# Patient Record
Sex: MALE | Race: OTHER | NOT HISPANIC OR LATINO | URBAN - METROPOLITAN AREA
[De-identification: names, ages, dates, MRNs, and addresses within clinical notes are randomized per-mention and may not be internally consistent; named-entity substitution may affect disease eponyms.]

---

## 2019-12-19 PROBLEM — Z00.00 ENCOUNTER FOR PREVENTIVE HEALTH EXAMINATION: Status: ACTIVE | Noted: 2019-12-19

## 2019-12-30 ENCOUNTER — OUTPATIENT (OUTPATIENT)
Dept: OUTPATIENT SERVICES | Facility: HOSPITAL | Age: 64
LOS: 1 days | Discharge: HOME | End: 2019-12-30

## 2019-12-30 DIAGNOSIS — C61 MALIGNANT NEOPLASM OF PROSTATE: ICD-10-CM

## 2019-12-31 ENCOUNTER — LABORATORY RESULT (OUTPATIENT)
Age: 64
End: 2019-12-31

## 2019-12-31 ENCOUNTER — RESULT REVIEW (OUTPATIENT)
Age: 64
End: 2019-12-31

## 2019-12-31 ENCOUNTER — APPOINTMENT (OUTPATIENT)
Dept: HEMATOLOGY ONCOLOGY | Facility: CLINIC | Age: 64
End: 2019-12-31
Payer: MEDICAID

## 2019-12-31 VITALS
WEIGHT: 237 LBS | OXYGEN SATURATION: 97 % | HEART RATE: 97 BPM | DIASTOLIC BLOOD PRESSURE: 81 MMHG | SYSTOLIC BLOOD PRESSURE: 122 MMHG | BODY MASS INDEX: 27.42 KG/M2 | HEIGHT: 78 IN

## 2019-12-31 LAB
BASOPHILS # BLD AUTO: 0 K/UL — SIGNIFICANT CHANGE UP (ref 0–0.2)
BASOPHILS NFR BLD AUTO: 0.8 % — SIGNIFICANT CHANGE UP (ref 0–2)
EOSINOPHIL # BLD AUTO: 0.2 K/UL — SIGNIFICANT CHANGE UP (ref 0–0.5)
EOSINOPHIL NFR BLD AUTO: 3.3 % — SIGNIFICANT CHANGE UP (ref 0–6)
HCT VFR BLD CALC: 38.3 % — LOW (ref 39–50)
HGB BLD-MCNC: 12.1 G/DL — LOW (ref 13–17)
LYMPHOCYTES # BLD AUTO: 1 K/UL — SIGNIFICANT CHANGE UP (ref 1–3.3)
LYMPHOCYTES # BLD AUTO: 17.2 % — SIGNIFICANT CHANGE UP (ref 13–44)
MCHC RBC-ENTMCNC: 28.9 PG — SIGNIFICANT CHANGE UP (ref 27–34)
MCHC RBC-ENTMCNC: 31.6 G/DL — LOW (ref 32–36)
MCV RBC AUTO: 91.6 FL — SIGNIFICANT CHANGE UP (ref 80–100)
MONOCYTES # BLD AUTO: 0.4 K/UL — SIGNIFICANT CHANGE UP (ref 0–0.9)
MONOCYTES NFR BLD AUTO: 6.5 % — SIGNIFICANT CHANGE UP (ref 2–14)
NEUTROPHILS # BLD AUTO: 4.4 K/UL — SIGNIFICANT CHANGE UP (ref 1.8–7.4)
NEUTROPHILS NFR BLD AUTO: 72.2 % — SIGNIFICANT CHANGE UP (ref 43–77)
PLATELET # BLD AUTO: 480 K/UL — HIGH (ref 150–400)
RBC # BLD: 4.18 M/UL — LOW (ref 4.2–5.8)
RBC # FLD: 14.5 % — SIGNIFICANT CHANGE UP (ref 10.3–14.5)
WBC # BLD: 6.1 K/UL — SIGNIFICANT CHANGE UP (ref 3.8–10.5)
WBC # FLD AUTO: 6.1 K/UL — SIGNIFICANT CHANGE UP (ref 3.8–10.5)

## 2019-12-31 PROCEDURE — 99204 OFFICE O/P NEW MOD 45 MIN: CPT

## 2019-12-31 NOTE — REASON FOR VISIT
[Initial Consultation] : an initial consultation [Family Member] : family member [FreeTextEntry2] : metastatic prostate cancer

## 2019-12-31 NOTE — HISTORY OF PRESENT ILLNESS
[de-identified] : Mr. SAVAGE is a 64 year old male presenting for an initial consultation regarding evaluation of and treatment options for metastatic prostate cancer. Has moved to [place] from [place], here to transfer care. \par \par Cancer hx is as follows per [facility]:\par \par Patient initially presented to [facility] on 11/15/19 with dyspnea and severe weight loss. CT chest revealed moderate to large bilateral pneumothoraces. Multifocal soft tissue infiltration of the chest wall lesions that were resulting in destruction of underlying ribs bilaterally. Multifocal cystic/cavity lung lesions with consolidated lung. Thoracic spine metastases with mild/moderate T6 compression fracture. The patient was seen by pulmonary and thoracic surgery. He underwent CT-guided chest tube placement. CT abdomen/pelvis on 11/21/19 revealed diffuse destructive osseous metastasis. Bulky retroperitoneal and lower pelvic lymphadenopathy. Severely enlarged prostate. PSA level greater than 5000. CT biopsy confirmed prostate cancer. The patient was initiated on Casodex on 11/21/19. He subsequently received Lupron on 11/25/19. He has also received IV Taxotere 75 mg/m2 on 11/26/19 which was complicated by mile neutropenia. The patient had a very long hospitalization and ultimately was transferred to [facility] given the persistent left-sided pneumothorax. 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 The patient underwent a talc pleurodesis at [facility]. He was ultimately discharged from the hospital on 12/16/19 after being hospitalized for close to 1 month. \par \par -- Casodex d/c on 12/17/19\par -- Lupron (11/25/29-on going) \par -- IV Taxotere 75 mg/m2 q 3 weeks (11/26/19-on going). Prednisone 5 mg BID. \par -- Chemotherapy induced leukopenia improved s/p Zarxio.\par -- Oxycodone given for pain \par -- Xgeva to be given pending dental clearance \par \par CT 11/15/19:\par -Bilateral large pneumothoraces and a large destructive right rib mass with diffuse bilateral infiltrates with cavitation. \par \par Pathology 11/18/19: \par Chest wall mass (needle biopsies):\par -Metastatic adenocarcinoma exhibiting features consistent with prostatic primary.  [de-identified] : Feels well today. \par Pain in right hip and anterior ribs.\par Managing pain well with oxycodone QD.\par Appetite is improving. \par Tolerating Lupron and Taxotere well. \par Has some pain in groin area, otherwise no urinary sx. \par Breathing has improved since recent hospitalization.

## 2019-12-31 NOTE — ASSESSMENT
[FreeTextEntry1] : 63 y/o male with prostate cancer metastatic to lung, chest wall, bone, and retroperitonea/pelvic lymph nodes. Currently on Lupron q 3 months and Taxotere q 3 weeks. \par \par Plan:\par -Continue Lupron, Taxotere, and prednisone \par -Start Xgeva \par -RADONC referral given \par -Will monitor PSA \par

## 2019-12-31 NOTE — PHYSICAL EXAM
[Normal] : no peripheral adenopathy appreciated [de-identified] : ambulatory walker  [de-identified] : mild gynecomastia  [de-identified] : healed biopsy scar left anterior chest, healed chest tube entry site left lateral chest

## 2019-12-31 NOTE — REVIEW OF SYSTEMS
[Negative] : Heme/Lymph [FreeTextEntry8] : groin pain  [FreeTextEntry9] : bone pain right hip, anterior ribs

## 2020-01-01 LAB
ALBUMIN SERPL ELPH-MCNC: 4.1 G/DL
ALP BLD-CCNC: 141 U/L
ALT SERPL-CCNC: 15 U/L
ANION GAP SERPL CALC-SCNC: 14 MMOL/L
AST SERPL-CCNC: 16 U/L
BILIRUB SERPL-MCNC: 0.3 MG/DL
BUN SERPL-MCNC: 15 MG/DL
CALCIUM SERPL-MCNC: 9.8 MG/DL
CHLORIDE SERPL-SCNC: 101 MMOL/L
CO2 SERPL-SCNC: 25 MMOL/L
CREAT SERPL-MCNC: 0.84 MG/DL
GLUCOSE SERPL-MCNC: 120 MG/DL
POTASSIUM SERPL-SCNC: 4.6 MMOL/L
PROT SERPL-MCNC: 7.1 G/DL
PSA FREE FLD-MCNC: 6 %
PSA FREE SERPL-MCNC: 24.1 NG/ML
PSA SERPL-MCNC: 401 NG/ML
SODIUM SERPL-SCNC: 140 MMOL/L

## 2020-01-06 ENCOUNTER — APPOINTMENT (OUTPATIENT)
Age: 65
End: 2020-01-06

## 2020-01-07 DIAGNOSIS — C78.00 SECONDARY MALIGNANT NEOPLASM OF UNSPECIFIED LUNG: ICD-10-CM

## 2020-01-07 DIAGNOSIS — C79.51 SECONDARY MALIGNANT NEOPLASM OF BONE: ICD-10-CM

## 2020-01-08 DIAGNOSIS — G89.3 NEOPLASM RELATED PAIN (ACUTE) (CHRONIC): ICD-10-CM

## 2020-01-09 ENCOUNTER — RX RENEWAL (OUTPATIENT)
Age: 65
End: 2020-01-09

## 2020-01-10 ENCOUNTER — RESULT REVIEW (OUTPATIENT)
Age: 65
End: 2020-01-10

## 2020-01-10 ENCOUNTER — APPOINTMENT (OUTPATIENT)
Age: 65
End: 2020-01-10

## 2020-01-10 LAB
BASOPHILS # BLD AUTO: 0.1 K/UL — SIGNIFICANT CHANGE UP (ref 0–0.2)
BASOPHILS NFR BLD AUTO: 0.6 % — SIGNIFICANT CHANGE UP (ref 0–2)
EOSINOPHIL # BLD AUTO: 0.1 K/UL — SIGNIFICANT CHANGE UP (ref 0–0.5)
EOSINOPHIL NFR BLD AUTO: 0.8 % — SIGNIFICANT CHANGE UP (ref 0–6)
HCT VFR BLD CALC: 34.2 % — LOW (ref 39–50)
HGB BLD-MCNC: 11.3 G/DL — LOW (ref 13–17)
LYMPHOCYTES # BLD AUTO: 1 K/UL — SIGNIFICANT CHANGE UP (ref 1–3.3)
LYMPHOCYTES # BLD AUTO: 7.7 % — LOW (ref 13–44)
MCHC RBC-ENTMCNC: 29.3 PG — SIGNIFICANT CHANGE UP (ref 27–34)
MCHC RBC-ENTMCNC: 33 G/DL — SIGNIFICANT CHANGE UP (ref 32–36)
MCV RBC AUTO: 88.8 FL — SIGNIFICANT CHANGE UP (ref 80–100)
MONOCYTES # BLD AUTO: 0.9 K/UL — SIGNIFICANT CHANGE UP (ref 0–0.9)
MONOCYTES NFR BLD AUTO: 7 % — SIGNIFICANT CHANGE UP (ref 2–14)
NEUTROPHILS # BLD AUTO: 10.5 K/UL — HIGH (ref 1.8–7.4)
NEUTROPHILS NFR BLD AUTO: 83.9 % — HIGH (ref 43–77)
PLATELET # BLD AUTO: 501 K/UL — HIGH (ref 150–400)
RBC # BLD: 3.86 M/UL — LOW (ref 4.2–5.8)
RBC # FLD: 14.3 % — SIGNIFICANT CHANGE UP (ref 10.3–14.5)
WBC # BLD: 12.5 K/UL — HIGH (ref 3.8–10.5)
WBC # FLD AUTO: 12.5 K/UL — HIGH (ref 3.8–10.5)

## 2020-01-10 RX ORDER — OXYCODONE AND ACETAMINOPHEN 10; 325 MG/1; MG/1
10-325 TABLET ORAL EVERY 8 HOURS
Qty: 42 | Refills: 0 | Status: DISCONTINUED | COMMUNITY
Start: 2020-01-08 | End: 2020-01-10

## 2020-01-13 DIAGNOSIS — Z51.11 ENCOUNTER FOR ANTINEOPLASTIC CHEMOTHERAPY: ICD-10-CM

## 2020-01-13 DIAGNOSIS — R11.2 NAUSEA WITH VOMITING, UNSPECIFIED: ICD-10-CM

## 2020-01-14 ENCOUNTER — APPOINTMENT (OUTPATIENT)
Dept: RADIATION ONCOLOGY | Facility: CLINIC | Age: 65
End: 2020-01-14
Payer: MEDICAID

## 2020-01-14 ENCOUNTER — OUTPATIENT (OUTPATIENT)
Dept: OUTPATIENT SERVICES | Facility: HOSPITAL | Age: 65
LOS: 1 days | Discharge: HOME | End: 2020-01-14
Payer: MEDICAID

## 2020-01-14 VITALS
TEMPERATURE: 98 F | DIASTOLIC BLOOD PRESSURE: 83 MMHG | BODY MASS INDEX: 27.19 KG/M2 | WEIGHT: 235 LBS | SYSTOLIC BLOOD PRESSURE: 133 MMHG | HEART RATE: 117 BPM | OXYGEN SATURATION: 90 % | HEIGHT: 78 IN

## 2020-01-14 DIAGNOSIS — Z87.438 PERSONAL HISTORY OF OTHER DISEASES OF MALE GENITAL ORGANS: ICD-10-CM

## 2020-01-14 DIAGNOSIS — K59.00 CONSTIPATION, UNSPECIFIED: ICD-10-CM

## 2020-01-14 DIAGNOSIS — Z78.9 OTHER SPECIFIED HEALTH STATUS: ICD-10-CM

## 2020-01-14 DIAGNOSIS — Z80.7 FAMILY HISTORY OF OTHER MALIGNANT NEOPLASMS OF LYMPHOID, HEMATOPOIETIC AND RELATED TISSUES: ICD-10-CM

## 2020-01-14 PROCEDURE — 77470 SPECIAL RADIATION TREATMENT: CPT | Mod: 26

## 2020-01-14 PROCEDURE — 77263 THER RADIOLOGY TX PLNG CPLX: CPT

## 2020-01-14 PROCEDURE — 99205 OFFICE O/P NEW HI 60 MIN: CPT | Mod: 25

## 2020-01-14 NOTE — HISTORY OF PRESENT ILLNESS
[FreeTextEntry1] : Mr. Potter is a 64 year old male with prostate cancer metastatic to lung, bone and lymph nodes.  Here for consideration of palliative radiation treatment to the right hip.\par \par ONCOLOGY HISTORY:\par He initially presented to [facility] in [place].  As per records, on 11/15/19 he presented initially at [facility] [place] [place] with dyspnea and sever weight loss. \par \par CT chest done on 11/15/19 revealed moderate to large bilateral pneumothoraces left greater than right. Multifocal soft tissue infiltration of the chest wall lesions that were resulting in destruction of underlying ribs bilaterally. Multifocal cystic/cavity lung lesions with consolidated lung. Thoracic spine metastases with mild/moderate T6 compression fracture. \par \par The patient was seen by pulmonary and thoracic surgery. He underwent CT-guided chest tube placement.  Needle biopsy of Chest wall mass done on 11/18/19, pathology showed metastatic adenocarcinoma exhibiting features consistent with prostatic primary.  \par \par CT abdomen/pelvis on 11/21/19 revealed diffuse destructive osseous metastasis. Bulky retroperitoneal and lower pelvic lymphadenopathy. Severely enlarged prostate. \par \par PSA on 11/21/19 was >5,000 ng/mL.\par \par He was initiated on Casodex on 11/21/19, and received Lupron on 11/25/19. He has also received IV Taxotere 75 mg/m2 on 11/26/19 which was complicated by mile neutropenia.\par \par The patient had a very long hospitalization and ultimately was transferred to [facility] given the persistent left-sided pneumothorax. 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 The patient underwent a talc pleurodesis at [facility].  He was ultimately discharged from the hospital on 12/16/19.  He continues to note pain along the left chest wall, which he feels coincided with timing of his interventions.  Casodex was discontinued on 12/17/19.\par \par He moved closer to family in [place].  He saw Dr. Oshea on 12/31/19.  Repeat PSA on 12/31/19 was 401 ng/mL.\par \par He continues on Lupron, taxotere, and prednisone; he was also started on Xgeva.  Currently, he reports pain to ribs, back, and right hip; his pain is relieved with oxycodone, but he notes significant constipation.  He reports cough, dyspnea when his pain medication is wearing off.  Denies cardiac chest pain.  Reports urinary frequency, incomplete emptying. Denies burning and hematuria.

## 2020-01-14 NOTE — HISTORY OF PRESENT ILLNESS
[FreeTextEntry1] : Mr. Potter is a 64 year old male with prostate cancer metastatic to lung, bone and lymph nodes.  Here for consideration of palliative radiation treatment to the right hip.\par \par ONCOLOGY HISTORY:\par He initially presented to [facility] in [place].  As per records, on 11/15/19 he presented initially at [facility] with dyspnea and sever weight loss. \par \par CT chest done on 11/15/19 revealed moderate to large bilateral pneumothoraces left greater than right. Multifocal soft tissue infiltration of the chest wall lesions that were resulting in destruction of underlying ribs bilaterally. Multifocal cystic/cavity lung lesions with consolidated lung. Thoracic spine metastases with mild/moderate T6 compression fracture. \par \par The patient was seen by pulmonary and thoracic surgery. He underwent CT-guided chest tube placement.  Needle biopsy of Chest wall mass done on 11/18/19, pathology showed metastatic adenocarcinoma exhibiting features consistent with prostatic primary.  \par \par CT abdomen/pelvis on 11/21/19 revealed diffuse destructive osseous metastasis. Bulky retroperitoneal and lower pelvic lymphadenopathy. Severely enlarged prostate. \par \par PSA on 11/21/19 was >5,000 ng/mL.\par \par He was initiated on Casodex on 11/21/19, and received Lupron on 11/25/19. He has also received IV Taxotere 75 mg/m2 on 11/26/19 which was complicated by mile neutropenia.\par \par The patient had a very long hospitalization and ultimately was transferred to [facility] given the persistent left-sided pneumothorax. 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 The patient underwent a talc pleurodesis at [facility].  He was ultimately discharged from the hospital on 12/16/19.  He continues to note pain along the left chest wall, which he feels coincided with timing of his interventions.  Casodex was discontinued on 12/17/19.\par \par He moved closer to family in [place].  He saw Dr. Oshea on 12/31/19.  Repeat PSA on 12/31/19 was 401 ng/mL.\par \par He continues on Lupron, taxotere, and prednisone; he was also started on Xgeva.  Currently, he reports pain to ribs, back, and right hip; his pain is relieved with oxycodone, but he notes significant constipation.  He reports cough, dyspnea when his pain medication is wearing off.  Denies cardiac chest pain.  Reports urinary frequency, incomplete emptying. Denies burning and hematuria.

## 2020-01-14 NOTE — PHYSICAL EXAM
[Normal] : oriented to person, place and time, the affect was normal, the mood was normal and not anxious [de-identified] : Tenderness to palpation along left lower lateral ribcage, right SI joint.  Shoulder ROM limited by chest wall/shoulder pain. [de-identified] : mild tenderness to palpation along low back [de-identified] : 4+/5 strength left hip flexion, 5/5 strength other UE / LE movements; gait aided by walker

## 2020-01-14 NOTE — VITALS
[Maximal Pain Intensity: 10/10] : 10/10 [Opioid] : opioid [Least Pain Intensity: 8/10] : 8/10 [60: Requires occasional assistance, but is able to care for most of his/her needs] : 60: Requires occasional assistance, but is able to care for most of his/her needs [ECOG Performance Status: 2 - Ambulatory and capable of all self care but unable to carry out any work activities] : Performance Status: 2 - Ambulatory and capable of all self care but unable to carry out any work activities. Up and about more than 50% of waking hours [Date: ____________] : Patient's last distress assessment performed on [unfilled]. [7 - Distress Level] : Distress Level: 7

## 2020-01-14 NOTE — LETTER CLOSING
[Consult Closing:] : Thank you for allowing me to participate in the care of this patient.  If you have any questions, please do not hesitate to contact me. [Sincerely yours,] : Sincerely yours, [FreeTextEntry3] : Perry Pascual MD\par Attending Physician\par Department of Radiation Medicine\par [facility] [place], [facility]\par \par \par Jason Sue \par [employer] [facility]\par

## 2020-01-14 NOTE — REVIEW OF SYSTEMS
[Fatigue] : fatigue [Recent Change In Weight] : ~T recent weight change [Cough] : cough [Shortness Of Breath] : shortness of breath [SOB on Exertion] : shortness of breath during exertion [Urinary Frequency] : urinary frequency [Constipation] : constipation [Joint Pain] : joint pain [Muscle Pain] : muscle pain [Disturbance Of Gait] : gait disturbance [Muscle Weakness] : muscle weakness [Constipation: Grade 2 - Persistent symptoms with regular use of laxatives or enemas; limiting instrumental ADL] : Constipation: Grade 2 - Persistent symptoms with regular use of laxatives or enemas; limiting instrumental ADL [Fatigue: Grade 2 - Fatigue not relieved by rest; limiting instrumental ADL] : Fatigue: Grade 2 - Fatigue not relieved by rest; limiting instrumental ADL [Cough: Grade 1 - Mild symptoms; nonprescription intervention indicated] : Cough: Grade 1 - Mild symptoms; nonprescription intervention indicated [Dyspnea: Grade 1 - Shortness of breath with moderate exertion] : Dyspnea: Grade 1 - Shortness of breath with moderate exertion [Patient Intake Form Reviewed] : Patient intake form was reviewed [Negative] : Heme/Lymph

## 2020-01-14 NOTE — REVIEW OF SYSTEMS
[Recent Change In Weight] : ~T recent weight change [Fatigue] : fatigue [Shortness Of Breath] : shortness of breath [SOB on Exertion] : shortness of breath during exertion [Cough] : cough [Urinary Frequency] : urinary frequency [Constipation] : constipation [Muscle Pain] : muscle pain [Joint Pain] : joint pain [Muscle Weakness] : muscle weakness [Disturbance Of Gait] : gait disturbance [Constipation: Grade 2 - Persistent symptoms with regular use of laxatives or enemas; limiting instrumental ADL] : Constipation: Grade 2 - Persistent symptoms with regular use of laxatives or enemas; limiting instrumental ADL [Fatigue: Grade 2 - Fatigue not relieved by rest; limiting instrumental ADL] : Fatigue: Grade 2 - Fatigue not relieved by rest; limiting instrumental ADL [Cough: Grade 1 - Mild symptoms; nonprescription intervention indicated] : Cough: Grade 1 - Mild symptoms; nonprescription intervention indicated [Dyspnea: Grade 1 - Shortness of breath with moderate exertion] : Dyspnea: Grade 1 - Shortness of breath with moderate exertion [Patient Intake Form Reviewed] : Patient intake form was reviewed [Negative] : Heme/Lymph

## 2020-01-14 NOTE — REASON FOR VISIT
[Other: ___] : [unfilled] [Consideration for Non-Curative Therapy] : consideration for non-curative therapy for [Bone Metastasis] : bone metastasis [Family Member] : family member [Prostate Cancer] : prostate cancer

## 2020-01-14 NOTE — LETTER CLOSING
[Consult Closing:] : Thank you for allowing me to participate in the care of this patient.  If you have any questions, please do not hesitate to contact me. [Sincerely yours,] : Sincerely yours, [FreeTextEntry3] : Perry Pascual MD\par Attending Physician\par Department of Radiation Medicine\par [facility] [place], [facility]\par \par \par Jason Sue \par [employer] at [facility]\par

## 2020-01-14 NOTE — PHYSICAL EXAM
[Normal] : oriented to person, place and time, the affect was normal, the mood was normal and not anxious [de-identified] : mild tenderness to palpation along low back [de-identified] : Tenderness to palpation along left lower lateral ribcage, right SI joint.  Shoulder ROM limited by chest wall/shoulder pain. [de-identified] : 4+/5 strength left hip flexion, 5/5 strength other UE / LE movements; gait aided by walker

## 2020-01-14 NOTE — DISEASE MANAGEMENT
[Clinical] : TNM Stage: c [IV] : IV [FreeTextEntry4] : metastatic prostate cancer [TTNM] : - [NTNM] : - [MTNM] : 1

## 2020-01-15 PROCEDURE — 77334 RADIATION TREATMENT AID(S): CPT | Mod: 26

## 2020-01-15 PROCEDURE — 77290 THER RAD SIMULAJ FIELD CPLX: CPT | Mod: 26

## 2020-01-17 PROCEDURE — 77280 THER RAD SIMULAJ FIELD SMPL: CPT | Mod: 26

## 2020-01-17 PROCEDURE — 77307 TELETHX ISODOSE PLAN CPLX: CPT | Mod: 26

## 2020-01-17 PROCEDURE — 77334 RADIATION TREATMENT AID(S): CPT | Mod: 26

## 2020-01-20 ENCOUNTER — INPATIENT (INPATIENT)
Facility: HOSPITAL | Age: 65
LOS: 29 days | Discharge: HOME | DRG: 177 | End: 2020-02-19
Attending: INTERNAL MEDICINE | Admitting: HOSPITALIST
Payer: MEDICAID

## 2020-01-20 ENCOUNTER — TRANSCRIPTION ENCOUNTER (OUTPATIENT)
Age: 65
End: 2020-01-20

## 2020-01-20 VITALS
HEIGHT: 78 IN | OXYGEN SATURATION: 95 % | WEIGHT: 235.01 LBS | TEMPERATURE: 98 F | HEART RATE: 102 BPM | DIASTOLIC BLOOD PRESSURE: 80 MMHG | RESPIRATION RATE: 20 BRPM | SYSTOLIC BLOOD PRESSURE: 130 MMHG

## 2020-01-20 DIAGNOSIS — J18.9 PNEUMONIA, UNSPECIFIED ORGANISM: ICD-10-CM

## 2020-01-20 LAB
ALBUMIN SERPL ELPH-MCNC: 2.8 G/DL — LOW (ref 3.3–5.2)
ALP SERPL-CCNC: 187 U/L — HIGH (ref 40–120)
ALT FLD-CCNC: 18 U/L — SIGNIFICANT CHANGE UP
ANION GAP SERPL CALC-SCNC: 15 MMOL/L — SIGNIFICANT CHANGE UP (ref 5–17)
APTT BLD: 31.9 SEC — SIGNIFICANT CHANGE UP (ref 27.5–36.3)
AST SERPL-CCNC: 17 U/L — SIGNIFICANT CHANGE UP
BILIRUB SERPL-MCNC: 0.6 MG/DL — SIGNIFICANT CHANGE UP (ref 0.4–2)
BUN SERPL-MCNC: 9 MG/DL — SIGNIFICANT CHANGE UP (ref 8–20)
CALCIUM SERPL-MCNC: 8.1 MG/DL — LOW (ref 8.6–10.2)
CHLORIDE SERPL-SCNC: 95 MMOL/L — LOW (ref 98–107)
CO2 SERPL-SCNC: 23 MMOL/L — SIGNIFICANT CHANGE UP (ref 22–29)
CREAT SERPL-MCNC: 0.53 MG/DL — SIGNIFICANT CHANGE UP (ref 0.5–1.3)
FLU A RESULT: SIGNIFICANT CHANGE UP
FLU A RESULT: SIGNIFICANT CHANGE UP
FLUAV AG NPH QL: SIGNIFICANT CHANGE UP
FLUBV AG NPH QL: SIGNIFICANT CHANGE UP
GLUCOSE SERPL-MCNC: 156 MG/DL — HIGH (ref 70–115)
HCT VFR BLD CALC: 32.2 % — LOW (ref 39–50)
HGB BLD-MCNC: 10.2 G/DL — LOW (ref 13–17)
INR BLD: 2.12 RATIO — HIGH (ref 0.88–1.16)
LACTATE BLDV-MCNC: 1.3 MMOL/L — SIGNIFICANT CHANGE UP (ref 0.5–2)
MCHC RBC-ENTMCNC: 27.8 PG — SIGNIFICANT CHANGE UP (ref 27–34)
MCHC RBC-ENTMCNC: 31.7 GM/DL — LOW (ref 32–36)
MCV RBC AUTO: 87.7 FL — SIGNIFICANT CHANGE UP (ref 80–100)
NRBC # BLD: 2 /100 WBCS — HIGH (ref 0–0)
NT-PROBNP SERPL-SCNC: 116 PG/ML — SIGNIFICANT CHANGE UP (ref 0–300)
PLATELET # BLD AUTO: 626 K/UL — HIGH (ref 150–400)
POTASSIUM SERPL-MCNC: 3.9 MMOL/L — SIGNIFICANT CHANGE UP (ref 3.5–5.3)
POTASSIUM SERPL-SCNC: 3.9 MMOL/L — SIGNIFICANT CHANGE UP (ref 3.5–5.3)
PROT SERPL-MCNC: 7.1 G/DL — SIGNIFICANT CHANGE UP (ref 6.6–8.7)
PROTHROM AB SERPL-ACNC: 24.9 SEC — HIGH (ref 10–12.9)
RBC # BLD: 3.67 M/UL — LOW (ref 4.2–5.8)
RBC # FLD: 16.2 % — HIGH (ref 10.3–14.5)
RSV RESULT: SIGNIFICANT CHANGE UP
RSV RNA RESP QL NAA+PROBE: SIGNIFICANT CHANGE UP
SODIUM SERPL-SCNC: 133 MMOL/L — LOW (ref 135–145)
TROPONIN T SERPL-MCNC: <0.01 NG/ML — SIGNIFICANT CHANGE UP (ref 0–0.06)
WBC # BLD: 7.91 K/UL — SIGNIFICANT CHANGE UP (ref 3.8–10.5)
WBC # FLD AUTO: 7.91 K/UL — SIGNIFICANT CHANGE UP (ref 3.8–10.5)

## 2020-01-20 PROCEDURE — 99223 1ST HOSP IP/OBS HIGH 75: CPT

## 2020-01-20 PROCEDURE — 99253 IP/OBS CNSLTJ NEW/EST LOW 45: CPT

## 2020-01-20 PROCEDURE — 93010 ELECTROCARDIOGRAM REPORT: CPT

## 2020-01-20 PROCEDURE — 99285 EMERGENCY DEPT VISIT HI MDM: CPT

## 2020-01-20 PROCEDURE — 32555 ASPIRATE PLEURA W/ IMAGING: CPT | Mod: LT

## 2020-01-20 PROCEDURE — 71275 CT ANGIOGRAPHY CHEST: CPT | Mod: 26

## 2020-01-20 PROCEDURE — 71045 X-RAY EXAM CHEST 1 VIEW: CPT | Mod: 26

## 2020-01-20 RX ORDER — VANCOMYCIN HCL 1 G
1000 VIAL (EA) INTRAVENOUS ONCE
Refills: 0 | Status: COMPLETED | OUTPATIENT
Start: 2020-01-20 | End: 2020-01-20

## 2020-01-20 RX ORDER — FENTANYL CITRATE 50 UG/ML
50 INJECTION INTRAVENOUS ONCE
Refills: 0 | Status: DISCONTINUED | OUTPATIENT
Start: 2020-01-20 | End: 2020-01-20

## 2020-01-20 RX ORDER — FINASTERIDE 5 MG/1
5 TABLET, FILM COATED ORAL DAILY
Refills: 0 | Status: DISCONTINUED | OUTPATIENT
Start: 2020-01-20 | End: 2020-02-19

## 2020-01-20 RX ORDER — VANCOMYCIN HCL 1 G
1000 VIAL (EA) INTRAVENOUS EVERY 12 HOURS
Refills: 0 | Status: DISCONTINUED | OUTPATIENT
Start: 2020-01-20 | End: 2020-01-21

## 2020-01-20 RX ORDER — PIPERACILLIN AND TAZOBACTAM 4; .5 G/20ML; G/20ML
3.38 INJECTION, POWDER, LYOPHILIZED, FOR SOLUTION INTRAVENOUS ONCE
Refills: 0 | Status: COMPLETED | OUTPATIENT
Start: 2020-01-20 | End: 2020-01-20

## 2020-01-20 RX ORDER — TAMSULOSIN HYDROCHLORIDE 0.4 MG/1
0.4 CAPSULE ORAL AT BEDTIME
Refills: 0 | Status: DISCONTINUED | OUTPATIENT
Start: 2020-01-20 | End: 2020-02-06

## 2020-01-20 RX ORDER — OXYCODONE HYDROCHLORIDE 5 MG/1
10 TABLET ORAL EVERY 6 HOURS
Refills: 0 | Status: DISCONTINUED | OUTPATIENT
Start: 2020-01-20 | End: 2020-01-27

## 2020-01-20 RX ADMIN — FENTANYL CITRATE 50 MICROGRAM(S): 50 INJECTION INTRAVENOUS at 13:15

## 2020-01-20 RX ADMIN — Medication 1000 MILLIGRAM(S): at 14:16

## 2020-01-20 RX ADMIN — OXYCODONE HYDROCHLORIDE 10 MILLIGRAM(S): 5 TABLET ORAL at 19:50

## 2020-01-20 RX ADMIN — PIPERACILLIN AND TAZOBACTAM 25 GRAM(S): 4; .5 INJECTION, POWDER, LYOPHILIZED, FOR SOLUTION INTRAVENOUS at 19:50

## 2020-01-20 RX ADMIN — PIPERACILLIN AND TAZOBACTAM 3.38 GRAM(S): 4; .5 INJECTION, POWDER, LYOPHILIZED, FOR SOLUTION INTRAVENOUS at 13:00

## 2020-01-20 RX ADMIN — Medication 250 MILLIGRAM(S): at 13:14

## 2020-01-20 RX ADMIN — PIPERACILLIN AND TAZOBACTAM 200 GRAM(S): 4; .5 INJECTION, POWDER, LYOPHILIZED, FOR SOLUTION INTRAVENOUS at 12:14

## 2020-01-20 RX ADMIN — FENTANYL CITRATE 50 MICROGRAM(S): 50 INJECTION INTRAVENOUS at 14:02

## 2020-01-20 RX ADMIN — Medication 5 MILLIGRAM(S): at 14:16

## 2020-01-20 RX ADMIN — TAMSULOSIN HYDROCHLORIDE 0.4 MILLIGRAM(S): 0.4 CAPSULE ORAL at 22:35

## 2020-01-20 NOTE — CONSULT NOTE ADULT - SUBJECTIVE AND OBJECTIVE BOX
REASON FOR Tele-evaluation    SUBJECTIVE: cough , fever     HPI: 63 yr old male with known history of prostate cancer undergoing chemo therapy with Dr Oshea , last Chemo was 1/14. patient reports recently being admitted [facility]  in [place] in December and was treated for fluid and  air around his lungs.  Patient denied any CP, admits to SOB denied any palpitations , denied any leg edema, denied phlegm , cough is moderate in intensity and  worsening since last week , subjective fever and chills        ROS: negative as per HPI      T(C): 36.5 (01-20-20 @ 08:51), Max: 36.5 (01-20-20 @ 08:51)  HR: 99 (01-20-20 @ 13:14) (87 - 102)  BP: 128/74 (01-20-20 @ 13:14) (128/74 - 137/78)  RR: 20 (01-20-20 @ 13:14) (20 - 20)  SpO2: 99% (01-20-20 @ 13:14) (95% - 99%)    PHYSICAL EXAM: evaluation precludes physical exam. Pertinent physical exam findings as per video conference with  nurse at bedside is as follow: AAO times 3 , some what uncomfortable , ill appearing but NAD , answers appropriately all questions                             10.2   7.91  )-----------( 626      ( 20 Jan 2020 09:56 )             32.2   01-20    133<L>  |  95<L>  |  9.0  ----------------------------<  156<H>  3.9   |  23.0  |  0.53    Ca    8.1<L>      20 Jan 2020 09:56    TPro  7.1  /  Alb  2.8<L>  /  TBili  0.6  /  DBili  x   /  AST  17  /  ALT  18  /  AlkPhos  187<H>  01-20        Radiology findings   IMPRESSION:    Diffuse bilateral areas of pulmonary consolidation with pneumatocele formation    Large loculated left pleural effusions with compressive atelectasis of the left lower lobe.        Medication reconciliation done                 Survey offered to patient -declined

## 2020-01-20 NOTE — H&P ADULT - PROBLEM SELECTOR PLAN 3
please consult Radiation Onc for possible continuation of Palliative Radiation   Hospice / Palliative Care consult  Prognosis poor  DVT prophylaxis: Lovenox  Cont Oxycodone PRN Q4hrs for pain  Cont Lactulose BID PRN for constipation

## 2020-01-20 NOTE — ED ADULT NURSE REASSESSMENT NOTE - NS ED NURSE REASSESS COMMENT FT1
pt resting comfortably, reports relief of pain and states he is feeling better. pt awaiting CTA, will continue to monitor.

## 2020-01-20 NOTE — H&P ADULT - HISTORY OF PRESENT ILLNESS
ED HPI: 64yoM; with pmh signif for Prostate Ca with mets to ribs(currently on chemo), HTN; now p/w cough, sob, fever x1 week, progressively worsening.  denies sick contacts. reports traveling from [place] after diagnosis of prostate ca complicated by b/l ptx in november and another hospitalization in december. denies cp/palp. denies abd pain: SOCIAL: No tobacco/illicit substance use/social/EtOH    Above ED HPI reviewed and noted: patient reports having a history of Bilateral Pneumonia approx 1.5 months prior with associated bilateral Pleural effusions which required draining. In addition to above, patient is scheduled palliative Radiation therapy today. He is on ADT (lupron), Taxotere and Xgeva for Prostate adenocarcinoma with Mets to Lung, Bone and Lymph. Pt was seen and evaluated on 1/20/20 at approx 9: 30pm. Delayed documentation completion.     ED HPI: 64yoM; with pmh signif for Prostate Ca with mets to ribs(currently on chemo), HTN; now p/w cough, sob, fever x1 week, progressively worsening.  denies sick contacts. reports traveling from [place] after diagnosis of prostate ca complicated by b/l ptx in november and another hospitalization in december. denies cp/palp. denies abd pain: SOCIAL: No tobacco/illicit substance use/social/EtOH    Above ED HPI reviewed and noted: patient reports having a history of Bilateral Pneumonia approx 1.5 months prior with associated bilateral Pleural effusions which required draining. In addition to above, patient is scheduled palliative Radiation therapy today. He is on ADT (lupron), Taxotere and Xgeva for Prostate adenocarcinoma with Mets to Lung, Bone and Lymph.

## 2020-01-20 NOTE — ED ADULT NURSE NOTE - OBJECTIVE STATEMENT
64 year old male a&ox3 comes to ED c/o worsening SOB over the past week. pt. went to urgent care and was sent here. pt. has Prostate CA, diagnosed last year, gets IV chemo every 3 weeks. pt. states he was due to start radiation tomorrow. pt. c/o productive cough with yellow phlegm, denies fevers, c/o chills. 64 year old male a&ox3 comes to ED c/o worsening SOB over the past week. pt. went to urgent care and was sent here. pt. has Prostate CA, diagnosed last year, gets IV chemo every 3 weeks. pt. last received chemo a week ago Friday called Taxotere. pt. states he was due to start radiation tomorrow. pt. c/o productive cough with yellow phlegm, denies fevers, c/o chills. pt. denies pain or discomfort at this time.

## 2020-01-20 NOTE — CONSULT NOTE ADULT - ASSESSMENT
63 yr old male with known history of prostate cancer undergoing chemo therapy with Dr Oshea , last Chemo was 1/14. with cough and fever- Ct showing bilateral consolidations- start Zosyn Vanco- do blood and sputum culture, supportive care   DVT PPX- INR therapeutic ? follow - not on any AC   Prostate cancer with bone mets - cont prednisone, Pain meds, Finasteride and Flomax   regular diet

## 2020-01-20 NOTE — ED ADULT NURSE REASSESSMENT NOTE - NS ED NURSE REASSESS COMMENT FT1
pt a+ox3, sitting comfortably in bed. pt in no apparent distress or discomfort, will continue to monitor.

## 2020-01-20 NOTE — H&P ADULT - NSHPPHYSICALEXAM_GEN_ALL_CORE
Vital Signs Last 24 Hrs  T(C): 36.5 (20 Jan 2020 08:51), Max: 36.5 (20 Jan 2020 08:51)  T(F): 97.7 (20 Jan 2020 08:51), Max: 97.7 (20 Jan 2020 08:51)  HR: 101 (20 Jan 2020 19:50) (87 - 102)  BP: 145/80 (20 Jan 2020 19:50) (128/74 - 145/80)  BP(mean): --  RR: 20 (20 Jan 2020 19:50) (20 - 20)  SpO2: 99% (20 Jan 2020 19:50) (95% - 99%)    Constitutional/General: Well developed, well nourished, vitals reviewed  EYE: PERRL, conjunctiva clear  ENT: Good dentition, oropharynx clear  NECK: No masses, thyroid normal  RESP: CTAB, no wheezes, no rhonchi, normal effort  CV: RRR, normal S1S2, no murmur, 2+ DP pulses, no JVD, no edema  ABDOMEN: Soft, nontender, no HSM  LYMPH: No cervical or supraclavicular adenopathy  SKIN: Warm, dry, no rashes  NEURO: CN II-XII intact grossly, sensation intact  PSYCHIATRIC: Oriented x3, normal mood and affect Vital Signs Last 24 Hrs  T(C): 36.5 (20 Jan 2020 08:51), Max: 36.5 (20 Jan 2020 08:51)  T(F): 97.7 (20 Jan 2020 08:51), Max: 97.7 (20 Jan 2020 08:51)  HR: 101 (20 Jan 2020 19:50) (87 - 102)  BP: 145/80 (20 Jan 2020 19:50) (128/74 - 145/80)  BP(mean): --  RR: 20 (20 Jan 2020 19:50) (20 - 20)  SpO2: 99% (20 Jan 2020 19:50) (95% - 99%)    Constitutional/General: Well developed, well nourished, vitals reviewed  EYE: PERRL, conjunctiva clear  ENT: Good dentition, oropharynx clear  NECK: No masses, thyroid normal  RESP: bilateral + wheezes, + rhonchi, normal effort  CV: RRR, normal S1S2, no murmur, 2+ DP pulses, no JVD, no edema  ABDOMEN: Soft, nontender, no HSM  LYMPH: No cervical or supraclavicular adenopathy  SKIN: Warm, dry, no rashes  NEURO: CN II-XII intact grossly, sensation intact  PSYCHIATRIC: Oriented x3, normal mood and affect

## 2020-01-20 NOTE — ED ADULT TRIAGE NOTE - CHIEF COMPLAINT QUOTE
Pt with Red card for prostate CA states he is here from Urgent care for SOB due to "cancer spots on my rib bone" and sister states "They said maybe he has a collapsed lung". Pt is in NAD during triage.

## 2020-01-20 NOTE — H&P ADULT - PROBLEM SELECTOR PLAN 1
Admit to medical unit with   cont Belgicao and Zosyn (recently hospitalized, immunocompromised).  cont supplemental oxygen as needed  Recommend f/u [facility] once treatement is completed  consider pulmonary consult   Advance directives discussed with patient: He would like his sister to make all decisions at this time (Gail Cruz [phone])   Duonebs Q6hrs PRN  Follow up Blood and Sputum Cultures.

## 2020-01-20 NOTE — ED PROVIDER NOTE - OBJECTIVE STATEMENT
64yoM; with pmh signif for Prostate Ca with mets to ribs(currently on chemo), HTN; now p/w cough, sob, fever x1 week, progressively worsening.  denies sick contacts. reports traveling from [place] after diagnosis of prostate ca complicated by b/l ptx in november and another hospitalization in december. denies cp/palp. denies abd pain  PMH: Prostate Ca  SOCIAL: No tobacco/illicit substance use/socialEtOH

## 2020-01-20 NOTE — ED PROVIDER NOTE - NS ED ROS FT
Constitutional: (+) fever  (+)chills  (-)sweats  Eyes/ENT: (-) blurry vision, (-) epistaxis  (-)rhinorrhea   (-) sore throat    Cardiovascular: (-) chest pain, (-) palpitations (-) edema   Respiratory: (+) cough, (-) shortness of breath   Gastrointestinal: (-)nausea  (-)vomiting, (-) diarrhea  (-) abdominal pain   :  (-)dysuria, (-)frequency, (-)urgency, (-)hematuria  Musculoskeletal: (-) neck pain, (-) back pain, (-) joint pain  Integumentary: (-) rash, (-) edema  Neurological: (-) headache, (-) altered mental status  (-)LOC

## 2020-01-20 NOTE — H&P ADULT - ASSESSMENT
63 y/o male with Prostate Adenocarcinoma with Mets to Bone, Lung and Lymp on ADT (Lupron), Taxotere, Xgeva therapy and awaiting implementation of Palliative radiation therapy today, 1/21/20.  Presented to ED with C/O worsening SOB. CTA chest completed ruled out PE. found to have Diffuse bilateral areas of pulmonary consolidation with pneumatocele formation and Large loculated left pleural effusions with compressive atelectasis of the left lower lobe.

## 2020-01-20 NOTE — ED ADULT NURSE NOTE - PAIN: PRESENCE, MLM
Procedure(s): LAPAROTOMY EXPLORATORY/ SIGMOIDECTOMY/ COLSTOMY 
externalize Shunt Ventricular-Peritoneal. 
 
general 
 
Anesthesia Post Evaluation Patient location during evaluation: PACU Note status: Adequate. Level of consciousness: responsive to verbal stimuli and sleepy but conscious Pain management: satisfactory to patient Airway patency: patent Anesthetic complications: no 
Cardiovascular status: acceptable Respiratory status: acceptable Hydration status: acceptable Comments: +Post-Anesthesia Evaluation and Assessment Patient: Jessica Agrawal MRN: [id number]  SSN: [id number] YOB: 1951  Age: 78 y.o. Sex: female Cardiovascular Function/Vital Signs /60   Pulse 99   Temp 35.7 °C (96.3 °F)   Resp 23   Ht 5' 8\" (1.727 m)   Wt 89.4 kg (197 lb 1.5 oz)   SpO2 96%   BMI 29.97 kg/m² Patient is status post Procedure(s): LAPAROTOMY EXPLORATORY/ SIGMOIDECTOMY/ COLSTOMY 
externalize Shunt Ventricular-Peritoneal. 
 
Nausea/Vomiting: Controlled. Postoperative hydration reviewed and adequate. Pain: 
Pain Scale 1: Numeric (0 - 10) (04/08/19 0724) Pain Intensity 1: 10 (04/08/19 0724) Managed. Neurological Status:  
Neuro (WDL): Within Defined Limits (03/29/19 1841) At baseline. Mental Status and Level of Consciousness: Arousable. Pulmonary Status:  
O2 Device: Nasal cannula;02 face tent (04/08/19 1826) Adequate oxygenation and airway patent. Complications related to anesthesia: None Post-anesthesia assessment completed. No concerns. I have evaluated the patient and the patient is stable and ready to be discharged from PACU . Signed By: Karuna Davalos MD  
 4/8/2019 Vitals Value Taken Time /57 4/8/2019  6:45 PM  
Temp 35.7 °C (96.3 °F) 4/8/2019  6:26 PM  
Pulse 93 4/8/2019  6:49 PM  
Resp 23 4/8/2019  6:49 PM  
SpO2 95 % 4/8/2019  6:49 PM  
Vitals shown include unvalidated device data.
denies pain/discomfort

## 2020-01-20 NOTE — ED ADULT NURSE REASSESSMENT NOTE - NS ED NURSE REASSESS COMMENT FT1
assumed care of pt at this time. pt a+ox3, resting comfortably in bed. pt denies any pain or discomfort, awaiting xray. VSS and in no apparent distress, will continue to monitor.

## 2020-01-20 NOTE — ED PROVIDER NOTE - PHYSICAL EXAMINATION
General:   mild resp distress  Head:     NC/AT, EOMI, oral mucosa moist  Neck:     trachea midline  Lungs:   decreased bs @ LLL  CVS:     S1S2, RRR, no m/g/r  Abd:     +BS, s/nt/nd, no organomegaly  Ext:    2+ radial and pedal pulses, no c/c/e  Neuro: AAOx3, no sensory/motor deficits

## 2020-01-21 DIAGNOSIS — J93.83 OTHER PNEUMOTHORAX: ICD-10-CM

## 2020-01-21 DIAGNOSIS — Z71.89 OTHER SPECIFIED COUNSELING: ICD-10-CM

## 2020-01-21 DIAGNOSIS — J90 PLEURAL EFFUSION, NOT ELSEWHERE CLASSIFIED: ICD-10-CM

## 2020-01-21 DIAGNOSIS — J18.9 PNEUMONIA, UNSPECIFIED ORGANISM: ICD-10-CM

## 2020-01-21 DIAGNOSIS — C61 MALIGNANT NEOPLASM OF PROSTATE: ICD-10-CM

## 2020-01-21 DIAGNOSIS — N40.0 BENIGN PROSTATIC HYPERPLASIA WITHOUT LOWER URINARY TRACT SYMPTOMS: ICD-10-CM

## 2020-01-21 DIAGNOSIS — G89.3 NEOPLASM RELATED PAIN (ACUTE) (CHRONIC): ICD-10-CM

## 2020-01-21 LAB
ALBUMIN SERPL ELPH-MCNC: 2.8 G/DL — LOW (ref 3.3–5.2)
ALP SERPL-CCNC: 187 U/L — HIGH (ref 40–120)
ALT FLD-CCNC: 16 U/L — SIGNIFICANT CHANGE UP
ANION GAP SERPL CALC-SCNC: 13 MMOL/L — SIGNIFICANT CHANGE UP (ref 5–17)
ANISOCYTOSIS BLD QL: SLIGHT — SIGNIFICANT CHANGE UP
AST SERPL-CCNC: 19 U/L — SIGNIFICANT CHANGE UP
BASOPHILS # BLD AUTO: 0 K/UL — SIGNIFICANT CHANGE UP (ref 0–0.2)
BASOPHILS NFR BLD AUTO: 0 % — SIGNIFICANT CHANGE UP (ref 0–2)
BILIRUB SERPL-MCNC: 0.7 MG/DL — SIGNIFICANT CHANGE UP (ref 0.4–2)
BUN SERPL-MCNC: 6 MG/DL — LOW (ref 8–20)
CALCIUM SERPL-MCNC: 7.9 MG/DL — LOW (ref 8.6–10.2)
CHLORIDE SERPL-SCNC: 94 MMOL/L — LOW (ref 98–107)
CO2 SERPL-SCNC: 24 MMOL/L — SIGNIFICANT CHANGE UP (ref 22–29)
CREAT SERPL-MCNC: 0.39 MG/DL — LOW (ref 0.5–1.3)
EOSINOPHIL # BLD AUTO: 0 K/UL — SIGNIFICANT CHANGE UP (ref 0–0.5)
EOSINOPHIL NFR BLD AUTO: 0 % — SIGNIFICANT CHANGE UP (ref 0–6)
GIANT PLATELETS BLD QL SMEAR: PRESENT — SIGNIFICANT CHANGE UP
GLUCOSE SERPL-MCNC: 145 MG/DL — HIGH (ref 70–115)
HCT VFR BLD CALC: 29.9 % — LOW (ref 39–50)
HCV AB S/CO SERPL IA: 0.11 S/CO — SIGNIFICANT CHANGE UP (ref 0–0.99)
HCV AB SERPL-IMP: SIGNIFICANT CHANGE UP
HGB BLD-MCNC: 9.5 G/DL — LOW (ref 13–17)
HYPOCHROMIA BLD QL: SLIGHT — SIGNIFICANT CHANGE UP
LYMPHOCYTES # BLD AUTO: 0.52 K/UL — LOW (ref 1–3.3)
LYMPHOCYTES # BLD AUTO: 5.3 % — LOW (ref 13–44)
MACROCYTES BLD QL: SLIGHT — SIGNIFICANT CHANGE UP
MANUAL SMEAR VERIFICATION: SIGNIFICANT CHANGE UP
MCHC RBC-ENTMCNC: 27.8 PG — SIGNIFICANT CHANGE UP (ref 27–34)
MCHC RBC-ENTMCNC: 31.8 GM/DL — LOW (ref 32–36)
MCV RBC AUTO: 87.4 FL — SIGNIFICANT CHANGE UP (ref 80–100)
METAMYELOCYTES # FLD: 1.8 % — HIGH (ref 0–0)
MICROCYTES BLD QL: SLIGHT — SIGNIFICANT CHANGE UP
MONOCYTES # BLD AUTO: 0.86 K/UL — SIGNIFICANT CHANGE UP (ref 0–0.9)
MONOCYTES NFR BLD AUTO: 8.8 % — SIGNIFICANT CHANGE UP (ref 2–14)
MYELOCYTES NFR BLD: 1.8 % — HIGH (ref 0–0)
NEUTROPHILS # BLD AUTO: 7.9 K/UL — HIGH (ref 1.8–7.4)
NEUTROPHILS NFR BLD AUTO: 78.9 % — HIGH (ref 43–77)
NEUTS BAND # BLD: 1.7 % — SIGNIFICANT CHANGE UP (ref 0–8)
PLAT MORPH BLD: ABNORMAL
PLATELET # BLD AUTO: 613 K/UL — HIGH (ref 150–400)
PLATELET COUNT - ESTIMATE: ABNORMAL
POIKILOCYTOSIS BLD QL AUTO: SLIGHT — SIGNIFICANT CHANGE UP
POLYCHROMASIA BLD QL SMEAR: SLIGHT — SIGNIFICANT CHANGE UP
POTASSIUM SERPL-MCNC: 3.7 MMOL/L — SIGNIFICANT CHANGE UP (ref 3.5–5.3)
POTASSIUM SERPL-SCNC: 3.7 MMOL/L — SIGNIFICANT CHANGE UP (ref 3.5–5.3)
PROT SERPL-MCNC: 7.1 G/DL — SIGNIFICANT CHANGE UP (ref 6.6–8.7)
RBC # BLD: 3.42 M/UL — LOW (ref 4.2–5.8)
RBC # FLD: 16.5 % — HIGH (ref 10.3–14.5)
RBC BLD AUTO: ABNORMAL
SODIUM SERPL-SCNC: 131 MMOL/L — LOW (ref 135–145)
VARIANT LYMPHS # BLD: 1.7 % — SIGNIFICANT CHANGE UP (ref 0–6)
WBC # BLD: 9.8 K/UL — SIGNIFICANT CHANGE UP (ref 3.8–10.5)
WBC # FLD AUTO: 9.8 K/UL — SIGNIFICANT CHANGE UP (ref 3.8–10.5)

## 2020-01-21 PROCEDURE — 99223 1ST HOSP IP/OBS HIGH 75: CPT

## 2020-01-21 PROCEDURE — 99233 SBSQ HOSP IP/OBS HIGH 50: CPT

## 2020-01-21 PROCEDURE — 99252 IP/OBS CONSLTJ NEW/EST SF 35: CPT

## 2020-01-21 RX ORDER — POLYETHYLENE GLYCOL 3350 17 G/17G
17 POWDER, FOR SOLUTION ORAL AT BEDTIME
Refills: 0 | Status: DISCONTINUED | OUTPATIENT
Start: 2020-01-21 | End: 2020-02-19

## 2020-01-21 RX ORDER — LACTULOSE 10 G/15ML
10 SOLUTION ORAL
Refills: 0 | Status: DISCONTINUED | OUTPATIENT
Start: 2020-01-21 | End: 2020-02-19

## 2020-01-21 RX ORDER — IPRATROPIUM/ALBUTEROL SULFATE 18-103MCG
3 AEROSOL WITH ADAPTER (GRAM) INHALATION EVERY 6 HOURS
Refills: 0 | Status: DISCONTINUED | OUTPATIENT
Start: 2020-01-21 | End: 2020-02-19

## 2020-01-21 RX ORDER — AZITHROMYCIN 500 MG/1
500 TABLET, FILM COATED ORAL EVERY 24 HOURS
Refills: 0 | Status: DISCONTINUED | OUTPATIENT
Start: 2020-01-22 | End: 2020-01-22

## 2020-01-21 RX ORDER — AZITHROMYCIN 500 MG/1
TABLET, FILM COATED ORAL
Refills: 0 | Status: DISCONTINUED | OUTPATIENT
Start: 2020-01-21 | End: 2020-01-22

## 2020-01-21 RX ORDER — PIPERACILLIN AND TAZOBACTAM 4; .5 G/20ML; G/20ML
3.38 INJECTION, POWDER, LYOPHILIZED, FOR SOLUTION INTRAVENOUS EVERY 8 HOURS
Refills: 0 | Status: DISCONTINUED | OUTPATIENT
Start: 2020-01-21 | End: 2020-01-21

## 2020-01-21 RX ORDER — ENOXAPARIN SODIUM 100 MG/ML
40 INJECTION SUBCUTANEOUS DAILY
Refills: 0 | Status: DISCONTINUED | OUTPATIENT
Start: 2020-01-21 | End: 2020-01-22

## 2020-01-21 RX ORDER — AZITHROMYCIN 500 MG/1
500 TABLET, FILM COATED ORAL ONCE
Refills: 0 | Status: COMPLETED | OUTPATIENT
Start: 2020-01-21 | End: 2020-01-21

## 2020-01-21 RX ORDER — CEFTRIAXONE 500 MG/1
1000 INJECTION, POWDER, FOR SOLUTION INTRAMUSCULAR; INTRAVENOUS EVERY 24 HOURS
Refills: 0 | Status: DISCONTINUED | OUTPATIENT
Start: 2020-01-21 | End: 2020-01-22

## 2020-01-21 RX ADMIN — OXYCODONE HYDROCHLORIDE 10 MILLIGRAM(S): 5 TABLET ORAL at 18:02

## 2020-01-21 RX ADMIN — FINASTERIDE 5 MILLIGRAM(S): 5 TABLET, FILM COATED ORAL at 11:03

## 2020-01-21 RX ADMIN — PIPERACILLIN AND TAZOBACTAM 25 GRAM(S): 4; .5 INJECTION, POWDER, LYOPHILIZED, FOR SOLUTION INTRAVENOUS at 05:25

## 2020-01-21 RX ADMIN — Medication 3 MILLILITER(S): at 19:42

## 2020-01-21 RX ADMIN — Medication 3 MILLILITER(S): at 14:50

## 2020-01-21 RX ADMIN — OXYCODONE HYDROCHLORIDE 10 MILLIGRAM(S): 5 TABLET ORAL at 01:22

## 2020-01-21 RX ADMIN — Medication 5 MILLIGRAM(S): at 05:25

## 2020-01-21 RX ADMIN — Medication 5 MILLIGRAM(S): at 18:03

## 2020-01-21 RX ADMIN — POLYETHYLENE GLYCOL 3350 17 GRAM(S): 17 POWDER, FOR SOLUTION ORAL at 21:14

## 2020-01-21 RX ADMIN — AZITHROMYCIN 255 MILLIGRAM(S): 500 TABLET, FILM COATED ORAL at 11:02

## 2020-01-21 RX ADMIN — OXYCODONE HYDROCHLORIDE 10 MILLIGRAM(S): 5 TABLET ORAL at 11:01

## 2020-01-21 RX ADMIN — OXYCODONE HYDROCHLORIDE 10 MILLIGRAM(S): 5 TABLET ORAL at 06:26

## 2020-01-21 RX ADMIN — ENOXAPARIN SODIUM 40 MILLIGRAM(S): 100 INJECTION SUBCUTANEOUS at 11:03

## 2020-01-21 RX ADMIN — TAMSULOSIN HYDROCHLORIDE 0.4 MILLIGRAM(S): 0.4 CAPSULE ORAL at 21:14

## 2020-01-21 RX ADMIN — Medication 250 MILLIGRAM(S): at 05:26

## 2020-01-21 RX ADMIN — CEFTRIAXONE 100 MILLIGRAM(S): 500 INJECTION, POWDER, FOR SOLUTION INTRAMUSCULAR; INTRAVENOUS at 11:02

## 2020-01-21 RX ADMIN — OXYCODONE HYDROCHLORIDE 10 MILLIGRAM(S): 5 TABLET ORAL at 05:25

## 2020-01-21 RX ADMIN — OXYCODONE HYDROCHLORIDE 10 MILLIGRAM(S): 5 TABLET ORAL at 00:48

## 2020-01-21 NOTE — CONSULT NOTE ADULT - ASSESSMENT
This is an 65YO M with Metastatic Prostate Cancer to BONE/LUNG/lympth admitted with B/L Pneumonia and severe pain

## 2020-01-21 NOTE — PHYSICAL THERAPY INITIAL EVALUATION ADULT - GENERAL OBSERVATIONS, REHAB EVAL
Pt received in bed, + IV Loc, + NC O2 with , in NAD, in 0/10 pain just c/o of stiffness, but agreeable to PT evaluation

## 2020-01-21 NOTE — CONSULT NOTE ADULT - PROBLEM SELECTOR RECOMMENDATION 9
Antibiotics changed to Rocephin until 1/26 and Zithromycin until 1/25  CT Chest-Diffuse B/L areas of pulmonary consolidation with peumocele formation  Large Loculated L Lung pleural effusion-on CT holding off on Chest Tube placement Antibiotics changed to Rocephin until 1/26 and Zithromax until 1/25  CT Chest-Diffuse B/L areas of pulmonary consolidation with pneumocele formation  Large Loculated L Lung pleural effusion-on CT holding off on Chest Tube placement

## 2020-01-21 NOTE — PROGRESS NOTE ADULT - ASSESSMENT
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 CTA chest completed ruled out PE. found to have Diffuse bilateral areas of pulmonary consolidation with pneumatocele formation and Large loculated left pleural effusions with compressive atelectasis of the left lower lobe. thoracic sx cs placed.     #Bilateral pneumonia, complicated by LT sided large loculated pleural effusion + acute resp failure w/ hypoxia, stable - in immunocompormised pt. flu panel neg. deescalate abx to rocephin + zithro started 1/21. last day rocephin 1/26, last day zithro 1/25. supplemental o2 - titrate to RA - not baseline dependent. repeat ct chest 6wks. pulm cs if no significant improvement wi 24hrs. nebs. blood cx 1/20 pending x2. sputum pending collection. cts cs appreciated - small fluid pocket on POCUS - currently holding off on chest tube as pt stable. .     #hyponatremia, mild, asymptomatic - trend    #Prostate CA, stable - was due to start palliative radiation tx 1/21. palliative cs pending. daphney cs pending. Plan: please consult Radiation Onc for possible continuation of Palliative Radiation. poor prognosis - hospice candidate if pt + family accepting.     #anemia, asymptomatic, stable - baseline hgb 11-12, no acute/active s/s of blood loss, in setting elevated inr - unclear etiology. trend pt/inr. vitamin k if plan for chest tube. outpt fu.     #dvt ppx. lovenox    #dispo. sister to make all decisions at this time (Gail Cruz [phone]) per pt. pending resolution resp failure + clearance by cts, likely 2-3d. PT pending    #outpt fu. pmd. onc, rad onc. 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 CTA chest completed ruled out PE. found to have Diffuse bilateral areas of pulmonary consolidation with pneumatocele formation and Large loculated left pleural effusions with compressive atelectasis of the left lower lobe. thoracic sx cs placed.     #Bilateral pneumonia, complicated by LT sided large loculated pleural effusion + acute resp failure w/ hypoxia, stable - in immunocompormised pt. flu panel neg. deescalate abx to rocephin + zithro started 1/21. last day rocephin 1/26, last day zithro 1/25. supplemental o2 - titrate to RA - not baseline dependent. repeat ct chest 6wks. pulm cs if no significant improvement wi 24hrs. nebs. blood cx 1/20 pending x2. sputum pending collection. cts cs appreciated - small fluid pocket on POCUS - currently holding off on chest tube as pt stable. .     #hyponatremia, mild, asymptomatic - trend    #Prostate CA, stable - was due to start palliative radiation tx 1/21. palliative cs pending. daphney cs pending. rad onc cs pending.     #anemia, asymptomatic, stable - baseline hgb 11-12, no acute/active s/s of blood loss, in setting elevated inr - unclear etiology. trend pt/inr. vitamin k if plan for chest tube. outpt fu.     #dvt ppx. lovenox    #dispo. sister to make all decisions at this time (Gail Cruz [phone]) per pt. pending resolution resp failure + clearance by cts, likely 2-3d. PT pending    #outpt fu. pmd. onc, rad onc. 65 y/o male with Prostate Adenocarcinoma with Mets to Bone, Lung and Lymp on ADT (Lupron), Taxotere, Xgeva therapy and awaiting implementation of Palliative radiation therapy presented to ED with C/O worsening SOB. CTA chest completed ruled out PE. found to have Diffuse bilateral areas of pulmonary consolidation with pneumatocele formation and Large loculated left pleural effusions with compressive atelectasis of the left lower lobe. thoracic sx cs placed.     #Bilateral pneumonia, complicated by LT sided large loculated pleural effusion + acute resp failure w/ hypoxia, stable - in immunocompormised pt. flu panel neg. deescalate abx to rocephin + zithro started 1/21. last day rocephin 1/26, last day zithro 1/25. supplemental o2 - titrate to RA - not baseline dependent. repeat ct chest 6wks. pulm cs if no significant improvement wi 24hrs. nebs. blood cx 1/20 pending x2. sputum pending collection. cts cs appreciated - small fluid pocket on POCUS - currently holding off on chest tube as pt stable. .     #hyponatremia, mild, asymptomatic - trend    #Prostate CA, stable - was due to start palliative radiation tx 1/21. palliative cs pending. daphney cs appreciated - cont daily prednisone. psa responding to current tx w/ lupron, docetaxel, xgeva - cont on dc. rad onc cs pending.     #anemia, asymptomatic, stable - baseline hgb 11-12, no acute/active s/s of blood loss, in setting elevated inr - unclear etiology. trend pt/inr. vitamin k if plan for chest tube. outpt fu.     #dvt ppx. lovenox    #dispo. sister to make all decisions at this time (Gail Cruz [phone]) per pt. pending resolution resp failure + clearance by cts, likely 2-3d. PT - no needs    #outpt fu. pmd. onc, rad onc.

## 2020-01-21 NOTE — CONSULT NOTE ADULT - PROBLEM SELECTOR RECOMMENDATION 6
It is clear, that Patient is still in aggressive Oncologic chemotherapeutic plan.  Does not have any plans to discontinue including Radiation Treatments  Hospice cannot be consulted until Patient's Chemotherapeutics are completed or discontinued  All GOC conversations are to include Gail Cruz (sister)  Tried reaching her by phone-line just continued to ring-NO PICK-UP or voice mail

## 2020-01-21 NOTE — CONSULT NOTE ADULT - ATTENDING COMMENTS
patient seen and examined. CT chest reviewed. CT chest shows loculated left pleural effusion. There appears to be two distinct areas in the chest. One collection is anterior and near the lingula and the other collection in closer towards the base of the diaphragm.  There are bilateral pneumatocele present right greater than left.     Patient states that he has had bilateral chest procedures does while he was living in [place]. From what he describes, he had VATS surgery with some sort of pleurodesis. I do not see any staple lines to indicate a wedge resection was performed. He does have multiple incisions on the left chest.   The plan will be to place a pigtail catheter in the lower collection once INR has normalized to see if we can get that lung expanded. He feels comfortable on 2 liters nasal cannula.     Will cont to follow along with you, thank you for the consult.
COUNSELING:    Face to face meeting to discuss Advanced Care Planning - Time Spent ______ Minutes.  See goals of care note.    More than 50% time spent in counseling and coordinating care. ___40___ Minutes.     Thank you for the opportunity to assist with the care of this patient.   [facility] [phone].

## 2020-01-21 NOTE — PATIENT PROFILE ADULT - NSPROMEDSPATCH_GEN_A_NUR
What Is The Reason For Today's Visit?: Full Body Skin Examination What Is The Reason For Today's Visit? (Being Monitored For X): the development of new lesions How Severe Are Your Spot(S)?: moderate none

## 2020-01-21 NOTE — CONSULT NOTE ADULT - PROBLEM SELECTOR RECOMMENDATION 4
ABX  Rocephin and Zithromax  Holding off on calling surgery for CT placement- symptoms improving at present  PT/INR > may need Vit K before CT placement  F/U CT and lab work Patient had been on Short acting Oxycodone 5mg Q8h at home  Oxycodone > to 10 mg Q6 ATC (Severe pain with End of Dose (Failure)  Recommend starting Fentanyl 25 mcg/hr patch for long acting opioid pain relief  Add Tylenol Q8h, Celebrex 200 BID for MUltimodal Analgesia  Using TENS unit with relief of musculoskeletal delmi  continue with Prednisone 5mg BID

## 2020-01-21 NOTE — PHYSICAL THERAPY INITIAL EVALUATION ADULT - PERTINENT HX OF CURRENT PROBLEM, REHAB EVAL
65 y/o male with Prostate Adenocarcinoma with Mets to Bone, Lung and Lymph, presents with c/o of worsening SOB. (-) PE.  Found to have bilateral pneumonia complicated by left pleaural effusion

## 2020-01-21 NOTE — CONSULT NOTE ADULT - ASSESSMENT
64 year old gentleman with metastatic prostate cancer, on treatment with Lupron/Docetaxel/Prednisone & Xgeva who is admitted with bilateral pneumonia, and large loculated pleural effusion    1)Bilateral pneumonia  continue zithromax and ceftriaxone    2)Left pleural effusion  -CT surgery on board, plan for chest tube placement    3)Elevated PT/INR  -trial of vitamin K 5mg PO or subQ daily x 2 doses    4)Metastatic prostate cancer   -on treatment with Lupron/Docetaxel/prednisone / Xgeva  -continue prednisone 5 mg daily 64 year old gentleman with metastatic prostate cancer, on treatment with Lupron/Docetaxel/Prednisone & Xgeva who is admitted with bilateral pneumonia, and large loculated pleural effusion    1)Bilateral pneumonia  continue zithromax and ceftriaxone    2)Left pleural effusion  -CT surgery is following,     3)Elevated PT/INR  -consider trial of vitamin K 5mg PO or subQ daily x 2 doses    4)Metastatic prostate cancer   -on treatment with Lupron/Docetaxel/prednisone / Xgeva  -continue prednisone 5 mg daily  -his PSA has responded to treatment and plan is to continue treatment as outpatient    Case discussed with Dr. Silva.

## 2020-01-21 NOTE — CONSULT NOTE ADULT - PROBLEM SELECTOR RECOMMENDATION 2
Currently on Chemotherapy and Hormonal Lupron-causing hot flashes.   Taxotere and Xgeva with Dr. Dayo England note appreciated  Goal is to pursue aggressive oncologic treatment

## 2020-01-21 NOTE — CONSULT NOTE ADULT - PROBLEM SELECTOR RECOMMENDATION 5
Patient had been on Short acting Oxycodone 5mg Q8h at home  Oxycodone > to 10 mg Q6 ATC (Severe pain with End of Dose (Failure)  Recommend starting Fentanyl 25 mcg/hr patch for long acting opioid pain relief  Add Tylenol Q8h, Celebrex 200 BID for MUltimodal Analgesia  Using TENS unit with relief of musculoskeletal delmi  continue with Prednisone 5mg BID It is clear, that Patient is still in aggressive Oncologic chemotherapeutic plan.  Does not have any plans to discontinue including Radiation Treatments  Hospice cannot be consulted until Patient's Chemotherapeutics are completed or discontinued  All GOC conversations are to include Gail Cruz (sister)  Tried reaching her by phone-line just continued to ring-NO PICK-UP or voice mail

## 2020-01-21 NOTE — CONSULT NOTE ADULT - PROBLEM SELECTOR RECOMMENDATION 3
Left sided  ABX Rocephin and Zithromax  Holding off calling CT Surgery for CT placement because symptoms are improving at present. PT/INR >ID recommending ViT K before CT placed

## 2020-01-21 NOTE — PROGRESS NOTE ADULT - SUBJECTIVE AND OBJECTIVE BOX
CC:    Patient seen and examined at the bedside. No acute overnight events. admitted yesterday. Complaining of - today. Denies fever/chills, headache, lightheadedness, dizziness, chest pain, palpitations, shortness of breath, cough, abd pain, nausea/vomiting/diarrhea, muscle pain.      =========================================================================================  T(C): 36.9 (01-21-20 @ 04:53), Max: 36.9 (01-21-20 @ 04:53)  HR: 102 (01-21-20 @ 04:53) (87 - 102)  BP: 108/63 (01-21-20 @ 07:44) (108/63 - 145/80)  RR: 19 (01-21-20 @ 04:53) (19 - 20)  SpO2: 97% (01-21-20 @ 07:44) (97% - 99%)    PHYSICAL EXAM.    GEN - appears age appropriate. well nourished. pleasant. no distress.   HEENT - NCAT, EOMI, BETITO  RESP - CTA BL, no wheeze/stridor/rhonchi/crackles. not on supplemental O2. able to speak in full sentences without distress.   CARDIO - NS1S2, RRR. No murmurs/rubs/gallops.  ABD - Soft/Non tender/Non distended. Normal BS x4 quadrants. no guarding/rebound tenderness.  Ext - No LG.  MSK - BL 5/5 strength on upper and lower extremities.   Neuro - AAOx3. cn 2-12 grossly intact  Psych - normal affect  Skin - c/d/i. no rashes/lesions      I&O's Summary    Daily     Daily     =========================================================================================  LABS.    01-20    133<L>  |  95<L>  |  9.0  ----------------------------<  156<H>  3.9   |  23.0  |  0.53    Ca    8.1<L>      20 Jan 2020 09:56    TPro  7.1  /  Alb  2.8<L>  /  TBili  0.6  /  DBili  x   /  AST  17  /  ALT  18  /  AlkPhos  187<H>  01-20                          10.2   7.91  )-----------( 626      ( 20 Jan 2020 09:56 )             32.2     LIVER FUNCTIONS - ( 20 Jan 2020 09:56 )  Alb: 2.8 g/dL / Pro: 7.1 g/dL / ALK PHOS: 187 U/L / ALT: 18 U/L / AST: 17 U/L / GGT: x           PT/INR - ( 20 Jan 2020 09:56 )   PT: 24.9 sec;   INR: 2.12 ratio         PTT - ( 20 Jan 2020 09:56 )  PTT:31.9 sec  CARDIAC MARKERS ( 20 Jan 2020 09:56 )  x     / <0.01 ng/mL / x     / x     / x                =========================================================================================  IMAGING.     =========================================================================================    DIET.    Diet, Regular (01-20-20 @ 18:42)      =========================================================================================    HOME MEDS.    Home Medications:  finasteride 5 mg oral tablet: 1 tab(s) orally once a day (20 Jan 2020 19:39)  lactulose:  (21 Jan 2020 07:32)  Lupron 5 mg/mL subcutaneous solution:  (21 Jan 2020 07:29)  oxyCODONE 10 mg oral tablet: 1 tab(s) orally every 6 hours (20 Jan 2020 19:39)  Percocet 10/325 oral tablet: 1 tab(s) orally every 6 hours (21 Jan 2020 07:31)  predniSONE 5 mg oral tablet: 1 tab(s) orally 2 times a day (21 Jan 2020 07:30)  Taxotere:  (21 Jan 2020 07:29)  Xgeva 120 mg/1.7 mL subcutaneous solution:  (21 Jan 2020 07:30)      =========================================================================================    HOSPITAL MEDS.    MEDICATIONS  (STANDING):  albuterol/ipratropium for Nebulization 3 milliLiter(s) Nebulizer every 6 hours  enoxaparin Injectable 40 milliGRAM(s) SubCutaneous daily  finasteride 5 milliGRAM(s) Oral daily  oxyCODONE    IR 10 milliGRAM(s) Oral every 6 hours  piperacillin/tazobactam IVPB.. 3.375 Gram(s) IV Intermittent every 8 hours  predniSONE   Tablet 5 milliGRAM(s) Oral two times a day  tamsulosin 0.4 milliGRAM(s) Oral at bedtime  vancomycin  IVPB 1000 milliGRAM(s) IV Intermittent every 12 hours    MEDICATIONS  (PRN):  lactulose Syrup 10 Gram(s) Oral two times a day PRN Constipation CC: pna    Patient seen and examined at the bedside. No acute overnight events. admitted yesterday. Complaining of mild sob today. Denies fever/chills, headache, lightheadedness, dizziness, chest pain, palpitations, cough, abd pain, nausea/vomiting/diarrhea, muscle pain.      =========================================================================================  T(C): 36.9 (01-21-20 @ 04:53), Max: 36.9 (01-21-20 @ 04:53)  HR: 102 (01-21-20 @ 04:53) (87 - 102)  BP: 108/63 (01-21-20 @ 07:44) (108/63 - 145/80)  RR: 19 (01-21-20 @ 04:53) (19 - 20)  SpO2: 97% (01-21-20 @ 07:44) (97% - 99%)    PHYSICAL EXAM.    GEN - appears age appropriate. well nourished. pleasant. no distress.   HEENT - NCAT, EOMI, BETITO  RESP - COOPER. no wheeze/stridor/rhonchi/crackles. on supplemental O2. able to speak in full sentences without distress.   CARDIO - NS1S2, RRR. No murmurs/rubs/gallops.  ABD - Soft/Non tender/Non distended. Normal BS x4 quadrants. no guarding/rebound tenderness.  Ext - No LG.  MSK - BL 5/5 strength on upper and lower extremities.   Neuro - AAOx3. cn 2-12 grossly intact  Psych - normal affect  Skin - c/d/i. no rashes/lesions      I&O's Summary    Daily     Daily     =========================================================================================  LABS.    01-20    133<L>  |  95<L>  |  9.0  ----------------------------<  156<H>  3.9   |  23.0  |  0.53    Ca    8.1<L>      20 Jan 2020 09:56    TPro  7.1  /  Alb  2.8<L>  /  TBili  0.6  /  DBili  x   /  AST  17  /  ALT  18  /  AlkPhos  187<H>  01-20                          10.2   7.91  )-----------( 626      ( 20 Jan 2020 09:56 )             32.2     LIVER FUNCTIONS - ( 20 Jan 2020 09:56 )  Alb: 2.8 g/dL / Pro: 7.1 g/dL / ALK PHOS: 187 U/L / ALT: 18 U/L / AST: 17 U/L / GGT: x           PT/INR - ( 20 Jan 2020 09:56 )   PT: 24.9 sec;   INR: 2.12 ratio         PTT - ( 20 Jan 2020 09:56 )  PTT:31.9 sec  CARDIAC MARKERS ( 20 Jan 2020 09:56 )  x     / <0.01 ng/mL / x     / x     / x                =========================================================================================  IMAGING.     < from: CT Angio Chest w/ IV Cont (01.20.20 @ 17:53) >  Lungs:There is consolidation with air bronchograms in the anterior left upper lung. A large pneumatocele measuring 7.1 x 5.9 x 6.7 cm is seen in the left upper lung. Peribronchial thickening is seen in the upper lungs. Multiple areas of consolidation with air bronchograms and pneumatocele  formation is seen in the right lower lung.    Loculated pleural effusions are seen throughout the left hemithorax. There is compressive atelectasis of left lower lung    Heart:The heart size is normal    Mediastinum:There are no enlarged mediastinal or hilar nodes. The thoracic aorta is intact. Pulmonary arteries are well opacified. No obvious pulmonary embolism is identified.    Pleura:There is no pleural thickening.     Bones:Unremarkable    Soft tissues:Unremarkable    IMPRESSION:    Diffuse bilateral areas of pulmonary consolidation with pneumatocele formation    Large loculated leftpleural effusions with compressive atelectasis of the left lower lobe.    < end of copied text >    =========================================================================================    DIET.    Diet, Regular (01-20-20 @ 18:42)      =========================================================================================    HOME MEDS.    Home Medications:  finasteride 5 mg oral tablet: 1 tab(s) orally once a day (20 Jan 2020 19:39)  lactulose:  (21 Jan 2020 07:32)  Lupron 5 mg/mL subcutaneous solution:  (21 Jan 2020 07:29)  oxyCODONE 10 mg oral tablet: 1 tab(s) orally every 6 hours (20 Jan 2020 19:39)  Percocet 10/325 oral tablet: 1 tab(s) orally every 6 hours (21 Jan 2020 07:31)  predniSONE 5 mg oral tablet: 1 tab(s) orally 2 times a day (21 Jan 2020 07:30)  Taxotere:  (21 Jan 2020 07:29)  Xgeva 120 mg/1.7 mL subcutaneous solution:  (21 Jan 2020 07:30)      =========================================================================================    HOSPITAL MEDS.    MEDICATIONS  (STANDING):  albuterol/ipratropium for Nebulization 3 milliLiter(s) Nebulizer every 6 hours  enoxaparin Injectable 40 milliGRAM(s) SubCutaneous daily  finasteride 5 milliGRAM(s) Oral daily  oxyCODONE    IR 10 milliGRAM(s) Oral every 6 hours  piperacillin/tazobactam IVPB.. 3.375 Gram(s) IV Intermittent every 8 hours  predniSONE   Tablet 5 milliGRAM(s) Oral two times a day  tamsulosin 0.4 milliGRAM(s) Oral at bedtime  vancomycin  IVPB 1000 milliGRAM(s) IV Intermittent every 12 hours    MEDICATIONS  (PRN):  lactulose Syrup 10 Gram(s) Oral two times a day PRN Constipation

## 2020-01-21 NOTE — CONSULT NOTE ADULT - SUBJECTIVE AND OBJECTIVE BOX
HPI:This is a 65yo M with PMH of BPH,  and recent B/L Pneumonia was admitted to [facility] with  Prostate Ca with Metastasis to Bone, ribs, lung  C/O persistent fever, increasing SOB, cough and  gait instability.   He is admitted with recurrent B/L PNA with Large Left Lung Loculated effusion. He is SOB today at time of my assessment. Using 3L O2  and getting Neb treatments Q6..    CC: Patient describes moderate to severe B/L hip ribs and  L sided bone pain worse than right. Waiting for Oncologic RT   for treatment at [facility].  He has not had a BM ice before admission, and has experienced constipation in recent past. He denies CP, Palpitations , N/V/D dysuria no abdominal pain      ED HPI: 64yoM; with pmh signif for Prostate Ca with mets to ribs(currently on chemo), HTN; now p/w cough, sob, fever x1 week, progressively worsening.  denies sick contacts. reports traveling from [place] after diagnosis of prostate ca complicated by b/l ptx in november and another hospitalization in december. denies cp/palp. denies abd pain: SOCIAL: No tobacco/illicit substance use/social/EtOH    Above ED HPI reviewed and noted: patient reports having a history of Bilateral Pneumonia approx 1.5 months prior with associated bilateral Pleural effusions which required draining. In addition to above, patient is scheduled palliative Radiation therapy today. He is on ADT (lupron), Taxotere and Xgeva for Prostate adenocarcinoma with Mets to Lung, Bone and Lymph. (20 Jan 2020 21:22)      PERTINENT PMH REVIEWED: Yes     PAST MEDICAL & SURGICAL HISTORY:  Prostate CA: Adenocarcinoma with mets to Lung, Bone, and Lymph  No significant past surgical history      SOCIAL HISTORY:  EtOH   No                                   Drugs   No                                     nonsmoker                                    Admitted from: home  Lives with Mother and his sister Gail Cruz [phone]    FAMILY HISTORY:  FH: multiple myeloma: Mother    Allergies  No Known Allergies    Baseline ADLs (prior to admission):  Independent     Present Symptoms:     Dyspnea:  1-2 (in bed at rest) 4-5 with activity    Nausea/Vomiting:  No  Anxiety:  Yes   Depression:  No  Fatigue: Yes   Loss of appetite:  No    Pain: B/L Hips L sided rib pain            Character-            Duration-            Effect-            Factors-            Frequency-            Location-            Severity-severe    Review of Systems: Reviewed                     All others negative    MEDICATIONS  (STANDING):  albuterol/ipratropium for Nebulization 3 milliLiter(s) Nebulizer every 6 hours  azithromycin  IVPB      cefTRIAXone   IVPB 1000 milliGRAM(s) IV Intermittent every 24 hours  enoxaparin Injectable 40 milliGRAM(s) SubCutaneous daily  finasteride 5 milliGRAM(s) Oral daily  oxyCODONE    IR 10 milliGRAM(s) Oral every 6 hours  polyethylene glycol 3350 17 Gram(s) Oral at bedtime  predniSONE   Tablet 5 milliGRAM(s) Oral two times a day  tamsulosin 0.4 milliGRAM(s) Oral at bedtime    MEDICATIONS  (PRN):  lactulose Syrup 10 Gram(s) Oral two times a day PRN Constipation    PHYSICAL EXAM:    Vital Signs Last 24 Hrs  T(C): 36.9 (21 Jan 2020 11:03), Max: 36.9 (21 Jan 2020 04:53)  T(F): 98.4 (21 Jan 2020 11:03), Max: 98.4 (21 Jan 2020 04:53)  HR: 105 (21 Jan 2020 14:53) (98 - 105)  BP: 123/77 (21 Jan 2020 11:03) (108/63 - 145/80)  BP(mean): --  RR: 19 (21 Jan 2020 11:03) (19 - 20)  SpO2: 94% (21 Jan 2020 14:53) (94% - 99%)    General: alert  oriented x 3____ eating lunch                  thin      HEENT: normal  dry mouth      Lungs: comfortable productive cough    CV:   tachycardia    GI:   distended                  constipation  last BM: couple of days    :   lucas    MSK:  weakness  edema            bedbound/wheelchair bound    Skin: normal  _____  no rash    LABS:                        9.5    9.80  )-----------( 613      ( 21 Jan 2020 09:44 )             29.9     01-21    131<L>  |  94<L>  |  6.0<L>  ----------------------------<  145<H>  3.7   |  24.0  |  0.39<L>    Ca    7.9<L>      21 Jan 2020 09:44    TPro  7.1  /  Alb  2.8<L>  /  TBili  0.7  /  DBili  x   /  AST  19  /  ALT  16  /  AlkPhos  187<H>  01-21    PT/INR - ( 20 Jan 2020 09:56 )   PT: 24.9 sec;   INR: 2.12 ratio         PTT - ( 20 Jan 2020 09:56 )  PTT:31.9 sec    I&O's Summary      RADIOLOGY & ADDITIONAL STUDIES:  < from: CT Angio Chest w/ IV Cont (01.20.20 @ 17:53) >  MPRESSION:    Diffuse bilateral areas of pulmonary consolidation with pneumatocele formation    Large loculated leftpleural effusions with compressive atelectasis of the left lower lobe.        ADVANCE DIRECTIVES:   DNR  NO  Completed on:                     MOLST   NO   Completed on:  Living Will   NO   Completed on: HPI:This is a 65yo M with PMH of BPH,  and recent B/L Pneumonia was admitted to [facility] with  Prostate Ca with Metastasis to Bone, ribs, lung  C/O persistent fever, increasing SOB, cough and  gait instability. He was eating his lunch at initial visit, then finishing a Neb treatment- O2 NC replaced once completed  He was admitted with recurrent B/L PNA with Large Left Lung Loculated effusion. He is SOB today at time of my assessment. Using 3L O2  and getting Neb treatments Q6..    CC: Patient describes moderate to severe B/L hip ribs and  L sided bone pain worse than right. Waiting for Oncologic RT   for treatment at [facility].  He has not had a BM since before admission, and has experienced constipation in recent past. He denies CP, Palpitations , N/V/D dysuria no abdominal pain    ED HPI: 64yoM; with pmh signif for Prostate Ca with mets to ribs(currently on chemo), HTN; now p/w cough, sob, fever x1 week, progressively worsening.  denies sick contacts. reports traveling from [place] after diagnosis of prostate ca complicated by b/l ptx in november and another hospitalization in december. denies cp/palp. denies abd pain: SOCIAL: No tobacco/illicit substance use/social/EtOH  Above ED HPI reviewed and noted: patient reports having a history of Bilateral Pneumonia approx 1.5 months prior with associated bilateral Pleural effusions which required draining. In addition to above, patient is scheduled palliative Radiation therapy today. He is on ADT (lupron), Taxotere and Xgeva for Prostate adenocarcinoma with Mets to Lung, Bone and Lymph. (20 Jan 2020 21:22)      PERTINENT PMH REVIEWED: Yes     PAST MEDICAL & SURGICAL HISTORY:  Prostate CA: Adenocarcinoma with mets to Lung, Bone, and Lymph  No significant past surgical history      SOCIAL HISTORY:  EtOH   No                                   Drugs   No                                     nonsmoker                                    Admitted from: home  Lives with Mother and his sister Gail Cruz [phone]    FAMILY HISTORY:  FH: multiple myeloma: Mother    Allergies  No Known Allergies    Baseline ADLs (prior to admission):  Independent     Present Symptoms:     Dyspnea:  1-2 (in bed at rest) 4-5 with activity    Nausea/Vomiting:  No  Anxiety:  Yes   Depression:  No  Fatigue: Yes   Loss of appetite:  No    Pain: B/L Hips L sided rib pain            Character-            Duration-            Effect-            Factors-            Frequency-            Location-B/L Hips L sided ribcage            Severity-severe 7-10    Review of Systems: Reviewed                     All others negative    MEDICATIONS  (STANDING):  albuterol/ipratropium for Nebulization 3 milliLiter(s) Nebulizer every 6 hours  azithromycin  IVPB      cefTRIAXone   IVPB 1000 milliGRAM(s) IV Intermittent every 24 hours  enoxaparin Injectable 40 milliGRAM(s) SubCutaneous daily  finasteride 5 milliGRAM(s) Oral daily  oxyCODONE    IR 10 milliGRAM(s) Oral every 6 hours  polyethylene glycol 3350 17 Gram(s) Oral at bedtime  predniSONE   Tablet 5 milliGRAM(s) Oral two times a day  tamsulosin 0.4 milliGRAM(s) Oral at bedtime    MEDICATIONS  (PRN):  lactulose Syrup 10 Gram(s) Oral two times a day PRN Constipation    PHYSICAL EXAM:    Vital Signs Last 24 Hrs  T(C): 36.9 (21 Jan 2020 11:03), Max: 36.9 (21 Jan 2020 04:53)  T(F): 98.4 (21 Jan 2020 11:03), Max: 98.4 (21 Jan 2020 04:53)  HR: 105 (21 Jan 2020 14:53) (98 - 105)  BP: 123/77 (21 Jan 2020 11:03) (108/63 - 145/80)  BP(mean): --  RR: 19 (21 Jan 2020 11:03) (19 - 20)  SpO2: 94% (21 Jan 2020 14:53) (94% - 99%)    General: alert  oriented x 3____ eating lunch                  thin      HEENT: normal  dry mouth      Lungs: comfortable productive cough    CV:   tachycardia    GI:   distended                  constipation  last BM: couple of days    :   lucas    MSK:  weakness  edema            bedbound/wheelchair bound    Skin: normal  _____  no rash    LABS:                        9.5    9.80  )-----------( 613      ( 21 Jan 2020 09:44 )             29.9     01-21    131<L>  |  94<L>  |  6.0<L>  ----------------------------<  145<H>  3.7   |  24.0  |  0.39<L>    Ca    7.9<L>      21 Jan 2020 09:44    TPro  7.1  /  Alb  2.8<L>  /  TBili  0.7  /  DBili  x   /  AST  19  /  ALT  16  /  AlkPhos  187<H>  01-21    PT/INR - ( 20 Jan 2020 09:56 )   PT: 24.9 sec;   INR: 2.12 ratio         PTT - ( 20 Jan 2020 09:56 )  PTT:31.9 sec    I&O's Summary      RADIOLOGY & ADDITIONAL STUDIES:  < from: CT Angio Chest w/ IV Cont (01.20.20 @ 17:53) >  MPRESSION:    Diffuse bilateral areas of pulmonary consolidation with pneumatocele formation    Large loculated leftpleural effusions with compressive atelectasis of the left lower lobe.        ADVANCE DIRECTIVES:   DNR  NO  Completed on:                     MOLST   NO   Completed on:  Living Will   NO   Completed on:

## 2020-01-21 NOTE — CONSULT NOTE ADULT - ASSESSMENT
ASSESSMENT:   64 year old male with known history of prostate cancer undergoing chemo therapy with Heme/Onc Dr. Oshea (1 treatment every 3 weeks, last Chemo treatment was 1/14) presented to [facility] 1/20/20 for worsening shortness of breath. +Admits to intermittent cough with yellowish sputum over the last week. +Night sweats with hormone therapy use as per Heme/Onc. +Uses a walker to ambulate due to fatigue, shortness of breath at baseline, and unsteadiness on feet. Patient reports recently being admitted to a hospital  in [place] 12/2019 and was treated for fluid and air around his lungs.        PLAN:  Admitted to Medicine.  Vanco/Zosyn current ordered for Pneumonia coverage as per primary team.   F/u sputum and blood cultures.  Patient maintaining appropriate SpO2 of 99% with O2 via NC at 2L.  Continuous SpO2 monitoring,  POCUS performed at the bedside. +Compressive atelectasis, loculated collection with only small pocket of fluid visualized for chest tube placement.   Plan for possible chest tube placement later today (as of note INR >2, patient states on no anticoagulation at home).  Will consider placing chest tube sooner if patient becomes symptomatic with increased work of breathing, SpO2 starts to trend down, O2 requirements start to trend up.  Case and plan to be discussed with Dr. Huerta / Thoracic surgery team at am rounds.   Will continue to follow.

## 2020-01-21 NOTE — CONSULT NOTE ADULT - SUBJECTIVE AND OBJECTIVE BOX
Thoracic Surgery PA    Thoracic Surgeon: Dr. Huerta    Thoracic Surgery Consult called for Left lung loculated Pleural effusion seen on CT scan    HPI:  64 year old male with known history of prostate cancer undergoing chemo therapy with Heme/Onc Dr. Oshea (1 treatment every 3 weeks, last Chemo treatment was 1/14). Patient presented today to [facility] for worsening shortness of breath. +Admits to intermittent cough with yellowish sputum over the last week. +Night sweats with hormone therapy use as per Heme/Onc. +Uses a walker to ambulate due to fatigue, shortness of breath at baseline, and unsteadiness on feet. Denies any known fevers, lightheadedness, dizziness, loss of consciousness, recent falls, chest pain, palpitations, hemoptysis, abdominal pain, LE edema, or any other current complaints. Patient reports recently being admitted to a hospital  in [place] 12/2019 and was treated for fluid and air around his lungs.      PAST MEDICAL & SURGICAL HISTORY:  Prostate CA: Adenocarcinoma with mets to Lung, Bone, and Lymph    REVIEW OF SYSTEMS  General: +Weight loss +Hair loss on chemo, +Fatigue. Denies current HA/Dizzy	  Skin/Breast: No Rashes/ Lesions/ Masses	  Ophthalmologic: No Blurry vision/ Blindness	  ENMT: No Hearing loss/ Drainage/ Lesions	  Respiratory and Thorax: +SOB, +yellow sputum production. +Minimal cough intermittent. Denies Wheezing/ Hemoptysis  Cardiovascular: No Chest pain/ Palpitations/ Diaphoresis	  Gastrointestinal: No Nausea/ Vomiting/ diarrhea  Genitourinary: No Heamturia/ Dysuria/ Frequency change	  Musculoskeletal: +Generalized weakess, uses walker to ambulate, Denies any current Pain  Neurological: No Seizures/ TIA/CVA/ new Parastesias	  Psychiatric: No Dementia/SI/HI elicited  Hematology/Lymphatics: No hx of bleeding/ Edema	  Allergic/Immunologic: No Anaphylaxis/ Intolerance/ Recent illnesses    MEDICATIONS  (STANDING):  finasteride 5 milliGRAM(s) Oral daily  oxyCODONE    IR 10 milliGRAM(s) Oral every 6 hours  piperacillin/tazobactam IVPB.. 3.375 Gram(s) IV Intermittent every 8 hours  predniSONE   Tablet 5 milliGRAM(s) Oral daily  tamsulosin 0.4 milliGRAM(s) Oral at bedtime  vancomycin  IVPB 1000 milliGRAM(s) IV Intermittent every 12 hours    Allergies / Intolerances:  No Known Allergies    SOCIAL HISTORY:  Denies smoking history  Quit drinking alcohol >10 years ago  Quit cocaine and marijuana use in the 1970s  Lives with his mother and sister  Lives in a private home with stairs, but his room in on the first floor  Ambulates with a walker    FAMILY HISTORY:  FH: multiple myeloma: Mother    Vital Signs Last 24 Hrs  T(C): 36.5 (20 Jan 2020 08:51), Max: 36.5 (20 Jan 2020 08:51)  T(F): 97.7 (20 Jan 2020 08:51), Max: 97.7 (20 Jan 2020 08:51)  HR: 101 (20 Jan 2020 19:50) (87 - 102)  BP: 145/80 (20 Jan 2020 19:50) (128/74 - 145/80)  RR: 20 (20 Jan 2020 19:50) (20 - 20)  SpO2: 99% (20 Jan 2020 19:50) (95% - 99%)    Physical Examination:  General: NAD lying in bed with head elevated n O2 via NC  Neurology: Awake, nonfocal, TANG x 4  Eyes: Gross vision intact  ENT: Gross hearing intact, grossly patent pharynx, no stridor  Neck: Neck supple, trachea midline, No JVD  Respiratory: Diminished BSs Left lower lobe, No wheezing, rales, or rhonchi appreciated no accessory muscle use  CV: RRR, S1S2, no murmurs, rubs or gallops  Abdominal: Soft, NT, ND +BS,   Extremities: No edema, + DP peripheral pulses palpable b/l  Skin: No Rashes, no Hematomas noted  Psych: Oriented x 3, normal affect      LABS:                        10.2   7.91  )-----------( 626      ( 20 Jan 2020 09:56 )             32.2     01-20    133<L>  |  95<L>  |  9.0  ----------------------------<  156<H>  3.9   |  23.0  |  0.53    Ca    8.1<L>      20 Jan 2020 09:56    TPro  7.1  /  Alb  2.8<L>  /  TBili  0.6  /  DBili  x   /  AST  17  /  ALT  18  /  AlkPhos  187<H>  01-20    PT/INR - ( 20 Jan 2020 09:56 )   PT: 24.9 sec;   INR: 2.12 ratio       PTT - ( 20 Jan 2020 09:56 )  PTT:31.9 sec      RADIOLOGY & ADDITIONAL STUDIES:  < from: Xray Chest 1 View AP/PA. (01.20.20 @ 11:18) >  FINDINGS:    The lungs  show bibasilar airspace consolidations and/or effusions LEFT greater than RIGHT. There is LEFT upper lobe perihilar infiltrate with possible LEFT upper lobe a cystmeasuring 8 cm. The  heart is enlarged in transverse diameter. No hilar mass. Visualized osseous structures are intact.  IMPRESSION: Cardiomegaly. Bibasilar airspace consolidations and/or effusions. Possible LEFT upper lobe 8 cm cyst.  < end of copied text >    < from: CT Angio Chest w/ IV Cont (01.20.20 @ 17:53) >  FINDINGS:  Lungs: There is consolidation with air bronchograms in the anterior left upper lung. A large pneumatocele measuring 7.1 x 5.9 x 6.7 cm is seen in the left upper lung. Peribronchial thickening is seen in the upper lungs. Multiple areas of consolidation with air bronchograms and pneumatocele  formation is seen in the right lower lung.  Loculated pleural effusions are seen throughout the left hemithorax. There is compressive atelectasis of left lower lung  Heart: The heart size is normal  Mediastinum: There are no enlarged mediastinal or hilar nodes. The thoracic aorta is intact. Pulmonary arteries are well opacified. No obvious pulmonary embolism is identified.  Pleura: There is no pleural thickening.   Bones: Unremarkable  Soft tissues: Unremarkable  IMPRESSION:  Diffuse bilateral areas of pulmonary consolidation with pneumatocele formation  Large loculated left pleural effusions with compressive atelectasis of the left lower lobe.  < end of copied text >

## 2020-01-21 NOTE — CONSULT NOTE ADULT - SUBJECTIVE AND OBJECTIVE BOX
REASON FOR CONSULTATION:     HPI:  64y year old; with pmh significant for Prostate Ca with mets to ribs(currently on chemo), HTN; now p/w cough, sob, fever x1 week, progressively worsening.  denies sick contacts. Reports traveling from [place] after diagnosis of prostate ca complicated by b/l ptx in november and another hospitalization in december. denies cp/palp. denies abd pain:     He has history of Bilateral Pneumonia approx 1.5 months prior with associated bilateral Pleural effusions which required draining. In addition to above, patient is scheduled palliative Radiation therapy today.   He recently established care with Dr. Oshea and is on ADT (lupron), Taxotere and Xgeva for metastatic prostate adenocarcinoma. He last received Docetaxel on 1/10/2020      REVIEW OF SYSTEMS:  Constitutional, Eyes, ENT, Cardiovascular, Respiratory, Gastrointestinal, Genitourinary, Musculoskeletal, Integumentary, Neurological, Psychiatric, Endocrine, Heme/Lymph, and Allergic/Immunologic review of systems are otherwise negative except as noted in the HPI.    PAST MEDICAL & SURGICAL HISTORY:  Prostate CA: Adenocarcinoma with mets to Lung, Bone, and Lymph  No significant past surgical history      FAMILY HISTORY:  FH: multiple myeloma: Mother      SOCIAL HISTORY:  No tobacco/illicit substance use/social/EtOH  Allergies    No Known Allergies    Intolerances        MEDICATIONS  (STANDING):  albuterol/ipratropium for Nebulization 3 milliLiter(s) Nebulizer every 6 hours  azithromycin  IVPB      azithromycin  IVPB 500 milliGRAM(s) IV Intermittent once  cefTRIAXone   IVPB 1000 milliGRAM(s) IV Intermittent every 24 hours  enoxaparin Injectable 40 milliGRAM(s) SubCutaneous daily  finasteride 5 milliGRAM(s) Oral daily  oxyCODONE    IR 10 milliGRAM(s) Oral every 6 hours  predniSONE   Tablet 5 milliGRAM(s) Oral two times a day  tamsulosin 0.4 milliGRAM(s) Oral at bedtime    MEDICATIONS  (PRN):  lactulose Syrup 10 Gram(s) Oral two times a day PRN Constipation      Vital Signs Last 24 Hrs  T(C): 36.9 (21 Jan 2020 04:53), Max: 36.9 (21 Jan 2020 04:53)  T(F): 98.4 (21 Jan 2020 04:53), Max: 98.4 (21 Jan 2020 04:53)  HR: 102 (21 Jan 2020 04:53) (87 - 102)  BP: 108/63 (21 Jan 2020 07:44) (108/63 - 145/80)  BP(mean): --  RR: 19 (21 Jan 2020 04:53) (19 - 20)  SpO2: 97% (21 Jan 2020 07:44) (97% - 99%)    PHYSICAL EXAM:    GENERAL: NAD, well-groomed, well-developed  HEAD:  Atraumatic, Normocephalic  EYES: EOMI,   NECK: Supple, No JVD, Normal thyroid  NERVOUS SYSTEM:  Alert & Oriented X3, Good concentration;  CHEST/LUNG: Clear to auscultation bilaterally; No rales, rhonchi, wheezing, or rubs  HEART: Regular rate and rhythm; No murmurs, rubs, or gallops  EXTREMITIES:  2+ Peripheral Pulses, No clubbing, cyanosis, or edema  LYMPH: No lymphadenopathy noted  SKIN: No rashes or lesions      LABS:                        9.5    9.80  )-----------( 613      ( 21 Jan 2020 09:44 )             29.9     01-21    131<L>  |  94<L>  |  6.0<L>  ----------------------------<  145<H>  3.7   |  24.0  |  0.39<L>    Ca    7.9<L>      21 Jan 2020 09:44    TPro  7.1  /  Alb  2.8<L>  /  TBili  0.7  /  DBili  x   /  AST  19  /  ALT  16  /  AlkPhos  187<H>  01-21    PT/INR - ( 20 Jan 2020 09:56 )   PT: 24.9 sec;   INR: 2.12 ratio         PTT - ( 20 Jan 2020 09:56 )  PTT:31.9 sec        RADIOLOGY & ADDITIONAL STUDIES:  < from: CT Angio Chest w/ IV Cont (01.20.20 @ 17:53) >  IMPRESSION:    Diffuse bilateral areas of pulmonary consolidation with pneumatocele formation    Large loculated leftpleural effusions with compressive atelectasis of the left lower lobe.    < end of copied text > REASON FOR CONSULTATION:     HPI:  64y year old; with pmh significant for Prostate Ca with mets to ribs(currently on chemo), HTN; now p/w cough, sob, fever x1 week, progressively worsening.  denies sick contacts. Reports traveling from [place] after diagnosis of prostate ca complicated by b/l ptx in november and another hospitalization in december. denies cp/palp. denies abd pain:     He has history of Bilateral Pneumonia approx 1.5 months prior with associated bilateral Pleural effusions which required draining. In addition to above, patient is scheduled palliative Radiation therapy today.   He recently established care with Dr. Oshea and is on ADT (lupron), Taxotere and Xgeva for metastatic prostate adenocarcinoma. He last received Docetaxel on 1/10/2020      REVIEW OF SYSTEMS:  Constitutional, Eyes, ENT, Cardiovascular, Respiratory, Gastrointestinal, Genitourinary, Musculoskeletal, Integumentary, Neurological, Psychiatric, Endocrine, Heme/Lymph, and Allergic/Immunologic review of systems are otherwise negative except as noted in the HPI.    PAST MEDICAL & SURGICAL HISTORY:  Prostate CA: Adenocarcinoma with mets to Lung, Bone, and Lymph  No significant past surgical history      FAMILY HISTORY:  FH: multiple myeloma: Mother      SOCIAL HISTORY:  No tobacco/illicit substance use/social/EtOH  Allergies    No Known Allergies    Intolerances        MEDICATIONS  (STANDING):  albuterol/ipratropium for Nebulization 3 milliLiter(s) Nebulizer every 6 hours  azithromycin  IVPB      azithromycin  IVPB 500 milliGRAM(s) IV Intermittent once  cefTRIAXone   IVPB 1000 milliGRAM(s) IV Intermittent every 24 hours  enoxaparin Injectable 40 milliGRAM(s) SubCutaneous daily  finasteride 5 milliGRAM(s) Oral daily  oxyCODONE    IR 10 milliGRAM(s) Oral every 6 hours  predniSONE   Tablet 5 milliGRAM(s) Oral two times a day  tamsulosin 0.4 milliGRAM(s) Oral at bedtime    MEDICATIONS  (PRN):  lactulose Syrup 10 Gram(s) Oral two times a day PRN Constipation      Vital Signs Last 24 Hrs  T(C): 36.9 (21 Jan 2020 04:53), Max: 36.9 (21 Jan 2020 04:53)  T(F): 98.4 (21 Jan 2020 04:53), Max: 98.4 (21 Jan 2020 04:53)  HR: 102 (21 Jan 2020 04:53) (87 - 102)  BP: 108/63 (21 Jan 2020 07:44) (108/63 - 145/80)  BP(mean): --  RR: 19 (21 Jan 2020 04:53) (19 - 20)  SpO2: 97% (21 Jan 2020 07:44) (97% - 99%)    PHYSICAL EXAM:    GENERAL: NAD, well-groomed, well-developed  HEAD:  Atraumatic, Normocephalic  EYES: EOMI,   NECK: Supple,   NERVOUS SYSTEM:  Alert & Oriented X3, Good concentration;  CHEST/LUNG: decreased breath sounds L base  HEART: Regular rate and rhythm; No murmurs, rubs, or gallops  EXTREMITIES: no edema  LYMPH: No lymphadenopathy noted  SKIN: No rashes or lesions      LABS:                        9.5    9.80  )-----------( 613      ( 21 Jan 2020 09:44 )             29.9     01-21    131<L>  |  94<L>  |  6.0<L>  ----------------------------<  145<H>  3.7   |  24.0  |  0.39<L>    Ca    7.9<L>      21 Jan 2020 09:44    TPro  7.1  /  Alb  2.8<L>  /  TBili  0.7  /  DBili  x   /  AST  19  /  ALT  16  /  AlkPhos  187<H>  01-21    PT/INR - ( 20 Jan 2020 09:56 )   PT: 24.9 sec;   INR: 2.12 ratio         PTT - ( 20 Jan 2020 09:56 )  PTT:31.9 sec        RADIOLOGY & ADDITIONAL STUDIES:  < from: CT Angio Chest w/ IV Cont (01.20.20 @ 17:53) >  IMPRESSION:    Diffuse bilateral areas of pulmonary consolidation with pneumatocele formation    Large loculated leftpleural effusions with compressive atelectasis of the left lower lobe.    < end of copied text >

## 2020-01-22 ENCOUNTER — APPOINTMENT (OUTPATIENT)
Dept: HEMATOLOGY ONCOLOGY | Facility: CLINIC | Age: 65
End: 2020-01-22

## 2020-01-22 PROBLEM — C61 MALIGNANT NEOPLASM OF PROSTATE: Chronic | Status: ACTIVE | Noted: 2020-01-20

## 2020-01-22 LAB
ANION GAP SERPL CALC-SCNC: 13 MMOL/L — SIGNIFICANT CHANGE UP (ref 5–17)
APTT BLD: 37.4 SEC — HIGH (ref 27.5–36.3)
BUN SERPL-MCNC: 7 MG/DL — LOW (ref 8–20)
CALCIUM SERPL-MCNC: 7.7 MG/DL — LOW (ref 8.6–10.2)
CHLORIDE SERPL-SCNC: 95 MMOL/L — LOW (ref 98–107)
CO2 SERPL-SCNC: 24 MMOL/L — SIGNIFICANT CHANGE UP (ref 22–29)
CREAT SERPL-MCNC: 0.41 MG/DL — LOW (ref 0.5–1.3)
GLUCOSE SERPL-MCNC: 179 MG/DL — HIGH (ref 70–99)
HCT VFR BLD CALC: 29.1 % — LOW (ref 39–50)
HGB BLD-MCNC: 9.3 G/DL — LOW (ref 13–17)
INR BLD: 2.07 RATIO — HIGH (ref 0.88–1.16)
POTASSIUM SERPL-MCNC: 3.6 MMOL/L — SIGNIFICANT CHANGE UP (ref 3.5–5.3)
POTASSIUM SERPL-SCNC: 3.6 MMOL/L — SIGNIFICANT CHANGE UP (ref 3.5–5.3)
PROTHROM AB SERPL-ACNC: 24.4 SEC — HIGH (ref 10–12.9)
SODIUM SERPL-SCNC: 132 MMOL/L — LOW (ref 135–145)

## 2020-01-22 PROCEDURE — 71045 X-RAY EXAM CHEST 1 VIEW: CPT | Mod: 26

## 2020-01-22 PROCEDURE — 99233 SBSQ HOSP IP/OBS HIGH 50: CPT

## 2020-01-22 PROCEDURE — 99222 1ST HOSP IP/OBS MODERATE 55: CPT

## 2020-01-22 PROCEDURE — 99232 SBSQ HOSP IP/OBS MODERATE 35: CPT

## 2020-01-22 RX ORDER — PIPERACILLIN AND TAZOBACTAM 4; .5 G/20ML; G/20ML
3.38 INJECTION, POWDER, LYOPHILIZED, FOR SOLUTION INTRAVENOUS EVERY 6 HOURS
Refills: 0 | Status: DISCONTINUED | OUTPATIENT
Start: 2020-01-22 | End: 2020-01-22

## 2020-01-22 RX ORDER — PHYTONADIONE (VIT K1) 5 MG
5 TABLET ORAL DAILY
Refills: 0 | Status: COMPLETED | OUTPATIENT
Start: 2020-01-22 | End: 2020-01-24

## 2020-01-22 RX ORDER — PIPERACILLIN AND TAZOBACTAM 4; .5 G/20ML; G/20ML
3.38 INJECTION, POWDER, LYOPHILIZED, FOR SOLUTION INTRAVENOUS EVERY 8 HOURS
Refills: 0 | Status: DISCONTINUED | OUTPATIENT
Start: 2020-01-22 | End: 2020-01-26

## 2020-01-22 RX ORDER — PIPERACILLIN AND TAZOBACTAM 4; .5 G/20ML; G/20ML
3.38 INJECTION, POWDER, LYOPHILIZED, FOR SOLUTION INTRAVENOUS ONCE
Refills: 0 | Status: COMPLETED | OUTPATIENT
Start: 2020-01-22 | End: 2020-01-22

## 2020-01-22 RX ORDER — VANCOMYCIN HCL 1 G
1250 VIAL (EA) INTRAVENOUS EVERY 12 HOURS
Refills: 0 | Status: DISCONTINUED | OUTPATIENT
Start: 2020-01-22 | End: 2020-01-22

## 2020-01-22 RX ORDER — VANCOMYCIN HCL 1 G
VIAL (EA) INTRAVENOUS
Refills: 0 | Status: DISCONTINUED | OUTPATIENT
Start: 2020-01-22 | End: 2020-01-22

## 2020-01-22 RX ADMIN — Medication 3 MILLILITER(S): at 03:08

## 2020-01-22 RX ADMIN — OXYCODONE HYDROCHLORIDE 10 MILLIGRAM(S): 5 TABLET ORAL at 05:14

## 2020-01-22 RX ADMIN — OXYCODONE HYDROCHLORIDE 10 MILLIGRAM(S): 5 TABLET ORAL at 00:10

## 2020-01-22 RX ADMIN — AZITHROMYCIN 255 MILLIGRAM(S): 500 TABLET, FILM COATED ORAL at 11:55

## 2020-01-22 RX ADMIN — POLYETHYLENE GLYCOL 3350 17 GRAM(S): 17 POWDER, FOR SOLUTION ORAL at 22:47

## 2020-01-22 RX ADMIN — OXYCODONE HYDROCHLORIDE 10 MILLIGRAM(S): 5 TABLET ORAL at 11:23

## 2020-01-22 RX ADMIN — OXYCODONE HYDROCHLORIDE 10 MILLIGRAM(S): 5 TABLET ORAL at 17:51

## 2020-01-22 RX ADMIN — Medication 5 MILLIGRAM(S): at 05:14

## 2020-01-22 RX ADMIN — OXYCODONE HYDROCHLORIDE 10 MILLIGRAM(S): 5 TABLET ORAL at 06:00

## 2020-01-22 RX ADMIN — CEFTRIAXONE 100 MILLIGRAM(S): 500 INJECTION, POWDER, FOR SOLUTION INTRAMUSCULAR; INTRAVENOUS at 08:26

## 2020-01-22 RX ADMIN — PIPERACILLIN AND TAZOBACTAM 200 GRAM(S): 4; .5 INJECTION, POWDER, LYOPHILIZED, FOR SOLUTION INTRAVENOUS at 13:13

## 2020-01-22 RX ADMIN — TAMSULOSIN HYDROCHLORIDE 0.4 MILLIGRAM(S): 0.4 CAPSULE ORAL at 22:46

## 2020-01-22 RX ADMIN — OXYCODONE HYDROCHLORIDE 10 MILLIGRAM(S): 5 TABLET ORAL at 12:23

## 2020-01-22 RX ADMIN — Medication 3 MILLILITER(S): at 08:47

## 2020-01-22 RX ADMIN — Medication 3 MILLILITER(S): at 20:21

## 2020-01-22 RX ADMIN — Medication 5 MILLIGRAM(S): at 17:06

## 2020-01-22 RX ADMIN — FINASTERIDE 5 MILLIGRAM(S): 5 TABLET, FILM COATED ORAL at 11:23

## 2020-01-22 RX ADMIN — PIPERACILLIN AND TAZOBACTAM 25 GRAM(S): 4; .5 INJECTION, POWDER, LYOPHILIZED, FOR SOLUTION INTRAVENOUS at 22:46

## 2020-01-22 RX ADMIN — OXYCODONE HYDROCHLORIDE 10 MILLIGRAM(S): 5 TABLET ORAL at 00:45

## 2020-01-22 RX ADMIN — Medication 5 MILLIGRAM(S): at 17:51

## 2020-01-22 RX ADMIN — Medication 3 MILLILITER(S): at 15:25

## 2020-01-22 NOTE — CONSULT NOTE ADULT - REASON FOR ADMISSION
Dyspnea secondary to Large located effusion and suspected recurrent Bilateral PNA
Dyspnea secondary to Large located effusion and suspected recurrent Bilateral PNA
bilateral pneumonia
Shortness of breath, possible pneumonia
Dyspnea secondary to Large located effusion and suspected recurrent Bilateral PNA

## 2020-01-22 NOTE — PROGRESS NOTE ADULT - SUBJECTIVE AND OBJECTIVE BOX
Subjective:  Pt in bed NAD.     VITAL SIGNS  T(C): 36.7 (01-22-20 @ 08:03), Max: 37.2 (01-21-20 @ 23:45)  HR: 99 (01-22-20 @ 08:59) (96 - 106)  BP: 116/75 (01-22-20 @ 08:03) (103/67 - 124/76)  RR: 19 (01-22-20 @ 08:03) (18 - 20)  SpO2: 98% (01-22-20 @ 08:59) (94% - 98%) RA            Daily     Daily   Admit Wt: Drug Dosing Weight  Height (cm): 198.12 (20 Jan 2020 08:51)  Weight (kg): 106.6 (20 Jan 2020 08:51)    Telemetry:   SR      MEDICATIONS  albuterol/ipratropium for Nebulization 3 milliLiter(s) Nebulizer every 6 hours  finasteride 5 milliGRAM(s) Oral daily  lactulose Syrup 10 Gram(s) Oral two times a day PRN  oxyCODONE    IR 10 milliGRAM(s) Oral every 6 hours  piperacillin/tazobactam IVPB.. 3.375 Gram(s) IV Intermittent every 8 hours  polyethylene glycol 3350 17 Gram(s) Oral at bedtime  predniSONE   Tablet 5 milliGRAM(s) Oral two times a day  tamsulosin 0.4 milliGRAM(s) Oral at bedtime    MEDICATIONS  (PRN):  lactulose Syrup 10 Gram(s) Oral two times a day PRN Constipation        PHYSICAL EXAM  General: Alert Awake NAD   Respiratory:  severely decreased  BS Left side, diminished RT side   CV: RRR S1 S2   Abdomen:  soft NT ND + BS   Extremities:  Trace edema b/l LE         I&O's Detail      LABS  01-22    132<L>  |  95<L>  |  7.0<L>  ----------------------------<  179<H>  3.6   |  24.0  |  0.41<L>    Ca    7.7<L>      22 Jan 2020 10:14    TPro  7.1  /  Alb  2.8<L>  /  TBili  0.7  /  DBili  x   /  AST  19  /  ALT  16  /  AlkPhos  187<H>  01-21                                 9.3    x     )-----------( x        ( 22 Jan 2020 10:14 )             29.1          PT/INR - ( 22 Jan 2020 10:14 )   PT: 24.4 sec;   INR: 2.07 ratio         PTT - ( 22 Jan 2020 10:14 )  PTT:37.4 sec      LIVER FUNCTIONS - ( 21 Jan 2020 09:44 )  Alb: 2.8 g/dL / Pro: 7.1 g/dL / ALK PHOS: 187 U/L / ALT: 16 U/L / AST: 19 U/L / GGT: x              CAPILLARY BLOOD GLUCOSE               Today's CXR:  < from: Xray Chest 1 View- PORTABLE-Urgent (01.22.20 @ 11:40) >  Impression:    No significant change in the suspected bilateral pleural effusions, left greater than right. Possible loculated fluid on the left. Atelectatic changes and/or underlying infection is possible. Please correlate clinically. Cavity or pneumatocele in the left apex again seen.    Of note, the small right-sided pneumothorax on chest CT from 1/20/2020 is not as well appreciated on this x-ray. The presence of a small right-sided pneumothorax was discussed with Dr. Silva at 12:25 PM on 1/22/2020.    < end of copied text >      PAST MEDICAL & SURGICAL HISTORY:  Prostate CA: Adenocarcinoma with mets to Lung, Bone, and Lymph  No significant past surgical history

## 2020-01-22 NOTE — PROGRESS NOTE ADULT - SUBJECTIVE AND OBJECTIVE BOX
CC: pna    Patient seen and examined at the bedside. No acute overnight events. no evnets yesterday. Complaining of hot flashes today. Denies fever/chills, headache, lightheadedness, dizziness, chest pain, palpitations, cough, abd pain, nausea/vomiting/diarrhea, muscle pain.      =========================================================================================    PHYSICAL EXAM.    GEN - appears age appropriate. well nourished. pleasant. no distress.   HEENT - NCAT, EOMI, BETITO  RESP - COOPER. no wheeze/stridor/rhonchi/crackles. on supplemental O2. able to speak in full sentences without distress.   CARDIO - NS1S2, RRR. No murmurs/rubs/gallops.  ABD - Soft/Non tender/Non distended. Normal BS x4 quadrants. no guarding/rebound tenderness.  Ext - No LG.  MSK - BL 5/5 strength on upper and lower extremities.   Neuro - AAOx3. cn 2-12 grossly intact  Psych - normal affect  Skin - c/d/i. no rashes/lesions        VITAL SIGNS.    Vital Signs Last 24 Hrs  T(C): 37.4 (22 Jan 2020 16:22), Max: 37.4 (22 Jan 2020 16:22)  T(F): 99.3 (22 Jan 2020 16:22), Max: 99.3 (22 Jan 2020 16:22)  HR: 111 (22 Jan 2020 16:22) (96 - 111)  BP: 154/80 (22 Jan 2020 16:22) (103/67 - 154/80)  BP(mean): --  RR: 20 (22 Jan 2020 16:22) (19 - 20)  SpO2: 99% (22 Jan 2020 16:22) (94% - 99%)        =================================================    LABS.                          9.3    x     )-----------( x        ( 22 Jan 2020 10:14 )             29.1     01-22    132<L>  |  95<L>  |  7.0<L>  ----------------------------<  179<H>  3.6   |  24.0  |  0.41<L>    Ca    7.7<L>      22 Jan 2020 10:14    TPro  7.1  /  Alb  2.8<L>  /  TBili  0.7  /  DBili  x   /  AST  19  /  ALT  16  /  AlkPhos  187<H>  01-21    LIVER FUNCTIONS - ( 21 Jan 2020 09:44 )  Alb: 2.8 g/dL / Pro: 7.1 g/dL / ALK PHOS: 187 U/L / ALT: 16 U/L / AST: 19 U/L / GGT: x           PT/INR - ( 22 Jan 2020 10:14 )   PT: 24.4 sec;   INR: 2.07 ratio         PTT - ( 22 Jan 2020 10:14 )  PTT:37.4 sec  I&O's Summary      Culture - Blood (collected 20 Jan 2020 10:24)  Source: .Blood  Preliminary Report (22 Jan 2020 11:01):    No growth at 48 hours    Culture - Blood (collected 20 Jan 2020 10:24)  Source: .Blood  Preliminary Report (22 Jan 2020 11:01):    No growth at 48 hours        ================================================    IMAGING.    < from: CT Angio Chest w/ IV Cont (01.20.20 @ 17:53) >  Lungs:There is consolidation with air bronchograms in the anterior left upper lung. A large pneumatocele measuring 7.1 x 5.9 x 6.7 cm is seen in the left upper lung. Peribronchial thickening is seen in the upper lungs. Multiple areas of consolidation with air bronchograms and pneumatocele  formation is seen in the right lower lung.    Loculated pleural effusions are seen throughout the left hemithorax. There is compressive atelectasis of left lower lung    Heart:The heart size is normal    Mediastinum:There are no enlarged mediastinal or hilar nodes. The thoracic aorta is intact. Pulmonary arteries are well opacified. No obvious pulmonary embolism is identified.    Pleura:There is no pleural thickening.     Bones:Unremarkable    Soft tissues:Unremarkable    IMPRESSION:    Diffuse bilateral areas of pulmonary consolidation with pneumatocele formation    Large loculated leftpleural effusions with compressive atelectasis of the left lower lobe.    < end of copied text >    < from: Xray Chest 1 View- PORTABLE-Urgent (01.22.20 @ 11:40) >  No changein the mild right and moderate left pleural effusions with suspicion for loculated fluid on the left. Underlying infection and/or atelectasis is possible. Again noted is a cavity or pneumatocele in the left apex. Limited evaluation of the heart size.Small left-sided pneumothorax seen on chest CT from 1/20/2020 is not as well visualized on this x-ray.    Impression:    No significant change in the suspected bilateral pleural effusions, left greater than right. Possible loculated fluid on the left. Atelectatic changes and/or underlying infection is possible. Please correlate clinically. Cavity or pneumatocele in the left apex again seen.    Of note, the small right-sided pneumothorax on chest CT from 1/20/2020 is not as well appreciated on this x-ray. The presence of a small right-sided pneumothorax was discussed with Dr. Silva at 12:25 PM on 1/22/2020.    < end of copied text >    ================================================    DIET.    Diet, Regular (01-21-20 @ 12:58)    ================================================    HOME MEDS.    Home Medications:  finasteride 5 mg oral tablet: 1 tab(s) orally once a day (20 Jan 2020 19:39)  lactulose:  (21 Jan 2020 07:32)  Lupron 5 mg/mL subcutaneous solution:  (21 Jan 2020 07:29)  oxyCODONE 10 mg oral tablet: 1 tab(s) orally every 6 hours (20 Jan 2020 19:39)  Percocet 10/325 oral tablet: 1 tab(s) orally every 6 hours (21 Jan 2020 07:31)  predniSONE 5 mg oral tablet: 1 tab(s) orally 2 times a day (21 Jan 2020 07:30)  Taxotere:  (21 Jan 2020 07:29)  Xgeva 120 mg/1.7 mL subcutaneous solution:  (21 Jan 2020 07:30)      ================================================    HOSPITAL MEDS.    MEDICATIONS  (STANDING):  albuterol/ipratropium for Nebulization 3 milliLiter(s) Nebulizer every 6 hours  finasteride 5 milliGRAM(s) Oral daily  oxyCODONE    IR 10 milliGRAM(s) Oral every 6 hours  phytonadione   Solution 5 milliGRAM(s) Oral daily  piperacillin/tazobactam IVPB.. 3.375 Gram(s) IV Intermittent every 8 hours  polyethylene glycol 3350 17 Gram(s) Oral at bedtime  predniSONE   Tablet 5 milliGRAM(s) Oral two times a day  tamsulosin 0.4 milliGRAM(s) Oral at bedtime    MEDICATIONS  (PRN):  lactulose Syrup 10 Gram(s) Oral two times a day PRN Constipation

## 2020-01-22 NOTE — CONSULT NOTE ADULT - ASSESSMENT
This 64yoM; with pmh signif for Prostate Ca with mets to ribs diagnosed in 11/2019 (currently on chemo), HTN; now p/w cough, sob, fever x1 week, progressively worsening.  denies sick contacts. reports traveling from [place] after diagnosis of prostate ca complicated by b/l ptx in november and another hospitalization in december. denies cp/palp. denies abd pain: SOCIAL: No tobacco/illicit substance use/social/EtOH    patient reports having a history of Bilateral Pneumonia approx 1.5 months prior with associated bilateral Pleural effusions which required draining. In addition to above, patient is scheduled palliative Radiation therapy today. He is on ADT (lupron), Taxotere and Xgeva for Prostate adenocarcinoma with Mets to Lung, Bone and Lymph. (20 Jan 2020 21:22)    Chest imaging xray showed:  No significant change in the suspected bilateral pleural effusions, left greater than right.   Possible loculated fluid on the left. Atelectatic changes and/or underlying infection is possible. Please correlate clinically. Cavity or pneumatocele in the left apex again seen.  Of note, the small right-sided pneumothorax on chest CT from 1/20/2020 is not as well appreciated on this x-ray. The presence of a small right-sided pneumothorax was discussed with Dr. Silva at 12:25 PM on 1/22/2020.    A CT scan was done and showed:  Diffuse bilateral areas of pulmonary consolidation with pneumatocele formation  Large loculated left pleural effusions with compressive atelectasis of the left lower lobe.      patient was initially started On vanco & Zosyn, then Ceftriaxone and Zithromax was added as well.   We are consulted to evaluate this patient for PNA.       Impression  PNA  large loculated effusion  LEFT lobe atelectasis  Shortness of breath      Plan:  - simplify antibiotics  maintain Zosyn  - check sputum cx    - follow up all outstanding cultures  - trend temperature and WBC curve  - repeat cultures from blood and all sources if febrile.

## 2020-01-22 NOTE — PROGRESS NOTE ADULT - ASSESSMENT
Assessment and Recommendation:   · Assessment		  ASSESSMENT:   64 year old male with known history of prostate cancer undergoing chemo therapy with Heme/Onc Dr. Oshea (1 treatment every 3 weeks, last Chemo treatment was 1/14) presented to [facility] 1/20/20 for worsening shortness of breath. +Admits to intermittent cough with yellowish sputum over the last week. +Night sweats with hormone therapy use as per Heme/Onc. +Uses a walker to ambulate due to fatigue, shortness of breath at baseline, and unsteadiness on feet. Patient reports recently being admitted to a hospital  in [place] 12/2019 and was treated for fluid and air around his lungs.        PLAN:  Admitted to Medicine.  Vanco/Zosyn current ordered for Pneumonia coverage as per primary team.   F/u sputum and blood cultures.  Patient maintaining appropriate SpO2 of 99% with O2 via NC at 2L.  Continuous SpO2 monitoring,  POCUS performed at the bedside. +Compressive atelectasis, loculated collection with only small pocket of fluid visualized for chest tube placement.   Plan for possible chest tube placement later today (as of note INR >2, patient states on no anticoagulation at home).  Spoke with Dr Huerta.  With the integrity of his lungs tge best option is to set up IR fir the drainage of the left  posterior  pocket.   Spoke with Dr Horvath and she will place the orders and reach out to IR   Case and plan to be discussed with Dr. Huerta / Thoracic surgery team at am rounds.   Will continue to follow.     Problem/Recommendation - 1:  Problem: Pleural effusion on left. Recommendation: Plan as above.    Problem/Recommendation - 2:  ·  Problem: Prostate CA.  Recommendation: Plan as above.

## 2020-01-22 NOTE — CONSULT NOTE ADULT - SUBJECTIVE AND OBJECTIVE BOX
[facility]  INFECTIOUS DISEASES AND INTERNAL MEDICINE at [place]  =======================================================  Chandler Mccrary MD  Diplomates American Board of Internal Medicine and Infectious Diseases  Telephone [phone]  Fax            [phone]  =======================================================    [id number]  SENTHIL SAVAGE   HPI:  This 64yoM; with pmh signif for Prostate Ca with mets to ribs diagnosed in 11/2019 (currently on chemo), HTN; now p/w cough, sob, fever x1 week, progressively worsening.  denies sick contacts. reports traveling from [place] after diagnosis of prostate ca complicated by b/l ptx in november and another hospitalization in december. denies cp/palp. denies abd pain: SOCIAL: No tobacco/illicit substance use/social/EtOH    patient reports having a history of Bilateral Pneumonia approx 1.5 months prior with associated bilateral Pleural effusions which required draining. In addition to above, patient is scheduled palliative Radiation therapy today. He is on ADT (lupron), Taxotere and Xgeva for Prostate adenocarcinoma with Mets to Lung, Bone and Lymph. (20 Jan 2020 21:22)    Chest imaging xray showed:  No significant change in the suspected bilateral pleural effusions, left greater than right.   Possible loculated fluid on the left. Atelectatic changes and/or underlying infection is possible. Please correlate clinically. Cavity or pneumatocele in the left apex again seen.  Of note, the small right-sided pneumothorax on chest CT from 1/20/2020 is not as well appreciated on this x-ray. The presence of a small right-sided pneumothorax was discussed with Dr. Silva at 12:25 PM on 1/22/2020.    A CT scan was done and showed:  Diffuse bilateral areas of pulmonary consolidation with pneumatocele formation  Large loculated left pleural effusions with compressive atelectasis of the left lower lobe.      patient was initially started On vanco & Zosyn, then Ceftriaxone and Zithromax was added as well.   We are consulted to evaluate this patient for PNA.       I have personally reviewed the labs and data; pertinent labs and data are listed in this note; please see below.   =======================================================  Past Medical & Surgical Hx:  =====================  PAST MEDICAL & SURGICAL HISTORY:  Prostate CA: Adenocarcinoma with mets to Lung, Bone, and Lymph  No significant past surgical history    Problem List:  ==========  HEALTH ISSUES - PROBLEM Dx:  Counseling regarding goals of care: Counseling regarding goals of care  Pain from bone metastases: Pain from bone metastases  Loculated pneumothorax of lateral aspect of left lung: Loculated pneumothorax of lateral aspect of left lung  Loculated pleural effusion: Loculated pleural effusion  Malignant neoplasm of prostate metastatic to bone: Malignant neoplasm of prostate metastatic to bone  Pneumonia of both lower lobes due to infectious organism: Pneumonia of both lower lobes due to infectious organism  BPH (benign prostatic hyperplasia): BPH (benign prostatic hyperplasia)  Bilateral pneumonia: Bilateral pneumonia  Prostate CA: Prostate CA  Pleural effusion on left: Pleural effusion on left        Social Hx:  =======  no toxic habits currently    FAMILY HISTORY:  FH: multiple myeloma: Mother  no significant family history of immunosuppressive disorders in mother or father   =======================================================    REVIEW OF SYSTEMS:  CONSTITUTIONAL:  No Fever or chills  HEENT:  No diplopia or blurred vision.  No earache, sore throat or runny nose.  CARDIOVASCULAR:  No pressure, squeezing, strangling, tightness, heaviness or aching about the chest, neck, axilla or epigastrium.  RESPIRATORY:  No cough, shortness of breath  GASTROINTESTINAL:  No nausea, vomiting or diarrhea.  GENITOURINARY:  No dysuria, frequency or urgency. No Blood in urine  MUSCULOSKELETAL:  no joint aches, no muscle pain  SKIN:  No change in skin, hair or nails.  NEUROLOGIC:  No Headaches, seizures or weakness.  PSYCHIATRIC:  No disorder of thought or mood.  ENDOCRINE:  No heat or cold intolerance  HEMATOLOGICAL:  No easy bruising or bleeding.   =======================================================  Allergies  No Known Allergies       Antibiotics:  piperacillin/tazobactam IVPB.. 3.375 Gram(s) IV Intermittent every 8 hours    Other medications:  albuterol/ipratropium for Nebulization 3 milliLiter(s) Nebulizer every 6 hours  finasteride 5 milliGRAM(s) Oral daily  oxyCODONE    IR 10 milliGRAM(s) Oral every 6 hours  phytonadione   Solution 5 milliGRAM(s) Oral daily  polyethylene glycol 3350 17 Gram(s) Oral at bedtime  predniSONE   Tablet 5 milliGRAM(s) Oral two times a day  tamsulosin 0.4 milliGRAM(s) Oral at bedtime     azithromycin  IVPB   255 mL/Hr IV Intermittent (01-21-20 @ 11:02)   255 mL/Hr IV Intermittent (01-22-20 @ 11:55)    cefTRIAXone   IVPB   100 mL/Hr IV Intermittent (01-21-20 @ 11:02)   100 mL/Hr IV Intermittent (01-22-20 @ 08:26)    piperacillin/tazobactam IVPB.   200 mL/Hr IV Intermittent (01-20-20 @ 12:14)   25 mL/Hr IV Intermittent (01-20-20 @ 19:50)   25 mL/Hr IV Intermittent (01-21-20 @ 05:25)   200 mL/Hr IV Intermittent (01-22-20 @ 13:13)    vancomycin  IVPB   250 mL/Hr IV Intermittent (01-21-20 @ 05:26)   250 mL/Hr IV Intermittent (01-20-20 @ 13:14)      ======================================================  Physical Exam:  ============  T(F): 99.3 (22 Jan 2020 16:22), Max: 99.3 (22 Jan 2020 16:22)  HR: 111 (22 Jan 2020 16:22)  BP: 154/80 (22 Jan 2020 16:22)  RR: 20 (22 Jan 2020 16:22)  SpO2: 99% (22 Jan 2020 16:22) (94% - 99%)  temp max in last 48H T(F): , Max: 99.3 (01-22-20 @ 16:22)    General:  No acute distress.  Eye: Pupils are equal, round and reactive to light, Extraocular movements are intact, Normal conjunctiva.  HENT: Normocephalic, Oral mucosa is moist, No pharyngeal erythema, No sinus tenderness.  Neck: Supple, No lymphadenopathy.  Respiratory:  POOR aeration of the LEFT side.   Cardiovascular: Normal rate, Regular rhythm,   Gastrointestinal: Soft, Non-tender, Non-distended, Normal bowel sounds.  Genitourinary: No costovertebral angle tenderness.  Lymphatics: No lymphadenopathy neck,   Musculoskeletal: Normal range of motion, Normal strength.  Integumentary: No rash.  Neurologic: Alert, Oriented, No focal deficits, Cranial Nerves II-XII are grossly intact.  Psychiatric: Appropriate mood & affect.    =======================================================  Labs:                        9.3    x     )-----------( x        ( 22 Jan 2020 10:14 )             29.1       WBC Count: 9.80 K/uL (01-21-20 @ 09:44)  WBC Count: 7.91 K/uL (01-20-20 @ 09:56)      01-22    132<L>  |  95<L>  |  7.0<L>  ----------------------------<  179<H>  3.6   |  24.0  |  0.41<L>    Ca    7.7<L>      22 Jan 2020 10:14    TPro  7.1  /  Alb  2.8<L>  /  TBili  0.7  /  DBili  x   /  AST  19  /  ALT  16  /  AlkPhos  187<H>  01-21      Culture - Blood (collected 01-20-20 @ 10:24)  Source: .Blood    Culture - Blood (collected 01-20-20 @ 10:24)  Source: .Blood      Creatinine, Serum: 0.41 mg/dL (01-22-20 @ 10:14)  Creatinine, Serum: 0.39 mg/dL (01-21-20 @ 09:44)  Creatinine, Serum: 0.53 mg/dL (01-20-20 @ 09:56)      < from: CT Angio Chest w/ IV Cont (01.20.20 @ 17:53) >   EXAM:  CT ANGIO CHEST (W)AW IC                        PROCEDURE DATE:  01/20/2020      INTERPRETATION:  INDICATION:Shortness of breath. Rule out pulmonary embolism  COMPARISON:None available  TECHNIQUE: Axial images with sagittal and coronal reconstructions were obtained.71ml of Optiray 350 was injected intravenously.54 mL was discarded.  FINDINGS:    Lungs:There is consolidation with air bronchograms in the anterior left upper lung. A large pneumatocele measuring 7.1 x 5.9 x 6.7 cm is seen in the left upper lung. Peribronchial thickening is seen in the upper lungs. Multiple areas of consolidation with air bronchograms and pneumatocele  formation is seen in the right lower lung.    Loculated pleural effusions are seen throughout the left hemithorax. There is compressive atelectasis of left lower lung  Heart:The heart size is normal  Mediastinum:There are no enlarged mediastinal or hilar nodes. The thoracic aorta is intact. Pulmonary arteries are well opacified. No obvious pulmonary embolism is identified.  Pleura:There is no pleural thickening.   Bones:Unremarkable  Soft tissues:Unremarkable    IMPRESSION:  Diffuse bilateral areas of pulmonary consolidation with pneumatocele formation  Large loculated leftpleural effusions with compressive atelectasis of the left lower lobe.      CLYDE BILLINGS M.D.; ATTENDING RADIOLOGIST  This document has been electronically signed. Jan 20 2020  6:00PM        < end of copied text >        < from: Xray Chest 1 View- PORTABLE-Urgent (01.22.20 @ 11:40) >   EXAM:  XR CHEST PORTABLE URGENT 1V                          PROCEDURE DATE:  01/22/2020          INTERPRETATION:  Portable chest x-ray performed on 1/22/2020 11:30 AM compared with 1/20/2020 at 9:29 AM.    Clinical statement: Effusion    No changein the mild right and moderate left pleural effusions with suspicion for loculated fluid on the left. Underlying infection and/or atelectasis is possible. Again noted is a cavity or pneumatocele in the left apex. Limited evaluation of the heart size.Small left-sided pneumothorax seen on chest CT from 1/20/2020 is not as well visualized on this x-ray.    Impression:    No significant change in the suspected bilateral pleural effusions, left greater than right. Possible loculated fluid on the left. Atelectatic changes and/or underlying infection is possible. Please correlate clinically. Cavity or pneumatocele in the left apex again seen.    Of note, the small right-sided pneumothorax on chest CT from 1/20/2020 is not as well appreciated on this x-ray. The presence of a small right-sided pneumothorax was discussed with Dr. Silva at 12:25 PM on 1/22/2020.           TOMMY HOOD M.D., ATTENDING RADIOLOGIST  This document has been electronically signed. Jan 22 2020 12:27PM        < end of copied text >

## 2020-01-22 NOTE — [facility]I QUERY NOTE - NSCDIOTHERTXTBX_GEN_ALL_CORE_HH
Documentation noted bilateral PNA.    Supporting Documentation:   1/21 Hospitalist note: #Bilateral pneumonia, complicated by LT sided large loculated pleural effusion + acute resp failure w/ hypoxia, stable - in immunocompormised pt. flu panel neg. deescalate abx to rocephin + zithro started 1/21. last day rocephin 1/26, last day zithro 1/25. supplemental o2 - titrate to RA    ABx: Zosyn 3.375 IV Q8hrs    Please clarify the type of PNA being treated that necessitates the use of IV zosyn.  A) Gram negative sean PNA  B) Other, please specify  C) Not clinically significant

## 2020-01-22 NOTE — PROGRESS NOTE ADULT - ASSESSMENT
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 CTA chest completed ruled out PE. found to have Diffuse bilateral areas of pulmonary consolidation with pneumatocele formation and Large loculated left pleural effusions with compressive atelectasis of the left lower lobe. thoracic sx cs placed.     presentation complicated by trace RT sided PTX. medical management only, no chest tube placed.     #Bilateral pneumonia, complicated by LT sided large loculated pleural effusion + acute resp failure w/ hypoxia, stable - in immunocompromised pt. flu panel neg. reescalate abx to zosyn - suspecting resistant gram neg and/or positive organisms as underlying etiology behind infection in light of cavitary lesions - started 1/22. supplemental o2 - titrate to RA - not baseline dependent. repeat ct chest 6wks. nebs. blood cx 1/20 pending x2. sputum + legionella pending collection. cts cs appreciated. IR cs appreciated - pending chest tube placement when INR </- 1.7 by IR - vitamin k + pt/inr daily. . ID cs appreciated.     #hyponatremia, mild, asymptomatic, persistent - trend    #Prostate CA, stable - was due to start palliative radiation tx 1/21. palliative cs appreciated. daphney cs appreciated - cont daily prednisone. psa responding to current tx w/ lupron, docetaxel, xgeva - cont on dc. rad onc cs appreciated - will try to set up radiation sessions while inpt.     #anemia, asymptomatic, stable - baseline hgb 11-12, no acute/active s/s of blood loss. outpt fu.     #dvt ppx. lovenox    #dispo. sister to make all decisions at this time (Gail Cruz [phone]) per pt. pending resolution resp failure + clearance by cts, likely 2-3d. PT - no needs    #outpt fu. pmd. onc, rad onc.

## 2020-01-22 NOTE — GOALS OF CARE CONVERSATION - ADVANCED CARE PLANNING - CONVERSATION DETAILS
Sw met with patient to provide supportive counseling to facilitate coping with dx of catastrophic illness. Patient actively engaged with SW.  Patient has been seeking treatment at [facility] with Dr Oshea and goals are to continue seeking treatment options. patient reports no current symptom issues and reports pain being managed. SW encouraged utilization of services in community regarding ACS and referral to be initiated at discharge. patient lives with mother and sister and also reports nephew in home. patients other brother involved and supportive. Palliative care to follow.

## 2020-01-23 ENCOUNTER — OUTPATIENT (OUTPATIENT)
Dept: OUTPATIENT SERVICES | Facility: HOSPITAL | Age: 65
LOS: 1 days | Discharge: HOME | End: 2020-01-23

## 2020-01-23 VITALS
SYSTOLIC BLOOD PRESSURE: 128 MMHG | DIASTOLIC BLOOD PRESSURE: 86 MMHG | WEIGHT: 235 LBS | BODY MASS INDEX: 27.19 KG/M2 | RESPIRATION RATE: 18 BRPM | TEMPERATURE: 97.6 F | HEIGHT: 78 IN | OXYGEN SATURATION: 96 % | HEART RATE: 109 BPM

## 2020-01-23 DIAGNOSIS — J90 PLEURAL EFFUSION, NOT ELSEWHERE CLASSIFIED: ICD-10-CM

## 2020-01-23 DIAGNOSIS — C61 MALIGNANT NEOPLASM OF PROSTATE: ICD-10-CM

## 2020-01-23 LAB
ANION GAP SERPL CALC-SCNC: 11 MMOL/L — SIGNIFICANT CHANGE UP (ref 5–17)
APTT BLD: 33.6 SEC — SIGNIFICANT CHANGE UP (ref 27.5–36.3)
BUN SERPL-MCNC: 9 MG/DL — SIGNIFICANT CHANGE UP (ref 8–20)
CALCIUM SERPL-MCNC: 7.8 MG/DL — LOW (ref 8.6–10.2)
CHLORIDE SERPL-SCNC: 94 MMOL/L — LOW (ref 98–107)
CO2 SERPL-SCNC: 25 MMOL/L — SIGNIFICANT CHANGE UP (ref 22–29)
CREAT SERPL-MCNC: 0.43 MG/DL — LOW (ref 0.5–1.3)
GLUCOSE SERPL-MCNC: 126 MG/DL — HIGH (ref 70–99)
GRAM STN FLD: SIGNIFICANT CHANGE UP
INR BLD: 2.01 RATIO — HIGH (ref 0.88–1.16)
LEGIONELLA AG UR QL: NEGATIVE — SIGNIFICANT CHANGE UP
POTASSIUM SERPL-MCNC: 3.7 MMOL/L — SIGNIFICANT CHANGE UP (ref 3.5–5.3)
POTASSIUM SERPL-SCNC: 3.7 MMOL/L — SIGNIFICANT CHANGE UP (ref 3.5–5.3)
PROTHROM AB SERPL-ACNC: 23.6 SEC — HIGH (ref 10–12.9)
SODIUM SERPL-SCNC: 130 MMOL/L — LOW (ref 135–145)
SPECIMEN SOURCE: SIGNIFICANT CHANGE UP

## 2020-01-23 PROCEDURE — 99232 SBSQ HOSP IP/OBS MODERATE 35: CPT

## 2020-01-23 PROCEDURE — 99231 SBSQ HOSP IP/OBS SF/LOW 25: CPT

## 2020-01-23 RX ORDER — SODIUM CHLORIDE 9 MG/ML
1000 INJECTION INTRAMUSCULAR; INTRAVENOUS; SUBCUTANEOUS
Refills: 0 | Status: DISCONTINUED | OUTPATIENT
Start: 2020-01-23 | End: 2020-01-25

## 2020-01-23 RX ADMIN — PIPERACILLIN AND TAZOBACTAM 25 GRAM(S): 4; .5 INJECTION, POWDER, LYOPHILIZED, FOR SOLUTION INTRAVENOUS at 12:36

## 2020-01-23 RX ADMIN — OXYCODONE HYDROCHLORIDE 10 MILLIGRAM(S): 5 TABLET ORAL at 05:32

## 2020-01-23 RX ADMIN — OXYCODONE HYDROCHLORIDE 10 MILLIGRAM(S): 5 TABLET ORAL at 00:45

## 2020-01-23 RX ADMIN — Medication 5 MILLIGRAM(S): at 11:57

## 2020-01-23 RX ADMIN — OXYCODONE HYDROCHLORIDE 10 MILLIGRAM(S): 5 TABLET ORAL at 11:56

## 2020-01-23 RX ADMIN — PIPERACILLIN AND TAZOBACTAM 25 GRAM(S): 4; .5 INJECTION, POWDER, LYOPHILIZED, FOR SOLUTION INTRAVENOUS at 21:55

## 2020-01-23 RX ADMIN — OXYCODONE HYDROCHLORIDE 10 MILLIGRAM(S): 5 TABLET ORAL at 04:59

## 2020-01-23 RX ADMIN — Medication 5 MILLIGRAM(S): at 16:42

## 2020-01-23 RX ADMIN — Medication 3 MILLILITER(S): at 16:09

## 2020-01-23 RX ADMIN — Medication 5 MILLIGRAM(S): at 05:43

## 2020-01-23 RX ADMIN — OXYCODONE HYDROCHLORIDE 10 MILLIGRAM(S): 5 TABLET ORAL at 22:36

## 2020-01-23 RX ADMIN — PIPERACILLIN AND TAZOBACTAM 25 GRAM(S): 4; .5 INJECTION, POWDER, LYOPHILIZED, FOR SOLUTION INTRAVENOUS at 05:44

## 2020-01-23 RX ADMIN — Medication 3 MILLILITER(S): at 21:28

## 2020-01-23 RX ADMIN — OXYCODONE HYDROCHLORIDE 10 MILLIGRAM(S): 5 TABLET ORAL at 10:29

## 2020-01-23 RX ADMIN — OXYCODONE HYDROCHLORIDE 10 MILLIGRAM(S): 5 TABLET ORAL at 22:06

## 2020-01-23 RX ADMIN — FINASTERIDE 5 MILLIGRAM(S): 5 TABLET, FILM COATED ORAL at 11:56

## 2020-01-23 RX ADMIN — TAMSULOSIN HYDROCHLORIDE 0.4 MILLIGRAM(S): 0.4 CAPSULE ORAL at 21:55

## 2020-01-23 RX ADMIN — SODIUM CHLORIDE 75 MILLILITER(S): 9 INJECTION INTRAMUSCULAR; INTRAVENOUS; SUBCUTANEOUS at 22:47

## 2020-01-23 NOTE — PROGRESS NOTE ADULT - ASSESSMENT
64 year old gentleman with stage IV prostate cancer and associated bony pain, admitted with pneumonia and pleural effusion.

## 2020-01-23 NOTE — PHYSICAL EXAM
[Normal] : normoactive bowel sounds, soft and nontender, no hepatosplenomegaly or masses appreciated [Exaggerated Use Of Accessory Muscles For Inspiration] : no accessory muscle use [] : no respiratory distress [de-identified] : 2L NC O2 [de-identified] : tenderness along low back/hips

## 2020-01-23 NOTE — PROGRESS NOTE ADULT - SUBJECTIVE AND OBJECTIVE BOX
CC: pna    Patient seen and examined at the bedside. No acute overnight events. no evnets yesterday. Complaining of hot flashes today. Denies fever/chills, headache, lightheadedness, dizziness, chest pain, palpitations, cough, abd pain, nausea/vomiting/diarrhea, muscle pain.      =========================================================================================    PHYSICAL EXAM.    GEN - appears age appropriate. well nourished. pleasant. no distress.   HEENT - NCAT, EOMI, BETITO  RESP - COOPER. no wheeze/stridor/rhonchi/crackles. on supplemental O2. able to speak in full sentences without distress.   CARDIO - NS1S2, RRR. No murmurs/rubs/gallops.  ABD - Soft/Non tender/Non distended. Normal BS x4 quadrants. no guarding/rebound tenderness.  Ext - No GL.  MSK - BL 5/5 strength on upper and lower extremities.   Neuro - AAOx3. cn 2-12 grossly intact  Psych - normal affect  Skin - c/d/i. no rashes/lesions        VITAL SIGNS.    Vital Signs Last 24 Hrs  T(C): 36.9 (23 Jan 2020 07:50), Max: 37.4 (22 Jan 2020 16:22)  T(F): 98.5 (23 Jan 2020 07:50), Max: 99.3 (22 Jan 2020 16:22)  HR: 103 (23 Jan 2020 07:50) (70 - 114)  BP: 120/70 (23 Jan 2020 07:50) (108/69 - 154/80)  BP(mean): --  RR: 20 (23 Jan 2020 07:50) (18 - 20)  SpO2: 94% (23 Jan 2020 07:50) (94% - 99%)        =================================================    LABS.                          9.3    x     )-----------( x        ( 22 Jan 2020 10:14 )             29.1     01-23    130<L>  |  94<L>  |  9.0  ----------------------------<  126<H>  3.7   |  25.0  |  0.43<L>    Ca    7.8<L>      23 Jan 2020 08:16        PT/INR - ( 23 Jan 2020 08:16 )   PT: 23.6 sec;   INR: 2.01 ratio         PTT - ( 23 Jan 2020 08:16 )  PTT:33.6 sec  I&O's Summary      Culture - Blood (collected 20 Jan 2020 10:24)  Source: .Blood  Preliminary Report (22 Jan 2020 11:01):    No growth at 48 hours    Culture - Blood (collected 20 Jan 2020 10:24)  Source: .Blood  Preliminary Report (22 Jan 2020 11:01):    No growth at 48 hours        ================================================    IMAGING.    ================================================    DIET.    Diet, Regular (01-21-20 @ 12:58)    ================================================    HOME MEDS.    Home Medications:  finasteride 5 mg oral tablet: 1 tab(s) orally once a day (20 Jan 2020 19:39)  lactulose:  (21 Jan 2020 07:32)  Lupron 5 mg/mL subcutaneous solution:  (21 Jan 2020 07:29)  oxyCODONE 10 mg oral tablet: 1 tab(s) orally every 6 hours (20 Jan 2020 19:39)  Percocet 10/325 oral tablet: 1 tab(s) orally every 6 hours (21 Jan 2020 07:31)  predniSONE 5 mg oral tablet: 1 tab(s) orally 2 times a day (21 Jan 2020 07:30)  Taxotere:  (21 Jan 2020 07:29)  Xgeva 120 mg/1.7 mL subcutaneous solution:  (21 Jan 2020 07:30)      ================================================    HOSPITAL MEDS.    MEDICATIONS  (STANDING):  albuterol/ipratropium for Nebulization 3 milliLiter(s) Nebulizer every 6 hours  finasteride 5 milliGRAM(s) Oral daily  oxyCODONE    IR 10 milliGRAM(s) Oral every 6 hours  phytonadione   Solution 5 milliGRAM(s) Oral daily  piperacillin/tazobactam IVPB.. 3.375 Gram(s) IV Intermittent every 8 hours  polyethylene glycol 3350 17 Gram(s) Oral at bedtime  predniSONE   Tablet 5 milliGRAM(s) Oral two times a day  tamsulosin 0.4 milliGRAM(s) Oral at bedtime    MEDICATIONS  (PRN):  lactulose Syrup 10 Gram(s) Oral two times a day PRN Constipation CC: pna    Patient seen and examined at the bedside. No acute overnight events. no evnets yesterday. Complaining of intermittent sob today. Denies fever/chills, headache, lightheadedness, dizziness, chest pain, palpitations, cough, abd pain, nausea/vomiting/diarrhea, muscle pain.      =========================================================================================    PHYSICAL EXAM.    GEN - appears age appropriate. well nourished. pleasant. no distress.   HEENT - NCAT, EOMI, BETITO  RESP - COOPER. no wheeze/stridor/rhonchi/crackles. on supplemental O2. able to speak in full sentences without distress.   CARDIO - NS1S2, RRR. No murmurs/rubs/gallops.  ABD - Soft/Non tender/Non distended. Normal BS x4 quadrants. no guarding/rebound tenderness.  Ext - No LG.  MSK - BL 5/5 strength on upper and lower extremities.   Neuro - AAOx3. cn 2-12 grossly intact  Psych - normal affect  Skin - c/d/i. no rashes/lesions        VITAL SIGNS.    Vital Signs Last 24 Hrs  T(C): 36.9 (23 Jan 2020 07:50), Max: 37.4 (22 Jan 2020 16:22)  T(F): 98.5 (23 Jan 2020 07:50), Max: 99.3 (22 Jan 2020 16:22)  HR: 103 (23 Jan 2020 07:50) (70 - 114)  BP: 120/70 (23 Jan 2020 07:50) (108/69 - 154/80)  BP(mean): --  RR: 20 (23 Jan 2020 07:50) (18 - 20)  SpO2: 94% (23 Jan 2020 07:50) (94% - 99%)        =================================================    LABS.                          9.3    x     )-----------( x        ( 22 Jan 2020 10:14 )             29.1     01-23    130<L>  |  94<L>  |  9.0  ----------------------------<  126<H>  3.7   |  25.0  |  0.43<L>    Ca    7.8<L>      23 Jan 2020 08:16        PT/INR - ( 23 Jan 2020 08:16 )   PT: 23.6 sec;   INR: 2.01 ratio         PTT - ( 23 Jan 2020 08:16 )  PTT:33.6 sec  I&O's Summary      Culture - Blood (collected 20 Jan 2020 10:24)  Source: .Blood  Preliminary Report (22 Jan 2020 11:01):    No growth at 48 hours    Culture - Blood (collected 20 Jan 2020 10:24)  Source: .Blood  Preliminary Report (22 Jan 2020 11:01):    No growth at 48 hours        ================================================    IMAGING.    ================================================    DIET.    Diet, Regular (01-21-20 @ 12:58)    ================================================    HOME MEDS.    Home Medications:  finasteride 5 mg oral tablet: 1 tab(s) orally once a day (20 Jan 2020 19:39)  lactulose:  (21 Jan 2020 07:32)  Lupron 5 mg/mL subcutaneous solution:  (21 Jan 2020 07:29)  oxyCODONE 10 mg oral tablet: 1 tab(s) orally every 6 hours (20 Jan 2020 19:39)  Percocet 10/325 oral tablet: 1 tab(s) orally every 6 hours (21 Jan 2020 07:31)  predniSONE 5 mg oral tablet: 1 tab(s) orally 2 times a day (21 Jan 2020 07:30)  Taxotere:  (21 Jan 2020 07:29)  Xgeva 120 mg/1.7 mL subcutaneous solution:  (21 Jan 2020 07:30)      ================================================    HOSPITAL MEDS.    MEDICATIONS  (STANDING):  albuterol/ipratropium for Nebulization 3 milliLiter(s) Nebulizer every 6 hours  finasteride 5 milliGRAM(s) Oral daily  oxyCODONE    IR 10 milliGRAM(s) Oral every 6 hours  phytonadione   Solution 5 milliGRAM(s) Oral daily  piperacillin/tazobactam IVPB.. 3.375 Gram(s) IV Intermittent every 8 hours  polyethylene glycol 3350 17 Gram(s) Oral at bedtime  predniSONE   Tablet 5 milliGRAM(s) Oral two times a day  tamsulosin 0.4 milliGRAM(s) Oral at bedtime    MEDICATIONS  (PRN):  lactulose Syrup 10 Gram(s) Oral two times a day PRN Constipation

## 2020-01-23 NOTE — ADVANCED PRACTICE NURSE CONSULT - RECOMMEDATIONS
Oriented pt to oncology patient navigator role and contact information provided.  Pt states he feels " wiped  out" from treatment today.  ACS referral completed.  Emotional support provided.

## 2020-01-23 NOTE — PROGRESS NOTE ADULT - PROBLEM SELECTOR PLAN 1
-Previously seen as outpatient, and underwent radiation treatment planning.  -Brought over to [facility] today for initiation of palliative radiation treatments to the SI joints bilaterally.  Anticipate 5 fractions of treatment.  Will resume tomorrow.  Please contact with any questions or concerns.

## 2020-01-23 NOTE — PROGRESS NOTE ADULT - ASSESSMENT
This 64yoM; with pmh signif for Prostate Ca with mets to ribs diagnosed in 11/2019 (currently on chemo), HTN; now p/w cough, sob, fever x1 week, progressively worsening.  denies sick contacts. reports traveling from [place] after diagnosis of prostate ca complicated by b/l ptx in november and another hospitalization in december. denies cp/palp. denies abd pain: SOCIAL: No tobacco/illicit substance use/social/EtOH    patient reports having a history of Bilateral Pneumonia approx 1.5 months prior with associated bilateral Pleural effusions which required draining. In addition to above, patient is scheduled palliative Radiation therapy today. He is on ADT (lupron), Taxotere and Xgeva for Prostate adenocarcinoma with Mets to Lung, Bone and Lymph. (20 Jan 2020 21:22)    Chest imaging xray showed:  No significant change in the suspected bilateral pleural effusions, left greater than right.   Possible loculated fluid on the left. Atelectatic changes and/or underlying infection is possible. Please correlate clinically. Cavity or pneumatocele in the left apex again seen.  Of note, the small right-sided pneumothorax on chest CT from 1/20/2020 is not as well appreciated on this x-ray. The presence of a small right-sided pneumothorax was discussed with Dr. Silva at 12:25 PM on 1/22/2020.    A CT scan was done and showed:  Diffuse bilateral areas of pulmonary consolidation with pneumatocele formation  Large loculated left pleural effusions with compressive atelectasis of the left lower lobe.      patient was initially started On vanco & Zosyn, then Ceftriaxone and Zithromax was added as well.   We are consulted to evaluate this patient for PNA.       Impression  PNA  large loculated effusion  LEFT lobe atelectasis  Shortness of breath      Plan:  - continue Antibiotics:  piperacillin/tazobactam IVPB.. 3.375 Gram(s) IV Intermittent every 8 hours    - follow up on sputum cx    - follow up all outstanding cultures  - trend temperature and WBC curve  - repeat cultures from blood and all sources if febrile.

## 2020-01-23 NOTE — DISEASE MANAGEMENT
[Clinical] : TNM Stage: c [IV] : IV [FreeTextEntry4] : metastatic prostate carcinoma to bone [NTNM] : - [TTNM] : - [MTNM] : 1 [de-identified] : 400 [de-identified] : 2000

## 2020-01-23 NOTE — PROGRESS NOTE ADULT - SUBJECTIVE AND OBJECTIVE BOX
Hospital Course:  1/20 presented with SOB, found to have large loculated left pleural effusion, pending IR drainage when INR downtrends, RT today    Subjective: "i feel tired, i had radiation today". c/o SOB even at rest, exacerbated by exertion and stress per pt, denies CP, palpitations, cough, fever, chills, itchiness/rash, diaphoresis, vision changes, HA, dizziness/lightheadedness, numbness/tingling, abd pain, N/V     T(C): 37.1 (01-23-20 @ 13:43), Max: 37.4 (01-22-20 @ 16:22)  HR: 112 (01-23-20 @ 13:43) (70 - 114)  BP: 158/96 (01-23-20 @ 13:43) (108/69 - 158/96)  RR: 22 (01-23-20 @ 13:43) (18 - 22)  SpO2: 96% (01-23-20 @ 13:43) (94% - 99%)    01-23    130<L>  |  94<L>  |  9.0  ----------------------------<  126<H>  3.7   |  25.0  |  0.43<L>    Ca    7.8<L>      23 Jan 2020 08:16                               9.3    x     )-----------( x        ( 22 Jan 2020 10:14 )             29.1   PT/INR - ( 23 Jan 2020 08:16 )   PT: 23.6 sec;   INR: 2.01 ratio    PTT - ( 23 Jan 2020 08:16 )  PTT:33.6 sec    Drug Dosing Weight  Height (cm): 198.12 (20 Jan 2020 08:51)  Weight (kg): 106.6 (20 Jan 2020 08:51)  BMI (kg/m2): 27.2 (20 Jan 2020 08:51)  BSA (m2): 2.42 (20 Jan 2020 08:51)    MEDICATIONS  (STANDING):  albuterol/ipratropium for Nebulization 3 milliLiter(s) Nebulizer every 6 hours  finasteride 5 milliGRAM(s) Oral daily  oxyCODONE    IR 10 milliGRAM(s) Oral every 6 hours  phytonadione   Solution 5 milliGRAM(s) Oral daily  piperacillin/tazobactam IVPB.. 3.375 Gram(s) IV Intermittent every 8 hours  polyethylene glycol 3350 17 Gram(s) Oral at bedtime  predniSONE   Tablet 5 milliGRAM(s) Oral two times a day  tamsulosin 0.4 milliGRAM(s) Oral at bedtime    MEDICATIONS  (PRN):  lactulose Syrup 10 Gram(s) Oral two times a day PRN Constipation    Physical Exam  Constitutional:, well developed, well nourished  Neuro: A+O x 3, non-focal, speech clear and intact  CV: regular rate, regular rhythm, +S1S2, no murmurs or rub  Pulm/chest: decreased breath sounds on left, clear on right, no wheezing, no use of accessory muscles, breathing mildly labored  Abd: soft, NT, ND, +BS  Ext: TANG x 4, no clubbing/cyanosis/edema  Skin: warm, well perfused  Psych: calm, appropriate affect

## 2020-01-23 NOTE — CHART NOTE - NSCHARTNOTEFT_GEN_A_CORE
Medicine PA- Cd. to order IVF at pt's request, feels like he isn't taking in enough PO fluids, has no c/o otherwise, pain controlled. Pt. hx metastatic prostate ca, on chemo/radiation, NS @ 75cc/hr ordered, continue to monitor.

## 2020-01-23 NOTE — ADVANCED PRACTICE NURSE CONSULT - ASSESSMENT
64 year old gentleman, pt of Dr. Oshea, with metastatic prostate cancer, on treatment with Lupron/Docetaxel/Prednisone & Xgeva who is admitted with bilateral pneumonia, and large loculated pleural effusion. Last dose of docetaxel given on 1/10/20.  1 of 5 fractions of XRT given today at the [facility].

## 2020-01-23 NOTE — PROGRESS NOTE ADULT - PROBLEM SELECTOR PLAN 1
POCUS on 1/22 with multiple septations and loculations, will need IR drainage, holding coumadin, INR still therapeutic today, to receive Vit K today per primary team  thoracic surgery to follow  remainder of patient's care per primary hospitalist team  d/w Dr. Huerta

## 2020-01-23 NOTE — PROGRESS NOTE ADULT - ASSESSMENT
64M, with known history of prostate cancer undergoing chemo therapy with Heme/Onc Dr. Oshea (1 treatment every 3 weeks, last Chemo treatment was 1/14) presented to [facility] 1/20/20 for worsening shortness of breath. +Admits to intermittent cough with yellowish sputum over the last week. +Night sweats with hormone therapy use as per Heme/Onc. +Uses a walker to ambulate due to fatigue, shortness of breath at baseline, and unsteadiness on feet. Patient reports recently being admitted to a hospital  in [place] 12/2019 and was treated for fluid and air around his lungs.        PLAN:  Admitted to Medicine.  Vanco/Zosyn current ordered for Pneumonia coverage as per primary team.   F/u sputum and blood cultures.  Patient maintaining appropriate SpO2 of 99% with O2 via NC at 2L.  Continuous SpO2 monitoring,  POCUS performed at the bedside. +Compressive atelectasis, loculated collection with only small pocket of fluid visualized for chest tube placement.   Plan for possible chest tube placement later today (as of note INR >2, patient states on no anticoagulation at home).  Spoke with Dr Huerta.  With the integrity of his lungs tge best option is to set up IR fir the drainage of the left  posterior  pocket.   Spoke with Dr Horvath and she will place the orders and reach out to IR   Case and plan to be discussed with Dr. Huerta / Thoracic surgery team at am rounds.   Will continue to follow.     Problem/Recommendation - 1:  Problem: Pleural effusion on left. Recommendation: Plan as above.    Problem/Recommendation - 2:  ·  Problem: Prostate CA.  Recommendation: Plan as above. 64M, with known history of prostate cancer undergoing chemo therapy with Heme/Onc Dr. Oshea (1 treatment every 3 weeks, last Chemo treatment was 1/14) presented to [facility] 1/20/20 for worsening shortness of breath, recently admitted to a hospital  in [place] 11/2019 and was treated for fluid and air around his lungs, ? decortication performed, found to have large loculated left pleural effusion;

## 2020-01-23 NOTE — PROGRESS NOTE ADULT - SUBJECTIVE AND OBJECTIVE BOX
[facility]  INFECTIOUS DISEASES AND INTERNAL MEDICINE at [place]  =======================================================  Chandler Mccrary MD  Diplomates American Board of Internal Medicine and Infectious Diseases  Telephone [phone]  Fax            [phone]  =======================================================    [facility]-[id number]  [place]   follow up:  Pneumonia    no fevers  still with SOB      =======================================================    REVIEW OF SYSTEMS:  CONSTITUTIONAL:  No Fever or chills  HEENT:  No diplopia or blurred vision.  No earache, sore throat or runny nose.  CARDIOVASCULAR:  No pressure, squeezing, strangling, tightness, heaviness or aching about the chest, neck, axilla or epigastrium.  RESPIRATORY:  No cough, shortness of breath  GASTROINTESTINAL:  No nausea, vomiting or diarrhea.  GENITOURINARY:  No dysuria, frequency or urgency. No Blood in urine  MUSCULOSKELETAL:  no joint aches, no muscle pain  SKIN:  No change in skin, hair or nails.  NEUROLOGIC:  No Headaches, seizures or weakness.  PSYCHIATRIC:  No disorder of thought or mood.  ENDOCRINE:  No heat or cold intolerance  HEMATOLOGICAL:  No easy bruising or bleeding.   =======================================================  Allergies  No Known Allergies     Antibiotics:  piperacillin/tazobactam IVPB.. 3.375 Gram(s) IV Intermittent every 8 hours    Other medications:  albuterol/ipratropium for Nebulization 3 milliLiter(s) Nebulizer every 6 hours  finasteride 5 milliGRAM(s) Oral daily  oxyCODONE    IR 10 milliGRAM(s) Oral every 6 hours  phytonadione   Solution 5 milliGRAM(s) Oral daily  polyethylene glycol 3350 17 Gram(s) Oral at bedtime  predniSONE   Tablet 5 milliGRAM(s) Oral two times a day  tamsulosin 0.4 milliGRAM(s) Oral at bedtime      ======================================================  Physical Exam:  ============  T(F): 98.5 (23 Jan 2020 07:50), Max: 99.3 (22 Jan 2020 16:22)  HR: 103 (23 Jan 2020 07:50)  BP: 120/70 (23 Jan 2020 07:50)  RR: 20 (23 Jan 2020 07:50)  SpO2: 94% (23 Jan 2020 07:50) (94% - 99%)  temp max in last 48H T(F): , Max: 99.3 (01-22-20 @ 16:22)    General:  No acute distress.  Eye: Pupils are equal, round and reactive to light, Extraocular movements are intact, Normal conjunctiva.  HENT: Normocephalic, Oral mucosa is moist, No pharyngeal erythema, No sinus tenderness.  Neck: Supple, No lymphadenopathy.  Respiratory:  POOR aeration of the LEFT side.   Cardiovascular: Normal rate, Regular rhythm,   Gastrointestinal: Soft, Non-tender, Non-distended, Normal bowel sounds.  Genitourinary: No costovertebral angle tenderness.  Lymphatics: No lymphadenopathy neck,   Musculoskeletal: Normal range of motion, Normal strength.  Integumentary: No rash.  Neurologic: Alert, Oriented, No focal deficits, Cranial Nerves II-XII are grossly intact.  Psychiatric: Appropriate mood & affect.    =======================================================  Labs:                        9.3    x     )-----------( x        ( 22 Jan 2020 10:14 )             29.1       WBC Count: 9.80 K/uL (01-21-20 @ 09:44)  WBC Count: 7.91 K/uL (01-20-20 @ 09:56)      01-23    130<L>  |  94<L>  |  9.0  ----------------------------<  126<H>  3.7   |  25.0  |  0.43<L>    Ca    7.8<L>      23 Jan 2020 08:16        Culture - Blood (collected 01-20-20 @ 10:24)  Source: .Blood    Culture - Blood (collected 01-20-20 @ 10:24)  Source: .Blood

## 2020-01-23 NOTE — PROGRESS NOTE ADULT - ASSESSMENT
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 CTA chest completed ruled out PE. found to have Diffuse bilateral areas of pulmonary consolidation with pneumatocele formation and Large loculated left pleural effusions with compressive atelectasis of the left lower lobe. thoracic sx cs placed.     presentation complicated by trace RT sided PTX. medical management only, no chest tube placed.     #Bilateral pneumonia, complicated by LT sided large loculated pleural effusion + acute resp failure w/ hypoxia, stable - in immunocompromised pt. flu panel neg. reescalate abx to zosyn 1/22 - suspecting resistant gram neg and/or positive organisms as underlying etiology behind infection in light of cavitary lesions. supplemental o2 - titrate to RA - not baseline dependent. repeat ct chest 6wks. nebs. blood cx 1/20 pending. sputum + legionella pending - already collected. cts cs appreciated. IR cs appreciated - pending chest tube placement when INR </- 1.7 by IR - vitamin k + pt/inr daily. . ID cs appreciated.     #hyponatremia, mild, asymptomatic, persistent - trend.    #Prostate CA, stable - was due to start palliative radiation tx 1/21. palliative cs appreciated. daphney cs appreciated - cont daily prednisone. psa responding to current tx w/ lupron, docetaxel, xgeva - cont on dc. rad onc cs appreciated - rad therapy while inpt - pt to be transferred to center for tx and brought back - first tx 1/23.     #anemia, asymptomatic, stable - baseline hgb 11-12, no acute/active s/s of blood loss. outpt fu.     #dvt ppx. lovenox    #dispo. sister to make all decisions at this time (Gail Cruz [phone]) per pt. pending resolution resp failure + clearance by cts, undetermined date as pt also needs chest tube when INR improved. PT - no needs    #outpt fu. pmd. onc, rad onc dr carlisle @ [facility].

## 2020-01-23 NOTE — REVIEW OF SYSTEMS
[Constipation: Grade 0] : Constipation: Grade 0 [Diarrhea: Grade 0] : Diarrhea: Grade 0 [Edema Limbs: Grade 0] : Edema Limbs: Grade 0  [Fatigue: Grade 1 - Fatigue relieved by rest] : Fatigue: Grade 1 - Fatigue relieved by rest [Localized Edema: Grade 0] : Localized Edema: Grade 0  [Neck Edema: Grade 0] : Neck Edema: Grade 0 [Cough: Grade 1 - Mild symptoms; nonprescription intervention indicated] : Cough: Grade 1 - Mild symptoms; nonprescription intervention indicated [Dyspnea: Grade 2 - Shortness of breath with minimal exertion; limiting instrumental ADL] : Dyspnea: Grade 2 - Shortness of breath with minimal exertion; limiting instrumental ADL

## 2020-01-24 VITALS
OXYGEN SATURATION: 100 % | RESPIRATION RATE: 17 BRPM | HEIGHT: 78 IN | SYSTOLIC BLOOD PRESSURE: 147 MMHG | DIASTOLIC BLOOD PRESSURE: 80 MMHG

## 2020-01-24 LAB
ANION GAP SERPL CALC-SCNC: 13 MMOL/L — SIGNIFICANT CHANGE UP (ref 5–17)
APTT BLD: 32.8 SEC — SIGNIFICANT CHANGE UP (ref 27.5–36.3)
BUN SERPL-MCNC: 7 MG/DL — LOW (ref 8–20)
CALCIUM SERPL-MCNC: 7.8 MG/DL — LOW (ref 8.6–10.2)
CHLORIDE SERPL-SCNC: 94 MMOL/L — LOW (ref 98–107)
CO2 SERPL-SCNC: 24 MMOL/L — SIGNIFICANT CHANGE UP (ref 22–29)
CREAT SERPL-MCNC: 0.37 MG/DL — LOW (ref 0.5–1.3)
GLUCOSE SERPL-MCNC: 118 MG/DL — HIGH (ref 70–99)
HCT VFR BLD CALC: 28.5 % — LOW (ref 39–50)
HGB BLD-MCNC: 8.9 G/DL — LOW (ref 13–17)
INR BLD: 1.91 RATIO — HIGH (ref 0.88–1.16)
POTASSIUM SERPL-MCNC: 3.4 MMOL/L — LOW (ref 3.5–5.3)
POTASSIUM SERPL-SCNC: 3.4 MMOL/L — LOW (ref 3.5–5.3)
PROTHROM AB SERPL-ACNC: 22.4 SEC — HIGH (ref 10–12.9)
SODIUM SERPL-SCNC: 131 MMOL/L — LOW (ref 135–145)

## 2020-01-24 PROCEDURE — 99232 SBSQ HOSP IP/OBS MODERATE 35: CPT

## 2020-01-24 PROCEDURE — 99233 SBSQ HOSP IP/OBS HIGH 50: CPT

## 2020-01-24 RX ORDER — PHYTONADIONE (VIT K1) 5 MG
5 TABLET ORAL DAILY
Refills: 0 | Status: DISCONTINUED | OUTPATIENT
Start: 2020-01-25 | End: 2020-01-27

## 2020-01-24 RX ORDER — ENOXAPARIN SODIUM 100 MG/ML
40 INJECTION SUBCUTANEOUS DAILY
Refills: 0 | Status: COMPLETED | OUTPATIENT
Start: 2020-01-24 | End: 2020-01-26

## 2020-01-24 RX ORDER — POTASSIUM CHLORIDE 20 MEQ
40 PACKET (EA) ORAL ONCE
Refills: 0 | Status: COMPLETED | OUTPATIENT
Start: 2020-01-24 | End: 2020-01-24

## 2020-01-24 RX ADMIN — OXYCODONE HYDROCHLORIDE 10 MILLIGRAM(S): 5 TABLET ORAL at 22:37

## 2020-01-24 RX ADMIN — OXYCODONE HYDROCHLORIDE 10 MILLIGRAM(S): 5 TABLET ORAL at 23:07

## 2020-01-24 RX ADMIN — Medication 40 MILLIEQUIVALENT(S): at 16:43

## 2020-01-24 RX ADMIN — Medication 5 MILLIGRAM(S): at 16:42

## 2020-01-24 RX ADMIN — PIPERACILLIN AND TAZOBACTAM 25 GRAM(S): 4; .5 INJECTION, POWDER, LYOPHILIZED, FOR SOLUTION INTRAVENOUS at 05:37

## 2020-01-24 RX ADMIN — OXYCODONE HYDROCHLORIDE 10 MILLIGRAM(S): 5 TABLET ORAL at 05:37

## 2020-01-24 RX ADMIN — OXYCODONE HYDROCHLORIDE 10 MILLIGRAM(S): 5 TABLET ORAL at 16:43

## 2020-01-24 RX ADMIN — PIPERACILLIN AND TAZOBACTAM 25 GRAM(S): 4; .5 INJECTION, POWDER, LYOPHILIZED, FOR SOLUTION INTRAVENOUS at 22:38

## 2020-01-24 RX ADMIN — OXYCODONE HYDROCHLORIDE 10 MILLIGRAM(S): 5 TABLET ORAL at 10:42

## 2020-01-24 RX ADMIN — OXYCODONE HYDROCHLORIDE 10 MILLIGRAM(S): 5 TABLET ORAL at 16:52

## 2020-01-24 RX ADMIN — Medication 5 MILLIGRAM(S): at 12:21

## 2020-01-24 RX ADMIN — OXYCODONE HYDROCHLORIDE 10 MILLIGRAM(S): 5 TABLET ORAL at 12:42

## 2020-01-24 RX ADMIN — SODIUM CHLORIDE 75 MILLILITER(S): 9 INJECTION INTRAMUSCULAR; INTRAVENOUS; SUBCUTANEOUS at 22:38

## 2020-01-24 RX ADMIN — PIPERACILLIN AND TAZOBACTAM 25 GRAM(S): 4; .5 INJECTION, POWDER, LYOPHILIZED, FOR SOLUTION INTRAVENOUS at 12:21

## 2020-01-24 RX ADMIN — TAMSULOSIN HYDROCHLORIDE 0.4 MILLIGRAM(S): 0.4 CAPSULE ORAL at 22:37

## 2020-01-24 RX ADMIN — ENOXAPARIN SODIUM 40 MILLIGRAM(S): 100 INJECTION SUBCUTANEOUS at 16:52

## 2020-01-24 RX ADMIN — POLYETHYLENE GLYCOL 3350 17 GRAM(S): 17 POWDER, FOR SOLUTION ORAL at 22:37

## 2020-01-24 RX ADMIN — FINASTERIDE 5 MILLIGRAM(S): 5 TABLET, FILM COATED ORAL at 12:21

## 2020-01-24 RX ADMIN — LACTULOSE 10 GRAM(S): 10 SOLUTION ORAL at 16:42

## 2020-01-24 RX ADMIN — Medication 5 MILLIGRAM(S): at 05:37

## 2020-01-24 RX ADMIN — OXYCODONE HYDROCHLORIDE 10 MILLIGRAM(S): 5 TABLET ORAL at 06:07

## 2020-01-24 NOTE — PROGRESS NOTE ADULT - ASSESSMENT
This 64yoM; with pmh signif for Prostate Ca with mets to ribs diagnosed in 11/2019 (currently on chemo), HTN; now p/w cough, sob, fever x1 week, progressively worsening.  denies sick contacts. reports traveling from [place] after diagnosis of prostate ca complicated by b/l ptx in november and another hospitalization in december. denies cp/palp. denies abd pain: SOCIAL: No tobacco/illicit substance use/social/EtOH    patient reports having a history of Bilateral Pneumonia approx 1.5 months prior with associated bilateral Pleural effusions which required draining. In addition to above, patient is scheduled palliative Radiation therapy today. He is on ADT (lupron), Taxotere and Xgeva for Prostate adenocarcinoma with Mets to Lung, Bone and Lymph. (20 Jan 2020 21:22)    Chest imaging xray showed:  No significant change in the suspected bilateral pleural effusions, left greater than right.   Possible loculated fluid on the left. Atelectatic changes and/or underlying infection is possible. Please correlate clinically. Cavity or pneumatocele in the left apex again seen.  Of note, the small right-sided pneumothorax on chest CT from 1/20/2020 is not as well appreciated on this x-ray. The presence of a small right-sided pneumothorax was discussed with Dr. Silva at 12:25 PM on 1/22/2020.    A CT scan was done and showed:  Diffuse bilateral areas of pulmonary consolidation with pneumatocele formation  Large loculated left pleural effusions with compressive atelectasis of the left lower lobe.      patient was initially started On vanco & Zosyn, then Ceftriaxone and Zithromax was added as well.   We are consulted to evaluate this patient for PNA.       Impression:  PNA  large loculated effusion  LEFT lobe atelectasis  Shortness of breath    Plan:  - continue Antibiotics:  piperacillin/tazobactam IVPB.. 3.375 Gram(s) IV Intermittent every 8 hours    - follow up on sputum cx

## 2020-01-24 NOTE — PROGRESS NOTE ADULT - ASSESSMENT
65 y/o male with Prostate Adenocarcinoma with Mets to Bone, Lung and Lymp on ADT (Lupron), Taxotere, Xgeva therapy and awaiting implementation of Palliative radiation therapy presented to ED with C/O worsening SOB. CTA chest completed ruled out PE. found to have Diffuse bilateral areas of pulmonary consolidation with pneumatocele formation and Large loculated left pleural effusions with compressive atelectasis of the left lower lobe. thoracic sx cs placed.     presentation complicated by trace RT sided PTX. medical management only, no chest tube placed.     #Bilateral pneumonia, complicated by LT sided large loculated pleural effusion + acute resp failure w/ hypoxia, stable - in immunocompromised pt. flu panel neg. reescalate abx to zosyn 1/22 - suspecting resistant gram neg and/or positive organisms as underlying etiology behind infection in light of cavitary lesions. supplemental o2 - titrate to RA - not baseline dependent. repeat ct chest 6wks. nebs. blood cx 1/20 neg. sputum cx prelim resp teresa only. neg legionella. cts cs appreciated. IR cs appreciated - pending chest tube placement when INR </- 1.7 by IR - vitamin k + pt/inr daily - stop vit k when goal met. . ID cs appreciated.     #hyponatremia, mild, asymptomatic, persistent - trend.    #Prostate CA, stable - palliative cs appreciated. [facility] cs appreciated - cont daily prednisone. psa responding to current tx w/ lupron, docetaxel, xgeva - cont on dc. rad onc cs appreciated - rad therapy while inpt - pt to be transferred to center for tx and brought back - first tx 1/23, likely 5 sessions planned.     #anemia, asymptomatic, stable - baseline hgb 11-12, no acute/active s/s of blood loss. outpt fu.     #hypokalemia, mild, asymptomatc - replete + repeat    #dvt ppx. lovenox over the weekend, hold monday AM in case Chest tube placement during the week    #dispo. sister to make all decisions at this time (Gail Cruz [phone]) per pt.  undetermined date of dc as pt also needs chest tube when INR improved for loculated effusion, earliest poss date of procedure 1/27 if inr @ goal. PT - no needs    #outpt fu. pmd. onc, rad onc dr carlisle @ [facility].

## 2020-01-24 NOTE — CHART NOTE - NSCHARTNOTEFT_GEN_A_CORE
Patient being followed by service for left loculated effusion pending IR intervention when INR < 1.7 currently receiving vitamin K daily until within range   will continue to follow with you peripherally pending chest tube   please call for any acute changes of a CT surgery concern

## 2020-01-24 NOTE — PROGRESS NOTE ADULT - ASSESSMENT
Assessment:  · Assessment		  64 year old gentleman, pt of Dr. Oshea, with metastatic prostate cancer, on treatment with Lupron/Docetaxel/Prednisone & Xgeva who is admitted with bilateral pneumonia, and large loculated pleural effusion. Last dose of docetaxel given on 1/10/20.  2 of 5 fractions of XRT given today at the [facility] center.    Bilateral pneumonia, complicated by LT sided large loculated pleural effusion   + Acute Resp Failure w/ hypoxia, stable -    Antibiotics changed to   zosyn 1/22 -   Suspecting resistant gram neg and/or positive organisms as underlying etiology behind infection in light of cavitary lesions.   Supplemental o2 - titrate to RA - not baseline dependent.   Duoneb treatements   blood cx 1/20 pending. sputum + legionella pending -   Still planning- chest tube placement when INR </- 1.7 by IR - vitamin k + pt/inr daily. . ID cs appreciated.     Prostate CA, stable - was due to start palliative radiation tx 1/21. palliative cs appreciated. [facility] cs appreciated -   cont daily prednisone.   PSA responding to current tx w/ lupron, docetaxel, xgeva -   Continue- rad therapy while inpt - Transport to [facility] and Back daily x 5 days    Constipation  Lactulose 2x/d  Miralax at bedtime  Dulcolax suppository tomorrow if orall regime not effective    Goals of Care     Sister to make all decisions at this time (Gail Cruz [phone])  Continue Chemotherapeutics   Hormonal therapy-getting "Hot flashes"- [place] useful OTC  Undetermined date as pt also needs chest tube when INR improved. PT - no needs  FULL CODE seeking aggressive care

## 2020-01-24 NOTE — PROGRESS NOTE ADULT - SUBJECTIVE AND OBJECTIVE BOX
[facility]  INFECTIOUS DISEASES AND INTERNAL MEDICINE at [place]  =======================================================  Chandler Mccrary MD  Diplomates American Board of Internal Medicine and Infectious Diseases  Telephone [phone]  Fax            [phone]  =======================================================    [facility]-[id number]  [place]   follow up:  Pneumonia    transferred to [facility] for XRT, now returned.   no new events.         =======================================================    REVIEW OF SYSTEMS:  CONSTITUTIONAL:  No Fever or chills  HEENT:  No diplopia or blurred vision.  No earache, sore throat or runny nose.  CARDIOVASCULAR:  No pressure, squeezing, strangling, tightness, heaviness or aching about the chest, neck, axilla or epigastrium.  RESPIRATORY:  No cough, shortness of breath  GASTROINTESTINAL:  No nausea, vomiting or diarrhea.  GENITOURINARY:  No dysuria, frequency or urgency. No Blood in urine  MUSCULOSKELETAL:  no joint aches, no muscle pain  SKIN:  No change in skin, hair or nails.  NEUROLOGIC:  No Headaches, seizures or weakness.  PSYCHIATRIC:  No disorder of thought or mood.  ENDOCRINE:  No heat or cold intolerance  HEMATOLOGICAL:  No easy bruising or bleeding.   =======================================================  Allergies  No Known Allergies     Antibiotics:  piperacillin/tazobactam IVPB.. 3.375 Gram(s) IV Intermittent every 8 hours    Other medications:  albuterol/ipratropium for Nebulization 3 milliLiter(s) Nebulizer every 6 hours  finasteride 5 milliGRAM(s) Oral daily  oxyCODONE    IR 10 milliGRAM(s) Oral every 6 hours  polyethylene glycol 3350 17 Gram(s) Oral at bedtime  potassium chloride    Tablet ER 40 milliEquivalent(s) Oral once  predniSONE   Tablet 5 milliGRAM(s) Oral two times a day  sodium chloride 0.9%. 1000 milliLiter(s) IV Continuous <Continuous>  tamsulosin 0.4 milliGRAM(s) Oral at bedtime    ======================================================  Physical Exam:  ============  T(F): 98.1 (24 Jan 2020 07:27), Max: 98.4 (23 Jan 2020 21:00)  HR: 105 (24 Jan 2020 09:34)  BP: 126/72 (24 Jan 2020 07:27)  RR: 20 (24 Jan 2020 07:27)  SpO2: 97% (23 Jan 2020 23:37) (95% - 97%)  temp max in last 48H T(F): , Max: 99.3 (01-22-20 @ 16:22)    General:  No acute distress.  Eye: Pupils are equal, round and reactive to light, Extraocular movements are intact, Normal conjunctiva.  HENT: Normocephalic, Oral mucosa is moist, No pharyngeal erythema, No sinus tenderness.  Neck: Supple, No lymphadenopathy.  Respiratory:  POOR aeration of the LEFT side.   Cardiovascular: Normal rate, Regular rhythm,   Gastrointestinal: Soft, Non-tender, Non-distended, Normal bowel sounds.  Genitourinary: No costovertebral angle tenderness.  Lymphatics: No lymphadenopathy neck,   Musculoskeletal: Normal range of motion, Normal strength.  Integumentary: No rash.  Neurologic: Alert, Oriented, No focal deficits, Cranial Nerves II-XII are grossly intact.  Psychiatric: Appropriate mood & affect.    =======================================================  Labs:                        8.9    x     )-----------( x        ( 24 Jan 2020 08:28 )             28.5       WBC Count: 9.80 K/uL (01-21-20 @ 09:44)  WBC Count: 7.91 K/uL (01-20-20 @ 09:56)      01-24    131<L>  |  94<L>  |  7.0<L>  ----------------------------<  118<H>  3.4<L>   |  24.0  |  0.37<L>    Ca    7.8<L>      24 Jan 2020 08:28        Culture - Sputum (collected 01-23-20 @ 09:17)  Source: .Sputum  Gram Stain (01-23-20 @ 14:01):    Moderate WBC's    No organisms seen    Culture - Blood (collected 01-21-20 @ 10:00)  Source: .Blood    Culture - Blood (collected 01-20-20 @ 10:24)  Source: .Blood    Culture - Blood (collected 01-20-20 @ 10:24)  Source: .Blood

## 2020-01-24 NOTE — PROGRESS NOTE ADULT - SUBJECTIVE AND OBJECTIVE BOX
INTERVAL HPI/OVERNIGHT EVENTS: 65yo Male Patient admitted  64y old  Male who presents with a chief complaint of Dyspnea secondary to Large located effusion and suspected recurrent Bilateral PNA (23 Jan 2020 15:26)    Follow up:  Pneumonia  Transferred to [facility] for XRT, tolerated well  No new events.       Present Symptoms:     Dyspnea: 0 1 2 3   Nausea/Vomiting: Yes No  Anxiety:  Yes No  Depression: Yes No  Fatigue: Yes No  Loss of appetite: Yes No    Pain:             Character-            Duration-            Effect-            Factors-            Frequency-            Location-            Severity-    Review of Systems: Reviewed                     Negative:                     Positive:  Unable to obtain due to poor mentation   All others negative    MEDICATIONS  (STANDING):  albuterol/ipratropium for Nebulization 3 milliLiter(s) Nebulizer every 6 hours  finasteride 5 milliGRAM(s) Oral daily  oxyCODONE    IR 10 milliGRAM(s) Oral every 6 hours  piperacillin/tazobactam IVPB.. 3.375 Gram(s) IV Intermittent every 8 hours  polyethylene glycol 3350 17 Gram(s) Oral at bedtime  potassium chloride    Tablet ER 40 milliEquivalent(s) Oral once  predniSONE   Tablet 5 milliGRAM(s) Oral two times a day  sodium chloride 0.9%. 1000 milliLiter(s) (75 mL/Hr) IV Continuous <Continuous>  tamsulosin 0.4 milliGRAM(s) Oral at bedtime    MEDICATIONS  (PRN):  lactulose Syrup 10 Gram(s) Oral two times a day PRN Constipation      PHYSICAL EXAM:    Vital Signs Last 24 Hrs  T(C): 36.7 (24 Jan 2020 07:27), Max: 36.9 (23 Jan 2020 21:00)  T(F): 98.1 (24 Jan 2020 07:27), Max: 98.4 (23 Jan 2020 21:00)  HR: 105 (24 Jan 2020 09:34) (105 - 114)  BP: 126/72 (24 Jan 2020 07:27) (122/68 - 137/74)  BP(mean): --  RR: 20 (24 Jan 2020 07:27) (18 - 20)  SpO2: 97% (23 Jan 2020 23:37) (95% - 97%)    General: alert  oriented x ____ lethargic agitated                  cachexia  nonverbal  coma    Karnofsky:  %    HEENT: normal  dry mouth  ET tube/trach    Lungs: comfortable tachypnea/labored breathing  excessive secretions    CV: normal  tachycardia    GI: normal  distended  tender  no BS               PEG/NG/OG tube  constipation  last BM:     : normal  incontinent  oliguria/anuria  zavala    MSK: normal  weakness  edema             ambulatory  bedbound/wheelchair bound    Skin: normal  pressure ulcers- Stage_____  no rash    LABS:                        8.9    x     )-----------( x        ( 24 Jan 2020 08:28 )             28.5     01-24    131<L>  |  94<L>  |  7.0<L>  ----------------------------<  118<H>  3.4<L>   |  24.0  |  0.37<L>    Ca    7.8<L>      24 Jan 2020 08:28      PT/INR - ( 24 Jan 2020 08:28 )   PT: 22.4 sec;   INR: 1.91 ratio         PTT - ( 24 Jan 2020 08:28 )  PTT:32.8 sec    I&O's Summary    23 Jan 2020 07:01  -  24 Jan 2020 07:00  --------------------------------------------------------  IN: 675 mL / OUT: 400 mL / NET: 275 mL    24 Jan 2020 07:01  -  24 Jan 2020 15:46  --------------------------------------------------------  IN: 375 mL / OUT: 0 mL / NET: 375 mL        RADIOLOGY & ADDITIONAL STUDIES:    ADVANCE DIRECTIVES:   DNR YES NO  Completed on:                     MOLST  YES NO   Completed on:  Living Will  YES NO   Completed on:    COUNSELING:    Face to face meeting to discuss Advanced Care Planning - Time Spent ______ Minutes.  See goals of care note.    More than 50% time spent in counseling and coordinating care. ______ Minutes.     Thank you for the opportunity to assist with the care of this patient.   [facility] [phone]. INTERVAL HPI/OVERNIGHT EVENTS: 63yo Male Patient admitted with severe SOB, weakness and recurrent B/L PNA. He is responding well to antibiotics and is  getting RT treatments at [facility] for lower back bone mets. Patient recalls how he did not tolerate his XRT treatment yesterday. He was completely wiped out,  slept for hours after returning to his room. Today he was able to get through his treatment, and remain alert and engaging. Patient still experiencing SOB  on exertion-using Nasal O2 continuously. He also c/o constipation and will be taking oral laxatives this evening. Appetite OK, mood stable. Denies CP,  Palpitations N/V/D dysuria, cough, fever  64y old  Male who presents with a chief complaint of Dyspnea secondary to Large located effusion and suspected recurrent Bilateral PNA (23 Jan 2020 15:26)     Follow up:  Pneumonia  Transferred to [facility] for XRT, tolerated well  No new events.     PAST MEDICAL & SURGICAL HISTORY:  Prostate CA: Adenocarcinoma with mets to Lung, Bone, and Lymph  No significant past surgical history    Present Symptoms:     Dyspnea: 2 -3   Nausea/Vomiting:  No  Anxiety:  Yes mild  Depression: No  Fatigue: Yes   Loss of appetite: Yes     Pain: Denies pain at present            Character-            Duration-            Effect-            Factors-            Frequency-            Location-            Severity-    Review of Systems: Reviewed                       All others negative    MEDICATIONS  (STANDING):  albuterol/ipratropium for Nebulization 3 milliLiter(s) Nebulizer every 6 hours  finasteride 5 milliGRAM(s) Oral daily  oxyCODONE    IR 10 milliGRAM(s) Oral every 6 hours  piperacillin/tazobactam IVPB.. 3.375 Gram(s) IV Intermittent every 8 hours  polyethylene glycol 3350 17 Gram(s) Oral at bedtime  potassium chloride    Tablet ER 40 milliEquivalent(s) Oral once  predniSONE   Tablet 5 milliGRAM(s) Oral two times a day  sodium chloride 0.9%. 1000 milliLiter(s) (75 mL/Hr) IV Continuous <Continuous>  tamsulosin 0.4 milliGRAM(s) Oral at bedtime    MEDICATIONS  (PRN):  lactulose Syrup 10 Gram(s) Oral two times a day PRN Constipation    PHYSICAL EXAM:    Vital Signs Last 24 Hrs  T(C): 36.7 (24 Jan 2020 07:27), Max: 36.9 (23 Jan 2020 21:00)  T(F): 98.1 (24 Jan 2020 07:27), Max: 98.4 (23 Jan 2020 21:00)  HR: 105 (24 Jan 2020 09:34) (105 - 114)  BP: 126/72 (24 Jan 2020 07:27) (122/68 - 137/74)  BP(mean): --  RR: 20 (24 Jan 2020 07:27) (18 - 20)  SpO2: 97% (23 Jan 2020 23:37) (95% - 97%)    General: alert  oriented x 3____                  wearing mask speech clear    HEENT: normal  dry mouth      Lungs:  tachypnea/labored breathing     CV:   tachycardia    GI: distended                   constipation  last BM: before admission    : normal  incontinent  oliguria/anuria  zavala    MSK:  weakness  edema             ambulatory at bedside>chair in room  bedbound    Skin: normal  _  no rash    LABS:                        8.9    x     )-----------( x        ( 24 Jan 2020 08:28 )             28.5     01-24    131<L>  |  94<L>  |  7.0<L>  ----------------------------<  118<H>  3.4<L>   |  24.0  |  0.37<L>    Ca    7.8<L>      24 Jan 2020 08:28      PT/INR - ( 24 Jan 2020 08:28 )   PT: 22.4 sec;   INR: 1.91 ratio         PTT - ( 24 Jan 2020 08:28 )  PTT:32.8 sec    I&O's Summary    23 Jan 2020 07:01  -  24 Jan 2020 07:00  --------------------------------------------------------  IN: 675 mL / OUT: 400 mL / NET: 275 mL    24 Jan 2020 07:01  -  24 Jan 2020 15:46  --------------------------------------------------------  IN: 375 mL / OUT: 0 mL / NET: 375 mL    RADIOLOGY & ADDITIONAL STUDIES:  < from: Xray Chest 1 View- PORTABLE-Urgent (01.22.20 @ 11:40) >  mpression:    No significant change in the suspected bilateral pleural effusions, left greater than right. Possible loculated fluid on the left. Atelectatic changes and/or underlying infection is possible. Please correlate clinically. Cavity or pneumatocele in the left apex again seen.    Of note, the small right-sided pneumothorax on chest CT from 1/20/2020 is not as well appreciated on this x-ray. The presence of a small right-sided pneumothorax was discussed with Dr. Silva at 12:25 PM on 1/22/2020.      ADVANCE DIRECTIVES:   DNR  NO  Completed on:                     MOLST  NO   Completed on:  Living Will   NO   Completed on:

## 2020-01-24 NOTE — PROGRESS NOTE ADULT - SUBJECTIVE AND OBJECTIVE BOX
CC: pna    Patient seen and examined at the bedside. No acute overnight events. sp 1st rad tx yesterday. Complaining of intermittent sob today. Denies fever/chills, headache, lightheadedness, dizziness, chest pain, palpitations, cough, abd pain, nausea/vomiting/diarrhea, muscle pain.      =========================================================================================    PHYSICAL EXAM.    GEN - appears age appropriate. well nourished. pleasant. no distress.   HEENT - NCAT, EOMI, BETITO  RESP - COOPER. no wheeze/stridor/rhonchi/crackles. on supplemental O2. able to speak in full sentences without distress.   CARDIO - NS1S2, RRR. No murmurs/rubs/gallops.  ABD - Soft/Non tender/Non distended. Normal BS x4 quadrants. no guarding/rebound tenderness.  Ext - No LG.  MSK - BL 5/5 strength on upper and lower extremities.   Neuro - AAOx3. cn 2-12 grossly intact  Psych - normal affect  Skin - c/d/i. no rashes/lesions      VITAL SIGNS.    Vital Signs Last 24 Hrs  T(C): 36.7 (24 Jan 2020 07:27), Max: 36.9 (23 Jan 2020 21:00)  T(F): 98.1 (24 Jan 2020 07:27), Max: 98.4 (23 Jan 2020 21:00)  HR: 105 (24 Jan 2020 09:34) (105 - 114)  BP: 126/72 (24 Jan 2020 07:27) (122/68 - 137/74)  BP(mean): --  RR: 20 (24 Jan 2020 07:27) (18 - 20)  SpO2: 97% (23 Jan 2020 23:37) (96% - 97%)        =================================================    LABS.                          8.9    x     )-----------( x        ( 24 Jan 2020 08:28 )             28.5     01-24    131<L>  |  94<L>  |  7.0<L>  ----------------------------<  118<H>  3.4<L>   |  24.0  |  0.37<L>    Ca    7.8<L>      24 Jan 2020 08:28        PT/INR - ( 24 Jan 2020 08:28 )   PT: 22.4 sec;   INR: 1.91 ratio         PTT - ( 24 Jan 2020 08:28 )  PTT:32.8 sec  I&O's Summary    23 Jan 2020 07:01  -  24 Jan 2020 07:00  --------------------------------------------------------  IN: 675 mL / OUT: 400 mL / NET: 275 mL    24 Jan 2020 07:01  -  24 Jan 2020 16:37  --------------------------------------------------------  IN: 375 mL / OUT: 0 mL / NET: 375 mL        Culture - Sputum (collected 23 Jan 2020 09:17)  Source: .Sputum  Gram Stain (23 Jan 2020 14:01):    Moderate WBC's    No organisms seen  Preliminary Report (24 Jan 2020 09:14):    Moderate Routine respiratory teresa present    Culture in progress        ================================================    IMAGING.    ================================================    DIET.    Diet, Regular (01-21-20 @ 12:58)    ================================================    HOME MEDS.    Home Medications:  finasteride 5 mg oral tablet: 1 tab(s) orally once a day (20 Jan 2020 19:39)  lactulose:  (21 Jan 2020 07:32)  Lupron 5 mg/mL subcutaneous solution:  (21 Jan 2020 07:29)  oxyCODONE 10 mg oral tablet: 1 tab(s) orally every 6 hours (20 Jan 2020 19:39)  Percocet 10/325 oral tablet: 1 tab(s) orally every 6 hours (21 Jan 2020 07:31)  predniSONE 5 mg oral tablet: 1 tab(s) orally 2 times a day (21 Jan 2020 07:30)  Taxotere:  (21 Jan 2020 07:29)  Xgeva 120 mg/1.7 mL subcutaneous solution:  (21 Jan 2020 07:30)      ================================================    HOSPITAL MEDS.    MEDICATIONS  (STANDING):  albuterol/ipratropium for Nebulization 3 milliLiter(s) Nebulizer every 6 hours  finasteride 5 milliGRAM(s) Oral daily  oxyCODONE    IR 10 milliGRAM(s) Oral every 6 hours  piperacillin/tazobactam IVPB.. 3.375 Gram(s) IV Intermittent every 8 hours  polyethylene glycol 3350 17 Gram(s) Oral at bedtime  potassium chloride    Tablet ER 40 milliEquivalent(s) Oral once  predniSONE   Tablet 5 milliGRAM(s) Oral two times a day  sodium chloride 0.9%. 1000 milliLiter(s) (75 mL/Hr) IV Continuous <Continuous>  tamsulosin 0.4 milliGRAM(s) Oral at bedtime    MEDICATIONS  (PRN):  lactulose Syrup 10 Gram(s) Oral two times a day PRN Constipation

## 2020-01-25 LAB
ANION GAP SERPL CALC-SCNC: 11 MMOL/L — SIGNIFICANT CHANGE UP (ref 5–17)
APTT BLD: 39.8 SEC — HIGH (ref 27.5–36.3)
BASOPHILS # BLD AUTO: 0.05 K/UL — SIGNIFICANT CHANGE UP (ref 0–0.2)
BASOPHILS NFR BLD AUTO: 0.3 % — SIGNIFICANT CHANGE UP (ref 0–2)
BUN SERPL-MCNC: 5 MG/DL — LOW (ref 8–20)
CALCIUM SERPL-MCNC: 7.9 MG/DL — LOW (ref 8.6–10.2)
CHLORIDE SERPL-SCNC: 99 MMOL/L — SIGNIFICANT CHANGE UP (ref 98–107)
CO2 SERPL-SCNC: 23 MMOL/L — SIGNIFICANT CHANGE UP (ref 22–29)
CREAT SERPL-MCNC: 0.38 MG/DL — LOW (ref 0.5–1.3)
CULTURE RESULTS: SIGNIFICANT CHANGE UP
EOSINOPHIL # BLD AUTO: 0.02 K/UL — SIGNIFICANT CHANGE UP (ref 0–0.5)
EOSINOPHIL NFR BLD AUTO: 0.1 % — SIGNIFICANT CHANGE UP (ref 0–6)
GLUCOSE SERPL-MCNC: 165 MG/DL — HIGH (ref 70–99)
HCT VFR BLD CALC: 29.4 % — LOW (ref 39–50)
HGB BLD-MCNC: 9.1 G/DL — LOW (ref 13–17)
IMM GRANULOCYTES NFR BLD AUTO: 4.3 % — HIGH (ref 0–1.5)
INR BLD: 1.91 RATIO — HIGH (ref 0.88–1.16)
LYMPHOCYTES # BLD AUTO: 0.75 K/UL — LOW (ref 1–3.3)
LYMPHOCYTES # BLD AUTO: 4.6 % — LOW (ref 13–44)
MCHC RBC-ENTMCNC: 27.3 PG — SIGNIFICANT CHANGE UP (ref 27–34)
MCHC RBC-ENTMCNC: 31 GM/DL — LOW (ref 32–36)
MCV RBC AUTO: 88.3 FL — SIGNIFICANT CHANGE UP (ref 80–100)
MONOCYTES # BLD AUTO: 0.67 K/UL — SIGNIFICANT CHANGE UP (ref 0–0.9)
MONOCYTES NFR BLD AUTO: 4.1 % — SIGNIFICANT CHANGE UP (ref 2–14)
NEUTROPHILS # BLD AUTO: 14.17 K/UL — HIGH (ref 1.8–7.4)
NEUTROPHILS NFR BLD AUTO: 86.6 % — HIGH (ref 43–77)
PLATELET # BLD AUTO: 561 K/UL — HIGH (ref 150–400)
POTASSIUM SERPL-MCNC: 3.8 MMOL/L — SIGNIFICANT CHANGE UP (ref 3.5–5.3)
POTASSIUM SERPL-SCNC: 3.8 MMOL/L — SIGNIFICANT CHANGE UP (ref 3.5–5.3)
PROTHROM AB SERPL-ACNC: 22.4 SEC — HIGH (ref 10–12.9)
RBC # BLD: 3.33 M/UL — LOW (ref 4.2–5.8)
RBC # FLD: 16.6 % — HIGH (ref 10.3–14.5)
SODIUM SERPL-SCNC: 133 MMOL/L — LOW (ref 135–145)
SPECIMEN SOURCE: SIGNIFICANT CHANGE UP
WBC # BLD: 16.36 K/UL — HIGH (ref 3.8–10.5)
WBC # FLD AUTO: 16.36 K/UL — HIGH (ref 3.8–10.5)

## 2020-01-25 PROCEDURE — 99233 SBSQ HOSP IP/OBS HIGH 50: CPT

## 2020-01-25 PROCEDURE — 99232 SBSQ HOSP IP/OBS MODERATE 35: CPT

## 2020-01-25 RX ADMIN — OXYCODONE HYDROCHLORIDE 10 MILLIGRAM(S): 5 TABLET ORAL at 12:57

## 2020-01-25 RX ADMIN — PIPERACILLIN AND TAZOBACTAM 25 GRAM(S): 4; .5 INJECTION, POWDER, LYOPHILIZED, FOR SOLUTION INTRAVENOUS at 22:04

## 2020-01-25 RX ADMIN — FINASTERIDE 5 MILLIGRAM(S): 5 TABLET, FILM COATED ORAL at 11:57

## 2020-01-25 RX ADMIN — OXYCODONE HYDROCHLORIDE 10 MILLIGRAM(S): 5 TABLET ORAL at 18:11

## 2020-01-25 RX ADMIN — PIPERACILLIN AND TAZOBACTAM 25 GRAM(S): 4; .5 INJECTION, POWDER, LYOPHILIZED, FOR SOLUTION INTRAVENOUS at 06:26

## 2020-01-25 RX ADMIN — ENOXAPARIN SODIUM 40 MILLIGRAM(S): 100 INJECTION SUBCUTANEOUS at 11:58

## 2020-01-25 RX ADMIN — LACTULOSE 10 GRAM(S): 10 SOLUTION ORAL at 04:34

## 2020-01-25 RX ADMIN — Medication 5 MILLIGRAM(S): at 17:11

## 2020-01-25 RX ADMIN — Medication 3 MILLILITER(S): at 09:18

## 2020-01-25 RX ADMIN — SODIUM CHLORIDE 75 MILLILITER(S): 9 INJECTION INTRAMUSCULAR; INTRAVENOUS; SUBCUTANEOUS at 04:34

## 2020-01-25 RX ADMIN — Medication 5 MILLIGRAM(S): at 11:58

## 2020-01-25 RX ADMIN — TAMSULOSIN HYDROCHLORIDE 0.4 MILLIGRAM(S): 0.4 CAPSULE ORAL at 22:04

## 2020-01-25 RX ADMIN — OXYCODONE HYDROCHLORIDE 10 MILLIGRAM(S): 5 TABLET ORAL at 11:57

## 2020-01-25 RX ADMIN — OXYCODONE HYDROCHLORIDE 10 MILLIGRAM(S): 5 TABLET ORAL at 17:11

## 2020-01-25 RX ADMIN — OXYCODONE HYDROCHLORIDE 10 MILLIGRAM(S): 5 TABLET ORAL at 05:04

## 2020-01-25 RX ADMIN — Medication 3 MILLILITER(S): at 17:25

## 2020-01-25 RX ADMIN — OXYCODONE HYDROCHLORIDE 10 MILLIGRAM(S): 5 TABLET ORAL at 04:34

## 2020-01-25 RX ADMIN — LACTULOSE 10 GRAM(S): 10 SOLUTION ORAL at 17:12

## 2020-01-25 RX ADMIN — OXYCODONE HYDROCHLORIDE 10 MILLIGRAM(S): 5 TABLET ORAL at 23:34

## 2020-01-25 RX ADMIN — Medication 5 MILLIGRAM(S): at 04:34

## 2020-01-25 RX ADMIN — PIPERACILLIN AND TAZOBACTAM 25 GRAM(S): 4; .5 INJECTION, POWDER, LYOPHILIZED, FOR SOLUTION INTRAVENOUS at 12:01

## 2020-01-25 NOTE — PROGRESS NOTE ADULT - SUBJECTIVE AND OBJECTIVE BOX
CC: pna    Patient seen and examined at the bedside. No acute overnight events. sp 1st rad tx on Thursday. SOB about the same.  Denies fever/chills, headache, lightheadedness, dizziness, chest pain, palpitations, cough, abd pain, nausea/vomiting/diarrhea, muscle pain.      =========================================================================================    PHYSICAL EXAM.    GEN - appears age appropriate. well nourished. pleasant. no distress.   HEENT - NCAT, EOMI, BETITO  RESP - COOPER. no wheeze/stridor/rhonchi/crackles. on supplemental O2. able to speak in full sentences without distress.   CARDIO - NS1S2, RRR. No murmurs/rubs/gallops.  ABD - Soft/Non tender/Non distended. Normal BS x4 quadrants. no guarding/rebound tenderness.  Ext - No LG.  MSK - BL 5/5 strength on upper and lower extremities.   Neuro - AAOx3. cn 2-12 grossly intact  Psych - normal affect  Skin - c/d/i. no rashes/lesions      VITAL SIGNS.    Vital Signs Last 24 Hrs  T(C): 36.7 (24 Jan 2020 07:27), Max: 36.9 (23 Jan 2020 21:00)  T(F): 98.1 (24 Jan 2020 07:27), Max: 98.4 (23 Jan 2020 21:00)  HR: 105 (24 Jan 2020 09:34) (105 - 114)  BP: 126/72 (24 Jan 2020 07:27) (122/68 - 137/74)  BP(mean): --  RR: 20 (24 Jan 2020 07:27) (18 - 20)  SpO2: 97% (23 Jan 2020 23:37) (96% - 97%)        =================================================    LABS.                          8.9    x     )-----------( x        ( 24 Jan 2020 08:28 )             28.5     01-24    131<L>  |  94<L>  |  7.0<L>  ----------------------------<  118<H>  3.4<L>   |  24.0  |  0.37<L>    Ca    7.8<L>      24 Jan 2020 08:28        PT/INR - ( 24 Jan 2020 08:28 )   PT: 22.4 sec;   INR: 1.91 ratio         PTT - ( 24 Jan 2020 08:28 )  PTT:32.8 sec  I&O's Summary    23 Jan 2020 07:01  -  24 Jan 2020 07:00  --------------------------------------------------------  IN: 675 mL / OUT: 400 mL / NET: 275 mL    24 Jan 2020 07:01  -  24 Jan 2020 16:37  --------------------------------------------------------  IN: 375 mL / OUT: 0 mL / NET: 375 mL        Culture - Sputum (collected 23 Jan 2020 09:17)  Source: .Sputum  Gram Stain (23 Jan 2020 14:01):    Moderate WBC's    No organisms seen  Preliminary Report (24 Jan 2020 09:14):    Moderate Routine respiratory teresa present    Culture in progress        ================================================    IMAGING.    ================================================    DIET.    Diet, Regular (01-21-20 @ 12:58)    ================================================    HOME MEDS.    Home Medications:  finasteride 5 mg oral tablet: 1 tab(s) orally once a day (20 Jan 2020 19:39)  lactulose:  (21 Jan 2020 07:32)  Lupron 5 mg/mL subcutaneous solution:  (21 Jan 2020 07:29)  oxyCODONE 10 mg oral tablet: 1 tab(s) orally every 6 hours (20 Jan 2020 19:39)  Percocet 10/325 oral tablet: 1 tab(s) orally every 6 hours (21 Jan 2020 07:31)  predniSONE 5 mg oral tablet: 1 tab(s) orally 2 times a day (21 Jan 2020 07:30)  Taxotere:  (21 Jan 2020 07:29)  Xgeva 120 mg/1.7 mL subcutaneous solution:  (21 Jan 2020 07:30)      ================================================    HOSPITAL MEDS.    MEDICATIONS  (STANDING):  albuterol/ipratropium for Nebulization 3 milliLiter(s) Nebulizer every 6 hours  finasteride 5 milliGRAM(s) Oral daily  oxyCODONE    IR 10 milliGRAM(s) Oral every 6 hours  piperacillin/tazobactam IVPB.. 3.375 Gram(s) IV Intermittent every 8 hours  polyethylene glycol 3350 17 Gram(s) Oral at bedtime  potassium chloride    Tablet ER 40 milliEquivalent(s) Oral once  predniSONE   Tablet 5 milliGRAM(s) Oral two times a day  sodium chloride 0.9%. 1000 milliLiter(s) (75 mL/Hr) IV Continuous <Continuous>  tamsulosin 0.4 milliGRAM(s) Oral at bedtime    MEDICATIONS  (PRN):  lactulose Syrup 10 Gram(s) Oral two times a day PRN Constipation

## 2020-01-25 NOTE — PROGRESS NOTE ADULT - ASSESSMENT
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 CTA chest completed ruled out PE. found to have Diffuse bilateral areas of pulmonary consolidation with pneumatocele formation and Large loculated left pleural effusions with compressive atelectasis of the left lower lobe. thoracic sx cs placed.     presentation complicated by trace RT sided PTX. medical management only, no chest tube placed.     #Bilateral pneumonia, complicated by LT sided large loculated pleural effusion + acute resp failure w/ hypoxia, stable - in immunocompromised pt. flu panel neg. reescalate abx to zosyn 1/22. plan for at least 7 day course zosyn for PNA treatment- suspecting resistant gram neg and/or positive organisms as underlying etiology behind infection in light of cavitary lesions. supplemental o2 - titrate to RA - not baseline dependent. repeat ct chest 6wks. nebs. blood cx 1/20 neg. sputum cx prelim resp teresa only. neg legionella. cts cs appreciated. IR cs appreciated - pending chest tube placement when INR </- 1.7 by IR - vitamin k + pt/inr daily - stop vit k when goal met. . ID cs appreciated.     #hyponatremia, mild, asymptomatic, persistent - trend.    #Prostate CA, stable - palliative cs appreciated. daphney cs appreciated - cont daily prednisone. psa responding to current tx w/ lupron, docetaxel, xgeva - cont on dc. rad onc cs appreciated - rad therapy while inpt - pt to be transferred to center for tx and brought back - first tx 1/23, likely 5 sessions planned.     #anemia, asymptomatic, stable - baseline hgb 11-12, no acute/active s/s of blood loss. outpt fu.     #hypokalemia, mild, asymptomatc - replete + repeat    #dvt ppx. lovenox over the weekend, hold monday AM in case Chest tube placement during the week    #dispo. sister to make all decisions at this time (Gail Cruz [phone]) per pt.  undetermined date of dc as pt also needs chest tube when INR improved for loculated effusion, earliest poss date of procedure 1/27 if inr @ goal. PT - no needs    #outpt fu. pmd. onc, rad onc dr carlisle @ [facility].

## 2020-01-25 NOTE — PROGRESS NOTE ADULT - SUBJECTIVE AND OBJECTIVE BOX
[facility]  INFECTIOUS DISEASES AND INTERNAL MEDICINE at [place]  =======================================================  Chandler Mccrary MD  Diplomates American Board of Internal Medicine and Infectious Diseases  Telephone [phone]  Fax            [phone]  =======================================================    [id number]  DEL JANETTE   follow up:  Pneumonia    feels slightly better    no fevers over last 48 h      =======================================================    REVIEW OF SYSTEMS:  CONSTITUTIONAL:  No Fever or chills  HEENT:  No diplopia or blurred vision.  No earache, sore throat or runny nose.  CARDIOVASCULAR:  No pressure, squeezing, strangling, tightness, heaviness or aching about the chest, neck, axilla or epigastrium.  RESPIRATORY:  No cough, shortness of breath  GASTROINTESTINAL:  No nausea, vomiting or diarrhea.  GENITOURINARY:  No dysuria, frequency or urgency. No Blood in urine  MUSCULOSKELETAL:  no joint aches, no muscle pain  SKIN:  No change in skin, hair or nails.  NEUROLOGIC:  No Headaches, seizures or weakness.  PSYCHIATRIC:  No disorder of thought or mood.  ENDOCRINE:  No heat or cold intolerance  HEMATOLOGICAL:  No easy bruising or bleeding.   =======================================================  Allergies  No Known Allergies       ======================================================  Physical Exam:  ============  (see section below)    General:  No acute distress.  Eye: Pupils are equal, round and reactive to light, Extraocular movements are intact, Normal conjunctiva.  HENT: Normocephalic, Oral mucosa is moist, No pharyngeal erythema, No sinus tenderness.  Neck: Supple, No lymphadenopathy.  Respiratory:  POOR aeration of the LEFT side., unchanged  Cardiovascular: Normal rate, Regular rhythm,   Gastrointestinal: Soft, Non-tender, Non-distended, Normal bowel sounds.  Genitourinary: No costovertebral angle tenderness.  Lymphatics: No lymphadenopathy neck,   Musculoskeletal: Normal range of motion, Normal strength.  Integumentary: No rash.  Neurologic: Alert, Oriented, No focal deficits, Cranial Nerves II-XII are grossly intact.  Psychiatric: Appropriate mood & affect.    =======================================================     VITALS    ============  T(F): 98.1 (25 Jan 2020 07:52), Max: 99.6 (24 Jan 2020 16:41)  HR: 102 (25 Jan 2020 09:18)  BP: 119/72 (25 Jan 2020 07:52)  RR: 20 (25 Jan 2020 07:52)  SpO2: 96% (25 Jan 2020 09:18) (96% - 99%)  temp max in last 48H T(F): , Max: 99.6 (01-24-20 @ 16:41)    =======================================================  Antibiotics:  piperacillin/tazobactam IVPB.. 3.375 Gram(s) IV Intermittent every 8 hours    Other medications:  albuterol/ipratropium for Nebulization 3 milliLiter(s) Nebulizer every 6 hours  bisacodyl Suppository 10 milliGRAM(s) Rectal once  enoxaparin Injectable 40 milliGRAM(s) SubCutaneous daily  finasteride 5 milliGRAM(s) Oral daily  oxyCODONE    IR 10 milliGRAM(s) Oral every 6 hours  phytonadione   Solution 5 milliGRAM(s) Oral daily  polyethylene glycol 3350 17 Gram(s) Oral at bedtime  predniSONE   Tablet 5 milliGRAM(s) Oral two times a day  sodium chloride 0.9%. 1000 milliLiter(s) IV Continuous <Continuous>  tamsulosin 0.4 milliGRAM(s) Oral at bedtime           =======================================================  Labs:                        9.1    16.36 )-----------( 561      ( 25 Jan 2020 09:38 )             29.4       WBC Count: 16.36 K/uL (01-25-20 @ 09:38)  WBC Count: 9.80 K/uL (01-21-20 @ 09:44)      01-25    133<L>  |  99  |  5.0<L>  ----------------------------<  165<H>  3.8   |  23.0  |  0.38<L>    Ca    7.9<L>      25 Jan 2020 09:39        Culture - Sputum (collected 01-23-20 @ 09:17)  Source: .Sputum  Gram Stain (01-23-20 @ 14:01):    Moderate WBC's    No organisms seen  Final Report (01-25-20 @ 09:57):    Moderate Routine respiratory teresa present    Culture - Blood (collected 01-21-20 @ 10:00)  Source: .Blood    Culture - Blood (collected 01-20-20 @ 10:24)  Source: .Blood  Final Report (01-25-20 @ 11:01):    No growth at 5 days.    Culture - Blood (collected 01-20-20 @ 10:24)  Source: .Blood  Final Report (01-25-20 @ 11:01):    No growth at 5 days.    Creatinine, Serum: 0.38 mg/dL (01-25-20 @ 09:39)  Creatinine, Serum: 0.37 mg/dL (01-24-20 @ 08:28)  Creatinine, Serum: 0.43 mg/dL (01-23-20 @ 08:16)  Creatinine, Serum: 0.41 mg/dL (01-22-20 @ 10:14)  Creatinine, Serum: 0.39 mg/dL (01-21-20 @ 09:44)

## 2020-01-25 NOTE — PROGRESS NOTE ADULT - ASSESSMENT
This 64yoM; with pmh signif for Prostate Ca with mets to ribs diagnosed in 11/2019 (currently on chemo), HTN; now p/w cough, sob, fever x1 week, progressively worsening.  denies sick contacts. reports traveling from [place] after diagnosis of prostate ca complicated by b/l ptx in november and another hospitalization in december. denies cp/palp. denies abd pain: SOCIAL: No tobacco/illicit substance use/social/EtOH    patient reports having a history of Bilateral Pneumonia approx 1.5 months prior with associated bilateral Pleural effusions which required draining. In addition to above, patient is scheduled palliative Radiation therapy today. He is on ADT (lupron), Taxotere and Xgeva for Prostate adenocarcinoma with Mets to Lung, Bone and Lymph. (20 Jan 2020 21:22)    Chest imaging xray showed:  No significant change in the suspected bilateral pleural effusions, left greater than right.   Possible loculated fluid on the left. Atelectatic changes and/or underlying infection is possible. Please correlate clinically. Cavity or pneumatocele in the left apex again seen.  Of note, the small right-sided pneumothorax on chest CT from 1/20/2020 is not as well appreciated on this x-ray. The presence of a small right-sided pneumothorax was discussed with Dr. Silva at 12:25 PM on 1/22/2020.    A CT scan was done and showed:  Diffuse bilateral areas of pulmonary consolidation with pneumatocele formation  Large loculated left pleural effusions with compressive atelectasis of the left lower lobe.      patient was initially started On vanco & Zosyn, then Ceftriaxone and Zithromax was added as well.   We are consulted to evaluate this patient for PNA.       Impression:  PNA  large loculated effusion  LEFT lobe atelectasis  Shortness of breath    Plan:  - continue Antibiotics:  piperacillin/tazobactam IVPB.. 3.375 Gram(s) IV Intermittent every 8 hours  - sputum with resp teresa only.   - plan for at least 7 day course zosyn for PNA treatment/. THEN watch off antibiotics

## 2020-01-26 ENCOUNTER — RESULT REVIEW (OUTPATIENT)
Age: 65
End: 2020-01-26

## 2020-01-26 LAB
ANION GAP SERPL CALC-SCNC: 12 MMOL/L — SIGNIFICANT CHANGE UP (ref 5–17)
BASOPHILS # BLD AUTO: 0.05 K/UL — SIGNIFICANT CHANGE UP (ref 0–0.2)
BASOPHILS NFR BLD AUTO: 0.3 % — SIGNIFICANT CHANGE UP (ref 0–2)
BUN SERPL-MCNC: 7 MG/DL — LOW (ref 8–20)
CALCIUM SERPL-MCNC: 8.2 MG/DL — LOW (ref 8.6–10.2)
CHLORIDE SERPL-SCNC: 96 MMOL/L — LOW (ref 98–107)
CO2 SERPL-SCNC: 23 MMOL/L — SIGNIFICANT CHANGE UP (ref 22–29)
CREAT SERPL-MCNC: 0.42 MG/DL — LOW (ref 0.5–1.3)
CULTURE RESULTS: SIGNIFICANT CHANGE UP
EOSINOPHIL # BLD AUTO: 0.03 K/UL — SIGNIFICANT CHANGE UP (ref 0–0.5)
EOSINOPHIL NFR BLD AUTO: 0.2 % — SIGNIFICANT CHANGE UP (ref 0–6)
GLUCOSE SERPL-MCNC: 143 MG/DL — HIGH (ref 70–99)
HCT VFR BLD CALC: 29.8 % — LOW (ref 39–50)
HGB BLD-MCNC: 9.4 G/DL — LOW (ref 13–17)
IMM GRANULOCYTES NFR BLD AUTO: 2.4 % — HIGH (ref 0–1.5)
INR BLD: 1.93 RATIO — HIGH (ref 0.88–1.16)
LYMPHOCYTES # BLD AUTO: 0.76 K/UL — LOW (ref 1–3.3)
LYMPHOCYTES # BLD AUTO: 4.5 % — LOW (ref 13–44)
MAGNESIUM SERPL-MCNC: 1.9 MG/DL — SIGNIFICANT CHANGE UP (ref 1.6–2.6)
MCHC RBC-ENTMCNC: 27.6 PG — SIGNIFICANT CHANGE UP (ref 27–34)
MCHC RBC-ENTMCNC: 31.5 GM/DL — LOW (ref 32–36)
MCV RBC AUTO: 87.6 FL — SIGNIFICANT CHANGE UP (ref 80–100)
MONOCYTES # BLD AUTO: 0.58 K/UL — SIGNIFICANT CHANGE UP (ref 0–0.9)
MONOCYTES NFR BLD AUTO: 3.5 % — SIGNIFICANT CHANGE UP (ref 2–14)
NEUTROPHILS # BLD AUTO: 14.97 K/UL — HIGH (ref 1.8–7.4)
NEUTROPHILS NFR BLD AUTO: 89.1 % — HIGH (ref 43–77)
PHOSPHATE SERPL-MCNC: 1.4 MG/DL — LOW (ref 2.4–4.7)
PLATELET # BLD AUTO: 600 K/UL — HIGH (ref 150–400)
POTASSIUM SERPL-MCNC: 4 MMOL/L — SIGNIFICANT CHANGE UP (ref 3.5–5.3)
POTASSIUM SERPL-SCNC: 4 MMOL/L — SIGNIFICANT CHANGE UP (ref 3.5–5.3)
PROTHROM AB SERPL-ACNC: 22.6 SEC — HIGH (ref 10–12.9)
RBC # BLD: 3.4 M/UL — LOW (ref 4.2–5.8)
RBC # FLD: 17 % — HIGH (ref 10.3–14.5)
SODIUM SERPL-SCNC: 131 MMOL/L — LOW (ref 135–145)
SPECIMEN SOURCE: SIGNIFICANT CHANGE UP
WBC # BLD: 16.79 K/UL — HIGH (ref 3.8–10.5)
WBC # FLD AUTO: 16.79 K/UL — HIGH (ref 3.8–10.5)

## 2020-01-26 PROCEDURE — 99232 SBSQ HOSP IP/OBS MODERATE 35: CPT

## 2020-01-26 PROCEDURE — 99231 SBSQ HOSP IP/OBS SF/LOW 25: CPT

## 2020-01-26 RX ORDER — PHYTONADIONE (VIT K1) 5 MG
5 TABLET ORAL ONCE
Refills: 0 | Status: COMPLETED | OUTPATIENT
Start: 2020-01-26 | End: 2020-01-26

## 2020-01-26 RX ORDER — MEROPENEM 1 G/30ML
1000 INJECTION INTRAVENOUS EVERY 8 HOURS
Refills: 0 | Status: DISCONTINUED | OUTPATIENT
Start: 2020-01-26 | End: 2020-02-03

## 2020-01-26 RX ADMIN — OXYCODONE HYDROCHLORIDE 10 MILLIGRAM(S): 5 TABLET ORAL at 13:24

## 2020-01-26 RX ADMIN — OXYCODONE HYDROCHLORIDE 10 MILLIGRAM(S): 5 TABLET ORAL at 16:07

## 2020-01-26 RX ADMIN — OXYCODONE HYDROCHLORIDE 10 MILLIGRAM(S): 5 TABLET ORAL at 06:26

## 2020-01-26 RX ADMIN — Medication 101 MILLIGRAM(S): at 16:23

## 2020-01-26 RX ADMIN — Medication 3 MILLILITER(S): at 20:47

## 2020-01-26 RX ADMIN — LACTULOSE 10 GRAM(S): 10 SOLUTION ORAL at 05:25

## 2020-01-26 RX ADMIN — OXYCODONE HYDROCHLORIDE 10 MILLIGRAM(S): 5 TABLET ORAL at 22:32

## 2020-01-26 RX ADMIN — Medication 5 MILLIGRAM(S): at 16:07

## 2020-01-26 RX ADMIN — PIPERACILLIN AND TAZOBACTAM 25 GRAM(S): 4; .5 INJECTION, POWDER, LYOPHILIZED, FOR SOLUTION INTRAVENOUS at 05:25

## 2020-01-26 RX ADMIN — ENOXAPARIN SODIUM 40 MILLIGRAM(S): 100 INJECTION SUBCUTANEOUS at 10:43

## 2020-01-26 RX ADMIN — FINASTERIDE 5 MILLIGRAM(S): 5 TABLET, FILM COATED ORAL at 10:42

## 2020-01-26 RX ADMIN — MEROPENEM 100 MILLIGRAM(S): 1 INJECTION INTRAVENOUS at 21:33

## 2020-01-26 RX ADMIN — OXYCODONE HYDROCHLORIDE 10 MILLIGRAM(S): 5 TABLET ORAL at 05:26

## 2020-01-26 RX ADMIN — Medication 3 MILLILITER(S): at 02:19

## 2020-01-26 RX ADMIN — OXYCODONE HYDROCHLORIDE 10 MILLIGRAM(S): 5 TABLET ORAL at 00:34

## 2020-01-26 RX ADMIN — Medication 5 MILLIGRAM(S): at 05:25

## 2020-01-26 RX ADMIN — PIPERACILLIN AND TAZOBACTAM 25 GRAM(S): 4; .5 INJECTION, POWDER, LYOPHILIZED, FOR SOLUTION INTRAVENOUS at 13:20

## 2020-01-26 RX ADMIN — Medication 62.5 MILLIMOLE(S): at 16:38

## 2020-01-26 RX ADMIN — Medication 5 MILLIGRAM(S): at 16:38

## 2020-01-26 RX ADMIN — TAMSULOSIN HYDROCHLORIDE 0.4 MILLIGRAM(S): 0.4 CAPSULE ORAL at 21:32

## 2020-01-26 RX ADMIN — Medication 3 MILLILITER(S): at 10:08

## 2020-01-26 RX ADMIN — OXYCODONE HYDROCHLORIDE 10 MILLIGRAM(S): 5 TABLET ORAL at 21:32

## 2020-01-26 RX ADMIN — MEROPENEM 100 MILLIGRAM(S): 1 INJECTION INTRAVENOUS at 16:22

## 2020-01-26 RX ADMIN — Medication 3 MILLILITER(S): at 15:51

## 2020-01-26 NOTE — PROGRESS NOTE ADULT - SUBJECTIVE AND OBJECTIVE BOX
[facility]  INFECTIOUS DISEASES AND INTERNAL MEDICINE at [place]  =======================================================  Chandler Mccrary MD  Diplomates American Board of Internal Medicine and Infectious Diseases  Telephone [phone]  Fax            [phone]  =======================================================    [facility]-[id number]  DEL JANETTE   follow up:  Pneumonia    pt reports feeling febrile  on Zosyn  cultures negative so far, sputum with normal teresa.     legionella negative.         =======================================================    REVIEW OF SYSTEMS:  CONSTITUTIONAL:   + FEVERS  HEENT:  No diplopia or blurred vision.  No earache, sore throat or runny nose.  CARDIOVASCULAR:  No pressure, squeezing, strangling, tightness, heaviness or aching about the chest, neck, axilla or epigastrium.  RESPIRATORY:  No cough, shortness of breath  GASTROINTESTINAL:  No nausea, vomiting or diarrhea.  GENITOURINARY:  No dysuria, frequency or urgency. No Blood in urine  MUSCULOSKELETAL:  no joint aches, no muscle pain  SKIN:  No change in skin, hair or nails.  NEUROLOGIC:  No Headaches, seizures or weakness.  PSYCHIATRIC:  No disorder of thought or mood.  ENDOCRINE:  No heat or cold intolerance  HEMATOLOGICAL:  No easy bruising or bleeding.   =======================================================  Allergies  No Known Allergies       ======================================================  Physical Exam:  ============  (see section below)    General:  No acute distress.  Eye: Pupils are equal, round and reactive to light, Extraocular movements are intact, Normal conjunctiva.  HENT: Normocephalic, Oral mucosa is moist, No pharyngeal erythema, No sinus tenderness.  Neck: Supple, No lymphadenopathy.  Respiratory:  POOR aeration of the LEFT side., unchanged  Cardiovascular: Normal rate, Regular rhythm,   Gastrointestinal: Soft, Non-tender, Non-distended, Normal bowel sounds.  Genitourinary: No costovertebral angle tenderness.  Lymphatics: No lymphadenopathy neck,   Musculoskeletal: Normal range of motion, Normal strength.  Integumentary: No rash.  Neurologic: Alert, Oriented, No focal deficits, Cranial Nerves II-XII are grossly intact.  Psychiatric: Appropriate mood & affect.    =======================================================     VITALS  ============  T(F): 97.7 (26 Jan 2020 08:10), Max: 99.1 (25 Jan 2020 16:01)  HR: 106 (26 Jan 2020 10:12)  BP: 126/73 (26 Jan 2020 08:10)  RR: 18 (26 Jan 2020 08:10)  SpO2: 96% (26 Jan 2020 10:12) (96% - 100%)  temp max in last 48H T(F): , Max: 99.6 (01-24-20 @ 16:41)    =======================================================  Current Antibiotics:  meropenem  IVPB 1000 milliGRAM(s) IV Intermittent every 8 hours    Other medications:  albuterol/ipratropium for Nebulization 3 milliLiter(s) Nebulizer every 6 hours  bisacodyl Suppository 10 milliGRAM(s) Rectal once  finasteride 5 milliGRAM(s) Oral daily  oxyCODONE    IR 10 milliGRAM(s) Oral every 6 hours  phytonadione   Solution 5 milliGRAM(s) Oral daily  phytonadione  IVPB 5 milliGRAM(s) IV Intermittent once  polyethylene glycol 3350 17 Gram(s) Oral at bedtime  predniSONE   Tablet 5 milliGRAM(s) Oral two times a day  sodium phosphate IVPB 15 milliMole(s) IV Intermittent once  tamsulosin 0.4 milliGRAM(s) Oral at bedtime      =======================================================  Labs:                        9.4    16.79 )-----------( 600      ( 26 Jan 2020 11:01 )             29.8       WBC Count: 16.79 K/uL (01-26-20 @ 11:01)  WBC Count: 16.36 K/uL (01-25-20 @ 09:38)      01-26    131<L>  |  96<L>  |  7.0<L>  ----------------------------<  143<H>  4.0   |  23.0  |  0.42<L>    Ca    8.2<L>      26 Jan 2020 11:01  Phos  1.4     01-26  Mg     1.9     01-26        Culture - Sputum (collected 01-23-20 @ 09:17)  Source: .Sputum  Gram Stain (01-23-20 @ 14:01):    Moderate WBC's    No organisms seen  Final Report (01-25-20 @ 09:57):    Moderate Routine respiratory teresa present    Culture - Blood (collected 01-21-20 @ 10:00)  Source: .Blood  Final Report (01-26-20 @ 11:00):    No growth at 5 days.    Culture - Blood (collected 01-20-20 @ 10:24)  Source: .Blood  Final Report (01-25-20 @ 11:01):    No growth at 5 days.    Culture - Blood (collected 01-20-20 @ 10:24)  Source: .Blood  Final Report (01-25-20 @ 11:01):    No growth at 5 days.      Creatinine, Serum: 0.42 mg/dL (01-26-20 @ 11:01)  Creatinine, Serum: 0.38 mg/dL (01-25-20 @ 09:39)  Creatinine, Serum: 0.37 mg/dL (01-24-20 @ 08:28)  Creatinine, Serum: 0.43 mg/dL (01-23-20 @ 08:16)  Creatinine, Serum: 0.41 mg/dL (01-22-20 @ 10:14)

## 2020-01-26 NOTE — PROGRESS NOTE ADULT - ASSESSMENT
65 y/o male with Prostate Adenocarcinoma with Mets to Bone, Lung and Lymp on ADT (Lupron), Taxotere, Xgeva therapy and awaiting implementation of Palliative radiation therapy presented to ED with C/O worsening SOB. CTA chest completed ruled out PE. found to have Diffuse bilateral areas of pulmonary consolidation with pneumatocele formation and Large loculated left pleural effusions with compressive atelectasis of the left lower lobe. thoracic sx cs placed.     presentation complicated by trace RT sided PTX. medical management only, no chest tube placed.     #Bilateral pneumonia, complicated by LT sided large loculated pleural effusion + acute resp failure w/ hypoxia, stable - in immunocompromised pt. flu panel neg. reescalate abx to zosyn 1/22. plan for at least 7 day course zosyn for PNA treatment- suspecting resistant gram neg and/or positive organisms as underlying etiology behind infection in light of cavitary lesions. supplemental o2 - titrate to RA - not baseline dependent. repeat ct chest 6wks. nebs. blood cx 1/20 neg. sputum cx prelim resp teresa only. neg legionella. cts cs appreciated. IR cs appreciated - pending chest tube placement when INR </- 1.7 by IR - vitamin k + pt/inr daily - stop vit k when goal met. Will give 1 dose of IV vit K as INR yet 1.9 today. . ID cs appreciated. IR consult placed. Will call IR in am. NPO after mn. Thoracentesis labs ordered    #hyponatremia, mild, asymptomatic, persistent - trend.    #Prostate CA, stable - palliative cs appreciated. daphney cs appreciated - cont daily prednisone. psa responding to current tx w/ lupron, docetaxel, xgeva - cont on dc. rad onc cs appreciated - rad therapy while inpt - pt to be transferred to center for tx and brought back - first tx 1/23, likely 5 sessions planned.     #anemia, asymptomatic, stable - baseline hgb 11-12, no acute/active s/s of blood loss. outpt fu.     #hypokalemia, mild, asymptomatc - replete + repeat    #dvt ppx. lovenox over the weekend, hold monday AM in case Chest tube placement during the week    #dispo. sister to make all decisions at this time (Gail Cruz [phone]) per pt.  undetermined date of dc as pt also needs chest tube when INR improved for loculated effusion, earliest poss date of procedure 1/27 if inr @ goal. PT - no needs    #outpt fu. pmd. onc, rad onc dr carlisle @ [facility] [place].

## 2020-01-26 NOTE — PROGRESS NOTE ADULT - ASSESSMENT
64M, with known history of prostate cancer undergoing chemo therapy with Heme/Onc Dr. Oshea (1 treatment every 3 weeks, last Chemo treatment was 1/14) presented to [facility] 1/20/20 for worsening shortness of breath, recently admitted to a hospital  in [place] 11/2019 and was treated for fluid and air around his lungs, ? decortication performed, found to have large loculated left pleural effusion;  Currently, interventional drainage limited by elevated INR, getting vitamin K, awaiting value of less than 1.7 for IR to intervene.

## 2020-01-26 NOTE — PROGRESS NOTE ADULT - ASSESSMENT
This 64yoM; with pmh signif for Prostate Ca with mets to ribs diagnosed in 11/2019 (currently on chemo), HTN; now p/w cough, sob, fever x1 week, progressively worsening.  denies sick contacts. reports traveling from [place] after diagnosis of prostate ca complicated by b/l ptx in november and another hospitalization in december. denies cp/palp. denies abd pain: SOCIAL: No tobacco/illicit substance use/social/EtOH    patient reports having a history of Bilateral Pneumonia approx 1.5 months prior with associated bilateral Pleural effusions which required draining. In addition to above, patient is scheduled palliative Radiation therapy today. He is on ADT (lupron), Taxotere and Xgeva for Prostate adenocarcinoma with Mets to Lung, Bone and Lymph. (20 Jan 2020 21:22)    Chest imaging xray showed:  No significant change in the suspected bilateral pleural effusions, left greater than right.   Possible loculated fluid on the left. Atelectatic changes and/or underlying infection is possible. Please correlate clinically. Cavity or pneumatocele in the left apex again seen.  Of note, the small right-sided pneumothorax on chest CT from 1/20/2020 is not as well appreciated on this x-ray. The presence of a small right-sided pneumothorax was discussed with Dr. Silva at 12:25 PM on 1/22/2020.    A CT scan was done and showed:  Diffuse bilateral areas of pulmonary consolidation with pneumatocele formation  Large loculated left pleural effusions with compressive atelectasis of the left lower lobe.      patient was initially started On vanco & Zosyn, then Ceftriaxone and Zithromax was added as well.   We are consulted to evaluate this patient for PNA.       Impression:  PNA  large loculated effusion  LEFT lobe atelectasis  Shortness of breath    Plan:  - workup negative so far  - will repeat cultures  - d/c Zosyn  - escalate to merrem 1 gram Q 8H      - follow up all outstanding cultures  - trend temperature and WBC curve  - repeat cultures from blood and all sources if febrile.

## 2020-01-26 NOTE — PROGRESS NOTE ADULT - SUBJECTIVE AND OBJECTIVE BOX
CC: pna    Patient seen and examined at the bedside. No acute overnight events. sp 1st rad tx on Thursday. SOB about the same.  Denies fever/chills, headache, lightheadedness, dizziness, chest pain, palpitations, cough, abd pain, nausea/vomiting/diarrhea, muscle pain.      =========================================================================================    PHYSICAL EXAM.    GEN - appears age appropriate. well nourished. pleasant. no distress.   HEENT - NCAT, EOMI, BETITO  RESP -decreased breath sounds on LLL  CARDIO - NS1S2, RRR. No murmurs/rubs/gallops.  ABD - Soft/Non tender/Non distended. Normal BS x4 quadrants. no guarding/rebound tenderness.  Ext - No LG.  MSK - BL 5/5 strength on upper and lower extremities.   Neuro - AAOx3. cn 2-12 grossly intact  Psych - normal affect  Skin - c/d/i. no rashes/lesions      VITAL SIGNS.    Vital Signs Last 24 Hrs  T(C): 36.7 (24 Jan 2020 07:27), Max: 36.9 (23 Jan 2020 21:00)  T(F): 98.1 (24 Jan 2020 07:27), Max: 98.4 (23 Jan 2020 21:00)  HR: 105 (24 Jan 2020 09:34) (105 - 114)  BP: 126/72 (24 Jan 2020 07:27) (122/68 - 137/74)  BP(mean): --  RR: 20 (24 Jan 2020 07:27) (18 - 20)  SpO2: 97% (23 Jan 2020 23:37) (96% - 97%)        =================================================    LABS.                          8.9    x     )-----------( x        ( 24 Jan 2020 08:28 )             28.5     01-24    131<L>  |  94<L>  |  7.0<L>  ----------------------------<  118<H>  3.4<L>   |  24.0  |  0.37<L>    Ca    7.8<L>      24 Jan 2020 08:28        PT/INR - ( 24 Jan 2020 08:28 )   PT: 22.4 sec;   INR: 1.91 ratio         PTT - ( 24 Jan 2020 08:28 )  PTT:32.8 sec  I&O's Summary    23 Jan 2020 07:01  -  24 Jan 2020 07:00  --------------------------------------------------------  IN: 675 mL / OUT: 400 mL / NET: 275 mL    24 Jan 2020 07:01  -  24 Jan 2020 16:37  --------------------------------------------------------  IN: 375 mL / OUT: 0 mL / NET: 375 mL        Culture - Sputum (collected 23 Jan 2020 09:17)  Source: .Sputum  Gram Stain (23 Jan 2020 14:01):    Moderate WBC's    No organisms seen  Preliminary Report (24 Jan 2020 09:14):    Moderate Routine respiratory teresa present    Culture in progress        ================================================    IMAGING.    ================================================    DIET.    Diet, Regular (01-21-20 @ 12:58)    ================================================    HOME MEDS.    Home Medications:  finasteride 5 mg oral tablet: 1 tab(s) orally once a day (20 Jan 2020 19:39)  lactulose:  (21 Jan 2020 07:32)  Lupron 5 mg/mL subcutaneous solution:  (21 Jan 2020 07:29)  oxyCODONE 10 mg oral tablet: 1 tab(s) orally every 6 hours (20 Jan 2020 19:39)  Percocet 10/325 oral tablet: 1 tab(s) orally every 6 hours (21 Jan 2020 07:31)  predniSONE 5 mg oral tablet: 1 tab(s) orally 2 times a day (21 Jan 2020 07:30)  Taxotere:  (21 Jan 2020 07:29)  Xgeva 120 mg/1.7 mL subcutaneous solution:  (21 Jan 2020 07:30)      ================================================    HOSPITAL MEDS.    MEDICATIONS  (STANDING):  albuterol/ipratropium for Nebulization 3 milliLiter(s) Nebulizer every 6 hours  finasteride 5 milliGRAM(s) Oral daily  oxyCODONE    IR 10 milliGRAM(s) Oral every 6 hours  piperacillin/tazobactam IVPB.. 3.375 Gram(s) IV Intermittent every 8 hours  polyethylene glycol 3350 17 Gram(s) Oral at bedtime  potassium chloride    Tablet ER 40 milliEquivalent(s) Oral once  predniSONE   Tablet 5 milliGRAM(s) Oral two times a day  sodium chloride 0.9%. 1000 milliLiter(s) (75 mL/Hr) IV Continuous <Continuous>  tamsulosin 0.4 milliGRAM(s) Oral at bedtime    MEDICATIONS  (PRN):  lactulose Syrup 10 Gram(s) Oral two times a day PRN Constipation

## 2020-01-26 NOTE — PROGRESS NOTE ADULT - SUBJECTIVE AND OBJECTIVE BOX
64y Male with left effusion and elevated INR    Subjective:  Pt complains of significant dyspnea with ambulation.    T(C): 36.5 (01-26-20 @ 08:10), Max: 37.3 (01-25-20 @ 16:01)  HR: 106 (01-26-20 @ 10:12) (98 - 115)  BP: 126/73 (01-26-20 @ 08:10) (126/73 - 138/74)  ABP: --  ABP(mean): --  RR: 18 (01-26-20 @ 08:10) (18 - 18)  SpO2: 96% (01-26-20 @ 10:12) (96% - 100%)  Wt(kg): --  CVP(mm Hg): --  CO: --  CI: --  PA: --         01-25    133<L>  |  99  |  5.0<L>  ----------------------------<  165<H>  3.8   |  23.0  |  0.38<L>    Ca    7.9<L>      25 Jan 2020 09:39                                 9.1    16.36 )-----------( 561      ( 25 Jan 2020 09:38 )             29.4        PT/INR - ( 25 Jan 2020 09:38 )   PT: 22.4 sec;   INR: 1.91 ratio         PTT - ( 25 Jan 2020 09:38 )  PTT:39.8 sec             CAPILLARY BLOOD GLUCOSE               CXR:    I&O's Detail    25 Jan 2020 07:01  -  26 Jan 2020 07:00  --------------------------------------------------------  IN:    sodium chloride 0.9%: 900 mL  Total IN: 900 mL    OUT:  Total OUT: 0 mL    Total NET: 900 mL          MEDICATIONS  (STANDING):  albuterol/ipratropium for Nebulization 3 milliLiter(s) Nebulizer every 6 hours  bisacodyl Suppository 10 milliGRAM(s) Rectal once  finasteride 5 milliGRAM(s) Oral daily  oxyCODONE    IR 10 milliGRAM(s) Oral every 6 hours  phytonadione   Solution 5 milliGRAM(s) Oral daily  piperacillin/tazobactam IVPB.. 3.375 Gram(s) IV Intermittent every 8 hours  polyethylene glycol 3350 17 Gram(s) Oral at bedtime  predniSONE   Tablet 5 milliGRAM(s) Oral two times a day  tamsulosin 0.4 milliGRAM(s) Oral at bedtime    MEDICATIONS  (PRN):  lactulose Syrup 10 Gram(s) Oral two times a day PRN Constipation      Physical Exam  Neuro: A+O x 3, non-focal, speech clear and intact  Pulm: CTA, equal bilaterally  CV: RRR, +S1S2  Abd: soft, NT, ND, +BS  Ext: TANG x 4, no edema  Inc: MSI C/D/I/stable w/ dsg, LLE C/D/I w/ dsg/Ace wrap    -----------------------------------------------------------------------------------------------------------------------------------------------------------------------------  -----------------------------------------------------------------------------------------------------------------------------------------------------------------------------

## 2020-01-27 VITALS
HEIGHT: 78 IN | SYSTOLIC BLOOD PRESSURE: 150 MMHG | OXYGEN SATURATION: 98 % | HEART RATE: 110 BPM | DIASTOLIC BLOOD PRESSURE: 92 MMHG

## 2020-01-27 DIAGNOSIS — J93.9 PNEUMOTHORAX, UNSPECIFIED: ICD-10-CM

## 2020-01-27 LAB
ALBUMIN FLD-MCNC: 2.4 G/DL — SIGNIFICANT CHANGE UP
ANION GAP SERPL CALC-SCNC: 14 MMOL/L — SIGNIFICANT CHANGE UP (ref 5–17)
B PERT IGG+IGM PNL SER: ABNORMAL
BASOPHILS # BLD AUTO: 0.04 K/UL — SIGNIFICANT CHANGE UP (ref 0–0.2)
BASOPHILS NFR BLD AUTO: 0.2 % — SIGNIFICANT CHANGE UP (ref 0–2)
BUN SERPL-MCNC: 6 MG/DL — LOW (ref 8–20)
CALCIUM SERPL-MCNC: 8.5 MG/DL — LOW (ref 8.6–10.2)
CHLORIDE SERPL-SCNC: 97 MMOL/L — LOW (ref 98–107)
CO2 SERPL-SCNC: 24 MMOL/L — SIGNIFICANT CHANGE UP (ref 22–29)
COLOR FLD: YELLOW
COMMENT - FLUIDS: SIGNIFICANT CHANGE UP
COMMENT - FLUIDS: SIGNIFICANT CHANGE UP
CREAT SERPL-MCNC: 0.38 MG/DL — LOW (ref 0.5–1.3)
EOSINOPHIL # BLD AUTO: 0.04 K/UL — SIGNIFICANT CHANGE UP (ref 0–0.5)
EOSINOPHIL NFR BLD AUTO: 0.2 % — SIGNIFICANT CHANGE UP (ref 0–6)
FLUID INTAKE SUBSTANCE CLASS: SIGNIFICANT CHANGE UP
FLUID SEGMENTED GRANULOCYTES: 75 % — SIGNIFICANT CHANGE UP
GLUCOSE FLD-MCNC: 19 MG/DL — SIGNIFICANT CHANGE UP
GLUCOSE SERPL-MCNC: 121 MG/DL — HIGH (ref 70–99)
GRAM STN FLD: SIGNIFICANT CHANGE UP
HCT VFR BLD CALC: 31.2 % — LOW (ref 39–50)
HGB BLD-MCNC: 9.6 G/DL — LOW (ref 13–17)
IMM GRANULOCYTES NFR BLD AUTO: 1.9 % — HIGH (ref 0–1.5)
INR BLD: 1.79 RATIO — HIGH (ref 0.88–1.16)
LDH SERPL L TO P-CCNC: 174 U/L — SIGNIFICANT CHANGE UP (ref 98–192)
LDH SERPL L TO P-CCNC: 5531 U/L — SIGNIFICANT CHANGE UP
LYMPHOCYTES # BLD AUTO: 0.88 K/UL — LOW (ref 1–3.3)
LYMPHOCYTES # BLD AUTO: 5.5 % — LOW (ref 13–44)
LYMPHOCYTES # FLD: 10 % — SIGNIFICANT CHANGE UP
MAGNESIUM SERPL-MCNC: 1.9 MG/DL — SIGNIFICANT CHANGE UP (ref 1.8–2.6)
MCHC RBC-ENTMCNC: 27.1 PG — SIGNIFICANT CHANGE UP (ref 27–34)
MCHC RBC-ENTMCNC: 30.8 GM/DL — LOW (ref 32–36)
MCV RBC AUTO: 88.1 FL — SIGNIFICANT CHANGE UP (ref 80–100)
MESOTHL CELL # FLD: 5 % — SIGNIFICANT CHANGE UP
MONOCYTES # BLD AUTO: 0.74 K/UL — SIGNIFICANT CHANGE UP (ref 0–0.9)
MONOCYTES NFR BLD AUTO: 4.6 % — SIGNIFICANT CHANGE UP (ref 2–14)
MONOS+MACROS # FLD: 10 % — SIGNIFICANT CHANGE UP
NEUTROPHILS # BLD AUTO: 14.13 K/UL — HIGH (ref 1.8–7.4)
NEUTROPHILS NFR BLD AUTO: 87.6 % — HIGH (ref 43–77)
PH FLD: 7 — SIGNIFICANT CHANGE UP
PHOSPHATE SERPL-MCNC: 1.3 MG/DL — LOW (ref 2.4–4.7)
PLATELET # BLD AUTO: 563 K/UL — HIGH (ref 150–400)
POTASSIUM SERPL-MCNC: 3.4 MMOL/L — LOW (ref 3.5–5.3)
POTASSIUM SERPL-SCNC: 3.4 MMOL/L — LOW (ref 3.5–5.3)
PROT FLD-MCNC: 4 G/DL — SIGNIFICANT CHANGE UP
PROT SERPL-MCNC: 7.7 G/DL — SIGNIFICANT CHANGE UP (ref 6.6–8.7)
PROTHROM AB SERPL-ACNC: 21 SEC — HIGH (ref 10–12.9)
RBC # BLD: 3.54 M/UL — LOW (ref 4.2–5.8)
RBC # FLD: 17.1 % — HIGH (ref 10.3–14.5)
RCV VOL RI: HIGH /UL (ref 0–0)
SODIUM SERPL-SCNC: 135 MMOL/L — SIGNIFICANT CHANGE UP (ref 135–145)
SPECIMEN SOURCE: SIGNIFICANT CHANGE UP
TOTAL NUCLEATED CELL COUNT, BODY FLUID: SIGNIFICANT CHANGE UP /UL
TUBE TYPE: SIGNIFICANT CHANGE UP
WBC # BLD: 16.14 K/UL — HIGH (ref 3.8–10.5)
WBC # FLD AUTO: 16.14 K/UL — HIGH (ref 3.8–10.5)

## 2020-01-27 PROCEDURE — 99231 SBSQ HOSP IP/OBS SF/LOW 25: CPT

## 2020-01-27 PROCEDURE — 99232 SBSQ HOSP IP/OBS MODERATE 35: CPT

## 2020-01-27 PROCEDURE — 88305 TISSUE EXAM BY PATHOLOGIST: CPT | Mod: 26

## 2020-01-27 PROCEDURE — 71045 X-RAY EXAM CHEST 1 VIEW: CPT | Mod: 26,77

## 2020-01-27 PROCEDURE — 71045 X-RAY EXAM CHEST 1 VIEW: CPT | Mod: 26

## 2020-01-27 PROCEDURE — 88112 CYTOPATH CELL ENHANCE TECH: CPT | Mod: 26

## 2020-01-27 RX ORDER — LIDOCAINE 4 G/100G
1 CREAM TOPICAL DAILY
Refills: 0 | Status: DISCONTINUED | OUTPATIENT
Start: 2020-01-28 | End: 2020-02-19

## 2020-01-27 RX ORDER — OXYCODONE HYDROCHLORIDE 5 MG/1
5 TABLET ORAL EVERY 4 HOURS
Refills: 0 | Status: DISCONTINUED | OUTPATIENT
Start: 2020-01-27 | End: 2020-01-30

## 2020-01-27 RX ORDER — ACETAMINOPHEN 500 MG
650 TABLET ORAL EVERY 6 HOURS
Refills: 0 | Status: DISCONTINUED | OUTPATIENT
Start: 2020-01-27 | End: 2020-02-19

## 2020-01-27 RX ORDER — POTASSIUM PHOSPHATE, MONOBASIC POTASSIUM PHOSPHATE, DIBASIC 236; 224 MG/ML; MG/ML
15 INJECTION, SOLUTION INTRAVENOUS ONCE
Refills: 0 | Status: COMPLETED | OUTPATIENT
Start: 2020-01-27 | End: 2020-01-27

## 2020-01-27 RX ORDER — PHYTONADIONE (VIT K1) 5 MG
5 TABLET ORAL ONCE
Refills: 0 | Status: COMPLETED | OUTPATIENT
Start: 2020-01-27 | End: 2020-01-27

## 2020-01-27 RX ORDER — OXYCODONE AND ACETAMINOPHEN 5; 325 MG/1; MG/1
1 TABLET ORAL EVERY 4 HOURS
Refills: 0 | Status: DISCONTINUED | OUTPATIENT
Start: 2020-01-27 | End: 2020-01-27

## 2020-01-27 RX ADMIN — OXYCODONE HYDROCHLORIDE 10 MILLIGRAM(S): 5 TABLET ORAL at 02:21

## 2020-01-27 RX ADMIN — OXYCODONE HYDROCHLORIDE 10 MILLIGRAM(S): 5 TABLET ORAL at 01:21

## 2020-01-27 RX ADMIN — POTASSIUM PHOSPHATE, MONOBASIC POTASSIUM PHOSPHATE, DIBASIC 62.5 MILLIMOLE(S): 236; 224 INJECTION, SOLUTION INTRAVENOUS at 14:07

## 2020-01-27 RX ADMIN — Medication 5 MILLIGRAM(S): at 17:04

## 2020-01-27 RX ADMIN — OXYCODONE HYDROCHLORIDE 10 MILLIGRAM(S): 5 TABLET ORAL at 11:22

## 2020-01-27 RX ADMIN — OXYCODONE HYDROCHLORIDE 5 MILLIGRAM(S): 5 TABLET ORAL at 23:37

## 2020-01-27 RX ADMIN — MEROPENEM 100 MILLIGRAM(S): 1 INJECTION INTRAVENOUS at 06:14

## 2020-01-27 RX ADMIN — MEROPENEM 100 MILLIGRAM(S): 1 INJECTION INTRAVENOUS at 13:38

## 2020-01-27 RX ADMIN — TAMSULOSIN HYDROCHLORIDE 0.4 MILLIGRAM(S): 0.4 CAPSULE ORAL at 22:34

## 2020-01-27 RX ADMIN — OXYCODONE HYDROCHLORIDE 10 MILLIGRAM(S): 5 TABLET ORAL at 07:15

## 2020-01-27 RX ADMIN — OXYCODONE HYDROCHLORIDE 10 MILLIGRAM(S): 5 TABLET ORAL at 11:55

## 2020-01-27 RX ADMIN — OXYCODONE HYDROCHLORIDE 10 MILLIGRAM(S): 5 TABLET ORAL at 17:05

## 2020-01-27 RX ADMIN — OXYCODONE HYDROCHLORIDE 10 MILLIGRAM(S): 5 TABLET ORAL at 06:15

## 2020-01-27 RX ADMIN — Medication 3 MILLILITER(S): at 15:58

## 2020-01-27 RX ADMIN — Medication 3 MILLILITER(S): at 03:11

## 2020-01-27 RX ADMIN — MEROPENEM 100 MILLIGRAM(S): 1 INJECTION INTRAVENOUS at 22:34

## 2020-01-27 RX ADMIN — FINASTERIDE 5 MILLIGRAM(S): 5 TABLET, FILM COATED ORAL at 11:23

## 2020-01-27 RX ADMIN — OXYCODONE HYDROCHLORIDE 10 MILLIGRAM(S): 5 TABLET ORAL at 17:29

## 2020-01-27 RX ADMIN — Medication 5 MILLIGRAM(S): at 06:14

## 2020-01-27 RX ADMIN — POLYETHYLENE GLYCOL 3350 17 GRAM(S): 17 POWDER, FOR SOLUTION ORAL at 22:34

## 2020-01-27 RX ADMIN — OXYCODONE HYDROCHLORIDE 5 MILLIGRAM(S): 5 TABLET ORAL at 22:37

## 2020-01-27 NOTE — PROGRESS NOTE ADULT - ASSESSMENT
This 64yoM; with pmh signif for Prostate Ca with mets to ribs diagnosed in 11/2019 (currently on chemo), HTN; now p/w cough, sob, fever x1 week, progressively worsening.  denies sick contacts. reports traveling from [place] after diagnosis of prostate ca complicated by b/l ptx in november and another hospitalization in december. denies cp/palp. denies abd pain: SOCIAL: No tobacco/illicit substance use/social/EtOH    patient reports having a history of Bilateral Pneumonia approx 1.5 months prior with associated bilateral Pleural effusions which required draining. In addition to above, patient is scheduled palliative Radiation therapy today. He is on ADT (lupron), Taxotere and Xgeva for Prostate adenocarcinoma with Mets to Lung, Bone and Lymph. (20 Jan 2020 21:22)    Chest imaging xray showed:  No significant change in the suspected bilateral pleural effusions, left greater than right.   Possible loculated fluid on the left. Atelectatic changes and/or underlying infection is possible. Please correlate clinically. Cavity or pneumatocele in the left apex again seen.  Of note, the small right-sided pneumothorax on chest CT from 1/20/2020 is not as well appreciated on this x-ray. The presence of a small right-sided pneumothorax was discussed with Dr. Silva at 12:25 PM on 1/22/2020.    A CT scan was done and showed:  Diffuse bilateral areas of pulmonary consolidation with pneumatocele formation  Large loculated left pleural effusions with compressive atelectasis of the left lower lobe.      patient was initially started On vanco & Zosyn, then Ceftriaxone and Zithromax was added as well.   We are consulted to evaluate this patient for PNA.       Impression:  PNA  large loculated effusion  LEFT lobe atelectasis  Shortness of breath    Plan:  s/p thoracentesis on 1/27/2020  - workup negative so far  - will repeat cultures  -  continue Merrem 1 gram Q 8H      - follow up all outstanding cultures  - trend temperature and WBC curve  - repeat cultures from blood and all sources if febrile.

## 2020-01-27 NOTE — PROGRESS NOTE ADULT - PROBLEM SELECTOR PLAN 1
IR thoracentesis today 1200cc turbid fluid, no drain left  f/u CXR stable  f/u fluid analysis and cultures  d/w Dr. Huerta

## 2020-01-27 NOTE — PROGRESS NOTE ADULT - SUBJECTIVE AND OBJECTIVE BOX
INTERVAL HPI/OVERNIGHT EVENTS: 65yo Male Patient PMH of Bladder Ca with Metastasis to Lung Bone and Lymph. Dyspnea on exertion  On continuous Nasal O2. He came in very weak. treated for PNA. Antibiotic changed several times, now on Merrem 1Gm Q8. He is having Radiation Treatments, this  is day 3/5. Very exhauseted this PM. "They did it to me again" he was referring to two procedures in same day. L Thoracentesis today.  1200 ml of turbid fluid drained-He was minimally sore at time of my visit- could not say if he felt it easier to breathe.   Dinner tray untouched at his bedside. No fevers T 99.3. In bed with lights out;,not really wanting to talk. Denies any other complaint other than what was mentioned in my note    64y old  Male who presents with a chief complaint of Dyspnea secondary to Large located effusion and suspected recurrent Bilateral PNA (27 Jan 2020 14:43)    PAST MEDICAL & SURGICAL HISTORY:  Prostate CA: Adenocarcinoma with mets to Lung, Bone, and Lymph  No significant past surgical history    Present Symptoms:     Dyspnea:   2-3   Nausea/Vomiting:  No  Anxiety:  Yes   Depression: Yes   Fatigue: Yes   Loss of appetite: Yes     Pain: deneis L side soreness at thoracentesis site            Character-            Duration-            Effect-            Factors-            Frequency-            Location-            Severity-mild    Review of Systems: Reviewed                     All others negative    MEDICATIONS  (STANDING):  albuterol/ipratropium for Nebulization 3 milliLiter(s) Nebulizer every 6 hours  bisacodyl Suppository 10 milliGRAM(s) Rectal once  finasteride 5 milliGRAM(s) Oral daily  meropenem  IVPB 1000 milliGRAM(s) IV Intermittent every 8 hours  polyethylene glycol 3350 17 Gram(s) Oral at bedtime  predniSONE   Tablet 5 milliGRAM(s) Oral two times a day  tamsulosin 0.4 milliGRAM(s) Oral at bedtime    MEDICATIONS  (PRN):  acetaminophen   Tablet .. 650 milliGRAM(s) Oral every 6 hours PRN Temp greater or equal to 38C (100.4F), Mild Pain (1 - 3)  lactulose Syrup 10 Gram(s) Oral two times a day PRN Constipation  oxycodone    5 mG/acetaminophen 325 mG 1 Tablet(s) Oral every 4 hours PRN Moderate Pain (4 - 6)  oxyCODONE    IR 5 milliGRAM(s) Oral every 4 hours PRN Severe Pain (7 - 10)      PHYSICAL EXAM:    Vital Signs Last 24 Hrs  T(C): 36.8 (27 Jan 2020 14:09), Max: 36.8 (27 Jan 2020 14:09)  T(F): 98.2 (27 Jan 2020 14:09), Max: 98.2 (27 Jan 2020 14:09)  HR: 114 (27 Jan 2020 14:09) (100 - 114)  BP: 136/82 (27 Jan 2020 14:09) (130/62 - 136/88)  BP(mean): --  RR: 20 (27 Jan 2020 14:09) (18 - 20)  SpO2: 95% (27 Jan 2020 14:09) (95% - 99%)    General: alert  oriented x _3___withdrawn and very tired               thin    HEENT: normal       Lungs: tachypnea/labored breathing on exertion    CV:   tachycardia    GI: distended  tender                 constipation  last BM:     :   lucas    MSK: n weakness  edema             ambulatory to BR few steps    Skin: normal  ____  no rash    LABS:                        9.6    16.14 )-----------( 563      ( 27 Jan 2020 08:17 )             31.2     01-27    135  |  97<L>  |  6.0<L>  ----------------------------<  121<H>  3.4<L>   |  24.0  |  0.38<L>    Ca    8.5<L>      27 Jan 2020 08:16  Phos  1.3     01-27  Mg     1.9     01-27    TPro  7.7  /  Alb  x   /  TBili  x   /  DBili  x   /  AST  x   /  ALT  x   /  AlkPhos  x   01-27    PT/INR - ( 27 Jan 2020 08:17 )   PT: 21.0 sec;   INR: 1.79 ratio       I&O's Summary    26 Jan 2020 07:01  -  27 Jan 2020 07:00  --------------------------------------------------------  IN: 0 mL / OUT: 500 mL / NET: -500 mL    RADIOLOGY & ADDITIONAL STUDIES:  < from: Xray Chest 1 View AP/PA. (01.27.20 @ 13:15) >  COMPARISON: 1/27/2020 at 4:46 AM.  FINDINGS:     HEART:  Enlarged  LUNGS: There is redemonstration of the right pneumothorax which extends into the minor fissure. The left pleural effusion has decreased. There is fluid in the fissure on the left. There is no pneumothorax.    OSSEOUS STRUCTURES:: degenerative changes    IMPRESSION:     Stable right pneumothorax.    No left pneumothorax after left thoracentesis.    ADVANCE DIRECTIVES:   DNR NO  Completed on:                     MOLST  NO   Completed on:  Living Will   NO   Completed on:

## 2020-01-27 NOTE — PROGRESS NOTE ADULT - ASSESSMENT
· Assessment		  65 y/o male with Prostate Adenocarcinoma with Mets to Bone, Lung and Lymp on ADT (Lupron), Taxotere, Xgeva therapy and awaiting implementation of Palliative radiation therapy presented to ED with C/O worsening SOB. CTA chest completed ruled out PE. found to have Diffuse bilateral areas of pulmonary consolidation with pneumatocele formation and Large loculated left pleural effusions with compressive atelectasis of the left lower lobe. thoracic sx cs placed.   presentation complicated by trace RT sided PTX. medical management only, no chest tube placed.     B/L Pneumonia  ID Following  large loculated effusion  LEFT lobe atelectasis  Shortness of breath  Plan:  s/p thoracentesis on 1/27/2020 no CT left in place   workup negative so far  will repeat cultures  continue Merrem 1 gram Q 8H    Prostate CA, stable --   cont daily prednisone. psa responding to current tx w/ lupron, docetaxel, xgeva - -   rad therapy while inpt -    Hypokalemia   mild, asymptomatc - replete + repeat    Depression/ Anxiety  Should be on antidepressant  Will discuss with him tomorrow    PAIN minimal  Left thoracic Puncture site from Thoracenetesis today  Lidoderm patch to area start tonight        GOC  sister to make all decisions at this time (Gail Cruz [phone]) per pt.   Will complete his RT treatments  Remain on IV ABX as per ID  F/U with Oncology for chemotherapy      trend temperature and WBC curve  repeat cultures from blood and all sources if febrile.

## 2020-01-27 NOTE — DIETITIAN INITIAL EVALUATION ADULT. - PROBLEM SELECTOR PLAN 1
Admit to medical unit with   cont [place] and Zosyn (recently hospitalized, immunocompromised).  cont supplemental oxygen as needed  Recommend f/u [facility] once treatement is completed  consider pulmonary consult   Advance directives discussed with patient: He would like his sister to make all decisions at this time (Gail Cruz [phone])   Duonebs Q6hrs PRN  Follow up Blood and Sputum Cultures.

## 2020-01-27 NOTE — PROGRESS NOTE ADULT - SUBJECTIVE AND OBJECTIVE BOX
CC: pna    Patient seen and examined at the bedside. No acute overnight events. SOB about the same.  Pt going for IR thoracentesis. Denies fever/chills, headache, lightheadedness, dizziness, chest pain, palpitations, cough, abd pain, nausea/vomiting/diarrhea, muscle pain.      =========================================================================================    PHYSICAL EXAM.    GEN - appears age appropriate. well nourished. pleasant. no distress.   HEENT - NCAT, EOMI, BETITO  RESP -decreased breath sounds on LLL  CARDIO - NS1S2, RRR. No murmurs/rubs/gallops.  ABD - Soft/Non tender/Non distended. Normal BS x4 quadrants. no guarding/rebound tenderness.  Ext - No LG.  MSK - BL 5/5 strength on upper and lower extremities.   Neuro - AAOx3. cn 2-12 grossly intact  Psych - normal affect  Skin - c/d/i. no rashes/lesions      VITAL SIGNS.    Vital Signs Last 24 Hrs  T(C): 36.7 (24 Jan 2020 07:27), Max: 36.9 (23 Jan 2020 21:00)  T(F): 98.1 (24 Jan 2020 07:27), Max: 98.4 (23 Jan 2020 21:00)  HR: 105 (24 Jan 2020 09:34) (105 - 114)  BP: 126/72 (24 Jan 2020 07:27) (122/68 - 137/74)  BP(mean): --  RR: 20 (24 Jan 2020 07:27) (18 - 20)  SpO2: 97% (23 Jan 2020 23:37) (96% - 97%)        =================================================    LABS.                          8.9    x     )-----------( x        ( 24 Jan 2020 08:28 )             28.5     01-24    131<L>  |  94<L>  |  7.0<L>  ----------------------------<  118<H>  3.4<L>   |  24.0  |  0.37<L>    Ca    7.8<L>      24 Jan 2020 08:28        PT/INR - ( 24 Jan 2020 08:28 )   PT: 22.4 sec;   INR: 1.91 ratio         PTT - ( 24 Jan 2020 08:28 )  PTT:32.8 sec  I&O's Summary    23 Jan 2020 07:01  -  24 Jan 2020 07:00  --------------------------------------------------------  IN: 675 mL / OUT: 400 mL / NET: 275 mL    24 Jan 2020 07:01  -  24 Jan 2020 16:37  --------------------------------------------------------  IN: 375 mL / OUT: 0 mL / NET: 375 mL        Culture - Sputum (collected 23 Jan 2020 09:17)  Source: .Sputum  Gram Stain (23 Jan 2020 14:01):    Moderate WBC's    No organisms seen  Preliminary Report (24 Jan 2020 09:14):    Moderate Routine respiratory teresa present    Culture in progress        ================================================    IMAGING.    ================================================    DIET.    Diet, Regular (01-21-20 @ 12:58)    ================================================    HOME MEDS.    Home Medications:  finasteride 5 mg oral tablet: 1 tab(s) orally once a day (20 Jan 2020 19:39)  lactulose:  (21 Jan 2020 07:32)  Lupron 5 mg/mL subcutaneous solution:  (21 Jan 2020 07:29)  oxyCODONE 10 mg oral tablet: 1 tab(s) orally every 6 hours (20 Jan 2020 19:39)  Percocet 10/325 oral tablet: 1 tab(s) orally every 6 hours (21 Jan 2020 07:31)  predniSONE 5 mg oral tablet: 1 tab(s) orally 2 times a day (21 Jan 2020 07:30)  Taxotere:  (21 Jan 2020 07:29)  Xgeva 120 mg/1.7 mL subcutaneous solution:  (21 Jan 2020 07:30)      ================================================    HOSPITAL MEDS.    MEDICATIONS  (STANDING):  albuterol/ipratropium for Nebulization 3 milliLiter(s) Nebulizer every 6 hours  finasteride 5 milliGRAM(s) Oral daily  oxyCODONE    IR 10 milliGRAM(s) Oral every 6 hours  piperacillin/tazobactam IVPB.. 3.375 Gram(s) IV Intermittent every 8 hours  polyethylene glycol 3350 17 Gram(s) Oral at bedtime  potassium chloride    Tablet ER 40 milliEquivalent(s) Oral once  predniSONE   Tablet 5 milliGRAM(s) Oral two times a day  sodium chloride 0.9%. 1000 milliLiter(s) (75 mL/Hr) IV Continuous <Continuous>  tamsulosin 0.4 milliGRAM(s) Oral at bedtime    MEDICATIONS  (PRN):  lactulose Syrup 10 Gram(s) Oral two times a day PRN Constipation

## 2020-01-27 NOTE — DIETITIAN INITIAL EVALUATION ADULT. - PERTINENT LABORATORY DATA
01-27 Na135 mmol/L Glu 121 mg/dL<H> K+ 3.4 mmol/L<L> Cr  0.38 mg/dL<L> BUN 6.0 mg/dL<L> Phos 1.3 mg/dL<L> Alb n/a   PAB n/a

## 2020-01-27 NOTE — PROGRESS NOTE ADULT - SUBJECTIVE AND OBJECTIVE BOX
Brief Hospital Course:  1/20 p/w SOB, found to have large located left effusion with elevated INR    Significant recent/past 24 hr events: IR thoracentesis today    Subjective: c/o pain at IR thoracentesis site, when asked if SOB is improved pt states "it seems to be", denies CP, palpitations, cough, fever, chills, itchiness/rash, diaphoresis, vision changes, HA, dizziness/lightheadedness, numbness/tingling, abd pain, N/V     Review of Systems: as above    Patient is a 64y old  Male who presents with a chief complaint of Dyspnea secondary to Large located effusion and suspected recurrent Bilateral PNA (26 Jan 2020 12:50)    HPI:  Pt was seen and evaluated on 1/20/20 at approx 9: 30pm. Delayed documentation completion.     ED HPI: 64yoM; with pmh signif for Prostate Ca with mets to ribs(currently on chemo), HTN; now p/w cough, sob, fever x1 week, progressively worsening.  denies sick contacts. reports traveling from [place] after diagnosis of prostate ca complicated by b/l ptx in november and another hospitalization in december. denies cp/palp. denies abd pain: SOCIAL: No tobacco/illicit substance use/social/EtOH    Above ED HPI reviewed and noted: patient reports having a history of Bilateral Pneumonia approx 1.5 months prior with associated bilateral Pleural effusions which required draining. In addition to above, patient is scheduled palliative Radiation therapy today. He is on ADT (lupron), Taxotere and Xgeva for Prostate adenocarcinoma with Mets to Lung, Bone and Lymph. (20 Jan 2020 21:22)    PAST MEDICAL & SURGICAL HISTORY:  Prostate CA: Adenocarcinoma with mets to Lung, Bone, and Lymph  No significant past surgical history    FAMILY HISTORY:  FH: multiple myeloma: Mother    Vitals   ICU Vital Signs Last 24 Hrs  T(C): 36.8 (27 Jan 2020 14:09), Max: 37.4 (26 Jan 2020 15:43)  T(F): 98.2 (27 Jan 2020 14:09), Max: 99.3 (26 Jan 2020 15:43)  HR: 114 (27 Jan 2020 14:09) (100 - 117)  BP: 136/82 (27 Jan 2020 14:09) (126/77 - 136/88)  RR: 20 (27 Jan 2020 14:09) (18 - 20)  SpO2: 95% (27 Jan 2020 14:09) (92% - 99%)    I&O's Detail    26 Jan 2020 07:01  -  27 Jan 2020 07:00  --------------------------------------------------------  IN:  Total IN: 0 mL    OUT:    Voided: 500 mL  Total OUT: 500 mL  Total NET: -500 mL    LABS                        9.6    16.14 )-----------( 563      ( 27 Jan 2020 08:17 )             31.2     01-27    135  |  97<L>  |  6.0<L>  ----------------------------<  121<H>  3.4<L>   |  24.0  |  0.38<L>    Ca    8.5<L>      27 Jan 2020 08:16  Phos  1.3     01-27  Mg     1.9     01-27    TPro  7.7  /  Alb  x   /  TBili  x   /  DBili  x   /  AST  x   /  ALT  x   /  AlkPhos  x   01-27    LIVER FUNCTIONS - ( 27 Jan 2020 08:16 )  Alb: x     / Pro: 7.7 g/dL / ALK PHOS: x     / ALT: x     / AST: x     / GGT: x         PT/INR - ( 27 Jan 2020 08:17 )   PT: 21.0 sec;   INR: 1.79 ratio      MEDICATIONS  (STANDING):  albuterol/ipratropium for Nebulization 3 milliLiter(s) Nebulizer every 6 hours  bisacodyl Suppository 10 milliGRAM(s) Rectal once  finasteride 5 milliGRAM(s) Oral daily  meropenem  IVPB 1000 milliGRAM(s) IV Intermittent every 8 hours  oxyCODONE    IR 10 milliGRAM(s) Oral every 6 hours  phytonadione   Solution 5 milliGRAM(s) Oral daily  polyethylene glycol 3350 17 Gram(s) Oral at bedtime  predniSONE   Tablet 5 milliGRAM(s) Oral two times a day  tamsulosin 0.4 milliGRAM(s) Oral at bedtime    MEDICATIONS  (PRN):  lactulose Syrup 10 Gram(s) Oral two times a day PRN Constipation      Allergies:  No Known Allergies    Physical Exam  Constitutional: NAD, well developed, well nourished  Neuro: A+O x 3, non-focal, speech clear and intact  CV: regular rate, regular rhythm, +S1S2, no murmurs or rub  Pulm/chest: decreased breath sounds on left with crackles, decreased at right base, no wheezing or use of accessory muscles  Abd: +BS soft NT ND  Ext: TANG x 4, no clubbing/cyanosis/edema  Skin: warm, well perfused  Psych: calm, appropriate affect

## 2020-01-27 NOTE — PROGRESS NOTE ADULT - ASSESSMENT
64M, with known history of prostate cancer undergoing chemo therapy with Heme/Onc Dr. Oshea (1 treatment every 3 weeks, last Chemo treatment was 1/14) presented to [facility] 1/20/20 for worsening shortness of breath, recently admitted to a hospital  in [place] 11/2019 and was treated for fluid and air around his lungs, ? decortication performed, found to have large loculated left pleural effusion, initially had elevated INR requiring Vit K, now s/p IR thoracentesis today for 1200cc turbid fluid, analysis pending;

## 2020-01-27 NOTE — PROGRESS NOTE ADULT - ASSESSMENT
65 y/o male with Prostate Adenocarcinoma with Mets to Bone, Lung and Lymp on ADT (Lupron), Taxotere, Xgeva therapy and awaiting implementation of Palliative radiation therapy presented to ED with C/O worsening SOB. CTA chest completed ruled out PE. found to have Diffuse bilateral areas of pulmonary consolidation with pneumatocele formation and Large loculated left pleural effusions with compressive atelectasis of the left lower lobe. thoracic sx cs placed.     presentation complicated by trace RT sided PTX. medical management only, no chest tube placed.     #Bilateral pneumonia, complicated by LT sided large loculated pleural effusion + acute resp failure w/ hypoxia, stable - in immunocompromised pt. flu panel neg. reescalate abx to zosyn 1/22. plan for at least 7 day course zosyn for PNA treatment- suspecting resistant gram neg and/or positive organisms as underlying etiology behind infection in light of cavitary lesions. supplemental o2 - titrate to RA - not baseline dependent. repeat ct chest 6wks. nebs. blood cx 1/20 neg. sputum cx prelim resp teresa only. neg legionella. cts cs appreciated. IR cs appreciated - pending chest tube placement when INR </- 1.7 by IR - vitamin k + pt/inr daily - stop vit k when goal met. Will give 1 dose of IV vit K as INR yet 1.9 today. . ID cs appreciated. IR consult placed. Will call IR in am. NPO after mn. Thoracentesis labs ordered    #hyponatremia, mild, asymptomatic, persistent - trend.    #Prostate CA, stable - palliative cs appreciated. daphney cs appreciated - cont daily prednisone. psa responding to current tx w/ lupron, docetaxel, xgeva - cont on dc. rad onc cs appreciated - rad therapy while inpt - pt to be transferred to center for tx and brought back - first tx 1/23, likely 5 sessions planned.     #anemia, asymptomatic, stable - baseline hgb 11-12, no acute/active s/s of blood loss. outpt fu.     #hypokalemia, mild, asymptomatc - replete + repeat    #dvt ppx. lovenox over the weekend, hold monday AM in case Chest tube placement during the week    #dispo. sister to make all decisions at this time (Gail Cruz [phone]) per pt.  undetermined date of dc as pt also needs chest tube when INR improved for loculated effusion, earliest poss date of procedure 1/27 if inr @ goal. PT - no needs    #outpt fu. pmd. onc, rad onc dr carlisle @ [facility].

## 2020-01-27 NOTE — PROVIDER CONTACT NOTE (MEDICATION) - BACKGROUND
Prostate Adenocarcinoma with Mets to Bone, Lung and Lymph  here with b/l pneumonia and left sided large loculated pleural effusion

## 2020-01-27 NOTE — PROGRESS NOTE ADULT - SUBJECTIVE AND OBJECTIVE BOX
[facility]  INFECTIOUS DISEASES AND INTERNAL MEDICINE at [place]  =======================================================  Chandler Mccrary MD  Diplomates American Board of Internal Medicine and Infectious Diseases  Telephone [phone]  Fax            [phone]  =======================================================    [facility]-[id number]  DEL JANETTE   follow up:  Pneumonia    s/p radiation this AM  s/p IR guided thoracentesis today./   1200ml fluid removed.      no more fevers  feels very tired      =======================================================    REVIEW OF SYSTEMS:  CONSTITUTIONAL:  no fevers  HEENT:  No diplopia or blurred vision.  No earache, sore throat or runny nose.  CARDIOVASCULAR:  No pressure, squeezing, strangling, tightness, heaviness or aching about the chest, neck, axilla or epigastrium.  RESPIRATORY:  No cough, shortness of breath  GASTROINTESTINAL:  No nausea, vomiting or diarrhea.  GENITOURINARY:  No dysuria, frequency or urgency. No Blood in urine  MUSCULOSKELETAL:  no joint aches, no muscle pain  SKIN:  No change in skin, hair or nails.  NEUROLOGIC:  No Headaches, seizures or weakness.  PSYCHIATRIC:  No disorder of thought or mood.  ENDOCRINE:  No heat or cold intolerance  HEMATOLOGICAL:  No easy bruising or bleeding.   =======================================================  Allergies  No Known Allergies     ======================================================  Physical Exam:  ============  (see section below)    General:  No acute distress.  Eye: Pupils are equal, round and reactive to light, Extraocular movements are intact, Normal conjunctiva.  HENT: Normocephalic, Oral mucosa is moist, No pharyngeal erythema, No sinus tenderness.  Neck: Supple, No lymphadenopathy.  Respiratory:  POOR aeration of the LEFT side., unchanged  Cardiovascular: Normal rate, Regular rhythm,   Gastrointestinal: Soft, Non-tender, Non-distended, Normal bowel sounds.  Genitourinary: No costovertebral angle tenderness.  Lymphatics: No lymphadenopathy neck,   Musculoskeletal: Normal range of motion, Normal strength.  Integumentary: No rash.  Neurologic: Alert, Oriented, No focal deficits, Cranial Nerves II-XII are grossly intact.  Psychiatric: Appropriate mood & affect.    =======================================================   VITALS  ============  T(F): 98.2 (27 Jan 2020 14:09), Max: 99.3 (26 Jan 2020 15:43)  HR: 114 (27 Jan 2020 14:09)  BP: 136/82 (27 Jan 2020 14:09)  RR: 20 (27 Jan 2020 14:09)  SpO2: 95% (27 Jan 2020 14:09) (92% - 99%)  temp max in last 48H T(F): , Max: 99.3 (01-26-20 @ 15:43)    =======================================================  Current Antibiotics:  meropenem  IVPB 1000 milliGRAM(s) IV Intermittent every 8 hours    Other medications:  albuterol/ipratropium for Nebulization 3 milliLiter(s) Nebulizer every 6 hours  bisacodyl Suppository 10 milliGRAM(s) Rectal once  finasteride 5 milliGRAM(s) Oral daily  oxyCODONE    IR 10 milliGRAM(s) Oral every 6 hours  phytonadione   Solution 5 milliGRAM(s) Oral daily  polyethylene glycol 3350 17 Gram(s) Oral at bedtime  predniSONE   Tablet 5 milliGRAM(s) Oral two times a day  tamsulosin 0.4 milliGRAM(s) Oral at bedtime      =======================================================  Labs:                        9.6    16.14 )-----------( 563      ( 27 Jan 2020 08:17 )             31.2       WBC Count: 16.14 K/uL (01-27-20 @ 08:17)  WBC Count: 16.79 K/uL (01-26-20 @ 11:01)  WBC Count: 16.36 K/uL (01-25-20 @ 09:38)      01-27    135  |  97<L>  |  6.0<L>  ----------------------------<  121<H>  3.4<L>   |  24.0  |  0.38<L>    Ca    8.5<L>      27 Jan 2020 08:16  Phos  1.3     01-27  Mg     1.9     01-27    TPro  7.7  /  Alb  x   /  TBili  x   /  DBili  x   /  AST  x   /  ALT  x   /  AlkPhos  x   01-27      Culture - Sputum (collected 01-23-20 @ 09:17)  Source: .Sputum  Gram Stain (01-23-20 @ 14:01):    Moderate WBC's    No organisms seen  Final Report (01-25-20 @ 09:57):    Moderate Routine respiratory teresa present    Culture - Blood (collected 01-21-20 @ 10:00)  Source: .Blood  Final Report (01-26-20 @ 11:00):    No growth at 5 days.    Culture - Blood (collected 01-20-20 @ 10:24)  Source: .Blood  Final Report (01-25-20 @ 11:01):    No growth at 5 days.    Culture - Blood (collected 01-20-20 @ 10:24)  Source: .Blood  Final Report (01-25-20 @ 11:01):    No growth at 5 days.      Creatinine, Serum: 0.38 mg/dL (01-27-20 @ 08:16)  Creatinine, Serum: 0.42 mg/dL (01-26-20 @ 11:01)  Creatinine, Serum: 0.38 mg/dL (01-25-20 @ 09:39)  Creatinine, Serum: 0.37 mg/dL (01-24-20 @ 08:28)  Creatinine, Serum: 0.43 mg/dL (01-23-20 @ 08:16)    Cell Count, Body Fluid (01.27.20 @ 13:07)    Mesothelial Cells - Body Fluid: 5 %    Monocyte/Macrophage Count - Body Fluid: 10 %    Fluid Segmented Granulocytes: 75 %    Fluid Type: Pleural    Comment - Fluids: Only intact cells counted    Body Fluid Appearance: Turbid    BF Lymphocytes: 10 %    Tube Type: Tube 2    Color - Body Fluid: Yellow    Total Nucleated Cell Count, Body Fluid: 055515: Peritoneal/Pleural/ Pericardial  Body Fluid Types            Total Nucleated cells: <500/uL            Neutrophils: <25%          Synovial Body Fluid Type           Total Nucleated cells: <150/uL           Neutrophils: <25%           Lymphocytes: <75%           Moncytes/Macrophages: </=70%  Please note reference range updated effective Nov 26, 2019 /uL    Total RBC Count: 18776 /uL

## 2020-01-27 NOTE — DIETITIAN INITIAL EVALUATION ADULT. - OTHER INFO
65 y/o male with Prostate Adenocarcinoma with Mets to Bone, Lung and Lymp, Taxotere, Xgeva therapy and awaiting implementation of Palliative radiation therapy presented to ED with C/O worsening SOB. Pt transferred to [facility] for radiation, multiple attempts made to interview. Pt has good po intake per EMR consuming % of meals. Last BM 1/24 documented. b/l leg 1+ edema noted (1/22). RD to remain available to obtain hx.

## 2020-01-27 NOTE — PROVIDER CONTACT NOTE (MEDICATION) - SITUATION
patient has vitamin k iv and oral ordered; unsure if patient should receive both    patient requesting a PRN analgesic prior to radiation; patient on oxycodone 10 mg q6h standing

## 2020-01-28 VITALS
DIASTOLIC BLOOD PRESSURE: 92 MMHG | SYSTOLIC BLOOD PRESSURE: 150 MMHG | HEART RATE: 110 BPM | RESPIRATION RATE: 16 BRPM | OXYGEN SATURATION: 98 %

## 2020-01-28 LAB
ANION GAP SERPL CALC-SCNC: 13 MMOL/L — SIGNIFICANT CHANGE UP (ref 5–17)
BASOPHILS # BLD AUTO: 0.04 K/UL — SIGNIFICANT CHANGE UP (ref 0–0.2)
BASOPHILS NFR BLD AUTO: 0.4 % — SIGNIFICANT CHANGE UP (ref 0–2)
BUN SERPL-MCNC: 7 MG/DL — LOW (ref 8–20)
CALCIUM SERPL-MCNC: 7.7 MG/DL — LOW (ref 8.6–10.2)
CHLORIDE SERPL-SCNC: 96 MMOL/L — LOW (ref 98–107)
CO2 SERPL-SCNC: 24 MMOL/L — SIGNIFICANT CHANGE UP (ref 22–29)
CREAT SERPL-MCNC: 0.28 MG/DL — LOW (ref 0.5–1.3)
EOSINOPHIL # BLD AUTO: 0.03 K/UL — SIGNIFICANT CHANGE UP (ref 0–0.5)
EOSINOPHIL NFR BLD AUTO: 0.3 % — SIGNIFICANT CHANGE UP (ref 0–6)
GLUCOSE SERPL-MCNC: 104 MG/DL — HIGH (ref 70–99)
HCT VFR BLD CALC: 27.6 % — LOW (ref 39–50)
HGB BLD-MCNC: 8.7 G/DL — LOW (ref 13–17)
IMM GRANULOCYTES NFR BLD AUTO: 1.5 % — SIGNIFICANT CHANGE UP (ref 0–1.5)
INR BLD: 1.83 RATIO — HIGH (ref 0.88–1.16)
LYMPHOCYTES # BLD AUTO: 0.54 K/UL — LOW (ref 1–3.3)
LYMPHOCYTES # BLD AUTO: 5.2 % — LOW (ref 13–44)
MAGNESIUM SERPL-MCNC: 1.9 MG/DL — SIGNIFICANT CHANGE UP (ref 1.6–2.6)
MCHC RBC-ENTMCNC: 27.8 PG — SIGNIFICANT CHANGE UP (ref 27–34)
MCHC RBC-ENTMCNC: 31.5 GM/DL — LOW (ref 32–36)
MCV RBC AUTO: 88.2 FL — SIGNIFICANT CHANGE UP (ref 80–100)
MONOCYTES # BLD AUTO: 0.51 K/UL — SIGNIFICANT CHANGE UP (ref 0–0.9)
MONOCYTES NFR BLD AUTO: 4.9 % — SIGNIFICANT CHANGE UP (ref 2–14)
NEUTROPHILS # BLD AUTO: 9.12 K/UL — HIGH (ref 1.8–7.4)
NEUTROPHILS NFR BLD AUTO: 87.7 % — HIGH (ref 43–77)
NIGHT BLUE STAIN TISS: SIGNIFICANT CHANGE UP
PHOSPHATE SERPL-MCNC: 1.3 MG/DL — LOW (ref 2.4–4.7)
PLATELET # BLD AUTO: 477 K/UL — HIGH (ref 150–400)
POTASSIUM SERPL-MCNC: 3.3 MMOL/L — LOW (ref 3.5–5.3)
POTASSIUM SERPL-SCNC: 3.3 MMOL/L — LOW (ref 3.5–5.3)
PROTHROM AB SERPL-ACNC: 21.5 SEC — HIGH (ref 10–12.9)
RBC # BLD: 3.13 M/UL — LOW (ref 4.2–5.8)
RBC # FLD: 17.3 % — HIGH (ref 10.3–14.5)
SODIUM SERPL-SCNC: 133 MMOL/L — LOW (ref 135–145)
SPECIMEN SOURCE: SIGNIFICANT CHANGE UP
WBC # BLD: 10.4 K/UL — SIGNIFICANT CHANGE UP (ref 3.8–10.5)
WBC # FLD AUTO: 10.4 K/UL — SIGNIFICANT CHANGE UP (ref 3.8–10.5)

## 2020-01-28 PROCEDURE — 71045 X-RAY EXAM CHEST 1 VIEW: CPT | Mod: 26

## 2020-01-28 PROCEDURE — 99232 SBSQ HOSP IP/OBS MODERATE 35: CPT

## 2020-01-28 PROCEDURE — 99231 SBSQ HOSP IP/OBS SF/LOW 25: CPT

## 2020-01-28 RX ORDER — CALCIUM GLUCONATE 100 MG/ML
1 VIAL (ML) INTRAVENOUS ONCE
Refills: 0 | Status: COMPLETED | OUTPATIENT
Start: 2020-01-28 | End: 2020-01-28

## 2020-01-28 RX ORDER — LINEZOLID 600 MG/300ML
600 INJECTION, SOLUTION INTRAVENOUS EVERY 12 HOURS
Refills: 0 | Status: DISCONTINUED | OUTPATIENT
Start: 2020-01-28 | End: 2020-01-30

## 2020-01-28 RX ORDER — OXYCODONE HYDROCHLORIDE 5 MG/1
10 TABLET ORAL EVERY 6 HOURS
Refills: 0 | Status: DISCONTINUED | OUTPATIENT
Start: 2020-01-28 | End: 2020-01-28

## 2020-01-28 RX ORDER — OXYCODONE HYDROCHLORIDE 5 MG/1
10 TABLET ORAL EVERY 6 HOURS
Refills: 0 | Status: DISCONTINUED | OUTPATIENT
Start: 2020-01-28 | End: 2020-02-04

## 2020-01-28 RX ORDER — SODIUM CHLORIDE 9 MG/ML
1000 INJECTION INTRAMUSCULAR; INTRAVENOUS; SUBCUTANEOUS
Refills: 0 | Status: DISCONTINUED | OUTPATIENT
Start: 2020-01-28 | End: 2020-02-01

## 2020-01-28 RX ORDER — ALPRAZOLAM 0.25 MG
0.5 TABLET ORAL AT BEDTIME
Refills: 0 | Status: DISCONTINUED | OUTPATIENT
Start: 2020-01-28 | End: 2020-01-30

## 2020-01-28 RX ORDER — POTASSIUM PHOSPHATE, MONOBASIC POTASSIUM PHOSPHATE, DIBASIC 236; 224 MG/ML; MG/ML
15 INJECTION, SOLUTION INTRAVENOUS ONCE
Refills: 0 | Status: COMPLETED | OUTPATIENT
Start: 2020-01-28 | End: 2020-01-28

## 2020-01-28 RX ADMIN — OXYCODONE HYDROCHLORIDE 5 MILLIGRAM(S): 5 TABLET ORAL at 06:42

## 2020-01-28 RX ADMIN — OXYCODONE HYDROCHLORIDE 5 MILLIGRAM(S): 5 TABLET ORAL at 10:55

## 2020-01-28 RX ADMIN — LACTULOSE 10 GRAM(S): 10 SOLUTION ORAL at 13:09

## 2020-01-28 RX ADMIN — OXYCODONE HYDROCHLORIDE 5 MILLIGRAM(S): 5 TABLET ORAL at 10:26

## 2020-01-28 RX ADMIN — POTASSIUM PHOSPHATE, MONOBASIC POTASSIUM PHOSPHATE, DIBASIC 62.5 MILLIMOLE(S): 236; 224 INJECTION, SOLUTION INTRAVENOUS at 14:16

## 2020-01-28 RX ADMIN — OXYCODONE HYDROCHLORIDE 10 MILLIGRAM(S): 5 TABLET ORAL at 23:05

## 2020-01-28 RX ADMIN — OXYCODONE HYDROCHLORIDE 10 MILLIGRAM(S): 5 TABLET ORAL at 17:09

## 2020-01-28 RX ADMIN — Medication 0.5 MILLIGRAM(S): at 21:55

## 2020-01-28 RX ADMIN — MEROPENEM 100 MILLIGRAM(S): 1 INJECTION INTRAVENOUS at 21:14

## 2020-01-28 RX ADMIN — LIDOCAINE 1 PATCH: 4 CREAM TOPICAL at 13:10

## 2020-01-28 RX ADMIN — Medication 5 MILLIGRAM(S): at 17:10

## 2020-01-28 RX ADMIN — LINEZOLID 300 MILLIGRAM(S): 600 INJECTION, SOLUTION INTRAVENOUS at 16:21

## 2020-01-28 RX ADMIN — FINASTERIDE 5 MILLIGRAM(S): 5 TABLET, FILM COATED ORAL at 11:11

## 2020-01-28 RX ADMIN — MEROPENEM 100 MILLIGRAM(S): 1 INJECTION INTRAVENOUS at 13:09

## 2020-01-28 RX ADMIN — POLYETHYLENE GLYCOL 3350 17 GRAM(S): 17 POWDER, FOR SOLUTION ORAL at 21:55

## 2020-01-28 RX ADMIN — Medication 3 MILLILITER(S): at 08:51

## 2020-01-28 RX ADMIN — TAMSULOSIN HYDROCHLORIDE 0.4 MILLIGRAM(S): 0.4 CAPSULE ORAL at 21:56

## 2020-01-28 RX ADMIN — SODIUM CHLORIDE 75 MILLILITER(S): 9 INJECTION INTRAMUSCULAR; INTRAVENOUS; SUBCUTANEOUS at 18:54

## 2020-01-28 RX ADMIN — MEROPENEM 100 MILLIGRAM(S): 1 INJECTION INTRAVENOUS at 05:43

## 2020-01-28 RX ADMIN — OXYCODONE HYDROCHLORIDE 5 MILLIGRAM(S): 5 TABLET ORAL at 05:43

## 2020-01-28 RX ADMIN — Medication 100 GRAM(S): at 14:05

## 2020-01-28 RX ADMIN — Medication 3 MILLILITER(S): at 16:16

## 2020-01-28 RX ADMIN — Medication 5 MILLIGRAM(S): at 05:43

## 2020-01-28 NOTE — PROGRESS NOTE ADULT - SUBJECTIVE AND OBJECTIVE BOX
CC: pna    Patient seen and examined at the bedside. No acute overnight events. SOB & cough about the same.  Pt going for RT today. Denies fever/chills, headache, lightheadedness, dizziness, chest pain, palpitations, cough, abd pain, nausea/vomiting/diarrhea, muscle pain.      =========================================================================================    PHYSICAL EXAM.    GEN - appears age appropriate. well nourished. pleasant. no distress.   HEENT - NCAT, EOMI, BETITO  RESP -decreased breath sounds on LLL  CARDIO - NS1S2, RRR. No murmurs/rubs/gallops.  ABD - Soft/Non tender/Non distended. Normal BS x4 quadrants. no guarding/rebound tenderness.  Ext - No LG.  MSK - BL 5/5 strength on upper and lower extremities.   Neuro - AAOx3. cn 2-12 grossly intact  Psych - normal affect  Skin - c/d/i. no rashes/lesions      VITAL SIGNS.    Vital Signs Last 24 Hrs  T(C): 36.7 (24 Jan 2020 07:27), Max: 36.9 (23 Jan 2020 21:00)  T(F): 98.1 (24 Jan 2020 07:27), Max: 98.4 (23 Jan 2020 21:00)  HR: 105 (24 Jan 2020 09:34) (105 - 114)  BP: 126/72 (24 Jan 2020 07:27) (122/68 - 137/74)  BP(mean): --  RR: 20 (24 Jan 2020 07:27) (18 - 20)  SpO2: 97% (23 Jan 2020 23:37) (96% - 97%)        =================================================    LABS.                          8.9    x     )-----------( x        ( 24 Jan 2020 08:28 )             28.5     01-24    131<L>  |  94<L>  |  7.0<L>  ----------------------------<  118<H>  3.4<L>   |  24.0  |  0.37<L>    Ca    7.8<L>      24 Jan 2020 08:28        PT/INR - ( 24 Jan 2020 08:28 )   PT: 22.4 sec;   INR: 1.91 ratio         PTT - ( 24 Jan 2020 08:28 )  PTT:32.8 sec  I&O's Summary    23 Jan 2020 07:01  -  24 Jan 2020 07:00  --------------------------------------------------------  IN: 675 mL / OUT: 400 mL / NET: 275 mL    24 Jan 2020 07:01  -  24 Jan 2020 16:37  --------------------------------------------------------  IN: 375 mL / OUT: 0 mL / NET: 375 mL        Culture - Sputum (collected 23 Jan 2020 09:17)  Source: .Sputum  Gram Stain (23 Jan 2020 14:01):    Moderate WBC's    No organisms seen  Preliminary Report (24 Jan 2020 09:14):    Moderate Routine respiratory teresa present    Culture in progress        ================================================    IMAGING.    ================================================    DIET.    Diet, Regular (01-21-20 @ 12:58)    ================================================    HOME MEDS.    Home Medications:  finasteride 5 mg oral tablet: 1 tab(s) orally once a day (20 Jan 2020 19:39)  lactulose:  (21 Jan 2020 07:32)  Lupron 5 mg/mL subcutaneous solution:  (21 Jan 2020 07:29)  oxyCODONE 10 mg oral tablet: 1 tab(s) orally every 6 hours (20 Jan 2020 19:39)  Percocet 10/325 oral tablet: 1 tab(s) orally every 6 hours (21 Jan 2020 07:31)  predniSONE 5 mg oral tablet: 1 tab(s) orally 2 times a day (21 Jan 2020 07:30)  Taxotere:  (21 Jan 2020 07:29)  Xgeva 120 mg/1.7 mL subcutaneous solution:  (21 Jan 2020 07:30)      ================================================    HOSPITAL MEDS.    MEDICATIONS  (STANDING):  albuterol/ipratropium for Nebulization 3 milliLiter(s) Nebulizer every 6 hours  finasteride 5 milliGRAM(s) Oral daily  oxyCODONE    IR 10 milliGRAM(s) Oral every 6 hours  piperacillin/tazobactam IVPB.. 3.375 Gram(s) IV Intermittent every 8 hours  polyethylene glycol 3350 17 Gram(s) Oral at bedtime  potassium chloride    Tablet ER 40 milliEquivalent(s) Oral once  predniSONE   Tablet 5 milliGRAM(s) Oral two times a day  sodium chloride 0.9%. 1000 milliLiter(s) (75 mL/Hr) IV Continuous <Continuous>  tamsulosin 0.4 milliGRAM(s) Oral at bedtime    MEDICATIONS  (PRN):  lactulose Syrup 10 Gram(s) Oral two times a day PRN Constipation

## 2020-01-28 NOTE — PROGRESS NOTE ADULT - ASSESSMENT
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 CTA chest completed ruled out PE. found to have Diffuse bilateral areas of pulmonary consolidation with pneumatocele formation and Large loculated left pleural effusions with compressive atelectasis of the left lower lobe. thoracic sx cs placed.     presentation complicated by trace RT sided PTX. medical management only, no chest tube placed.     #Bilateral pneumonia, complicated by LT sided large loculated pleural effusion + acute resp failure w/ hypoxia, stable - in immunocompromised pt. flu panel neg. reescalate abx to zosyn 1/22. plan for at least 7 day course zosyn for PNA treatment- suspecting resistant gram neg and/or positive organisms as underlying etiology behind infection in light of cavitary lesions. supplemental o2 - titrate to RA - not baseline dependent. repeat ct chest 6wks. nebs. blood cx 1/20 neg. sputum cx prelim resp teresa only. neg legionella. cts cs appreciated. s/p Thoracentesis by IR. Labs conistent with exudate. Pleural fluid cx growing Stap sp. DIscussed with ID & added linezolid IV 600mg Q 12H for empiric MRSA coverage    #hyponatremia, mild, asymptomatic, persistent - trend.    #Prostate CA, stable - palliative cs appreciated. daphney cs appreciated - cont daily prednisone. psa responding to current tx w/ lupron, docetaxel, xgeva - cont on dc. rad onc cs appreciated - rad therapy while inpt - pt to be transferred to center for tx and brought back - first tx 1/23, likely 5 sessions planned.     #anemia, asymptomatic, stable - baseline hgb 11-12, no acute/active s/s of blood loss. outpt fu.     #hypokalemia, mild, asymptomatc - replete + repeat    #dvt ppx. lovenox over the weekend, hold monday AM in case Chest tube placement during the week    #dispo. sister to make all decisions at this time (Gail Cruz [phone]) per pt.  undetermined date of dc as pt also needs chest tube when INR improved for loculated effusion, earliest poss date of procedure 1/27 if inr @ goal. PT - no needs    #outpt fu. pmd. onc, rad onc dr carlisle @ [facility].

## 2020-01-28 NOTE — PROGRESS NOTE ADULT - ASSESSMENT
Pneumonia   Lung Abscess  + Culture from thoracentesis pathology Staph Aureus  Lineozid  Merrem Antibiotics  CXR copied and included in this note      Dyspnea-  Worsening, Patient concerned  Nasal O2 3L  reporting more phlem- non productive  Tires easily    Metastatic Prostate Ca to Lung Lymph  Radiation treatments ongoing at [facility]  Day 4/5 planned RT treatments    Cancer Pain  C/O worsening pain-   Oxycodone Q6 ATC reordered  Medicate before transporting to [facility]  Oxycodone 10mg given by RN

## 2020-01-28 NOTE — PROGRESS NOTE ADULT - ASSESSMENT
Patient presented for lab on 6/7/18. PSR was advised on 6/7/18 that the lab test: MAR has been rejected for the following reason: Unable to collect.  The patient was given a specimen container, and will return the urine specimen.   If you would still like to have this test performed, a new order will need to be placed. Thanks!   This 64yoM; with pmh signif for Prostate Ca with mets to ribs diagnosed in 11/2019 (currently on chemo), HTN; now p/w cough, sob, fever x1 week, progressively worsening.  denies sick contacts. reports traveling from [place] after diagnosis of prostate ca complicated by b/l ptx in november and another hospitalization in december. denies cp/palp. denies abd pain: SOCIAL: No tobacco/illicit substance use/social/EtOH    patient reports having a history of Bilateral Pneumonia approx 1.5 months prior with associated bilateral Pleural effusions which required draining. In addition to above, patient is scheduled palliative Radiation therapy today. He is on ADT (lupron), Taxotere and Xgeva for Prostate adenocarcinoma with Mets to Lung, Bone and Lymph. (20 Jan 2020 21:22)    Chest imaging xray showed:  No significant change in the suspected bilateral pleural effusions, left greater than right.   Possible loculated fluid on the left. Atelectatic changes and/or underlying infection is possible. Please correlate clinically. Cavity or pneumatocele in the left apex again seen.  Of note, the small right-sided pneumothorax on chest CT from 1/20/2020 is not as well appreciated on this x-ray. The presence of a small right-sided pneumothorax was discussed with Dr. Silva at 12:25 PM on 1/22/2020.    A CT scan was done and showed:  Diffuse bilateral areas of pulmonary consolidation with pneumatocele formation  Large loculated left pleural effusions with compressive atelectasis of the left lower lobe.      patient was initially started On vanco & Zosyn, then Ceftriaxone and Zithromax was added as well.   We are consulted to evaluate this patient for PNA.     Impression:  PNA  large loculated effusion  LEFT lobe atelectasis  Shortness of breath    Plan:  s/p thoracentesis on 1/27/2020; cultures with Staph species  -  continue Merrem 1 gram Q 8H  - add Linezolid 600mg Q 12H for empiric MRSA coverage    - follow up all outstanding cultures  - trend temperature and WBC curve  - repeat cultures from blood and all sources if febrile.

## 2020-01-28 NOTE — CHART NOTE - NSCHARTNOTEFT_GEN_A_CORE
pt s/p IR thoracentesis 1/27 for 1200cc turbid fluid.  thoracic surgery to sign off as d/w Dr. Huerta. primary team to f/u cytology/cultures.  please reconsult PRN   [phone] from [id number] or [phone] after hours.    thank you

## 2020-01-28 NOTE — PROGRESS NOTE ADULT - SUBJECTIVE AND OBJECTIVE BOX
[facility]  INFECTIOUS DISEASES AND INTERNAL MEDICINE at [place]  =======================================================  Chandler Mccrary MD  Diplomates American Board of Internal Medicine and Infectious Diseases  Telephone [phone]  Fax            [phone]  =======================================================    [id number]  DEL JANETTE   follow up:  Pneumonia    s/p radiation this AM  s/p IR guided thoracentesis 1/27/19  1200ml fluid removed.     Staph aureus     =======================================================  REVIEW OF SYSTEMS:  CONSTITUTIONAL:  no fevers  HEENT:  No diplopia or blurred vision.  No earache, sore throat or runny nose.  CARDIOVASCULAR:  No pressure, squeezing, strangling, tightness, heaviness or aching about the chest, neck, axilla or epigastrium.  RESPIRATORY:  No cough, shortness of breath  GASTROINTESTINAL:  No nausea, vomiting or diarrhea.  GENITOURINARY:  No dysuria, frequency or urgency. No Blood in urine  MUSCULOSKELETAL:  no joint aches, no muscle pain  SKIN:  No change in skin, hair or nails.  NEUROLOGIC:  No Headaches, seizures or weakness.  PSYCHIATRIC:  No disorder of thought or mood.  ENDOCRINE:  No heat or cold intolerance  HEMATOLOGICAL:  No easy bruising or bleeding.   =======================================================  Allergies  No Known Allergies     ======================================================  Physical Exam:  ============  (see section below)    General:  No acute distress.  Eye: Pupils are equal, round and reactive to light, Extraocular movements are intact, Normal conjunctiva.  HENT: Normocephalic, Oral mucosa is moist, No pharyngeal erythema, No sinus tenderness.  Neck: Supple, No lymphadenopathy.  Respiratory:  POOR aeration of the LEFT side., unchanged  Cardiovascular: Normal rate, Regular rhythm,   Gastrointestinal: Soft, Non-tender, Non-distended, Normal bowel sounds.  Genitourinary: No costovertebral angle tenderness.  Lymphatics: No lymphadenopathy neck,   Musculoskeletal: Normal range of motion, Normal strength.  Integumentary: No rash.  Neurologic: Alert, Oriented, No focal deficits, Cranial Nerves II-XII are grossly intact.  Psychiatric: Appropriate mood & affect.    =======================================================   VITALS   ============  T(F): 97.8 (28 Jan 2020 13:00), Max: 98.4 (28 Jan 2020 11:47)  HR: 99 (28 Jan 2020 13:00)  BP: 120/74 (28 Jan 2020 13:00)  RR: 18 (28 Jan 2020 11:47)  SpO2: 94% (28 Jan 2020 11:47) (91% - 98%)  temp max in last 48H T(F): , Max: 99.3 (01-26-20 @ 15:43)    =======================================================  Current Antibiotics:  linezolid  IVPB 600 milliGRAM(s) IV Intermittent every 12 hours  meropenem  IVPB 1000 milliGRAM(s) IV Intermittent every 8 hours    Other medications:  albuterol/ipratropium for Nebulization 3 milliLiter(s) Nebulizer every 6 hours  ALPRAZolam 0.5 milliGRAM(s) Oral at bedtime  bisacodyl Suppository 10 milliGRAM(s) Rectal once  finasteride 5 milliGRAM(s) Oral daily  lidocaine   Patch 1 Patch Transdermal daily  oxyCODONE    IR 10 milliGRAM(s) Oral every 6 hours  polyethylene glycol 3350 17 Gram(s) Oral at bedtime  predniSONE   Tablet 5 milliGRAM(s) Oral two times a day  sodium chloride 0.9%. 1000 milliLiter(s) IV Continuous <Continuous>  tamsulosin 0.4 milliGRAM(s) Oral at bedtime      =======================================================  Labs:                        8.7    10.40 )-----------( 477      ( 28 Jan 2020 08:43 )             27.6       WBC Count: 10.40 K/uL (01-28-20 @ 08:43)  WBC Count: 16.14 K/uL (01-27-20 @ 08:17)  WBC Count: 16.79 K/uL (01-26-20 @ 11:01)  WBC Count: 16.36 K/uL (01-25-20 @ 09:38)      01-28    133<L>  |  96<L>  |  7.0<L>  ----------------------------<  104<H>  3.3<L>   |  24.0  |  0.28<L>    Ca    7.7<L>      28 Jan 2020 08:41  Phos  1.3     01-28  Mg     1.9     01-28    TPro  7.7  /  Alb  x   /  TBili  x   /  DBili  x   /  AST  x   /  ALT  x   /  AlkPhos  x   01-27    Culture - Acid Fast - Body Fluid w/Smear (collected 01-27-20 @ 21:54)  Source: .Body Fluid    Culture - Body Fluid with Gram Stain (collected 01-27-20 @ 13:04)  Source: .Body Fluid  Gram Stain (01-27-20 @ 15:34):    Numerous White blood cells    No organisms seen    Culture - Sputum (collected 01-23-20 @ 09:17)  Source: .Sputum  Gram Stain (01-23-20 @ 14:01):    Moderate WBC's    No organisms seen  Final Report (01-25-20 @ 09:57):    Moderate Routine respiratory teresa present    Culture - Blood (collected 01-21-20 @ 10:00)  Source: .Blood  Final Report (01-26-20 @ 11:00):    No growth at 5 days.    Culture - Blood (collected 01-20-20 @ 10:24)  Source: .Blood  Final Report (01-25-20 @ 11:01):    No growth at 5 days.    Culture - Blood (collected 01-20-20 @ 10:24)  Source: .Blood  Final Report (01-25-20 @ 11:01):    No growth at 5 days.    Creatinine, Serum: 0.28 mg/dL (01-28-20 @ 08:41)  Creatinine, Serum: 0.38 mg/dL (01-27-20 @ 08:16)  Creatinine, Serum: 0.42 mg/dL (01-26-20 @ 11:01)  Creatinine, Serum: 0.38 mg/dL (01-25-20 @ 09:39)  Creatinine, Serum: 0.37 mg/dL (01-24-20 @ 08:28)

## 2020-01-28 NOTE — PROGRESS NOTE ADULT - SUBJECTIVE AND OBJECTIVE BOX
INTERVAL HPI/OVERNIGHT EVENTS: 65 yo Male Patient with Metastatic Prostate Cancer  admitted with severe dyspnea treating for Pneumonia, IR performed thoracentesis yesterday  drained 1200ml turbid fluid which lead to a difficult night unable to sleep well or eat his dinner. He continues to C/O dyspnea his voice sounds lower using more effort to project his voice.  Continuous O2 NC in use. .Scheduled for 4th RT treatment today to transport to [facility], [place] late this morning. He denies CP, N/V/D/Constipation dysuria  64y old  Male who presents with a chief complaint of Dyspnea secondary to Large located effusion and suspected recurrent Bilateral PNA (27 Jan 2020 22:01)    PAST MEDICAL & SURGICAL HISTORY:  Prostate CA: Adenocarcinoma with mets to Lung, Bone, and Lymph  No significant past surgical history    Present Symptoms:     Dyspnea:  2 3 at rest (2) THOMPSON (3)  Nausea/Vomiting:  No  Anxiety:  Yes   Depression:  No  Fatigue: Yes   Loss of appetite:  No    Pain: Back and L sided flank pain            Character-            Duration-            Effect-            Factors-            Frequency-            Location-            Severity-    Review of Systems: Reviewed                       Unable to obtain due to poor mentation       MEDICATIONS  (STANDING):  albuterol/ipratropium for Nebulization 3 milliLiter(s) Nebulizer every 6 hours  ALPRAZolam 0.5 milliGRAM(s) Oral at bedtime  bisacodyl Suppository 10 milliGRAM(s) Rectal once  calcium gluconate IVPB 1 Gram(s) IV Intermittent once  finasteride 5 milliGRAM(s) Oral daily  lidocaine   Patch 1 Patch Transdermal daily  linezolid  IVPB 600 milliGRAM(s) IV Intermittent every 12 hours  meropenem  IVPB 1000 milliGRAM(s) IV Intermittent every 8 hours  oxyCODONE    IR 10 milliGRAM(s) Oral every 6 hours  polyethylene glycol 3350 17 Gram(s) Oral at bedtime  potassium phosphate IVPB 15 milliMole(s) IV Intermittent once  predniSONE   Tablet 5 milliGRAM(s) Oral two times a day  sodium chloride 0.9%. 1000 milliLiter(s) (75 mL/Hr) IV Continuous <Continuous>  tamsulosin 0.4 milliGRAM(s) Oral at bedtime    MEDICATIONS  (PRN):  acetaminophen   Tablet .. 650 milliGRAM(s) Oral every 6 hours PRN Temp greater or equal to 38C (100.4F), Mild Pain (1 - 3)  lactulose Syrup 10 Gram(s) Oral two times a day PRN Constipation  oxycodone    5 mG/acetaminophen 325 mG 1 Tablet(s) Oral every 4 hours PRN Moderate Pain (4 - 6)  oxyCODONE    IR 5 milliGRAM(s) Oral every 4 hours PRN Severe Pain (7 - 10)      PHYSICAL EXAM:    Vital Signs Last 24 Hrs  T(C): 36.4 (28 Jan 2020 09:10), Max: 36.8 (27 Jan 2020 14:09)  T(F): 97.5 (28 Jan 2020 09:10), Max: 98.2 (27 Jan 2020 14:09)  HR: 52 (28 Jan 2020 09:10) (52 - 114)  BP: 126/67 (28 Jan 2020 09:10) (116/66 - 136/82)  BP(mean): --  RR: 18 (28 Jan 2020 09:10) (18 - 28)  SpO2: 91% (28 Jan 2020 09:10) (91% - 98%)    General: alert  oriented x _4___OOB/CH verbal, engaging in conversation                   thin  HEENT: normal    Lungs: tachypnea/labored breathing  more phlem    CV: normal     GI: normal  distended               constipation  last BM: denies    : normal     MSK:   weakness  edema             ambulatory  ambulates with walker if not exhausted from procedures    Skin: normal  _  no rash    LABS:                        8.7    10.40 )-----------( 477      ( 28 Jan 2020 08:43 )             27.6     01-28    133<L>  |  96<L>  |  7.0<L>  ----------------------------<  104<H>  3.3<L>   |  24.0  |  0.28<L>    Ca    7.7<L>      28 Jan 2020 08:41  Phos  1.3     01-28  Mg     1.9     01-28    TPro  7.7  /  Alb  x   /  TBili  x   /  DBili  x   /  AST  x   /  ALT  x   /  AlkPhos  x   01-27    PT/INR - ( 28 Jan 2020 08:43 )   PT: 21.5 sec;   INR: 1.83 ratio       I&O's Summary    RADIOLOGY & ADDITIONAL STUDIES:  < from: Xray Chest 1 View- PORTABLE-Routine (01.28.20 @ 05:19) >  FINDINGS:    The lungs  show similar multiloculated the LEFT upper pleural effusion with areas of of airspace of consolidation in the LEFT upper lobe, RIGHT lower lobe. Cardiac chambers appear heart size cannot be assessed due to an adjacent LEFT lung base airspace consolidation. Visualized osseous structures are intact.        IMPRESSION:   Bilateral multifocal airspace consolidations RIGHT lung base and LEFT of lower lobe upper lobe with loculated effusions as seen on prior sinus CT scan 1/20/2020 and LEFT lung base. A complete atelectasis of the LEFT lower lobe..      ADVANCE DIRECTIVES:   DNR  NO  Completed on:                     MOLST   NO   Completed on:  Living Will  NO   Completed on:

## 2020-01-29 VITALS — DIASTOLIC BLOOD PRESSURE: 84 MMHG | OXYGEN SATURATION: 98 % | SYSTOLIC BLOOD PRESSURE: 118 MMHG | HEART RATE: 101 BPM

## 2020-01-29 LAB
-  AMPICILLIN/SULBACTAM: SIGNIFICANT CHANGE UP
-  CEFAZOLIN: SIGNIFICANT CHANGE UP
-  CLINDAMYCIN: SIGNIFICANT CHANGE UP
-  ERYTHROMYCIN: SIGNIFICANT CHANGE UP
-  GENTAMICIN: SIGNIFICANT CHANGE UP
-  OXACILLIN: SIGNIFICANT CHANGE UP
-  PENICILLIN: SIGNIFICANT CHANGE UP
-  RIFAMPIN: SIGNIFICANT CHANGE UP
-  TETRACYCLINE: SIGNIFICANT CHANGE UP
-  TRIMETHOPRIM/SULFAMETHOXAZOLE: SIGNIFICANT CHANGE UP
-  VANCOMYCIN: SIGNIFICANT CHANGE UP
ANION GAP SERPL CALC-SCNC: 11 MMOL/L — SIGNIFICANT CHANGE UP (ref 5–17)
BASOPHILS # BLD AUTO: 0.04 K/UL — SIGNIFICANT CHANGE UP (ref 0–0.2)
BASOPHILS NFR BLD AUTO: 0.5 % — SIGNIFICANT CHANGE UP (ref 0–2)
BUN SERPL-MCNC: 9 MG/DL — SIGNIFICANT CHANGE UP (ref 8–20)
CALCIUM SERPL-MCNC: 8.5 MG/DL — LOW (ref 8.6–10.2)
CHLORIDE SERPL-SCNC: 100 MMOL/L — SIGNIFICANT CHANGE UP (ref 98–107)
CO2 SERPL-SCNC: 25 MMOL/L — SIGNIFICANT CHANGE UP (ref 22–29)
CREAT SERPL-MCNC: 0.35 MG/DL — LOW (ref 0.5–1.3)
EOSINOPHIL # BLD AUTO: 0.01 K/UL — SIGNIFICANT CHANGE UP (ref 0–0.5)
EOSINOPHIL NFR BLD AUTO: 0.1 % — SIGNIFICANT CHANGE UP (ref 0–6)
GLUCOSE SERPL-MCNC: 158 MG/DL — HIGH (ref 70–99)
HCT VFR BLD CALC: 28.9 % — LOW (ref 39–50)
HGB BLD-MCNC: 9.2 G/DL — LOW (ref 13–17)
IMM GRANULOCYTES NFR BLD AUTO: 1.1 % — SIGNIFICANT CHANGE UP (ref 0–1.5)
INR BLD: 1.58 RATIO — HIGH (ref 0.88–1.16)
LYMPHOCYTES # BLD AUTO: 0.61 K/UL — LOW (ref 1–3.3)
LYMPHOCYTES # BLD AUTO: 7.2 % — LOW (ref 13–44)
MAGNESIUM SERPL-MCNC: 1.9 MG/DL — SIGNIFICANT CHANGE UP (ref 1.6–2.6)
MCHC RBC-ENTMCNC: 28 PG — SIGNIFICANT CHANGE UP (ref 27–34)
MCHC RBC-ENTMCNC: 31.8 GM/DL — LOW (ref 32–36)
MCV RBC AUTO: 87.8 FL — SIGNIFICANT CHANGE UP (ref 80–100)
METHOD TYPE: SIGNIFICANT CHANGE UP
MONOCYTES # BLD AUTO: 0.34 K/UL — SIGNIFICANT CHANGE UP (ref 0–0.9)
MONOCYTES NFR BLD AUTO: 4 % — SIGNIFICANT CHANGE UP (ref 2–14)
NEUTROPHILS # BLD AUTO: 7.44 K/UL — HIGH (ref 1.8–7.4)
NEUTROPHILS NFR BLD AUTO: 87.1 % — HIGH (ref 43–77)
NON-GYNECOLOGICAL CYTOLOGY STUDY: SIGNIFICANT CHANGE UP
PHOSPHATE SERPL-MCNC: 1.3 MG/DL — LOW (ref 2.4–4.7)
PLATELET # BLD AUTO: 560 K/UL — HIGH (ref 150–400)
POTASSIUM SERPL-MCNC: 3.6 MMOL/L — SIGNIFICANT CHANGE UP (ref 3.5–5.3)
POTASSIUM SERPL-SCNC: 3.6 MMOL/L — SIGNIFICANT CHANGE UP (ref 3.5–5.3)
PROTHROM AB SERPL-ACNC: 18.4 SEC — HIGH (ref 10–12.9)
RBC # BLD: 3.29 M/UL — LOW (ref 4.2–5.8)
RBC # FLD: 17.1 % — HIGH (ref 10.3–14.5)
SODIUM SERPL-SCNC: 136 MMOL/L — SIGNIFICANT CHANGE UP (ref 135–145)
WBC # BLD: 8.53 K/UL — SIGNIFICANT CHANGE UP (ref 3.8–10.5)
WBC # FLD AUTO: 8.53 K/UL — SIGNIFICANT CHANGE UP (ref 3.8–10.5)

## 2020-01-29 PROCEDURE — 99232 SBSQ HOSP IP/OBS MODERATE 35: CPT

## 2020-01-29 PROCEDURE — 77427 RADIATION TX MANAGEMENT X5: CPT

## 2020-01-29 RX ORDER — MULTIVIT WITH MIN/MFOLATE/K2 340-15/3 G
1 POWDER (GRAM) ORAL ONCE
Refills: 0 | Status: DISCONTINUED | OUTPATIENT
Start: 2020-01-29 | End: 2020-01-29

## 2020-01-29 RX ADMIN — OXYCODONE HYDROCHLORIDE 10 MILLIGRAM(S): 5 TABLET ORAL at 13:00

## 2020-01-29 RX ADMIN — Medication 3 MILLILITER(S): at 15:25

## 2020-01-29 RX ADMIN — OXYCODONE HYDROCHLORIDE 10 MILLIGRAM(S): 5 TABLET ORAL at 18:44

## 2020-01-29 RX ADMIN — OXYCODONE HYDROCHLORIDE 10 MILLIGRAM(S): 5 TABLET ORAL at 05:01

## 2020-01-29 RX ADMIN — LINEZOLID 300 MILLIGRAM(S): 600 INJECTION, SOLUTION INTRAVENOUS at 13:26

## 2020-01-29 RX ADMIN — OXYCODONE HYDROCHLORIDE 10 MILLIGRAM(S): 5 TABLET ORAL at 17:13

## 2020-01-29 RX ADMIN — Medication 0.5 MILLIGRAM(S): at 21:24

## 2020-01-29 RX ADMIN — OXYCODONE HYDROCHLORIDE 10 MILLIGRAM(S): 5 TABLET ORAL at 12:12

## 2020-01-29 RX ADMIN — Medication 3 MILLILITER(S): at 09:58

## 2020-01-29 RX ADMIN — FINASTERIDE 5 MILLIGRAM(S): 5 TABLET, FILM COATED ORAL at 12:14

## 2020-01-29 RX ADMIN — MEROPENEM 100 MILLIGRAM(S): 1 INJECTION INTRAVENOUS at 05:04

## 2020-01-29 RX ADMIN — MEROPENEM 100 MILLIGRAM(S): 1 INJECTION INTRAVENOUS at 13:26

## 2020-01-29 RX ADMIN — Medication 5 MILLIGRAM(S): at 17:14

## 2020-01-29 RX ADMIN — LACTULOSE 10 GRAM(S): 10 SOLUTION ORAL at 17:23

## 2020-01-29 RX ADMIN — LINEZOLID 300 MILLIGRAM(S): 600 INJECTION, SOLUTION INTRAVENOUS at 03:58

## 2020-01-29 RX ADMIN — Medication 3 MILLILITER(S): at 20:32

## 2020-01-29 RX ADMIN — MEROPENEM 100 MILLIGRAM(S): 1 INJECTION INTRAVENOUS at 21:24

## 2020-01-29 RX ADMIN — OXYCODONE HYDROCHLORIDE 10 MILLIGRAM(S): 5 TABLET ORAL at 23:23

## 2020-01-29 RX ADMIN — Medication 5 MILLIGRAM(S): at 05:01

## 2020-01-29 RX ADMIN — TAMSULOSIN HYDROCHLORIDE 0.4 MILLIGRAM(S): 0.4 CAPSULE ORAL at 21:24

## 2020-01-29 RX ADMIN — SODIUM CHLORIDE 75 MILLILITER(S): 9 INJECTION INTRAMUSCULAR; INTRAVENOUS; SUBCUTANEOUS at 10:19

## 2020-01-29 NOTE — GOALS OF CARE CONVERSATION - ADVANCED CARE PLANNING - CONVERSATION DETAILS
SW met with patient after his return from [facility] to provide support in coping with metastatic disease. Patient remained engaged with SW in exploring his illness and impact upon lifestyle. patient remains focused on treatment options. Palliatiev care following.

## 2020-01-29 NOTE — PROGRESS NOTE ADULT - ASSESSMENT
65 y/o male with Prostate Adenocarcinoma with Mets to Bone, Lung and Lymp on ADT (Lupron), Taxotere, Xgeva therapy and awaiting implementation of Palliative radiation therapy presented to ED with C/O worsening SOB. CTA chest completed ruled out PE. found to have Diffuse bilateral areas of pulmonary consolidation with pneumatocele formation and Large loculated left pleural effusions with compressive atelectasis of the left lower lobe. thoracic sx cs placed.     presentation complicated by trace RT sided PTX. medical management only, no chest tube placed.     #Bilateral pneumonia, complicated by LT sided large loculated pleural effusion + acute resp failure w/ hypoxia, stable - in immunocompromised pt. flu panel neg. reescalate abx to zosyn 1/22. plan for at least 7 day course zosyn for PNA treatment- suspecting resistant gram neg and/or positive organisms as underlying etiology behind infection in light of cavitary lesions. supplemental o2 - titrate to RA - not baseline dependent. repeat ct chest 6wks. nebs. blood cx 1/20 neg. sputum cx prelim resp teresa only. neg legionella. cts cs appreciated. s/p Thoracentesis by IR. Labs conistent with exudate. Pleural fluid cx growing Stap aureus. DIscussed with ID & added linezolid IV 600mg Q 12H for empiric MRSA coverage    #hyponatremia, mild, asymptomatic, persistent - trend.    #Prostate CA, stable - palliative cs appreciated. daphney cs appreciated - cont daily prednisone. psa responding to current tx w/ lupron, docetaxel, xgeva - cont on dc. rad onc cs appreciated - rad therapy while inpt - pt to be transferred to center for tx and brought back - first tx 1/23, likely 5 sessions planned.     #anemia, asymptomatic, stable - baseline hgb 11-12, no acute/active s/s of blood loss. outpt fu.     #hypokalemia, mild, asymptomatc - replete + repeat    #dvt ppx: pt's INR 1.8 and rising    ELevated INR: Hemonc called. With PT 21.5 and INR 1.83 despite Vitamin K, PT with 1:1 mix to distinguish factor deficiency from circulating anticoagulant ordered by hemonc. Consult apprerciated    #dispo. sister to make all decisions at this time (Gail Cruz [phone]) per pt.  undetermined date   #outpt fu. pmd. onc, rad onc dr carilsle @ [facility].

## 2020-01-29 NOTE — PROGRESS NOTE ADULT - SUBJECTIVE AND OBJECTIVE BOX
[facility]  INFECTIOUS DISEASES AND INTERNAL MEDICINE at [place]  =======================================================  Chandler Mccrary MD  Diplomates American Board of Internal Medicine and Infectious Diseases  Telephone [phone]  Fax            [phone]  =======================================================    [id number]  DEL JANETTE   follow up:  Pneumonia    s/p radiation this AM  s/p IR guided thoracentesis 1/27/19  staph in fluid is MSSA    =======================================================  REVIEW OF SYSTEMS:  CONSTITUTIONAL:  no fevers  HEENT:  No diplopia or blurred vision.  No earache, sore throat or runny nose.  CARDIOVASCULAR:  No pressure, squeezing, strangling, tightness, heaviness or aching about the chest, neck, axilla or epigastrium.  RESPIRATORY:  No cough, shortness of breath  GASTROINTESTINAL:  No nausea, vomiting or diarrhea.  GENITOURINARY:  No dysuria, frequency or urgency. No Blood in urine  MUSCULOSKELETAL:  no joint aches, no muscle pain  SKIN:  No change in skin, hair or nails.  NEUROLOGIC:  No Headaches, seizures or weakness.  PSYCHIATRIC:  No disorder of thought or mood.  ENDOCRINE:  No heat or cold intolerance  HEMATOLOGICAL:  No easy bruising or bleeding.   =======================================================  Allergies  No Known Allergies     ======================================================  Physical Exam:  ============  (see section below)    General:  No acute distress.  Eye: Pupils are equal, round and reactive to light, Extraocular movements are intact, Normal conjunctiva.  HENT: Normocephalic, Oral mucosa is moist, No pharyngeal erythema, No sinus tenderness.  Neck: Supple, No lymphadenopathy.  Respiratory:  Improved aeration of the LEFT side.   Cardiovascular: Normal rate, Regular rhythm,   Gastrointestinal: Soft, Non-tender, Non-distended, Normal bowel sounds.  Genitourinary: No costovertebral angle tenderness.  Lymphatics: No lymphadenopathy neck,   Musculoskeletal: Normal range of motion, Normal strength.  Integumentary: No rash.  Neurologic: Alert, Oriented, No focal deficits, Cranial Nerves II-XII are grossly intact.  Psychiatric: Appropriate mood & affect.    =======================================================   Vitals:  ============  T(F): 97.4 (29 Jan 2020 08:04), Max: 97.4 (29 Jan 2020 00:02)  HR: 98 (29 Jan 2020 10:09)  BP: 124/69 (29 Jan 2020 08:04)  RR: 20 (29 Jan 2020 08:04)  SpO2: 97% (29 Jan 2020 10:09) (91% - 98%)  temp max in last 48H T(F): , Max: 98.4 (01-28-20 @ 11:47)    =======================================================  Current Antibiotics:  linezolid  IVPB 600 milliGRAM(s) IV Intermittent every 12 hours  meropenem  IVPB 1000 milliGRAM(s) IV Intermittent every 8 hours    Other medications:  albuterol/ipratropium for Nebulization 3 milliLiter(s) Nebulizer every 6 hours  ALPRAZolam 0.5 milliGRAM(s) Oral at bedtime  bisacodyl Suppository 10 milliGRAM(s) Rectal once  finasteride 5 milliGRAM(s) Oral daily  lidocaine   Patch 1 Patch Transdermal daily  oxyCODONE    IR 10 milliGRAM(s) Oral every 6 hours  polyethylene glycol 3350 17 Gram(s) Oral at bedtime  predniSONE   Tablet 5 milliGRAM(s) Oral two times a day  sodium chloride 0.9%. 1000 milliLiter(s) IV Continuous <Continuous>  tamsulosin 0.4 milliGRAM(s) Oral at bedtime    =======================================================  Labs:                        9.2    8.53  )-----------( 560      ( 29 Jan 2020 10:20 )             28.9       WBC Count: 8.53 K/uL (01-29-20 @ 10:20)  WBC Count: 10.40 K/uL (01-28-20 @ 08:43)  WBC Count: 16.14 K/uL (01-27-20 @ 08:17)  WBC Count: 16.79 K/uL (01-26-20 @ 11:01)  WBC Count: 16.36 K/uL (01-25-20 @ 09:38)      01-29    136  |  100  |  9.0  ----------------------------<  158<H>  3.6   |  25.0  |  0.35<L>    Ca    8.5<L>      29 Jan 2020 10:20  Phos  1.3     01-29  Mg     1.9     01-29        Culture - Acid Fast - Body Fluid w/Smear (collected 01-27-20 @ 21:54)  Source: .Body Fluid    Culture - Fungal, Body Fluid (collected 01-27-20 @ 13:05)  Source: .Body Fluid    Culture - Body Fluid with Gram Stain (collected 01-27-20 @ 13:04)  Source: .Body Fluid  Gram Stain (01-27-20 @ 15:34):    Numerous White blood cells    No organisms seen  Organism: Staphylococcus aureus (01-29-20 @ 10:31)  Organism: Staphylococcus aureus (01-29-20 @ 10:31)    Sensitivities:      -  Ampicillin/Sulbactam: S <=8/4      -  Cefazolin: S <=4      -  Clindamycin: S <=0.5      -  Erythromycin: S <=0.5      -  Gentamicin: S <=4 Should not be used as monotherapy      -  Oxacillin: S <=0.25      -  Penicillin: R >8      -  RIF- Rifampin: S <=1 Should not be used as monotherapy      -  Tetra/Doxy: S <=4      -  Trimethoprim/Sulfamethoxazole: S <=0.5/9.5      -  Vancomycin: S 2      Method Type: KERRI    Culture - Blood (collected 01-26-20 @ 18:25)  Source: .Blood    Culture - Blood (collected 01-26-20 @ 18:25)  Source: .Blood    Culture - Sputum (collected 01-23-20 @ 09:17)  Source: .Sputum  Gram Stain (01-23-20 @ 14:01):    Moderate WBC's    No organisms seen  Final Report (01-25-20 @ 09:57):    Moderate Routine respiratory teresa present    Culture - Blood (collected 01-21-20 @ 10:00)  Source: .Blood  Final Report (01-26-20 @ 11:00):    No growth at 5 days.    Culture - Blood (collected 01-20-20 @ 10:24)  Source: .Blood  Final Report (01-25-20 @ 11:01):    No growth at 5 days.    Culture - Blood (collected 01-20-20 @ 10:24)  Source: .Blood  Final Report (01-25-20 @ 11:01):    No growth at 5 days.      Creatinine, Serum: 0.35 mg/dL (01-29-20 @ 10:20)  Creatinine, Serum: 0.28 mg/dL (01-28-20 @ 08:41)  Creatinine, Serum: 0.38 mg/dL (01-27-20 @ 08:16)  Creatinine, Serum: 0.42 mg/dL (01-26-20 @ 11:01)  Creatinine, Serum: 0.38 mg/dL (01-25-20 @ 09:39)

## 2020-01-29 NOTE — REASON FOR VISIT
[Routine On-Treatment] : a routine on-treatment visit for [Prostate Cancer] : prostate cancer [Bone Metastasis] : bone metastasis

## 2020-01-29 NOTE — REVIEW OF SYSTEMS
[Constipation: Grade 0] : Constipation: Grade 0 [Diarrhea: Grade 0] : Diarrhea: Grade 0 [Localized Edema: Grade 0] : Localized Edema: Grade 0  [Edema Limbs: Grade 0] : Edema Limbs: Grade 0  [Fatigue: Grade 1 - Fatigue relieved by rest] : Fatigue: Grade 1 - Fatigue relieved by rest [Neck Edema: Grade 0] : Neck Edema: Grade 0 [Dyspnea: Grade 2 - Shortness of breath with minimal exertion; limiting instrumental ADL] : Dyspnea: Grade 2 - Shortness of breath with minimal exertion; limiting instrumental ADL [Cough: Grade 1 - Mild symptoms; nonprescription intervention indicated] : Cough: Grade 1 - Mild symptoms; nonprescription intervention indicated

## 2020-01-29 NOTE — PROGRESS NOTE ADULT - SUBJECTIVE AND OBJECTIVE BOX
With protime 21.5 and INR 1.83 despite Vitamin K, I've ordered Protime with 1:1 mix to distinguish factor deficiency from circulating anticoagulant.

## 2020-01-29 NOTE — PROGRESS NOTE ADULT - ASSESSMENT
This 64yoM; with pmh signif for Prostate Ca with mets to ribs diagnosed in 11/2019 (currently on chemo), HTN; now p/w cough, sob, fever x1 week, progressively worsening.  denies sick contacts. reports traveling from [place] after diagnosis of prostate ca complicated by b/l ptx in november and another hospitalization in december. denies cp/palp. denies abd pain: SOCIAL: No tobacco/illicit substance use/social/EtOH    patient reports having a history of Bilateral Pneumonia approx 1.5 months prior with associated bilateral Pleural effusions which required draining. In addition to above, patient is scheduled palliative Radiation therapy today. He is on ADT (lupron), Taxotere and Xgeva for Prostate adenocarcinoma with Mets to Lung, Bone and Lymph. (20 Jan 2020 21:22)    Chest imaging xray showed:  No significant change in the suspected bilateral pleural effusions, left greater than right.   Possible loculated fluid on the left. Atelectatic changes and/or underlying infection is possible. Please correlate clinically. Cavity or pneumatocele in the left apex again seen.  Of note, the small right-sided pneumothorax on chest CT from 1/20/2020 is not as well appreciated on this x-ray. The presence of a small right-sided pneumothorax was discussed with Dr. Silva at 12:25 PM on 1/22/2020.    A CT scan was done and showed:  Diffuse bilateral areas of pulmonary consolidation with pneumatocele formation  Large loculated left pleural effusions with compressive atelectasis of the left lower lobe.      patient was initially started On vanco & Zosyn, then Ceftriaxone and Zithromax was added as well.   We are consulted to evaluate this patient for PNA.     Impression:  PNA  large loculated effusion  LEFT lobe atelectasis  Shortness of breath    Plan:  s/p thoracentesis on 1/27/2020; cultures with Staph species  -  continue Merrem 1 gram Q 8H  - continue Linezolid 600mg Q 12H     - follow up all outstanding cultures  - trend temperature and WBC curve  - repeat cultures from blood and all sources if febrile.

## 2020-01-30 ENCOUNTER — APPOINTMENT (OUTPATIENT)
Dept: HEMATOLOGY ONCOLOGY | Facility: CLINIC | Age: 65
End: 2020-01-30

## 2020-01-30 LAB
ANION GAP SERPL CALC-SCNC: 11 MMOL/L — SIGNIFICANT CHANGE UP (ref 5–17)
BASOPHILS # BLD AUTO: 0.01 K/UL — SIGNIFICANT CHANGE UP (ref 0–0.2)
BASOPHILS NFR BLD AUTO: 0.1 % — SIGNIFICANT CHANGE UP (ref 0–2)
BUN SERPL-MCNC: 8 MG/DL — SIGNIFICANT CHANGE UP (ref 8–20)
CALCIUM SERPL-MCNC: 8.1 MG/DL — LOW (ref 8.6–10.2)
CHLORIDE SERPL-SCNC: 97 MMOL/L — LOW (ref 98–107)
CO2 SERPL-SCNC: 28 MMOL/L — SIGNIFICANT CHANGE UP (ref 22–29)
CREAT SERPL-MCNC: 0.34 MG/DL — LOW (ref 0.5–1.3)
EOSINOPHIL # BLD AUTO: 0.03 K/UL — SIGNIFICANT CHANGE UP (ref 0–0.5)
EOSINOPHIL NFR BLD AUTO: 0.4 % — SIGNIFICANT CHANGE UP (ref 0–6)
GLUCOSE SERPL-MCNC: 95 MG/DL — SIGNIFICANT CHANGE UP (ref 70–99)
HCT VFR BLD CALC: 29.2 % — LOW (ref 39–50)
HGB BLD-MCNC: 8.8 G/DL — LOW (ref 13–17)
IMM GRANULOCYTES NFR BLD AUTO: 1.1 % — SIGNIFICANT CHANGE UP (ref 0–1.5)
INR BLD: 1.65 RATIO — HIGH (ref 0.88–1.16)
LYMPHOCYTES # BLD AUTO: 0.6 K/UL — LOW (ref 1–3.3)
LYMPHOCYTES # BLD AUTO: 8.6 % — LOW (ref 13–44)
MAGNESIUM SERPL-MCNC: 1.8 MG/DL — SIGNIFICANT CHANGE UP (ref 1.6–2.6)
MCHC RBC-ENTMCNC: 27 PG — SIGNIFICANT CHANGE UP (ref 27–34)
MCHC RBC-ENTMCNC: 30.1 GM/DL — LOW (ref 32–36)
MCV RBC AUTO: 89.6 FL — SIGNIFICANT CHANGE UP (ref 80–100)
MONOCYTES # BLD AUTO: 0.38 K/UL — SIGNIFICANT CHANGE UP (ref 0–0.9)
MONOCYTES NFR BLD AUTO: 5.5 % — SIGNIFICANT CHANGE UP (ref 2–14)
NEUTROPHILS # BLD AUTO: 5.86 K/UL — SIGNIFICANT CHANGE UP (ref 1.8–7.4)
NEUTROPHILS NFR BLD AUTO: 84.3 % — HIGH (ref 43–77)
PHOSPHATE SERPL-MCNC: 1.9 MG/DL — LOW (ref 2.4–4.7)
PLATELET # BLD AUTO: 524 K/UL — HIGH (ref 150–400)
POTASSIUM SERPL-MCNC: 3.5 MMOL/L — SIGNIFICANT CHANGE UP (ref 3.5–5.3)
POTASSIUM SERPL-SCNC: 3.5 MMOL/L — SIGNIFICANT CHANGE UP (ref 3.5–5.3)
PROTHROM AB SERPL-ACNC: 19.3 SEC — HIGH (ref 10–12.9)
PT 100%: 15.9 SEC — HIGH (ref 10–13.1)
PT 50/50: 12.9 SEC — SIGNIFICANT CHANGE UP (ref 10–15.2)
RBC # BLD: 3.26 M/UL — LOW (ref 4.2–5.8)
RBC # FLD: 17.5 % — HIGH (ref 10.3–14.5)
SODIUM SERPL-SCNC: 136 MMOL/L — SIGNIFICANT CHANGE UP (ref 135–145)
WBC # BLD: 6.96 K/UL — SIGNIFICANT CHANGE UP (ref 3.8–10.5)
WBC # FLD AUTO: 6.96 K/UL — SIGNIFICANT CHANGE UP (ref 3.8–10.5)

## 2020-01-30 PROCEDURE — 99232 SBSQ HOSP IP/OBS MODERATE 35: CPT

## 2020-01-30 PROCEDURE — 99233 SBSQ HOSP IP/OBS HIGH 50: CPT

## 2020-01-30 RX ORDER — ALPRAZOLAM 0.25 MG
0.25 TABLET ORAL AT BEDTIME
Refills: 0 | Status: DISCONTINUED | OUTPATIENT
Start: 2020-01-30 | End: 2020-02-06

## 2020-01-30 RX ORDER — OXYCODONE HYDROCHLORIDE 5 MG/1
10 TABLET ORAL EVERY 4 HOURS
Refills: 0 | Status: DISCONTINUED | OUTPATIENT
Start: 2020-01-30 | End: 2020-02-06

## 2020-01-30 RX ORDER — POTASSIUM PHOSPHATE, MONOBASIC POTASSIUM PHOSPHATE, DIBASIC 236; 224 MG/ML; MG/ML
15 INJECTION, SOLUTION INTRAVENOUS ONCE
Refills: 0 | Status: COMPLETED | OUTPATIENT
Start: 2020-01-30 | End: 2020-01-30

## 2020-01-30 RX ADMIN — OXYCODONE HYDROCHLORIDE 10 MILLIGRAM(S): 5 TABLET ORAL at 23:40

## 2020-01-30 RX ADMIN — Medication 5 MILLIGRAM(S): at 17:58

## 2020-01-30 RX ADMIN — MEROPENEM 100 MILLIGRAM(S): 1 INJECTION INTRAVENOUS at 05:08

## 2020-01-30 RX ADMIN — Medication 100 MILLIGRAM(S): at 11:35

## 2020-01-30 RX ADMIN — Medication 3 MILLILITER(S): at 20:07

## 2020-01-30 RX ADMIN — OXYCODONE HYDROCHLORIDE 10 MILLIGRAM(S): 5 TABLET ORAL at 22:48

## 2020-01-30 RX ADMIN — POTASSIUM PHOSPHATE, MONOBASIC POTASSIUM PHOSPHATE, DIBASIC 62.5 MILLIMOLE(S): 236; 224 INJECTION, SOLUTION INTRAVENOUS at 13:55

## 2020-01-30 RX ADMIN — OXYCODONE HYDROCHLORIDE 10 MILLIGRAM(S): 5 TABLET ORAL at 11:36

## 2020-01-30 RX ADMIN — Medication 5 MILLIGRAM(S): at 05:08

## 2020-01-30 RX ADMIN — LINEZOLID 300 MILLIGRAM(S): 600 INJECTION, SOLUTION INTRAVENOUS at 01:25

## 2020-01-30 RX ADMIN — MEROPENEM 100 MILLIGRAM(S): 1 INJECTION INTRAVENOUS at 21:44

## 2020-01-30 RX ADMIN — TAMSULOSIN HYDROCHLORIDE 0.4 MILLIGRAM(S): 0.4 CAPSULE ORAL at 21:44

## 2020-01-30 RX ADMIN — Medication 100 MILLIGRAM(S): at 21:44

## 2020-01-30 RX ADMIN — POLYETHYLENE GLYCOL 3350 17 GRAM(S): 17 POWDER, FOR SOLUTION ORAL at 21:44

## 2020-01-30 RX ADMIN — MEROPENEM 100 MILLIGRAM(S): 1 INJECTION INTRAVENOUS at 11:35

## 2020-01-30 RX ADMIN — FINASTERIDE 5 MILLIGRAM(S): 5 TABLET, FILM COATED ORAL at 11:35

## 2020-01-30 RX ADMIN — OXYCODONE HYDROCHLORIDE 10 MILLIGRAM(S): 5 TABLET ORAL at 12:30

## 2020-01-30 RX ADMIN — Medication 0.25 MILLIGRAM(S): at 21:44

## 2020-01-30 RX ADMIN — OXYCODONE HYDROCHLORIDE 10 MILLIGRAM(S): 5 TABLET ORAL at 18:30

## 2020-01-30 RX ADMIN — OXYCODONE HYDROCHLORIDE 10 MILLIGRAM(S): 5 TABLET ORAL at 05:08

## 2020-01-30 RX ADMIN — OXYCODONE HYDROCHLORIDE 10 MILLIGRAM(S): 5 TABLET ORAL at 17:57

## 2020-01-30 NOTE — PROGRESS NOTE ADULT - SUBJECTIVE AND OBJECTIVE BOX
[facility]  INFECTIOUS DISEASES AND INTERNAL MEDICINE at [place]  =======================================================  Chandler Mccrary MD  Diplomates American Board of Internal Medicine and Infectious Diseases  Telephone [phone]  Fax            [phone]  =======================================================    [id number]  DEL JANETTE   follow up:  Pneumonia    still with shortness of breath this AM  no fevers  =======================================================  REVIEW OF SYSTEMS:  CONSTITUTIONAL:  no fevers  HEENT:  No diplopia or blurred vision.  No earache, sore throat or runny nose.  CARDIOVASCULAR:  No pressure, squeezing, strangling, tightness, heaviness or aching about the chest, neck, axilla or epigastrium.  RESPIRATORY:  + shortness of breath  GASTROINTESTINAL:  No nausea, vomiting or diarrhea.  GENITOURINARY:  No dysuria, frequency or urgency. No Blood in urine  MUSCULOSKELETAL:  no joint aches, no muscle pain  SKIN:  No change in skin, hair or nails.  NEUROLOGIC:  No Headaches, seizures or weakness.  PSYCHIATRIC:  No disorder of thought or mood.  ENDOCRINE:  No heat or cold intolerance  HEMATOLOGICAL:  No easy bruising or bleeding.   =======================================================  Allergies  No Known Allergies     ======================================================  Physical Exam:  ============  (see section below)    General:  No acute distress.  Eye: Pupils are equal, round and reactive to light, Extraocular movements are intact, Normal conjunctiva.  HENT: Normocephalic, Oral mucosa is moist, No pharyngeal erythema, No sinus tenderness.  Neck: Supple, No lymphadenopathy.  Respiratory:   Aeration of the LEFT side to mid lung zone,  poor aeration at the bases  Cardiovascular: Normal rate, Regular rhythm,   Gastrointestinal: Soft, Non-tender, Non-distended, Normal bowel sounds.  Genitourinary: No costovertebral angle tenderness.  Lymphatics: No lymphadenopathy neck,   Musculoskeletal: Normal range of motion, Normal strength.  Integumentary: No rash.  Neurologic: Alert, Oriented, No focal deficits, Cranial Nerves II-XII are grossly intact.  Psychiatric: Appropriate mood & affect.    =======================================================   Vitals:  ============  T(F): 98.6 (30 Jan 2020 00:05), Max: 98.6 (30 Jan 2020 00:05)  HR: 98 (30 Jan 2020 00:05)  BP: 133/80 (30 Jan 2020 00:05)  RR: 20 (30 Jan 2020 00:05)  SpO2: 98% (30 Jan 2020 00:05) (96% - 98%)  temp max in last 48H T(F): , Max: 98.6 (01-30-20 @ 00:05)    =======================================================  Current Antibiotics:  doxycycline hyclate Capsule 100 milliGRAM(s) Oral every 12 hours  meropenem  IVPB 1000 milliGRAM(s) IV Intermittent every 8 hours    Other medications:  albuterol/ipratropium for Nebulization 3 milliLiter(s) Nebulizer every 6 hours  ALPRAZolam 0.5 milliGRAM(s) Oral at bedtime  finasteride 5 milliGRAM(s) Oral daily  lidocaine   Patch 1 Patch Transdermal daily  oxyCODONE    IR 10 milliGRAM(s) Oral every 6 hours  polyethylene glycol 3350 17 Gram(s) Oral at bedtime  predniSONE   Tablet 5 milliGRAM(s) Oral two times a day  sodium chloride 0.9%. 1000 milliLiter(s) IV Continuous <Continuous>  tamsulosin 0.4 milliGRAM(s) Oral at bedtime      =======================================================  Labs:                        9.2    8.53  )-----------( 560      ( 29 Jan 2020 10:20 )             28.9       WBC Count: 8.53 K/uL (01-29-20 @ 10:20)  WBC Count: 10.40 K/uL (01-28-20 @ 08:43)  WBC Count: 16.14 K/uL (01-27-20 @ 08:17)  WBC Count: 16.79 K/uL (01-26-20 @ 11:01)  WBC Count: 16.36 K/uL (01-25-20 @ 09:38)      01-29    136  |  100  |  9.0  ----------------------------<  158<H>  3.6   |  25.0  |  0.35<L>    Ca    8.5<L>      29 Jan 2020 10:20  Phos  1.3     01-29  Mg     1.9     01-29        Culture - Acid Fast - Body Fluid w/Smear (collected 01-27-20 @ 21:54)  Source: .Body Fluid    Culture - Fungal, Body Fluid (collected 01-27-20 @ 13:05)  Source: .Body Fluid    Culture - Body Fluid with Gram Stain (collected 01-27-20 @ 13:04)  Source: .Body Fluid  Gram Stain (01-27-20 @ 15:34):    Numerous White blood cells    No organisms seen  Organism: Staphylococcus aureus (01-29-20 @ 10:31)  Organism: Staphylococcus aureus (01-29-20 @ 10:31)    Sensitivities:      -  Ampicillin/Sulbactam: S <=8/4      -  Cefazolin: S <=4      -  Clindamycin: S <=0.5      -  Erythromycin: S <=0.5      -  Gentamicin: S <=4 Should not be used as monotherapy      -  Oxacillin: S <=0.25      -  Penicillin: R >8      -  RIF- Rifampin: S <=1 Should not be used as monotherapy      -  Tetra/Doxy: S <=4      -  Trimethoprim/Sulfamethoxazole: S <=0.5/9.5      -  Vancomycin: S 2      Method Type: KERRI    Culture - Blood (collected 01-26-20 @ 18:25)  Source: .Blood    Culture - Blood (collected 01-26-20 @ 18:25)  Source: .Blood    Culture - Sputum (collected 01-23-20 @ 09:17)  Source: .Sputum  Gram Stain (01-23-20 @ 14:01):    Moderate WBC's    No organisms seen  Final Report (01-25-20 @ 09:57):    Moderate Routine respiratory teresa present    Culture - Blood (collected 01-21-20 @ 10:00)  Source: .Blood  Final Report (01-26-20 @ 11:00):    No growth at 5 days.    Culture - Blood (collected 01-20-20 @ 10:24)  Source: .Blood  Final Report (01-25-20 @ 11:01):    No growth at 5 days.    Culture - Blood (collected 01-20-20 @ 10:24)  Source: .Blood  Final Report (01-25-20 @ 11:01):    No growth at 5 days.      Creatinine, Serum: 0.35 mg/dL (01-29-20 @ 10:20)  Creatinine, Serum: 0.28 mg/dL (01-28-20 @ 08:41)  Creatinine, Serum: 0.38 mg/dL (01-27-20 @ 08:16)  Creatinine, Serum: 0.42 mg/dL (01-26-20 @ 11:01)  Creatinine, Serum: 0.38 mg/dL (01-25-20 @ 09:39)

## 2020-01-30 NOTE — PROGRESS NOTE ADULT - SUBJECTIVE AND OBJECTIVE BOX
CC: pna    Patient seen and examined at the bedside. No acute overnight events. SOB & cough about the same. Denies fever/chills, headache, lightheadedness, dizziness, chest pain, palpitations, cough, abd pain, nausea/vomiting/diarrhea, muscle pain.      =========================================================================================    PHYSICAL EXAM.    GEN - appears age appropriate. well nourished. pleasant. no distress.   HEENT - NCAT, EOMI, BETITO  RESP -decreased breath sounds on LLL  CARDIO - NS1S2, RRR. No murmurs/rubs/gallops.  ABD - Soft/Non tender/Non distended. Normal BS x4 quadrants. no guarding/rebound tenderness.  Ext - No LG.  MSK - BL 5/5 strength on upper and lower extremities.   Neuro - AAOx3. cn 2-12 grossly intact  Psych - normal affect  Skin - c/d/i. no rashes/lesions      VITAL SIGNS.    Vital Signs Last 24 Hrs  T(C): 36.7 (24 Jan 2020 07:27), Max: 36.9 (23 Jan 2020 21:00)  T(F): 98.1 (24 Jan 2020 07:27), Max: 98.4 (23 Jan 2020 21:00)  HR: 105 (24 Jan 2020 09:34) (105 - 114)  BP: 126/72 (24 Jan 2020 07:27) (122/68 - 137/74)  BP(mean): --  RR: 20 (24 Jan 2020 07:27) (18 - 20)  SpO2: 97% (23 Jan 2020 23:37) (96% - 97%)        =================================================    LABS.                          8.9    x     )-----------( x        ( 24 Jan 2020 08:28 )             28.5     01-24    131<L>  |  94<L>  |  7.0<L>  ----------------------------<  118<H>  3.4<L>   |  24.0  |  0.37<L>    Ca    7.8<L>      24 Jan 2020 08:28        PT/INR - ( 24 Jan 2020 08:28 )   PT: 22.4 sec;   INR: 1.91 ratio         PTT - ( 24 Jan 2020 08:28 )  PTT:32.8 sec  I&O's Summary    23 Jan 2020 07:01  -  24 Jan 2020 07:00  --------------------------------------------------------  IN: 675 mL / OUT: 400 mL / NET: 275 mL    24 Jan 2020 07:01  -  24 Jan 2020 16:37  --------------------------------------------------------  IN: 375 mL / OUT: 0 mL / NET: 375 mL        Culture - Sputum (collected 23 Jan 2020 09:17)  Source: .Sputum  Gram Stain (23 Jan 2020 14:01):    Moderate WBC's    No organisms seen  Preliminary Report (24 Jan 2020 09:14):    Moderate Routine respiratory teresa present    Culture in progress        ================================================    IMAGING.    ================================================    DIET.    Diet, Regular (01-21-20 @ 12:58)    ================================================    HOME MEDS.    Home Medications:  finasteride 5 mg oral tablet: 1 tab(s) orally once a day (20 Jan 2020 19:39)  lactulose:  (21 Jan 2020 07:32)  Lupron 5 mg/mL subcutaneous solution:  (21 Jan 2020 07:29)  oxyCODONE 10 mg oral tablet: 1 tab(s) orally every 6 hours (20 Jan 2020 19:39)  Percocet 10/325 oral tablet: 1 tab(s) orally every 6 hours (21 Jan 2020 07:31)  predniSONE 5 mg oral tablet: 1 tab(s) orally 2 times a day (21 Jan 2020 07:30)  Taxotere:  (21 Jan 2020 07:29)  Xgeva 120 mg/1.7 mL subcutaneous solution:  (21 Jan 2020 07:30)      ================================================    HOSPITAL MEDS.    MEDICATIONS  (STANDING):  albuterol/ipratropium for Nebulization 3 milliLiter(s) Nebulizer every 6 hours  finasteride 5 milliGRAM(s) Oral daily  oxyCODONE    IR 10 milliGRAM(s) Oral every 6 hours  piperacillin/tazobactam IVPB.. 3.375 Gram(s) IV Intermittent every 8 hours  polyethylene glycol 3350 17 Gram(s) Oral at bedtime  potassium chloride    Tablet ER 40 milliEquivalent(s) Oral once  predniSONE   Tablet 5 milliGRAM(s) Oral two times a day  sodium chloride 0.9%. 1000 milliLiter(s) (75 mL/Hr) IV Continuous <Continuous>  tamsulosin 0.4 milliGRAM(s) Oral at bedtime    MEDICATIONS  (PRN):  lactulose Syrup 10 Gram(s) Oral two times a day PRN Constipation

## 2020-01-30 NOTE — PROGRESS NOTE ADULT - SUBJECTIVE AND OBJECTIVE BOX
INTERVAL HPI/OVERNIGHT EVENTS:   · Reason for Admission	Dyspnea secondary to Large located effusion and suspected recurrent Bilateral PNA	    F/U Note  Slept well last night, felt sluggish and slower mentally. Asked if we could reduce dose of Xanax, which I did to 0.25mg PO at bedtime  Patient admits his pain has gotten better since he started Palliative RT treatments at [facility]. He has completed 5/5 treatments.  He continues to need Oxycodone 10mg Q6 ATC, not much breakthrough medication taken. Appetite very good. Denies N/V/D constipation, CP, Palpitations dysuria  or changes inbowel or bladder control. Still SOB on exertion using Nasal O2 continuously.      64y old  Male who presents with a chief complaint of Dyspnea secondary to Large located effusion and suspected recurrent Bilateral PNA (30 Jan 2020 16:37)    PAST MEDICAL & SURGICAL HISTORY:  Prostate CA: Adenocarcinoma with mets to Lung, Bone, and Lymph  No significant past surgical history    Present Symptoms:     Dyspnea: 1 2    Nausea/Vomiting:  No  Anxiety:  Yes   Depression:  No  Fatigue:  No slept well last night. Did not go to [facility] finished RT treatments  Loss of appetite:  No    Pain: moderate much less intensity            Character-            Duration-            Effect-            Factors-            Frequency-            Location-Back and flank            Severity-    Review of Systems: Reviewed                      All others negative    MEDICATIONS  (STANDING):  albuterol/ipratropium for Nebulization 3 milliLiter(s) Nebulizer every 6 hours  ALPRAZolam 0.25 milliGRAM(s) Oral at bedtime  doxycycline hyclate Capsule 100 milliGRAM(s) Oral every 12 hours  finasteride 5 milliGRAM(s) Oral daily  lidocaine   Patch 1 Patch Transdermal daily  meropenem  IVPB 1000 milliGRAM(s) IV Intermittent every 8 hours  oxyCODONE    IR 10 milliGRAM(s) Oral every 6 hours  polyethylene glycol 3350 17 Gram(s) Oral at bedtime  predniSONE   Tablet 5 milliGRAM(s) Oral two times a day  sodium chloride 0.9%. 1000 milliLiter(s) (75 mL/Hr) IV Continuous <Continuous>  tamsulosin 0.4 milliGRAM(s) Oral at bedtime    MEDICATIONS  (PRN):  acetaminophen   Tablet .. 650 milliGRAM(s) Oral every 6 hours PRN Temp greater or equal to 38C (100.4F), Mild Pain (1 - 3)  lactulose Syrup 10 Gram(s) Oral two times a day PRN Constipation  oxycodone    5 mG/acetaminophen 325 mG 1 Tablet(s) Oral every 4 hours PRN Moderate Pain (4 - 6)  oxyCODONE    IR 10 milliGRAM(s) Oral every 4 hours PRN Severe Pain (7 - 10)      PHYSICAL EXAM:    Vital Signs Last 24 Hrs  T(C): 37 (30 Jan 2020 15:50), Max: 37 (30 Jan 2020 00:05)  T(F): 98.6 (30 Jan 2020 15:50), Max: 98.6 (30 Jan 2020 00:05)  HR: 93 (30 Jan 2020 15:50) (93 - 98)  BP: 137/69 (30 Jan 2020 15:50) (133/80 - 137/69)  BP(mean): --  RR: 19 (30 Jan 2020 15:50) (19 - 20)  SpO2: 98% (30 Jan 2020 15:50) (97% - 98%)    General: alert  oriented x _3_ awake, engaging with his family at his bedside               well nourished    HEENT: normal  dry mouth    Lungs: comfortable tachypnea/labored breathing  Phlem-clear productive cough    CV: normal     GI: normal   tender                   constipation  last BM:     : voiding    MSK:   weakness              ambulatory      Skin: normal  __  no rash    LABS:                        8.8    6.96  )-----------( 524      ( 30 Jan 2020 09:30 )             29.2     01-30    136  |  97<L>  |  8.0  ----------------------------<  95  3.5   |  28.0  |  0.34<L>    Ca    8.1<L>      30 Jan 2020 09:30  Phos  1.9     01-30  Mg     1.8     01-30      PT/INR - ( 30 Jan 2020 09:30 )   PT: 19.3 sec;   INR: 1.65 ratio        I&O's Summary    29 Jan 2020 07:01  -  30 Jan 2020 07:00  --------------------------------------------------------  IN: 1925 mL / OUT: 500 mL / NET: 1425 mL    RADIOLOGY & ADDITIONAL STUDIES:    ADVANCE DIRECTIVES:   DNR  NO  Completed on:       HCP completed designated his sister copy on the chart              MOLST   NO   Completed on:  Living Will  YES NO   Completed on:

## 2020-01-30 NOTE — PROGRESS NOTE ADULT - ASSESSMENT
65 y/o male with Prostate Adenocarcinoma with Mets to Bone, Lung and Lymp on ADT (Lupron), Taxotere, Xgeva therapy and awaiting implementation of Palliative radiation therapy presented to ED with C/O worsening SOB. CTA chest completed ruled out PE. found to have Diffuse bilateral areas of pulmonary consolidation with pneumatocele formation and Large loculated left pleural effusions with compressive atelectasis of the left lower lobe. thoracic sx cs placed.     presentation complicated by trace RT sided PTX. medical management only, no chest tube placed.     #Bilateral pneumonia, complicated by LT sided large loculated pleural effusion + acute resp failure w/ hypoxia, stable - in immunocompromised pt. flu panel neg. suspecting resistant gram neg and/or positive organisms as underlying etiology behind infection in light of cavitary lesions. supplemental o2 - titrate to RA - not baseline dependent. repeat ct chest 6wks. nebs. blood cx 1/20 neg. sputum cx prelim resp teresa only. neg legionella. cts cs appreciated. s/p Thoracentesis by IR. Labs conistent with exudate. Pleural fluid cx growing Stap aureus (MSSA).  linezolid IV changed to doxy PO per ID. c/w merrem IV    #hyponatremia, mild, asymptomatic, persistent - trend.    #Prostate CA, stable - palliative cs appreciated. daphney cs appreciated - cont daily prednisone. psa responding to current tx w/ lupron, docetaxel, xgeva - cont on dc. rad onc cs appreciated - rad therapy while inpt - pt completed 5 sessions of RT    #anemia, asymptomatic, stable - baseline hgb 11-12, no acute/active s/s of blood loss. outpt fu.     #hypokalemia, mild, asymptomatc - replete + repeat    #dvt ppx: pt's INR 1.8 and rising    ELevated INR: [facility] called. With PT 21.5 and INR 1.83 despite Vitamin K, PT with 1:1 mix to distinguish factor deficiency from circulating anticoagulant ordered by hemonc. Consult apprerciated    #dispo. sister to make all decisions at this time (Gail Cruz [phone]) per pt.  undetermined date   #outpt fu. pmd. onc, rad onc dr carlisle @ [facility].

## 2020-01-30 NOTE — PROGRESS NOTE ADULT - ASSESSMENT
· Assessment		  65 y/o male with Prostate Adenocarcinoma with Mets to Bone, Lung and Lymp on ADT (Lupron), Taxotere, Xgeva therapy and awaiting implementation of Palliative radiation therapy presented to ED with C/O worsening SOB. CTA chest completed ruled out PE. found to have Diffuse bilateral areas of pulmonary consolidation with pneumatocele formation and Large loculated left pleural effusions with compressive atelectasis of the left lower lobe. thoracic sx cs placed.     Bilateral pneumonia, complicated by LT sided large loculated pleural effusion + acute resp failure w/ hypoxia, stable -    Immunocompromised pt. flu panel neg. suspecting resistant gram neg and/or positive organisms as underlying etiology behind infection in light of cavitary lesions.   supplemental Nasal O2- titrate to RA -    Repeat CT chest 6wks. .   s/p Thoracentesis by IR. Labs    Pleural fluid cx growing Stap aureus (MSSA).    linezolid IV changed to doxy PO per ID.   c/w merrem IV    Prostate CA, with Metastasis  stable -   Daily prednisone.   PSA responding to current tx w/ lupron, docetaxel, xgeva -   Plans to continue with Chemotherapy when discharged  -   completed 5 sessions of RT  Pain is not as severe. Patient wants to continue Oxycodone IR  10 mg Q6 ATC  He will reassess his need for standing dose when discharged    ELevated INR:  ordered by hemonc. Consult apprerciated    DVT ppx: pt's INR 1.8 and rising  [facility]emonc called.   PT 21.5 and INR 1.83 despite Vitamin K, PT with 1:1 mix to distinguish factor deficiency from circulating anticoagulant    Palliative Encounter  Established his sister as HCP tonight  Patient is seeking aggressive care.   He is not actively dying, and wants to remain a Full Code at this time

## 2020-01-30 NOTE — PROGRESS NOTE ADULT - ASSESSMENT
Chief Complaint   Patient presents with     Blood Draw     Labs drawn via  by RN in lab.      Arabella Lan RN     This 64yoM; with pmh signif for Prostate Ca with mets to ribs diagnosed in 11/2019 (currently on chemo), HTN; now p/w cough, sob, fever x1 week, progressively worsening.  denies sick contacts. reports traveling from [place] after diagnosis of prostate ca complicated by b/l ptx in november and another hospitalization in december. denies cp/palp. denies abd pain: SOCIAL: No tobacco/illicit substance use/social/EtOH    patient reports having a history of Bilateral Pneumonia approx 1.5 months prior with associated bilateral Pleural effusions which required draining. In addition to above, patient is scheduled palliative Radiation therapy today. He is on ADT (lupron), Taxotere and Xgeva for Prostate adenocarcinoma with Mets to Lung, Bone and Lymph. (20 Jan 2020 21:22)    Chest imaging xray showed:  No significant change in the suspected bilateral pleural effusions, left greater than right.   Possible loculated fluid on the left. Atelectatic changes and/or underlying infection is possible. Please correlate clinically. Cavity or pneumatocele in the left apex again seen.  Of note, the small right-sided pneumothorax on chest CT from 1/20/2020 is not as well appreciated on this x-ray. The presence of a small right-sided pneumothorax was discussed with Dr. Silva at 12:25 PM on 1/22/2020.    A CT scan was done and showed:  Diffuse bilateral areas of pulmonary consolidation with pneumatocele formation  Large loculated left pleural effusions with compressive atelectasis of the left lower lobe.      patient was initially started On vanco & Zosyn, then Ceftriaxone and Zithromax was added as well.   We are consulted to evaluate this patient for PNA.     Impression:  PNA  large loculated effusion  LEFT lobe atelectasis  Shortness of breath    Plan:  s/p thoracentesis on 1/27/2020; cultures with Staph species  - continue Merrem 1 gram Q 8H  - change Linezolid to DOXYCYCLINE 100mg po BID    IF patient continue to have SOB, may need to repeat imaging of chest to determined if further fluid drainage is needed.     - follow up all outstanding cultures  - trend temperature and WBC curve  - repeat cultures from blood and all sources if febrile.

## 2020-01-31 LAB
ANION GAP SERPL CALC-SCNC: 11 MMOL/L — SIGNIFICANT CHANGE UP (ref 5–17)
BASOPHILS # BLD AUTO: 0.02 K/UL — SIGNIFICANT CHANGE UP (ref 0–0.2)
BASOPHILS NFR BLD AUTO: 0.3 % — SIGNIFICANT CHANGE UP (ref 0–2)
BUN SERPL-MCNC: 6 MG/DL — LOW (ref 8–20)
CALCIUM SERPL-MCNC: 8.3 MG/DL — LOW (ref 8.6–10.2)
CHLORIDE SERPL-SCNC: 100 MMOL/L — SIGNIFICANT CHANGE UP (ref 98–107)
CO2 SERPL-SCNC: 26 MMOL/L — SIGNIFICANT CHANGE UP (ref 22–29)
CREAT SERPL-MCNC: 0.28 MG/DL — LOW (ref 0.5–1.3)
CULTURE RESULTS: SIGNIFICANT CHANGE UP
CULTURE RESULTS: SIGNIFICANT CHANGE UP
EOSINOPHIL # BLD AUTO: 0.01 K/UL — SIGNIFICANT CHANGE UP (ref 0–0.5)
EOSINOPHIL NFR BLD AUTO: 0.1 % — SIGNIFICANT CHANGE UP (ref 0–6)
GLUCOSE SERPL-MCNC: 108 MG/DL — HIGH (ref 70–99)
HCT VFR BLD CALC: 30.2 % — LOW (ref 39–50)
HGB BLD-MCNC: 9.2 G/DL — LOW (ref 13–17)
IMM GRANULOCYTES NFR BLD AUTO: 0.9 % — SIGNIFICANT CHANGE UP (ref 0–1.5)
INR BLD: 1.69 RATIO — HIGH (ref 0.88–1.16)
LYMPHOCYTES # BLD AUTO: 0.58 K/UL — LOW (ref 1–3.3)
LYMPHOCYTES # BLD AUTO: 8.5 % — LOW (ref 13–44)
MAGNESIUM SERPL-MCNC: 1.8 MG/DL — SIGNIFICANT CHANGE UP (ref 1.6–2.6)
MCHC RBC-ENTMCNC: 27.1 PG — SIGNIFICANT CHANGE UP (ref 27–34)
MCHC RBC-ENTMCNC: 30.5 GM/DL — LOW (ref 32–36)
MCV RBC AUTO: 88.8 FL — SIGNIFICANT CHANGE UP (ref 80–100)
MONOCYTES # BLD AUTO: 0.39 K/UL — SIGNIFICANT CHANGE UP (ref 0–0.9)
MONOCYTES NFR BLD AUTO: 5.7 % — SIGNIFICANT CHANGE UP (ref 2–14)
NEUTROPHILS # BLD AUTO: 5.76 K/UL — SIGNIFICANT CHANGE UP (ref 1.8–7.4)
NEUTROPHILS NFR BLD AUTO: 84.5 % — HIGH (ref 43–77)
PHOSPHATE SERPL-MCNC: 1.8 MG/DL — LOW (ref 2.4–4.7)
PLATELET # BLD AUTO: 517 K/UL — HIGH (ref 150–400)
POTASSIUM SERPL-MCNC: 3.6 MMOL/L — SIGNIFICANT CHANGE UP (ref 3.5–5.3)
POTASSIUM SERPL-SCNC: 3.6 MMOL/L — SIGNIFICANT CHANGE UP (ref 3.5–5.3)
PROTHROM AB SERPL-ACNC: 19.7 SEC — HIGH (ref 10–12.9)
RBC # BLD: 3.4 M/UL — LOW (ref 4.2–5.8)
RBC # FLD: 17.7 % — HIGH (ref 10.3–14.5)
SODIUM SERPL-SCNC: 137 MMOL/L — SIGNIFICANT CHANGE UP (ref 135–145)
SPECIMEN SOURCE: SIGNIFICANT CHANGE UP
SPECIMEN SOURCE: SIGNIFICANT CHANGE UP
WBC # BLD: 6.82 K/UL — SIGNIFICANT CHANGE UP (ref 3.8–10.5)
WBC # FLD AUTO: 6.82 K/UL — SIGNIFICANT CHANGE UP (ref 3.8–10.5)

## 2020-01-31 PROCEDURE — 71250 CT THORAX DX C-: CPT | Mod: 26

## 2020-01-31 PROCEDURE — 99232 SBSQ HOSP IP/OBS MODERATE 35: CPT

## 2020-01-31 RX ORDER — POTASSIUM PHOSPHATE, MONOBASIC POTASSIUM PHOSPHATE, DIBASIC 236; 224 MG/ML; MG/ML
15 INJECTION, SOLUTION INTRAVENOUS ONCE
Refills: 0 | Status: COMPLETED | OUTPATIENT
Start: 2020-01-31 | End: 2020-01-31

## 2020-01-31 RX ORDER — LANOLIN ALCOHOL/MO/W.PET/CERES
3 CREAM (GRAM) TOPICAL AT BEDTIME
Refills: 0 | Status: DISCONTINUED | OUTPATIENT
Start: 2020-01-31 | End: 2020-02-19

## 2020-01-31 RX ADMIN — OXYCODONE HYDROCHLORIDE 10 MILLIGRAM(S): 5 TABLET ORAL at 13:50

## 2020-01-31 RX ADMIN — Medication 3 MILLIGRAM(S): at 21:48

## 2020-01-31 RX ADMIN — Medication 100 MILLIGRAM(S): at 18:06

## 2020-01-31 RX ADMIN — OXYCODONE HYDROCHLORIDE 10 MILLIGRAM(S): 5 TABLET ORAL at 06:02

## 2020-01-31 RX ADMIN — OXYCODONE HYDROCHLORIDE 10 MILLIGRAM(S): 5 TABLET ORAL at 21:47

## 2020-01-31 RX ADMIN — FINASTERIDE 5 MILLIGRAM(S): 5 TABLET, FILM COATED ORAL at 06:04

## 2020-01-31 RX ADMIN — OXYCODONE HYDROCHLORIDE 10 MILLIGRAM(S): 5 TABLET ORAL at 22:40

## 2020-01-31 RX ADMIN — OXYCODONE HYDROCHLORIDE 10 MILLIGRAM(S): 5 TABLET ORAL at 14:50

## 2020-01-31 RX ADMIN — Medication 3 MILLILITER(S): at 10:21

## 2020-01-31 RX ADMIN — MEROPENEM 100 MILLIGRAM(S): 1 INJECTION INTRAVENOUS at 21:48

## 2020-01-31 RX ADMIN — SODIUM CHLORIDE 75 MILLILITER(S): 9 INJECTION INTRAMUSCULAR; INTRAVENOUS; SUBCUTANEOUS at 21:48

## 2020-01-31 RX ADMIN — Medication 5 MILLIGRAM(S): at 18:06

## 2020-01-31 RX ADMIN — MEROPENEM 100 MILLIGRAM(S): 1 INJECTION INTRAVENOUS at 06:02

## 2020-01-31 RX ADMIN — POTASSIUM PHOSPHATE, MONOBASIC POTASSIUM PHOSPHATE, DIBASIC 62.5 MILLIMOLE(S): 236; 224 INJECTION, SOLUTION INTRAVENOUS at 18:06

## 2020-01-31 RX ADMIN — Medication 5 MILLIGRAM(S): at 06:03

## 2020-01-31 RX ADMIN — Medication 0.25 MILLIGRAM(S): at 21:47

## 2020-01-31 RX ADMIN — OXYCODONE HYDROCHLORIDE 10 MILLIGRAM(S): 5 TABLET ORAL at 06:24

## 2020-01-31 RX ADMIN — Medication 3 MILLILITER(S): at 14:50

## 2020-01-31 RX ADMIN — MEROPENEM 100 MILLIGRAM(S): 1 INJECTION INTRAVENOUS at 13:51

## 2020-01-31 RX ADMIN — TAMSULOSIN HYDROCHLORIDE 0.4 MILLIGRAM(S): 0.4 CAPSULE ORAL at 16:05

## 2020-01-31 RX ADMIN — Medication 100 MILLIGRAM(S): at 06:02

## 2020-01-31 RX ADMIN — SODIUM CHLORIDE 75 MILLILITER(S): 9 INJECTION INTRAMUSCULAR; INTRAVENOUS; SUBCUTANEOUS at 13:57

## 2020-01-31 NOTE — PROGRESS NOTE ADULT - SUBJECTIVE AND OBJECTIVE BOX
[facility]  INFECTIOUS DISEASES AND INTERNAL MEDICINE at [place]  =======================================================  Chandler Mccrary MD  Diplomates American Board of Internal Medicine and Infectious Diseases  Telephone [phone]  Fax            [phone]  =======================================================    [facility]-[id number]  [place]   follow up:  Pneumonia    still with shortness of breath this AM  no fevers  =======================================================  REVIEW OF SYSTEMS:  CONSTITUTIONAL:  no fevers  HEENT:  No diplopia or blurred vision.  No earache, sore throat or runny nose.  CARDIOVASCULAR:  No pressure, squeezing, strangling, tightness, heaviness or aching about the chest, neck, axilla or epigastrium.  RESPIRATORY:  + shortness of breath  GASTROINTESTINAL:  No nausea, vomiting or diarrhea.  GENITOURINARY:  No dysuria, frequency or urgency. No Blood in urine  MUSCULOSKELETAL:  no joint aches, no muscle pain  SKIN:  No change in skin, hair or nails.  NEUROLOGIC:  No Headaches, seizures or weakness.  PSYCHIATRIC:  No disorder of thought or mood.  ENDOCRINE:  No heat or cold intolerance  HEMATOLOGICAL:  No easy bruising or bleeding.   =======================================================  Allergies  No Known Allergies     ======================================================  Physical Exam:  ============  (see section below)    General:  No acute distress.  Eye: Pupils are equal, round and reactive to light, Extraocular movements are intact, Normal conjunctiva.  HENT: Normocephalic, Oral mucosa is moist, No pharyngeal erythema, No sinus tenderness.  Neck: Supple, No lymphadenopathy.  Respiratory:   POOR aeration of the LEFT side to mid lung zone,  + DULLNESS to percuss  Cardiovascular: Normal rate, Regular rhythm,   Gastrointestinal: Soft, Non-tender, Non-distended, Normal bowel sounds.  Genitourinary: No costovertebral angle tenderness.  Lymphatics: No lymphadenopathy neck,   Musculoskeletal: Normal range of motion, Normal strength.  Integumentary: No rash.  Neurologic: Alert, Oriented, No focal deficits, Cranial Nerves II-XII are grossly intact.  Psychiatric: Appropriate mood & affect.    =======================================================   Vitals:  ============  T(F): 98.4 (30 Jan 2020 23:49), Max: 98.6 (30 Jan 2020 15:50)  HR: 93 (30 Jan 2020 23:49)  BP: 139/87 (30 Jan 2020 23:49)  RR: 18 (30 Jan 2020 23:49)  SpO2: 99% (30 Jan 2020 23:49) (92% - 99%)  temp max in last 48H T(F): , Max: 98.6 (01-30-20 @ 00:05)    =======================================================  Current Antibiotics:  doxycycline hyclate Capsule 100 milliGRAM(s) Oral every 12 hours  meropenem  IVPB 1000 milliGRAM(s) IV Intermittent every 8 hours    Other medications:  albuterol/ipratropium for Nebulization 3 milliLiter(s) Nebulizer every 6 hours  ALPRAZolam 0.25 milliGRAM(s) Oral at bedtime  finasteride 5 milliGRAM(s) Oral daily  lidocaine   Patch 1 Patch Transdermal daily  oxyCODONE    IR 10 milliGRAM(s) Oral every 6 hours  polyethylene glycol 3350 17 Gram(s) Oral at bedtime  predniSONE   Tablet 5 milliGRAM(s) Oral two times a day  sodium chloride 0.9%. 1000 milliLiter(s) IV Continuous <Continuous>  tamsulosin 0.4 milliGRAM(s) Oral at bedtime      =======================================================  Labs:                        9.2    6.82  )-----------( 517      ( 31 Jan 2020 08:51 )             30.2       WBC Count: 6.82 K/uL (01-31-20 @ 08:51)  WBC Count: 6.96 K/uL (01-30-20 @ 09:30)  WBC Count: 8.53 K/uL (01-29-20 @ 10:20)  WBC Count: 10.40 K/uL (01-28-20 @ 08:43)  WBC Count: 16.14 K/uL (01-27-20 @ 08:17)  WBC Count: 16.79 K/uL (01-26-20 @ 11:01)      01-30    136  |  97<L>  |  8.0  ----------------------------<  95  3.5   |  28.0  |  0.34<L>    Ca    8.1<L>      30 Jan 2020 09:30  Phos  1.9     01-30  Mg     1.8     01-30        Culture - Acid Fast - Body Fluid w/Smear (collected 01-27-20 @ 21:54)  Source: .Body Fluid    Culture - Fungal, Body Fluid (collected 01-27-20 @ 13:05)  Source: .Body Fluid    Culture - Body Fluid with Gram Stain (collected 01-27-20 @ 13:04)  Source: .Body Fluid  Gram Stain (01-27-20 @ 15:34):    Numerous White blood cells    No organisms seen  Organism: Staphylococcus aureus (01-29-20 @ 10:31)  Organism: Staphylococcus aureus (01-29-20 @ 10:31)    Sensitivities:      -  Ampicillin/Sulbactam: S <=8/4      -  Cefazolin: S <=4      -  Clindamycin: S <=0.5      -  Erythromycin: S <=0.5      -  Gentamicin: S <=4 Should not be used as monotherapy      -  Oxacillin: S <=0.25      -  Penicillin: R >8      -  RIF- Rifampin: S <=1 Should not be used as monotherapy      -  Tetra/Doxy: S <=4      -  Trimethoprim/Sulfamethoxazole: S <=0.5/9.5      -  Vancomycin: S 2      Method Type: KERRI    Culture - Blood (collected 01-26-20 @ 18:25)  Source: .Blood    Culture - Blood (collected 01-26-20 @ 18:25)  Source: .Blood    Culture - Sputum (collected 01-23-20 @ 09:17)  Source: .Sputum  Gram Stain (01-23-20 @ 14:01):    Moderate WBC's    No organisms seen  Final Report (01-25-20 @ 09:57):    Moderate Routine respiratory teresa present    Culture - Blood (collected 01-21-20 @ 10:00)  Source: .Blood  Final Report (01-26-20 @ 11:00):    No growth at 5 days.    Culture - Blood (collected 01-20-20 @ 10:24)  Source: .Blood  Final Report (01-25-20 @ 11:01):    No growth at 5 days.    Culture - Blood (collected 01-20-20 @ 10:24)  Source: .Blood  Final Report (01-25-20 @ 11:01):    No growth at 5 days.      Creatinine, Serum: 0.34 mg/dL (01-30-20 @ 09:30)  Creatinine, Serum: 0.35 mg/dL (01-29-20 @ 10:20)  Creatinine, Serum: 0.28 mg/dL (01-28-20 @ 08:41)  Creatinine, Serum: 0.38 mg/dL (01-27-20 @ 08:16)  Creatinine, Serum: 0.42 mg/dL (01-26-20 @ 11:01)

## 2020-01-31 NOTE — PROGRESS NOTE ADULT - ASSESSMENT
This 64yoM; with pmh signif for Prostate Ca with mets to ribs diagnosed in 11/2019 (currently on chemo), HTN; now p/w cough, sob, fever x1 week, progressively worsening.  denies sick contacts. reports traveling from [place] after diagnosis of prostate ca complicated by b/l ptx in november and another hospitalization in december. denies cp/palp. denies abd pain: SOCIAL: No tobacco/illicit substance use/social/EtOH    patient reports having a history of Bilateral Pneumonia approx 1.5 months prior with associated bilateral Pleural effusions which required draining. In addition to above, patient is scheduled palliative Radiation therapy today. He is on ADT (lupron), Taxotere and Xgeva for Prostate adenocarcinoma with Mets to Lung, Bone and Lymph. (20 Jan 2020 21:22)    Chest imaging xray showed:  No significant change in the suspected bilateral pleural effusions, left greater than right.   Possible loculated fluid on the left. Atelectatic changes and/or underlying infection is possible. Please correlate clinically. Cavity or pneumatocele in the left apex again seen.  Of note, the small right-sided pneumothorax on chest CT from 1/20/2020 is not as well appreciated on this x-ray. The presence of a small right-sided pneumothorax was discussed with Dr. Silva at 12:25 PM on 1/22/2020.    A CT scan was done and showed:  Diffuse bilateral areas of pulmonary consolidation with pneumatocele formation  Large loculated left pleural effusions with compressive atelectasis of the left lower lobe.      patient was initially started On vanco & Zosyn, then Ceftriaxone and Zithromax was added as well.   We are consulted to evaluate this patient for PNA.     Impression:  PNA  large loculated effusion  LEFT lobe atelectasis  Shortness of breath    Plan:  s/p thoracentesis on 1/27/2020; cultures with Staph species  - continue Merrem 1 gram Q 8H  - COntinue  DOXYCYCLINE 100mg po BID    chest exam without significant improvement.   CONSIDER REPEAT CT scan chest.

## 2020-01-31 NOTE — PROGRESS NOTE ADULT - SUBJECTIVE AND OBJECTIVE BOX
INTERVAL HPI/OVERNIGHT EVENTS: 65 yo Male with Metastatic Prostate Cancer, Loculated L pleural Effusion S/P Thoracenteis and Palliative RT treatments to B/L SI  Joints Mimi Metastasis.  CC:" I am still very SOB, haven't seen much improvement in my breathing"- asked to turn up oxygen to 4L 9had been on 2L)   F/U Note  Had a very restless exhausting night, got up every hour to urinate- He is sure he got his regular medicine later than he takes it (Flomax) which could  be a factor in oliguria during night. He was at Radiology, just returned after CT Scanning. Appetite fine, THOMPSON persists.  Deneis N/V/D/CP, Palpitations cough fever. Pain is less severe intensity since RT    64y old  Male who presents with a chief complaint of Dyspnea secondary to Large located effusion and suspected recurrent Bilateral PNA (31 Jan 2020 15:50)    PAST MEDICAL & SURGICAL HISTORY:  Prostate CA: Adenocarcinoma with mets to Lung, Bone, and Lymph  No significant past surgical history    Present Symptoms:     Dyspnea: 2 -3   Nausea/Vomiting: No  Anxiety:  Yes   Depression: Yes   Fatigue: Yes   Loss of appetite:  No    Pain: tolerable with present analgesic plan            Character-            Duration-            Effect-            Factors-            Frequency-            Location-            Severity-    Review of Systems: Reviewed                      All others negative    MEDICATIONS  (STANDING):  albuterol/ipratropium for Nebulization 3 milliLiter(s) Nebulizer every 6 hours  ALPRAZolam 0.25 milliGRAM(s) Oral at bedtime  doxycycline hyclate Capsule 100 milliGRAM(s) Oral every 12 hours  finasteride 5 milliGRAM(s) Oral daily  lidocaine   Patch 1 Patch Transdermal daily  meropenem  IVPB 1000 milliGRAM(s) IV Intermittent every 8 hours  oxyCODONE    IR 10 milliGRAM(s) Oral every 6 hours  polyethylene glycol 3350 17 Gram(s) Oral at bedtime  potassium phosphate IVPB 15 milliMole(s) IV Intermittent once  predniSONE   Tablet 5 milliGRAM(s) Oral two times a day  sodium chloride 0.9%. 1000 milliLiter(s) (75 mL/Hr) IV Continuous <Continuous>  tamsulosin 0.4 milliGRAM(s) Oral at bedtime    MEDICATIONS  (PRN):  acetaminophen   Tablet .. 650 milliGRAM(s) Oral every 6 hours PRN Temp greater or equal to 38C (100.4F), Mild Pain (1 - 3)  lactulose Syrup 10 Gram(s) Oral two times a day PRN Constipation  oxycodone    5 mG/acetaminophen 325 mG 1 Tablet(s) Oral every 4 hours PRN Moderate Pain (4 - 6)  oxyCODONE    IR 10 milliGRAM(s) Oral every 4 hours PRN Severe Pain (7 - 10)      PHYSICAL EXAM:    Vital Signs Last 24 Hrs  T(C): 36.8 (31 Jan 2020 15:49), Max: 36.9 (30 Jan 2020 23:49)  T(F): 98.2 (31 Jan 2020 15:49), Max: 98.4 (30 Jan 2020 23:49)  HR: 97 (31 Jan 2020 15:49) (90 - 99)  BP: 150/79 (31 Jan 2020 15:49) (139/87 - 150/79)  BP(mean): --  RR: 18 (31 Jan 2020 15:49) (18 - 18)  SpO2: 100% (31 Jan 2020 15:49) (92% - 100%)    General: alert  oriented x ___3_ awake, frustrated about his lack of sleep last night             slim      HEENT: dry mouth      Lungs tachypnea/labored breathing     CV:   tachycardia    GI: normal   tender                constipation  last BM:     : voiding kb urine    MSK:  weakness               ambulatory      Skin: normal  _  no rash    LABS:                        9.2    6.82  )-----------( 517      ( 31 Jan 2020 08:51 )             30.2     01-31    137  |  100  |  6.0<L>  ----------------------------<  108<H>  3.6   |  26.0  |  0.28<L>    Ca    8.3<L>      31 Jan 2020 08:51  Phos  1.8     01-31  Mg     1.8     01-31      PT/INR - ( 31 Jan 2020 08:51 )   PT: 19.7 sec;   INR: 1.69 ratio      I&O's Summary    30 Jan 2020 07:01  -  31 Jan 2020 07:00  --------------------------------------------------------  IN: 0 mL / OUT: 2550 mL / NET: -2550 mL    RADIOLOGY & ADDITIONAL STUDIES:  < from: CT Chest No Cont (01.31.20 @ 15:36) >  ONES: Bony destructive changes are present in the left second and sixth ribs. There are scattered small vague lucencies within the spine with mixed lytic and sclerotic changes in T6 vertebral body.    IMPRESSION:     Smallright-sided pneumothorax, slightly increased in size compared to previous examination 1/20/2020.    Loculated left pleural effusion has decreased in size.    Bilateral consolidation and pneumatoceles, without significant change from previous exam.    Bony metastatic disease.      ADVANCE DIRECTIVES:   DNR NO  Completed on:                     MOLST   NO   Completed on:  Living Will  NO   Completed on:

## 2020-01-31 NOTE — PROGRESS NOTE ADULT - ASSESSMENT
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 CTA chest completed ruled out PE. found to have Diffuse bilateral areas of pulmonary consolidation with pneumatocele formation and Large loculated left pleural effusions with compressive atelectasis of the left lower lobe. thoracic sx cs placed.   presentation complicated by trace RT sided PTX. medical management only, no chest tube placed for PTX.  s/p Thoracentesis by IR. Labs conistent with exudate. Pleural fluid cx growing Stap aureus (MSSA).  linezolid IV changed to doxy PO per ID. c/w merrem IV. Pt continues to have SOB & cough. Repeat CT chest today        #Bilateral pneumonia, complicated by LT sided large loculated pleural effusion + acute resp failure w/ hypoxia, stable - in immunocompromised pt. flu panel neg. suspecting resistant gram neg and/or positive organisms as underlying etiology behind infection in light of cavitary lesions. supplemental o2 - titrate to RA - not baseline dependent. repeat ct chest 6wks. nebs. blood cx 1/20 neg. sputum cx prelim resp teresa only. neg legionella. cts cs appreciated. s/p Thoracentesis by IR. Labs conistent with exudate. Pleural fluid cx growing Stap aureus (MSSA).  linezolid IV changed to doxy PO per ID. c/w merrem IV. Pt continues to have SOB & cough. Repeat CT chest today       Increased urinary frequency overnight- No dysuria or flank pain. Afebrile. No leukocytosis. Check UA, Ucx    #hyponatremia, mild, resolved    #Prostate CA, stable - palliative cs appreciated. daphney cs appreciated - cont daily prednisone. psa responding to current tx w/ lupron, docetaxel, xgeva - cont on dc. rad onc cs appreciated - rad therapy while inpt - pt completed 5 sessions of RT    #anemia, asymptomatic, stable - baseline hgb 11-12, no acute/active s/s of blood loss. outpt fu.     #hypokalemia, mild, asymptomatc - repleted    #dvt ppx: pt's INR 1.8 and rising    ELevated INR: Hemon[facility] called. With PT 21.5 and INR 1.83 despite Vitamin K, PT with 1:1 mix to distinguish factor deficiency from circulating anticoagulant ordered by hemonc. Consult apprerciated    #dispo. sister to make all decisions at this time (Gailjerry Cruz [phone]) per pt.  undetermined date   #outpt fu. pmd. onc, rad onc dr carlisle @ [facility].

## 2020-01-31 NOTE — PROGRESS NOTE ADULT - SUBJECTIVE AND OBJECTIVE BOX
CHIEF COMPLAINT/INTERVAL HISTORY:    Patient is a 64y old  Male who presents with a chief complaint of Dyspnea secondary to Large located effusion and suspected recurrent Bilateral PNA (30 Jan 2020 17:55)      HPI:  Pt was seen and evaluated on 1/20/20 at approx 9: 30pm. Delayed documentation completion.     ED HPI: 64yoM; with pmh signif for Prostate Ca with mets to ribs(currently on chemo), HTN; now p/w cough, sob, fever x1 week, progressively worsening.  denies sick contacts. reports traveling from [place] after diagnosis of prostate ca complicated by b/l ptx in november and another hospitalization in december. denies cp/palp. denies abd pain: SOCIAL: No tobacco/illicit substance use/social/EtOH    Above ED HPI reviewed and noted: patient reports having a history of Bilateral Pneumonia approx 1.5 months prior with associated bilateral Pleural effusions which required draining. In addition to above, patient is scheduled palliative Radiation therapy today. He is on ADT (lupron), Taxotere and Xgeva for Prostate adenocarcinoma with Mets to Lung, Bone and Lymph. (20 Jan 2020 21:22)      SUBJECTIVE & OBJECTIVE/ ROS: Pt seen and examined at bedside. No acute overnight events. SOB & cough about the same. Increased urinary frequency overnight. No dysuria or flank pain. Denies fever/chills, headache, lightheadedness, dizziness, chest pain, palpitations, cough, abd pain, nausea/vomiting/diarrhea, muscle pain.        ICU Vital Signs Last 24 Hrs  T(C): 36.9 (30 Jan 2020 23:49), Max: 36.9 (30 Jan 2020 23:49)  T(F): 98.4 (30 Jan 2020 23:49), Max: 98.4 (30 Jan 2020 23:49)  HR: 90 (31 Jan 2020 14:51) (90 - 99)  BP: 139/87 (30 Jan 2020 23:49) (139/87 - 139/87)  BP(mean): --  ABP: --  ABP(mean): --  RR: 18 (30 Jan 2020 23:49) (18 - 18)  SpO2: 99% (31 Jan 2020 14:51) (92% - 99%)        MEDICATIONS  (STANDING):  albuterol/ipratropium for Nebulization 3 milliLiter(s) Nebulizer every 6 hours  ALPRAZolam 0.25 milliGRAM(s) Oral at bedtime  doxycycline hyclate Capsule 100 milliGRAM(s) Oral every 12 hours  finasteride 5 milliGRAM(s) Oral daily  lidocaine   Patch 1 Patch Transdermal daily  meropenem  IVPB 1000 milliGRAM(s) IV Intermittent every 8 hours  oxyCODONE    IR 10 milliGRAM(s) Oral every 6 hours  polyethylene glycol 3350 17 Gram(s) Oral at bedtime  potassium phosphate IVPB 15 milliMole(s) IV Intermittent once  predniSONE   Tablet 5 milliGRAM(s) Oral two times a day  sodium chloride 0.9%. 1000 milliLiter(s) (75 mL/Hr) IV Continuous <Continuous>  tamsulosin 0.4 milliGRAM(s) Oral at bedtime    MEDICATIONS  (PRN):  acetaminophen   Tablet .. 650 milliGRAM(s) Oral every 6 hours PRN Temp greater or equal to 38C (100.4F), Mild Pain (1 - 3)  lactulose Syrup 10 Gram(s) Oral two times a day PRN Constipation  oxycodone    5 mG/acetaminophen 325 mG 1 Tablet(s) Oral every 4 hours PRN Moderate Pain (4 - 6)  oxyCODONE    IR 10 milliGRAM(s) Oral every 4 hours PRN Severe Pain (7 - 10)      LABS:                        9.2    6.82  )-----------( 517      ( 31 Jan 2020 08:51 )             30.2     01-31    137  |  100  |  6.0<L>  ----------------------------<  108<H>  3.6   |  26.0  |  0.28<L>    Ca    8.3<L>      31 Jan 2020 08:51  Phos  1.8     01-31  Mg     1.8     01-31      PT/INR - ( 31 Jan 2020 08:51 )   PT: 19.7 sec;   INR: 1.69 ratio               CAPILLARY BLOOD GLUCOSE          RECENT CULTURES:      RADIOLOGY & ADDITIONAL TESTS:      PHYSICAL EXAM:    PHYSICAL EXAM.    GEN - appears age appropriate. well nourished. pleasant. no distress.   HEENT - NCAT, EOMI, BETITO  RESP -decreased breath sounds on LLL  CARDIO - NS1S2, RRR. No murmurs/rubs/gallops.  ABD - Soft/Non tender/Non distended. Normal BS x4 quadrants. no guarding/rebound tenderness.  Ext - No LG.  MSK - BL 5/5 strength on upper and lower extremities.   Neuro - AAOx3. cn 2-12 grossly intact  Psych - normal affect  Skin - c/d/i. no rashes/lesions

## 2020-01-31 NOTE — PROGRESS NOTE ADULT - ASSESSMENT
Impression:  Pneumonia  large loculated effusion  LEFT lobe atelectasis  Shortness of breath little improvement  Plan:  s/p thoracentesis on 1/27/2020; cultures with Staph species   continue Merrem 1 gram Q 8H  Continue  DOXYCYCLINE 100mg po BID  CONSIDER REPEAT CT scan chest.    Pleural fluid cx growing Staph aureus (MSSA).    Linezolid IV changed to doxy PO per ID.   Continue with Merren IV.   Pt continues to have SOB & cough.   Repeat CT chest today    Increased urinary frequency overnight-   No dysuria or flank pain. Afebrile. No leukocytosis.   Check UA, Ucx    #Prostate CA, stable -   Daily prednisone.PSA responding to current tx w/ lupron, docetaxel, xgeva -   Completed 5/5 RT treatments- pain is much less intense     Insomnia  Xanax 0.25 mg HS  F/U on Frequency on Urination  Flomax given earlier before 5    Goals of Care  Planning on returning home shares housing with his sister and mother.  He remains a FULL CODE- panning on continuing chemotherapy treatments  HCP in place

## 2020-02-01 ENCOUNTER — OUTPATIENT (OUTPATIENT)
Dept: OUTPATIENT SERVICES | Facility: HOSPITAL | Age: 65
LOS: 1 days | End: 2020-02-01
Payer: MEDICAID

## 2020-02-01 LAB
ANION GAP SERPL CALC-SCNC: 12 MMOL/L — SIGNIFICANT CHANGE UP (ref 5–17)
APPEARANCE UR: CLEAR — SIGNIFICANT CHANGE UP
BASOPHILS # BLD AUTO: 0.03 K/UL — SIGNIFICANT CHANGE UP (ref 0–0.2)
BASOPHILS NFR BLD AUTO: 0.5 % — SIGNIFICANT CHANGE UP (ref 0–2)
BILIRUB UR-MCNC: NEGATIVE — SIGNIFICANT CHANGE UP
BUN SERPL-MCNC: 8 MG/DL — SIGNIFICANT CHANGE UP (ref 8–20)
CALCIUM SERPL-MCNC: 8.1 MG/DL — LOW (ref 8.6–10.2)
CHLORIDE SERPL-SCNC: 102 MMOL/L — SIGNIFICANT CHANGE UP (ref 98–107)
CO2 SERPL-SCNC: 24 MMOL/L — SIGNIFICANT CHANGE UP (ref 22–29)
COLOR SPEC: YELLOW — SIGNIFICANT CHANGE UP
CREAT SERPL-MCNC: 0.29 MG/DL — LOW (ref 0.5–1.3)
CULTURE RESULTS: SIGNIFICANT CHANGE UP
DIFF PNL FLD: NEGATIVE — SIGNIFICANT CHANGE UP
EOSINOPHIL # BLD AUTO: 0.03 K/UL — SIGNIFICANT CHANGE UP (ref 0–0.5)
EOSINOPHIL NFR BLD AUTO: 0.5 % — SIGNIFICANT CHANGE UP (ref 0–6)
GLUCOSE SERPL-MCNC: 112 MG/DL — HIGH (ref 70–99)
GLUCOSE UR QL: NEGATIVE MG/DL — SIGNIFICANT CHANGE UP
HCT VFR BLD CALC: 27.6 % — LOW (ref 39–50)
HGB BLD-MCNC: 8.5 G/DL — LOW (ref 13–17)
IMM GRANULOCYTES NFR BLD AUTO: 0.7 % — SIGNIFICANT CHANGE UP (ref 0–1.5)
INR BLD: 1.71 RATIO — HIGH (ref 0.88–1.16)
KETONES UR-MCNC: NEGATIVE — SIGNIFICANT CHANGE UP
LEUKOCYTE ESTERASE UR-ACNC: NEGATIVE — SIGNIFICANT CHANGE UP
LYMPHOCYTES # BLD AUTO: 0.54 K/UL — LOW (ref 1–3.3)
LYMPHOCYTES # BLD AUTO: 9.5 % — LOW (ref 13–44)
MAGNESIUM SERPL-MCNC: 1.7 MG/DL — SIGNIFICANT CHANGE UP (ref 1.6–2.6)
MCHC RBC-ENTMCNC: 27.4 PG — SIGNIFICANT CHANGE UP (ref 27–34)
MCHC RBC-ENTMCNC: 30.8 GM/DL — LOW (ref 32–36)
MCV RBC AUTO: 89 FL — SIGNIFICANT CHANGE UP (ref 80–100)
MONOCYTES # BLD AUTO: 0.34 K/UL — SIGNIFICANT CHANGE UP (ref 0–0.9)
MONOCYTES NFR BLD AUTO: 6 % — SIGNIFICANT CHANGE UP (ref 2–14)
NEUTROPHILS # BLD AUTO: 4.7 K/UL — SIGNIFICANT CHANGE UP (ref 1.8–7.4)
NEUTROPHILS NFR BLD AUTO: 82.8 % — HIGH (ref 43–77)
NITRITE UR-MCNC: NEGATIVE — SIGNIFICANT CHANGE UP
ORGANISM # SPEC MICROSCOPIC CNT: SIGNIFICANT CHANGE UP
ORGANISM # SPEC MICROSCOPIC CNT: SIGNIFICANT CHANGE UP
PH UR: 7 — SIGNIFICANT CHANGE UP (ref 5–8)
PHOSPHATE SERPL-MCNC: 1.8 MG/DL — LOW (ref 2.4–4.7)
PLATELET # BLD AUTO: 443 K/UL — HIGH (ref 150–400)
POTASSIUM SERPL-MCNC: 3.8 MMOL/L — SIGNIFICANT CHANGE UP (ref 3.5–5.3)
POTASSIUM SERPL-SCNC: 3.8 MMOL/L — SIGNIFICANT CHANGE UP (ref 3.5–5.3)
PROT UR-MCNC: NEGATIVE MG/DL — SIGNIFICANT CHANGE UP
PROTHROM AB SERPL-ACNC: 20 SEC — HIGH (ref 10–12.9)
RBC # BLD: 3.1 M/UL — LOW (ref 4.2–5.8)
RBC # FLD: 18.1 % — HIGH (ref 10.3–14.5)
SODIUM SERPL-SCNC: 138 MMOL/L — SIGNIFICANT CHANGE UP (ref 135–145)
SP GR SPEC: 1.01 — SIGNIFICANT CHANGE UP (ref 1.01–1.02)
SPECIMEN SOURCE: SIGNIFICANT CHANGE UP
UROBILINOGEN FLD QL: 1 MG/DL
WBC # BLD: 5.68 K/UL — SIGNIFICANT CHANGE UP (ref 3.8–10.5)
WBC # FLD AUTO: 5.68 K/UL — SIGNIFICANT CHANGE UP (ref 3.8–10.5)

## 2020-02-01 PROCEDURE — 99232 SBSQ HOSP IP/OBS MODERATE 35: CPT

## 2020-02-01 PROCEDURE — 99233 SBSQ HOSP IP/OBS HIGH 50: CPT

## 2020-02-01 PROCEDURE — G9001: CPT

## 2020-02-01 RX ADMIN — Medication 3 MILLIGRAM(S): at 21:51

## 2020-02-01 RX ADMIN — MEROPENEM 100 MILLIGRAM(S): 1 INJECTION INTRAVENOUS at 05:21

## 2020-02-01 RX ADMIN — OXYCODONE HYDROCHLORIDE 10 MILLIGRAM(S): 5 TABLET ORAL at 18:37

## 2020-02-01 RX ADMIN — OXYCODONE HYDROCHLORIDE 10 MILLIGRAM(S): 5 TABLET ORAL at 06:15

## 2020-02-01 RX ADMIN — OXYCODONE HYDROCHLORIDE 10 MILLIGRAM(S): 5 TABLET ORAL at 12:13

## 2020-02-01 RX ADMIN — MEROPENEM 100 MILLIGRAM(S): 1 INJECTION INTRAVENOUS at 14:55

## 2020-02-01 RX ADMIN — Medication 5 MILLIGRAM(S): at 05:21

## 2020-02-01 RX ADMIN — OXYCODONE HYDROCHLORIDE 10 MILLIGRAM(S): 5 TABLET ORAL at 05:20

## 2020-02-01 RX ADMIN — Medication 3 MILLILITER(S): at 15:11

## 2020-02-01 RX ADMIN — OXYCODONE HYDROCHLORIDE 10 MILLIGRAM(S): 5 TABLET ORAL at 16:56

## 2020-02-01 RX ADMIN — OXYCODONE HYDROCHLORIDE 10 MILLIGRAM(S): 5 TABLET ORAL at 21:51

## 2020-02-01 RX ADMIN — MEROPENEM 100 MILLIGRAM(S): 1 INJECTION INTRAVENOUS at 21:51

## 2020-02-01 RX ADMIN — TAMSULOSIN HYDROCHLORIDE 0.4 MILLIGRAM(S): 0.4 CAPSULE ORAL at 08:01

## 2020-02-01 RX ADMIN — Medication 100 MILLIGRAM(S): at 05:21

## 2020-02-01 RX ADMIN — Medication 3 MILLILITER(S): at 21:03

## 2020-02-01 RX ADMIN — Medication 0.25 MILLIGRAM(S): at 21:51

## 2020-02-01 RX ADMIN — FINASTERIDE 5 MILLIGRAM(S): 5 TABLET, FILM COATED ORAL at 05:22

## 2020-02-01 RX ADMIN — Medication 100 MILLIGRAM(S): at 16:56

## 2020-02-01 RX ADMIN — OXYCODONE HYDROCHLORIDE 10 MILLIGRAM(S): 5 TABLET ORAL at 22:50

## 2020-02-01 RX ADMIN — Medication 5 MILLIGRAM(S): at 16:57

## 2020-02-01 NOTE — PROGRESS NOTE ADULT - ASSESSMENT
The patient is a 64 year old male with prostate adenocarcinoma with mets to the  Bone, Lung and Lymp on ADT (Lupron), Taxotere, Xgeva therapy who presented to the Er with complaints of worsening shortness of breath. CTA of the chest was negative for PE,  Diffuse bilateral areas of pulmonary consolidation with pneumatocele formation and Large loculated left pleural effusions with compressive atelectasis of the left lower lobe. Status post thoracentesis by IR. Pleural fluid was consistent with exudative fluid; cultures positive for MSSA. Started on IV antibiotics, ID consulted.     Assessment/plan:    1. Acute hypoxic respiratory failure secondary to bilateral pneumonia complicated by large loculated pleural effusion; Currently on meropenem and and doxycycline  Repeat CT chest with improvement    2. Hyponatremia: Resolved  Monitor BMP    3. MEtastatic prostate cancer: Continue prednisone  Completed 5 sessions of RT    4. Anemia secondary to malignancy  Hb/hct stable  Monitor cbc  No acute signs or symptoms of active bleeding    VTE_ SCDS bilaterally

## 2020-02-01 NOTE — PROGRESS NOTE ADULT - SUBJECTIVE AND OBJECTIVE BOX
[facility]  INFECTIOUS DISEASES AND INTERNAL MEDICINE at [place]  =======================================================  Chandler Mccrary MD  Diplomates American Board of Internal Medicine and Infectious Diseases  Telephone [phone]  Fax            [phone]  =======================================================    [facility]-[id number]  [place]   follow up:  Pneumonia    still with shortness of breath this AM  no fevers  =======================================================  REVIEW OF SYSTEMS:  CONSTITUTIONAL:  no fevers  HEENT:  No diplopia or blurred vision.  No earache, sore throat or runny nose.  CARDIOVASCULAR:  No pressure, squeezing, strangling, tightness, heaviness or aching about the chest, neck, axilla or epigastrium.  RESPIRATORY:  + shortness of breath  GASTROINTESTINAL:  No nausea, vomiting or diarrhea.  GENITOURINARY:  No dysuria, frequency or urgency. No Blood in urine  MUSCULOSKELETAL:  no joint aches, no muscle pain  SKIN:  No change in skin, hair or nails.  NEUROLOGIC:  No Headaches, seizures or weakness.  PSYCHIATRIC:  No disorder of thought or mood.  ENDOCRINE:  No heat or cold intolerance  HEMATOLOGICAL:  No easy bruising or bleeding.   =======================================================  Allergies  No Known Allergies     ======================================================  Physical Exam:  ============  (see section below)    General:  No acute distress.  Eye: Pupils are equal, round and reactive to light, Extraocular movements are intact, Normal conjunctiva.  HENT: Normocephalic, Oral mucosa is moist, No pharyngeal erythema, No sinus tenderness.  Neck: Supple, No lymphadenopathy.  Respiratory:   POOR aeration of the LEFT side to mid lung zone,  + DULLNESS to percuss  Cardiovascular: Normal rate, Regular rhythm,   Gastrointestinal: Soft, Non-tender, Non-distended, Normal bowel sounds.  Genitourinary: No costovertebral angle tenderness.  Lymphatics: No lymphadenopathy neck,   Musculoskeletal: Normal range of motion, Normal strength.  Integumentary: No rash.  Neurologic: Alert, Oriented, No focal deficits, Cranial Nerves II-XII are grossly intact.  Psychiatric: Appropriate mood & affect.    =======================================================   EXAM:  CT CHEST                        PROCEDURE DATE:  01/31/2020    INTERPRETATION:  CLINICAL INFORMATION: Chest pain and shortness of breath. Evaluate loculated pleural effusion and pneumothorax. Metastatic prostate cancer.  COMPARISON: 1/20/2020  PROCEDURE:   CT of the Chest was performed without intravenous contrast.  Sagittal and coronal reformats were performed.  FINDINGS:  LUNGS AND AIRWAYS: Patent central airways.  Bilateral consolidation and pneumatoceles are reidentified, without significant change from prior examination.   PLEURA: Small right pneumothorax is reidentified, slightly increased in size compared to previous examination 1/20/2020. Loculated left pleural effusion has decreased in size. Left pleural calcifications are noted.  MEDIASTINUM AND MARIELA: No lymphadenopathy.  VESSELS: Atherosclerotic calcifications.  HEART: Heart size is normal. No pericardial effusion.  CHEST WALL AND LOWER NECK: Within normal limits.  VISUALIZED UPPER ABDOMEN: Within normal limits.  BONES: Bony destructive changes are present in the left second and sixth ribs. There are scattered small vague lucencies within the spine with mixed lytic and sclerotic changes in T6 vertebral body.    IMPRESSION:   Small right-sided pneumothorax, slightly increased in size compared to previous examination 1/20/2020.  Loculated left pleural effusion has decreased in size.  Bilateral consolidation and pneumatoceles, without significant change from previous exam.  Bony metastatic disease.    ALLY CASTANON M.D., ATTENDING RADIOLOGIST  This document has been electronically signed. Jan 31 2020  3:52PM      =====================================================  Vitals:  ============  T(F): 98 (01 Feb 2020 08:49), Max: 98.6 (01 Feb 2020 00:04)  HR: 92 (01 Feb 2020 08:49)  BP: 134/77 (01 Feb 2020 08:49)  RR: 18 (01 Feb 2020 08:49)  SpO2: 98% (01 Feb 2020 08:49) (97% - 100%)  temp max in last 48H T(F): , Max: 98.6 (01-30-20 @ 15:50)    =======================================================  Current Antibiotics:  doxycycline hyclate Capsule 100 milliGRAM(s) Oral every 12 hours  meropenem  IVPB 1000 milliGRAM(s) IV Intermittent every 8 hours    Other medications:  albuterol/ipratropium for Nebulization 3 milliLiter(s) Nebulizer every 6 hours  ALPRAZolam 0.25 milliGRAM(s) Oral at bedtime  finasteride 5 milliGRAM(s) Oral daily  lidocaine   Patch 1 Patch Transdermal daily  melatonin 3 milliGRAM(s) Oral at bedtime  oxyCODONE    IR 10 milliGRAM(s) Oral every 6 hours  polyethylene glycol 3350 17 Gram(s) Oral at bedtime  predniSONE   Tablet 5 milliGRAM(s) Oral two times a day  sodium chloride 0.9%. 1000 milliLiter(s) IV Continuous <Continuous>  tamsulosin 0.4 milliGRAM(s) Oral at bedtime      =======================================================  Labs:                        8.5    5.68  )-----------( 443      ( 01 Feb 2020 08:35 )             27.6       WBC Count: 5.68 K/uL (02-01-20 @ 08:35)  WBC Count: 6.82 K/uL (01-31-20 @ 08:51)  WBC Count: 6.96 K/uL (01-30-20 @ 09:30)  WBC Count: 8.53 K/uL (01-29-20 @ 10:20)  WBC Count: 10.40 K/uL (01-28-20 @ 08:43)      02-01    138  |  102  |  8.0  ----------------------------<  112<H>  3.8   |  24.0  |  0.29<L>    Ca    8.1<L>      01 Feb 2020 08:35  Phos  1.8     02-01  Mg     1.7     02-01        Culture - Acid Fast - Body Fluid w/Smear (collected 01-27-20 @ 21:54)  Source: .Body Fluid    Culture - Fungal, Body Fluid (collected 01-27-20 @ 13:05)  Source: .Body Fluid    Culture - Body Fluid with Gram Stain (collected 01-27-20 @ 13:04)  Source: .Body Fluid  Gram Stain (01-27-20 @ 15:34):    Numerous White blood cells    No organisms seen  Final Report (02-01-20 @ 10:17):    Numerous Staphylococcus aureus  Organism: Staphylococcus aureus (02-01-20 @ 10:17)  Organism: Staphylococcus aureus (02-01-20 @ 10:17)    Sensitivities:      -  Ampicillin/Sulbactam: S <=8/4      -  Cefazolin: S <=4      -  Clindamycin: S <=0.5      -  Erythromycin: S <=0.5      -  Gentamicin: S <=4 Should not be used as monotherapy      -  Oxacillin: S <=0.25      -  Penicillin: R >8      -  RIF- Rifampin: S <=1 Should not be used as monotherapy      -  Tetra/Doxy: S <=4      -  Trimethoprim/Sulfamethoxazole: S <=0.5/9.5      -  Vancomycin: S 2      Method Type: KERRI    Culture - Blood (collected 01-26-20 @ 18:25)  Source: .Blood  Final Report (01-31-20 @ 19:01):    No growth at 5 days.    Culture - Blood (collected 01-26-20 @ 18:25)  Source: .Blood  Final Report (01-31-20 @ 19:01):    No growth at 5 days.    Culture - Sputum (collected 01-23-20 @ 09:17)  Source: .Sputum  Gram Stain (01-23-20 @ 14:01):    Moderate WBC's    No organisms seen  Final Report (01-25-20 @ 09:57):    Moderate Routine respiratory teresa present    Culture - Blood (collected 01-21-20 @ 10:00)  Source: .Blood  Final Report (01-26-20 @ 11:00):    No growth at 5 days.    Culture - Blood (collected 01-20-20 @ 10:24)  Source: .Blood  Final Report (01-25-20 @ 11:01):    No growth at 5 days.    Culture - Blood (collected 01-20-20 @ 10:24)  Source: .Blood  Final Report (01-25-20 @ 11:01):    No growth at 5 days.      Creatinine, Serum: 0.29 mg/dL (02-01-20 @ 08:35)  Creatinine, Serum: 0.28 mg/dL (01-31-20 @ 08:51)  Creatinine, Serum: 0.34 mg/dL (01-30-20 @ 09:30)  Creatinine, Serum: 0.35 mg/dL (01-29-20 @ 10:20)  Creatinine, Serum: 0.28 mg/dL (01-28-20 @ 08:41)

## 2020-02-01 NOTE — PROGRESS NOTE ADULT - ASSESSMENT
This 64yoM; with pmh signif for Prostate Ca with mets to ribs diagnosed in 11/2019 (currently on chemo), HTN; now p/w cough, sob, fever x1 week, progressively worsening.  denies sick contacts. reports traveling from [place] after diagnosis of prostate ca complicated by b/l ptx in november and another hospitalization in december. denies cp/palp. denies abd pain: SOCIAL: No tobacco/illicit substance use/social/EtOH    patient reports having a history of Bilateral Pneumonia approx 1.5 months prior with associated bilateral Pleural effusions which required draining. In addition to above, patient is scheduled palliative Radiation therapy today. He is on ADT (lupron), Taxotere and Xgeva for Prostate adenocarcinoma with Mets to Lung, Bone and Lymph. (20 Jan 2020 21:22)    Chest imaging xray showed:  No significant change in the suspected bilateral pleural effusions, left greater than right.   Possible loculated fluid on the left. Atelectatic changes and/or underlying infection is possible. Please correlate clinically. Cavity or pneumatocele in the left apex again seen.  Of note, the small right-sided pneumothorax on chest CT from 1/20/2020 is not as well appreciated on this x-ray. The presence of a small right-sided pneumothorax was discussed with Dr. Silva at 12:25 PM on 1/22/2020.    A CT scan was done and showed:  Diffuse bilateral areas of pulmonary consolidation with pneumatocele formation  Large loculated left pleural effusions with compressive atelectasis of the left lower lobe.      patient was initially started On vanco & Zosyn, then Ceftriaxone and Zithromax was added as well.   We are consulted to evaluate this patient for PNA.     Impression:  PNA  large loculated effusion  LEFT lobe atelectasis  Shortness of breath    Plan:  repeat CT scan with "Patent central airways.  Bilateral consolidation and pneumatoceles are reidentified, without significant change from prior examination."  s/p thoracentesis on 1/27/2020; cultures with Staph species  - continue Merrem 1 gram Q 8H  - Continue  DOXYCYCLINE 100mg po BID    chest exam without significant improvement.   CONSIDER REPEAT CT scan chest.

## 2020-02-01 NOTE — PROGRESS NOTE ADULT - SUBJECTIVE AND OBJECTIVE BOX
CC: Follow up    INTERVAL HPI/OVERNIGHT EVENTS: Patient seen and examined, no acute complaints overnight. Afebrile. Denies cough, sob or palpitations.       Vital Signs Last 24 Hrs  T(C): 36.7 (2020 08:49), Max: 37 (2020 00:04)  T(F): 98 (2020 08:49), Max: 98.6 (2020 00:04)  HR: 92 (2020 08:49) (91 - 97)  BP: 134/77 (2020 08:49) (124/74 - 150/79)  BP(mean): --  RR: 18 (2020 08:49) (16 - 18)  SpO2: 98% (2020 08:49) (97% - 100%)    PHYSICAL EXAM:    GENERAL: NAD, AOX3  HEAD:  Atraumatic, Normocephalic  EYES: EOMI, PERRLA, conjunctiva and sclera clear  ENMT: Moist mucous membranes  CHEST/LUNG: Bibasilar crackles   HEART: Regular rate and rhythm; No murmurs, rubs, or gallops  ABDOMEN: Soft, Nontender, Nondistended; Bowel sounds present  EXTREMITIES:  2+ Peripheral Pulses, No clubbing, cyanosis, or edema        MEDICATIONS  (STANDING):  albuterol/ipratropium for Nebulization 3 milliLiter(s) Nebulizer every 6 hours  ALPRAZolam 0.25 milliGRAM(s) Oral at bedtime  doxycycline hyclate Capsule 100 milliGRAM(s) Oral every 12 hours  finasteride 5 milliGRAM(s) Oral daily  lidocaine   Patch 1 Patch Transdermal daily  melatonin 3 milliGRAM(s) Oral at bedtime  meropenem  IVPB 1000 milliGRAM(s) IV Intermittent every 8 hours  oxyCODONE    IR 10 milliGRAM(s) Oral every 6 hours  polyethylene glycol 3350 17 Gram(s) Oral at bedtime  predniSONE   Tablet 5 milliGRAM(s) Oral two times a day  tamsulosin 0.4 milliGRAM(s) Oral at bedtime    MEDICATIONS  (PRN):  acetaminophen   Tablet .. 650 milliGRAM(s) Oral every 6 hours PRN Temp greater or equal to 38C (100.4F), Mild Pain (1 - 3)  lactulose Syrup 10 Gram(s) Oral two times a day PRN Constipation  oxycodone    5 mG/acetaminophen 325 mG 1 Tablet(s) Oral every 4 hours PRN Moderate Pain (4 - 6)  oxyCODONE    IR 10 milliGRAM(s) Oral every 4 hours PRN Severe Pain (7 - 10)      Allergies    No Known Allergies    Intolerances          LABS:                          8.5    5.68  )-----------( 443      ( 2020 08:35 )             27.6     02-    138  |  102  |  8.0  ----------------------------<  112<H>  3.8   |  24.0  |  0.29<L>    Ca    8.1<L>      2020 08:35  Phos  1.8     02-  Mg     1.7     02-      PT/INR - ( 2020 08:35 )   PT: 20.0 sec;   INR: 1.71 ratio           Urinalysis Basic - ( 2020 03:12 )    Color: Yellow / Appearance: Clear / S.010 / pH: x  Gluc: x / Ketone: Negative  / Bili: Negative / Urobili: 1 mg/dL   Blood: x / Protein: Negative mg/dL / Nitrite: Negative   Leuk Esterase: Negative / RBC: x / WBC x   Sq Epi: x / Non Sq Epi: x / Bacteria: x        RADIOLOGY & ADDITIONAL TESTS:

## 2020-02-02 LAB
ANION GAP SERPL CALC-SCNC: 10 MMOL/L — SIGNIFICANT CHANGE UP (ref 5–17)
BASOPHILS # BLD AUTO: 0.02 K/UL — SIGNIFICANT CHANGE UP (ref 0–0.2)
BASOPHILS NFR BLD AUTO: 0.4 % — SIGNIFICANT CHANGE UP (ref 0–2)
BUN SERPL-MCNC: 9 MG/DL — SIGNIFICANT CHANGE UP (ref 8–20)
CALCIUM SERPL-MCNC: 8.4 MG/DL — LOW (ref 8.6–10.2)
CHLORIDE SERPL-SCNC: 100 MMOL/L — SIGNIFICANT CHANGE UP (ref 98–107)
CO2 SERPL-SCNC: 28 MMOL/L — SIGNIFICANT CHANGE UP (ref 22–29)
CREAT SERPL-MCNC: 0.32 MG/DL — LOW (ref 0.5–1.3)
CULTURE RESULTS: NO GROWTH — SIGNIFICANT CHANGE UP
EOSINOPHIL # BLD AUTO: 0.03 K/UL — SIGNIFICANT CHANGE UP (ref 0–0.5)
EOSINOPHIL NFR BLD AUTO: 0.6 % — SIGNIFICANT CHANGE UP (ref 0–6)
GLUCOSE SERPL-MCNC: 103 MG/DL — HIGH (ref 70–99)
HCT VFR BLD CALC: 28.5 % — LOW (ref 39–50)
HGB BLD-MCNC: 8.5 G/DL — LOW (ref 13–17)
IMM GRANULOCYTES NFR BLD AUTO: 0.8 % — SIGNIFICANT CHANGE UP (ref 0–1.5)
INR BLD: 1.69 RATIO — HIGH (ref 0.88–1.16)
LYMPHOCYTES # BLD AUTO: 0.78 K/UL — LOW (ref 1–3.3)
LYMPHOCYTES # BLD AUTO: 16 % — SIGNIFICANT CHANGE UP (ref 13–44)
MAGNESIUM SERPL-MCNC: 1.7 MG/DL — SIGNIFICANT CHANGE UP (ref 1.6–2.6)
MCHC RBC-ENTMCNC: 27.1 PG — SIGNIFICANT CHANGE UP (ref 27–34)
MCHC RBC-ENTMCNC: 29.8 GM/DL — LOW (ref 32–36)
MCV RBC AUTO: 90.8 FL — SIGNIFICANT CHANGE UP (ref 80–100)
MONOCYTES # BLD AUTO: 0.37 K/UL — SIGNIFICANT CHANGE UP (ref 0–0.9)
MONOCYTES NFR BLD AUTO: 7.6 % — SIGNIFICANT CHANGE UP (ref 2–14)
NEUTROPHILS # BLD AUTO: 3.63 K/UL — SIGNIFICANT CHANGE UP (ref 1.8–7.4)
NEUTROPHILS NFR BLD AUTO: 74.6 % — SIGNIFICANT CHANGE UP (ref 43–77)
PHOSPHATE SERPL-MCNC: 2.1 MG/DL — LOW (ref 2.4–4.7)
PLATELET # BLD AUTO: 388 K/UL — SIGNIFICANT CHANGE UP (ref 150–400)
POTASSIUM SERPL-MCNC: 3.9 MMOL/L — SIGNIFICANT CHANGE UP (ref 3.5–5.3)
POTASSIUM SERPL-SCNC: 3.9 MMOL/L — SIGNIFICANT CHANGE UP (ref 3.5–5.3)
PROTHROM AB SERPL-ACNC: 19.8 SEC — HIGH (ref 10–12.9)
RBC # BLD: 3.14 M/UL — LOW (ref 4.2–5.8)
RBC # FLD: 18 % — HIGH (ref 10.3–14.5)
SODIUM SERPL-SCNC: 138 MMOL/L — SIGNIFICANT CHANGE UP (ref 135–145)
SPECIMEN SOURCE: SIGNIFICANT CHANGE UP
WBC # BLD: 4.87 K/UL — SIGNIFICANT CHANGE UP (ref 3.8–10.5)
WBC # FLD AUTO: 4.87 K/UL — SIGNIFICANT CHANGE UP (ref 3.8–10.5)

## 2020-02-02 PROCEDURE — 99232 SBSQ HOSP IP/OBS MODERATE 35: CPT

## 2020-02-02 PROCEDURE — 99233 SBSQ HOSP IP/OBS HIGH 50: CPT

## 2020-02-02 RX ADMIN — Medication 100 MILLIGRAM(S): at 05:32

## 2020-02-02 RX ADMIN — Medication 5 MILLIGRAM(S): at 17:27

## 2020-02-02 RX ADMIN — Medication 100 MILLIGRAM(S): at 17:27

## 2020-02-02 RX ADMIN — OXYCODONE HYDROCHLORIDE 10 MILLIGRAM(S): 5 TABLET ORAL at 22:38

## 2020-02-02 RX ADMIN — Medication 5 MILLIGRAM(S): at 05:32

## 2020-02-02 RX ADMIN — MEROPENEM 100 MILLIGRAM(S): 1 INJECTION INTRAVENOUS at 05:32

## 2020-02-02 RX ADMIN — FINASTERIDE 5 MILLIGRAM(S): 5 TABLET, FILM COATED ORAL at 11:27

## 2020-02-02 RX ADMIN — Medication 3 MILLIGRAM(S): at 21:38

## 2020-02-02 RX ADMIN — Medication 3 MILLILITER(S): at 15:16

## 2020-02-02 RX ADMIN — OXYCODONE HYDROCHLORIDE 10 MILLIGRAM(S): 5 TABLET ORAL at 17:24

## 2020-02-02 RX ADMIN — OXYCODONE HYDROCHLORIDE 10 MILLIGRAM(S): 5 TABLET ORAL at 11:27

## 2020-02-02 RX ADMIN — Medication 3 MILLILITER(S): at 20:33

## 2020-02-02 RX ADMIN — MEROPENEM 100 MILLIGRAM(S): 1 INJECTION INTRAVENOUS at 21:39

## 2020-02-02 RX ADMIN — OXYCODONE HYDROCHLORIDE 10 MILLIGRAM(S): 5 TABLET ORAL at 16:40

## 2020-02-02 RX ADMIN — MEROPENEM 100 MILLIGRAM(S): 1 INJECTION INTRAVENOUS at 14:06

## 2020-02-02 RX ADMIN — POLYETHYLENE GLYCOL 3350 17 GRAM(S): 17 POWDER, FOR SOLUTION ORAL at 21:39

## 2020-02-02 RX ADMIN — Medication 3 MILLILITER(S): at 09:05

## 2020-02-02 RX ADMIN — OXYCODONE HYDROCHLORIDE 10 MILLIGRAM(S): 5 TABLET ORAL at 12:25

## 2020-02-02 RX ADMIN — OXYCODONE HYDROCHLORIDE 10 MILLIGRAM(S): 5 TABLET ORAL at 21:38

## 2020-02-02 RX ADMIN — OXYCODONE HYDROCHLORIDE 10 MILLIGRAM(S): 5 TABLET ORAL at 06:28

## 2020-02-02 RX ADMIN — TAMSULOSIN HYDROCHLORIDE 0.4 MILLIGRAM(S): 0.4 CAPSULE ORAL at 11:26

## 2020-02-02 RX ADMIN — OXYCODONE HYDROCHLORIDE 10 MILLIGRAM(S): 5 TABLET ORAL at 05:32

## 2020-02-02 NOTE — PROGRESS NOTE ADULT - SUBJECTIVE AND OBJECTIVE BOX
[facility]            (For Outpatient Use Only) Initial Admit Date: 11/30/2017   Inpt/Obs Admit Date: Inpt: 11/30/17 / Obs: N/A   Discharge Date:    Alla Garcia:  [de-identified]   MRN: [de-identified]   CSN: [id number]        ENCOUNTER  Patient Cla CC: FOllow up    INTERVAL HPI/OVERNIGHT EVENTS: Patient seen and examined, complaints of non productive cough. Denies sob. Currently on 3 liters of o2 via nasal cannula.       Vital Signs Last 24 Hrs  T(C): 36.6 (2020 08:21), Max: 36.7 (2020 16:56)  T(F): 97.9 (2020 08:21), Max: 98 (2020 16:56)  HR: 88 (2020 09:08) (83 - 94)  BP: 119/74 (2020 08:21) (119/74 - 133/78)  BP(mean): --  RR: 18 (2020 08:21) (18 - 19)  SpO2: 93% (2020 09:08) (93% - 100%)    PHYSICAL EXAM:    GENERAL: NAD, AOX3  HEAD:  Atraumatic, Normocephalic  EYES: EOMI, PERRLA, conjunctiva and sclera clear  ENMT: Moist mucous membranes  NECK: Supple, No JVD  CHEST/LUNG: Right basilar rhonchi   HEART: Regular rate and rhythm; No murmurs, rubs, or gallops  ABDOMEN: Soft, Nontender, Nondistended; Bowel sounds present  EXTREMITIES:  2+ Peripheral Pulses, No clubbing, cyanosis, or edema        MEDICATIONS  (STANDING):  albuterol/ipratropium for Nebulization 3 milliLiter(s) Nebulizer every 6 hours  ALPRAZolam 0.25 milliGRAM(s) Oral at bedtime  doxycycline hyclate Capsule 100 milliGRAM(s) Oral every 12 hours  finasteride 5 milliGRAM(s) Oral daily  lidocaine   Patch 1 Patch Transdermal daily  melatonin 3 milliGRAM(s) Oral at bedtime  meropenem  IVPB 1000 milliGRAM(s) IV Intermittent every 8 hours  oxyCODONE    IR 10 milliGRAM(s) Oral every 6 hours  polyethylene glycol 3350 17 Gram(s) Oral at bedtime  predniSONE   Tablet 5 milliGRAM(s) Oral two times a day  tamsulosin 0.4 milliGRAM(s) Oral at bedtime    MEDICATIONS  (PRN):  acetaminophen   Tablet .. 650 milliGRAM(s) Oral every 6 hours PRN Temp greater or equal to 38C (100.4F), Mild Pain (1 - 3)  lactulose Syrup 10 Gram(s) Oral two times a day PRN Constipation  oxycodone    5 mG/acetaminophen 325 mG 1 Tablet(s) Oral every 4 hours PRN Moderate Pain (4 - 6)  oxyCODONE    IR 10 milliGRAM(s) Oral every 4 hours PRN Severe Pain (7 - 10)      Allergies    No Known Allergies    Intolerances          LABS:                          8.5    4.87  )-----------( 388      ( 2020 06:04 )             28.5     02    138  |  100  |  9.0  ----------------------------<  103<H>  3.9   |  28.0  |  0.32<L>    Ca    8.4<L>      2020 06:04  Phos  2.1     02-02  Mg     1.7     02-02      PT/INR - ( 2020 08:35 )   PT: 20.0 sec;   INR: 1.71 ratio           Urinalysis Basic - ( 2020 03:12 )    Color: Yellow / Appearance: Clear / S.010 / pH: x  Gluc: x / Ketone: Negative  / Bili: Negative / Urobili: 1 mg/dL   Blood: x / Protein: Negative mg/dL / Nitrite: Negative   Leuk Esterase: Negative / RBC: x / WBC x   Sq Epi: x / Non Sq Epi: x / Bacteria: x        RADIOLOGY & ADDITIONAL TESTS:

## 2020-02-02 NOTE — PROGRESS NOTE ADULT - SUBJECTIVE AND OBJECTIVE BOX
[facility]  INFECTIOUS DISEASES AND INTERNAL MEDICINE at [place]  =======================================================  Chandler Mccrary MD  Diplomates American Board of Internal Medicine and Infectious Diseases  Telephone [phone]  Fax            [phone]  =======================================================    [id number]  DEL JANETTE   follow up:  Pneumonia    no new issues  still with SOB  repeat CT reviewed with patient.     =======================================================  REVIEW OF SYSTEMS:  CONSTITUTIONAL:  no fevers  HEENT:  No diplopia or blurred vision.  No earache, sore throat or runny nose.  CARDIOVASCULAR:  No pressure, squeezing, strangling, tightness, heaviness or aching about the chest, neck, axilla or epigastrium.  RESPIRATORY:  + shortness of breath  GASTROINTESTINAL:  No nausea, vomiting or diarrhea.  GENITOURINARY:  No dysuria, frequency or urgency. No Blood in urine  MUSCULOSKELETAL:  no joint aches, no muscle pain  SKIN:  No change in skin, hair or nails.  NEUROLOGIC:  No Headaches, seizures or weakness.  PSYCHIATRIC:  No disorder of thought or mood.  ENDOCRINE:  No heat or cold intolerance  HEMATOLOGICAL:  No easy bruising or bleeding.   =======================================================  Allergies  No Known Allergies     ======================================================  Physical Exam:  ============  (see section below)    General:  No acute distress.  Eye: Pupils are equal, round and reactive to light, Extraocular movements are intact, Normal conjunctiva.  HENT: Normocephalic, Oral mucosa is moist, No pharyngeal erythema, No sinus tenderness.  Neck: Supple, No lymphadenopathy.  Respiratory:   POOR aeration of the LEFT side to mid lung zone,  + DULLNESS to percuss  Cardiovascular: Normal rate, Regular rhythm,   Gastrointestinal: Soft, Non-tender, Non-distended, Normal bowel sounds.  Genitourinary: No costovertebral angle tenderness.  Lymphatics: No lymphadenopathy neck,   Musculoskeletal: Normal range of motion, Normal strength.  Integumentary: No rash.  Neurologic: Alert, Oriented, No focal deficits, Cranial Nerves II-XII are grossly intact.  Psychiatric: Appropriate mood & affect.    =======================================================   EXAM:  CT CHEST                        PROCEDURE DATE:  01/31/2020    INTERPRETATION:  CLINICAL INFORMATION: Chest pain and shortness of breath. Evaluate loculated pleural effusion and pneumothorax. Metastatic prostate cancer.  COMPARISON: 1/20/2020  PROCEDURE:   CT of the Chest was performed without intravenous contrast.  Sagittal and coronal reformats were performed.  FINDINGS:  LUNGS AND AIRWAYS: Patent central airways.  Bilateral consolidation and pneumatoceles are reidentified, without significant change from prior examination.   PLEURA: Small right pneumothorax is reidentified, slightly increased in size compared to previous examination 1/20/2020. Loculated left pleural effusion has decreased in size. Left pleural calcifications are noted.  MEDIASTINUM AND MARIELA: No lymphadenopathy.  VESSELS: Atherosclerotic calcifications.  HEART: Heart size is normal. No pericardial effusion.  CHEST WALL AND LOWER NECK: Within normal limits.  VISUALIZED UPPER ABDOMEN: Within normal limits.  BONES: Bony destructive changes are present in the left second and sixth ribs. There are scattered small vague lucencies within the spine with mixed lytic and sclerotic changes in T6 vertebral body.    IMPRESSION:   Small right-sided pneumothorax, slightly increased in size compared to previous examination 1/20/2020.  Loculated left pleural effusion has decreased in size.  Bilateral consolidation and pneumatoceles, without significant change from previous exam.  Bony metastatic disease.    ALLY CASTANON M.D., ATTENDING RADIOLOGIST  This document has been electronically signed. Jan 31 2020  3:52PM      =====================================================  Vitals:  ============  T(F): 97.9 (02 Feb 2020 08:21), Max: 98 (01 Feb 2020 16:56)  HR: 88 (02 Feb 2020 09:08)  BP: 119/74 (02 Feb 2020 08:21)  RR: 18 (02 Feb 2020 08:21)  SpO2: 93% (02 Feb 2020 09:08) (93% - 100%)  temp max in last 48H T(F): , Max: 98.6 (02-01-20 @ 00:04)    =======================================================  Current Antibiotics:  doxycycline hyclate Capsule 100 milliGRAM(s) Oral every 12 hours  meropenem  IVPB 1000 milliGRAM(s) IV Intermittent every 8 hours    Other medications:  albuterol/ipratropium for Nebulization 3 milliLiter(s) Nebulizer every 6 hours  ALPRAZolam 0.25 milliGRAM(s) Oral at bedtime  finasteride 5 milliGRAM(s) Oral daily  lidocaine   Patch 1 Patch Transdermal daily  melatonin 3 milliGRAM(s) Oral at bedtime  oxyCODONE    IR 10 milliGRAM(s) Oral every 6 hours  polyethylene glycol 3350 17 Gram(s) Oral at bedtime  predniSONE   Tablet 5 milliGRAM(s) Oral two times a day  tamsulosin 0.4 milliGRAM(s) Oral at bedtime      =======================================================  Labs:                        8.5    4.87  )-----------( 388      ( 02 Feb 2020 06:04 )             28.5       WBC Count: 4.87 K/uL (02-02-20 @ 06:04)  WBC Count: 5.68 K/uL (02-01-20 @ 08:35)  WBC Count: 6.82 K/uL (01-31-20 @ 08:51)  WBC Count: 6.96 K/uL (01-30-20 @ 09:30)  WBC Count: 8.53 K/uL (01-29-20 @ 10:20)      02-02    138  |  100  |  9.0  ----------------------------<  103<H>  3.9   |  28.0  |  0.32<L>    Ca    8.4<L>      02 Feb 2020 06:04  Phos  2.1     02-02  Mg     1.7     02-02        Culture - Acid Fast - Body Fluid w/Smear (collected 01-27-20 @ 21:54)  Source: .Body Fluid    Culture - Fungal, Body Fluid (collected 01-27-20 @ 13:05)  Source: .Body Fluid    Culture - Body Fluid with Gram Stain (collected 01-27-20 @ 13:04)  Source: .Body Fluid  Gram Stain (01-27-20 @ 15:34):    Numerous White blood cells    No organisms seen  Final Report (02-01-20 @ 10:17):    Numerous Staphylococcus aureus  Organism: Staphylococcus aureus (02-01-20 @ 10:17)  Organism: Staphylococcus aureus (02-01-20 @ 10:17)    Sensitivities:      -  Ampicillin/Sulbactam: S <=8/4      -  Cefazolin: S <=4      -  Clindamycin: S <=0.5      -  Erythromycin: S <=0.5      -  Gentamicin: S <=4 Should not be used as monotherapy      -  Oxacillin: S <=0.25      -  Penicillin: R >8      -  RIF- Rifampin: S <=1 Should not be used as monotherapy      -  Tetra/Doxy: S <=4      -  Trimethoprim/Sulfamethoxazole: S <=0.5/9.5      -  Vancomycin: S 2      Method Type: KERRI    Culture - Blood (collected 01-26-20 @ 18:25)  Source: .Blood  Final Report (01-31-20 @ 19:01):    No growth at 5 days.    Culture - Blood (collected 01-26-20 @ 18:25)  Source: .Blood  Final Report (01-31-20 @ 19:01):    No growth at 5 days.    Culture - Sputum (collected 01-23-20 @ 09:17)  Source: .Sputum  Gram Stain (01-23-20 @ 14:01):    Moderate WBC's    No organisms seen  Final Report (01-25-20 @ 09:57):    Moderate Routine respiratory teresa present    Culture - Blood (collected 01-21-20 @ 10:00)  Source: .Blood  Final Report (01-26-20 @ 11:00):    No growth at 5 days.    Culture - Blood (collected 01-20-20 @ 10:24)  Source: .Blood  Final Report (01-25-20 @ 11:01):    No growth at 5 days.    Culture - Blood (collected 01-20-20 @ 10:24)  Source: .Blood  Final Report (01-25-20 @ 11:01):    No growth at 5 days.      Creatinine, Serum: 0.32 mg/dL (02-02-20 @ 06:04)  Creatinine, Serum: 0.29 mg/dL (02-01-20 @ 08:35)  Creatinine, Serum: 0.28 mg/dL (01-31-20 @ 08:51)  Creatinine, Serum: 0.34 mg/dL (01-30-20 @ 09:30)  Creatinine, Serum: 0.35 mg/dL (01-29-20 @ 10:20)

## 2020-02-02 NOTE — PROGRESS NOTE ADULT - ASSESSMENT
This 64yoM; with pmh signif for Prostate Ca with mets to ribs diagnosed in 11/2019 (currently on chemo), HTN; now p/w cough, sob, fever x1 week, progressively worsening.  denies sick contacts. reports traveling from [place] after diagnosis of prostate ca complicated by b/l ptx in november and another hospitalization in december. denies cp/palp. denies abd pain: SOCIAL: No tobacco/illicit substance use/social/EtOH    patient reports having a history of Bilateral Pneumonia approx 1.5 months prior with associated bilateral Pleural effusions which required draining. In addition to above, patient is scheduled palliative Radiation therapy today. He is on ADT (lupron), Taxotere and Xgeva for Prostate adenocarcinoma with Mets to Lung, Bone and Lymph. (20 Jan 2020 21:22)    Chest imaging xray showed:  No significant change in the suspected bilateral pleural effusions, left greater than right.   Possible loculated fluid on the left. Atelectatic changes and/or underlying infection is possible. Please correlate clinically. Cavity or pneumatocele in the left apex again seen.  Of note, the small right-sided pneumothorax on chest CT from 1/20/2020 is not as well appreciated on this x-ray. The presence of a small right-sided pneumothorax was discussed with Dr. Silva at 12:25 PM on 1/22/2020.    A CT scan was done and showed:  Diffuse bilateral areas of pulmonary consolidation with pneumatocele formation  Large loculated left pleural effusions with compressive atelectasis of the left lower lobe.      patient was initially started On vanco & Zosyn, then Ceftriaxone and Zithromax was added as well.   We are consulted to evaluate this patient for PNA.     Impression:  PNA  large loculated effusion  LEFT lobe atelectasis  Shortness of breath    Plan:  repeat CT scan with "Patent central airways.  Bilateral consolidation and pneumatoceles are reidentified, without significant change from prior examination."  s/p thoracentesis on 1/27/2020; cultures with Staph species  - continue Merrem 1 gram Q 8H  - Continue DOXYCYCLINE 100mg po BID  repeat CT exam with minimal improvement in loculated effusion     Will follow with you.

## 2020-02-02 NOTE — PROGRESS NOTE ADULT - ASSESSMENT
The patient is a 64 year old male with prostate adenocarcinoma with mets to the  Bone, Lung and Lymp on ADT (Lupron), Taxotere, Xgeva therapy who presented to the [facility] with complaints of worsening shortness of breath. CTA of the chest was negative for PE,  Diffuse bilateral areas of pulmonary consolidation with pneumatocele formation and Large loculated left pleural effusions with compressive atelectasis of the left lower lobe. Status post thoracentesis by IR. Pleural fluid was consistent with exudative fluid; cultures positive for MSSA. Started on IV antibiotics, ID consulted.     Assessment/plan:    1. Acute hypoxic respiratory failure secondary to bilateral pneumonia complicated by large loculated pleural effusion; Currently on meropenem and and doxycycline  Repeat CT chest with minimal improvement      2. Hyponatremia: Resolved  Monitor BMP    3. Metastatic prostate cancer: Continue prednisone  Completed 5 sessions of RT    4. Anemia secondary to malignancy  Hb/hct stable  Monitor cbc  No acute signs or symptoms of active bleeding    VTE_ SCDS bilaterally

## 2020-02-03 ENCOUNTER — APPOINTMENT (OUTPATIENT)
Age: 65
End: 2020-02-03

## 2020-02-03 LAB
HCT VFR BLD CALC: 26.6 % — LOW (ref 39–50)
HGB BLD-MCNC: 8.1 G/DL — LOW (ref 13–17)
MCHC RBC-ENTMCNC: 27.3 PG — SIGNIFICANT CHANGE UP (ref 27–34)
MCHC RBC-ENTMCNC: 30.5 GM/DL — LOW (ref 32–36)
MCV RBC AUTO: 89.6 FL — SIGNIFICANT CHANGE UP (ref 80–100)
PLATELET # BLD AUTO: 377 K/UL — SIGNIFICANT CHANGE UP (ref 150–400)
RBC # BLD: 2.97 M/UL — LOW (ref 4.2–5.8)
RBC # FLD: 18.5 % — HIGH (ref 10.3–14.5)
WBC # BLD: 4.94 K/UL — SIGNIFICANT CHANGE UP (ref 3.8–10.5)
WBC # FLD AUTO: 4.94 K/UL — SIGNIFICANT CHANGE UP (ref 3.8–10.5)

## 2020-02-03 PROCEDURE — 99232 SBSQ HOSP IP/OBS MODERATE 35: CPT

## 2020-02-03 PROCEDURE — 99233 SBSQ HOSP IP/OBS HIGH 50: CPT

## 2020-02-03 RX ORDER — CEFAZOLIN SODIUM 1 G
2000 VIAL (EA) INJECTION EVERY 8 HOURS
Refills: 0 | Status: DISCONTINUED | OUTPATIENT
Start: 2020-02-03 | End: 2020-02-19

## 2020-02-03 RX ADMIN — OXYCODONE HYDROCHLORIDE 10 MILLIGRAM(S): 5 TABLET ORAL at 22:02

## 2020-02-03 RX ADMIN — MEROPENEM 100 MILLIGRAM(S): 1 INJECTION INTRAVENOUS at 05:30

## 2020-02-03 RX ADMIN — Medication 5 MILLIGRAM(S): at 17:28

## 2020-02-03 RX ADMIN — Medication 3 MILLILITER(S): at 20:32

## 2020-02-03 RX ADMIN — OXYCODONE HYDROCHLORIDE 10 MILLIGRAM(S): 5 TABLET ORAL at 18:02

## 2020-02-03 RX ADMIN — OXYCODONE HYDROCHLORIDE 10 MILLIGRAM(S): 5 TABLET ORAL at 06:29

## 2020-02-03 RX ADMIN — Medication 100 MILLIGRAM(S): at 22:02

## 2020-02-03 RX ADMIN — Medication 5 MILLIGRAM(S): at 05:30

## 2020-02-03 RX ADMIN — OXYCODONE HYDROCHLORIDE 10 MILLIGRAM(S): 5 TABLET ORAL at 17:28

## 2020-02-03 RX ADMIN — Medication 3 MILLILITER(S): at 15:44

## 2020-02-03 RX ADMIN — Medication 3 MILLIGRAM(S): at 22:02

## 2020-02-03 RX ADMIN — OXYCODONE HYDROCHLORIDE 10 MILLIGRAM(S): 5 TABLET ORAL at 23:02

## 2020-02-03 RX ADMIN — OXYCODONE HYDROCHLORIDE 10 MILLIGRAM(S): 5 TABLET ORAL at 11:28

## 2020-02-03 RX ADMIN — Medication 100 MILLIGRAM(S): at 05:30

## 2020-02-03 RX ADMIN — FINASTERIDE 5 MILLIGRAM(S): 5 TABLET, FILM COATED ORAL at 11:27

## 2020-02-03 RX ADMIN — OXYCODONE HYDROCHLORIDE 10 MILLIGRAM(S): 5 TABLET ORAL at 10:31

## 2020-02-03 RX ADMIN — Medication 3 MILLILITER(S): at 08:39

## 2020-02-03 RX ADMIN — OXYCODONE HYDROCHLORIDE 10 MILLIGRAM(S): 5 TABLET ORAL at 05:29

## 2020-02-03 NOTE — PROGRESS NOTE ADULT - SUBJECTIVE AND OBJECTIVE BOX
[facility]  INFECTIOUS DISEASES AND INTERNAL MEDICINE at [place]  =======================================================  Chandler Mccrary MD  Diplomates American Board of Internal Medicine and Infectious Diseases  Telephone [phone]  Fax            [phone]  =======================================================    [id number]  DEL JANETTE   follow up:  Pneumonia    no new issues   FEELS OK ON O2.     =======================================================  REVIEW OF SYSTEMS:  CONSTITUTIONAL:  no fevers  HEENT:  No diplopia or blurred vision.  No earache, sore throat or runny nose.  CARDIOVASCULAR:  No pressure, squeezing, strangling, tightness, heaviness or aching about the chest, neck, axilla or epigastrium.  RESPIRATORY:  + shortness of breath  GASTROINTESTINAL:  No nausea, vomiting or diarrhea.  GENITOURINARY:  No dysuria, frequency or urgency. No Blood in urine  MUSCULOSKELETAL:  no joint aches, no muscle pain  SKIN:  No change in skin, hair or nails.  NEUROLOGIC:  No Headaches, seizures or weakness.  PSYCHIATRIC:  No disorder of thought or mood.  ENDOCRINE:  No heat or cold intolerance  HEMATOLOGICAL:  No easy bruising or bleeding.   =======================================================  Allergies  No Known Allergies     ======================================================  Physical Exam:  ============  (see section below)    General:  No acute distress.  Eye: Pupils are equal, round and reactive to light, Extraocular movements are intact, Normal conjunctiva.  HENT: Normocephalic, Oral mucosa is moist, No pharyngeal erythema, No sinus tenderness.  Neck: Supple, No lymphadenopathy.  Respiratory:   POOR aeration of the LEFT side to mid lung zone,  + DULLNESS to percuss  Cardiovascular: Normal rate, Regular rhythm,   Gastrointestinal: Soft, Non-tender, Non-distended, Normal bowel sounds.  Genitourinary: No costovertebral angle tenderness.  Lymphatics: No lymphadenopathy neck,   Musculoskeletal: Normal range of motion, Normal strength.  Integumentary: No rash.  Neurologic: Alert, Oriented, No focal deficits, Cranial Nerves II-XII are grossly intact.  Psychiatric: Appropriate mood & affect.    =======================================================   EXAM:  CT CHEST                        PROCEDURE DATE:  01/31/2020    INTERPRETATION:  CLINICAL INFORMATION: Chest pain and shortness of breath. Evaluate loculated pleural effusion and pneumothorax. Metastatic prostate cancer.  COMPARISON: 1/20/2020  PROCEDURE:   CT of the Chest was performed without intravenous contrast.  Sagittal and coronal reformats were performed.  FINDINGS:  LUNGS AND AIRWAYS: Patent central airways.  Bilateral consolidation and pneumatoceles are reidentified, without significant change from prior examination.   PLEURA: Small right pneumothorax is reidentified, slightly increased in size compared to previous examination 1/20/2020. Loculated left pleural effusion has decreased in size. Left pleural calcifications are noted.  MEDIASTINUM AND MARIELA: No lymphadenopathy.  VESSELS: Atherosclerotic calcifications.  HEART: Heart size is normal. No pericardial effusion.  CHEST WALL AND LOWER NECK: Within normal limits.  VISUALIZED UPPER ABDOMEN: Within normal limits.  BONES: Bony destructive changes are present in the left second and sixth ribs. There are scattered small vague lucencies within the spine with mixed lytic and sclerotic changes in T6 vertebral body.    IMPRESSION:   Small right-sided pneumothorax, slightly increased in size compared to previous examination 1/20/2020.  Loculated left pleural effusion has decreased in size.  Bilateral consolidation and pneumatoceles, without significant change from previous exam.  Bony metastatic disease.    ALLY CASTANON M.D., ATTENDING RADIOLOGIST  This document has been electronically signed. Jan 31 2020  3:52PM      =====================================================  Vitals:  ============   Vital Signs Last 24 Hrs  T(C): 36.6 (03 Feb 2020 08:32), Max: 37.2 (02 Feb 2020 15:42)  T(F): 97.8 (03 Feb 2020 08:32), Max: 98.9 (02 Feb 2020 15:42)  HR: 85 (03 Feb 2020 08:32) (74 - 85)  BP: 132/76 (03 Feb 2020 08:32) (117/70 - 132/76)  BP(mean): --  RR: 19 (03 Feb 2020 08:32) (18 - 19)  SpO2: 92% (03 Feb 2020 08:32) (92% - 98%)    =======================================================  Current Antibiotics:  KEFZOL    Other medications:  albuterol/ipratropium for Nebulization 3 milliLiter(s) Nebulizer every 6 hours  ALPRAZolam 0.25 milliGRAM(s) Oral at bedtime  finasteride 5 milliGRAM(s) Oral daily  lidocaine   Patch 1 Patch Transdermal daily  melatonin 3 milliGRAM(s) Oral at bedtime  oxyCODONE    IR 10 milliGRAM(s) Oral every 6 hours  polyethylene glycol 3350 17 Gram(s) Oral at bedtime  predniSONE   Tablet 5 milliGRAM(s) Oral two times a day  tamsulosin 0.4 milliGRAM(s) Oral at bedtime      =======================================================  Labs:                                               8.1    4.94  )-----------( 377      ( 03 Feb 2020 08:39 )             26.6   02-02    138  |  100  |  9.0  ----------------------------<  103<H>  3.9   |  28.0  |  0.32<L>    Ca    8.4<L>      02 Feb 2020 06:04  Phos  2.1     02-02  Mg     1.7     02-02          Culture - Acid Fast - Body Fluid w/Smear (collected 01-27-20 @ 21:54)  Source: .Body Fluid    Culture - Fungal, Body Fluid (collected 01-27-20 @ 13:05)  Source: .Body Fluid    Culture - Body Fluid with Gram Stain (collected 01-27-20 @ 13:04)  Source: .Body Fluid  Gram Stain (01-27-20 @ 15:34):    Numerous White blood cells    No organisms seen  Final Report (02-01-20 @ 10:17):    Numerous Staphylococcus aureus  Organism: Staphylococcus aureus (02-01-20 @ 10:17)  Organism: Staphylococcus aureus (02-01-20 @ 10:17)    Sensitivities:      -  Ampicillin/Sulbactam: S <=8/4      -  Cefazolin: S <=4      -  Clindamycin: S <=0.5      -  Erythromycin: S <=0.5      -  Gentamicin: S <=4 Should not be used as monotherapy      -  Oxacillin: S <=0.25      -  Penicillin: R >8      -  RIF- Rifampin: S <=1 Should not be used as monotherapy      -  Tetra/Doxy: S <=4      -  Trimethoprim/Sulfamethoxazole: S <=0.5/9.5      -  Vancomycin: S 2      Method Type: KERRI    Culture - Blood (collected 01-26-20 @ 18:25)  Source: .Blood  Final Report (01-31-20 @ 19:01):    No growth at 5 days.    Culture - Blood (collected 01-26-20 @ 18:25)  Source: .Blood  Final Report (01-31-20 @ 19:01):    No growth at 5 days.    Culture - Sputum (collected 01-23-20 @ 09:17)  Source: .Sputum  Gram Stain (01-23-20 @ 14:01):    Moderate WBC's    No organisms seen  Final Report (01-25-20 @ 09:57):    Moderate Routine respiratory teresa present    Culture - Blood (collected 01-21-20 @ 10:00)  Source: .Blood  Final Report (01-26-20 @ 11:00):    No growth at 5 days.    Culture - Blood (collected 01-20-20 @ 10:24)  Source: .Blood  Final Report (01-25-20 @ 11:01):    No growth at 5 days.    Culture - Blood (collected 01-20-20 @ 10:24)  Source: .Blood  Final Report (01-25-20 @ 11:01):    No growth at 5 days.      Creatinine, Serum: 0.32 mg/dL (02-02-20 @ 06:04)  Creatinine, Serum: 0.29 mg/dL (02-01-20 @ 08:35)  Creatinine, Serum: 0.28 mg/dL (01-31-20 @ 08:51)  Creatinine, Serum: 0.34 mg/dL (01-30-20 @ 09:30)  Creatinine, Serum: 0.35 mg/dL (01-29-20 @ 10:20)

## 2020-02-03 NOTE — PROGRESS NOTE ADULT - ASSESSMENT
64M, with known history of prostate cancer undergoing chemo therapy with Heme/Onc Dr. Oshea (1 treatment every 3 weeks, last Chemo treatment was 1/14) presented to [facility] 1/20/20 for worsening shortness of breath, recently admitted to a hospital  in [place] 11/2019 and was treated for pneumothorax and pleural effusions, ? decortication vs pleurodesis performed, found to have large loculated left pleural effusion, initially had elevated INR requiring Vit K, now s/p IR thoracentesis 1/27 for 1200cc turbid fluid, now reconsulted by ID for MSSA in pleural fluid, concerning for empyema;

## 2020-02-03 NOTE — PROGRESS NOTE ADULT - PROBLEM SELECTOR PLAN 1
MSSA in pleural fluid cx form 1/27  cont kefzol as per ID  d/w Dr. Singleton  please arrange for IR to place pigtail for residual fluid, pt with h/o recent decortication/pleurodesis, no OR at this time  thoracic surgery to follow

## 2020-02-03 NOTE — PROGRESS NOTE ADULT - SUBJECTIVE AND OBJECTIVE BOX
CC: Follow up    INTERVAL HPI/OVERNIGHT EVENTS: Patient seen and examined, no acute complaints overnight.       Vital Signs Last 24 Hrs  T(C): 36.6 (03 Feb 2020 08:32), Max: 37.2 (02 Feb 2020 15:42)  T(F): 97.8 (03 Feb 2020 08:32), Max: 98.9 (02 Feb 2020 15:42)  HR: 85 (03 Feb 2020 08:32) (74 - 85)  BP: 132/76 (03 Feb 2020 08:32) (117/70 - 132/76)  BP(mean): --  RR: 19 (03 Feb 2020 08:32) (18 - 19)  SpO2: 92% (03 Feb 2020 08:32) (92% - 98%)    PHYSICAL EXAM:    GENERAL: NAD, AOX3  HEAD:  Atraumatic, Normocephalic  EYES: EOMI, PERRLA, conjunctiva and sclera clear  ENMT: Moist mucous membranes  CHEST/LUNG: Clear to auscultation bilaterally; No rales, rhonchi, wheezing, or rubs  HEART: Regular rate and rhythm; No murmurs, rubs, or gallops  ABDOMEN: Soft, Nontender, Nondistended; Bowel sounds present  EXTREMITIES:  2+ Peripheral Pulses, No clubbing, cyanosis, or edema        MEDICATIONS  (STANDING):  albuterol/ipratropium for Nebulization 3 milliLiter(s) Nebulizer every 6 hours  ALPRAZolam 0.25 milliGRAM(s) Oral at bedtime  doxycycline hyclate Capsule 100 milliGRAM(s) Oral every 12 hours  finasteride 5 milliGRAM(s) Oral daily  lidocaine   Patch 1 Patch Transdermal daily  melatonin 3 milliGRAM(s) Oral at bedtime  meropenem  IVPB 1000 milliGRAM(s) IV Intermittent every 8 hours  oxyCODONE    IR 10 milliGRAM(s) Oral every 6 hours  polyethylene glycol 3350 17 Gram(s) Oral at bedtime  predniSONE   Tablet 5 milliGRAM(s) Oral two times a day  tamsulosin 0.4 milliGRAM(s) Oral at bedtime    MEDICATIONS  (PRN):  acetaminophen   Tablet .. 650 milliGRAM(s) Oral every 6 hours PRN Temp greater or equal to 38C (100.4F), Mild Pain (1 - 3)  lactulose Syrup 10 Gram(s) Oral two times a day PRN Constipation  oxycodone    5 mG/acetaminophen 325 mG 1 Tablet(s) Oral every 4 hours PRN Moderate Pain (4 - 6)  oxyCODONE    IR 10 milliGRAM(s) Oral every 4 hours PRN Severe Pain (7 - 10)      Allergies    No Known Allergies    Intolerances          LABS:                          8.1    4.94  )-----------( 377      ( 03 Feb 2020 08:39 )             26.6     02-02    138  |  100  |  9.0  ----------------------------<  103<H>  3.9   |  28.0  |  0.32<L>    Ca    8.4<L>      02 Feb 2020 06:04  Phos  2.1     02-02  Mg     1.7     02-02      PT/INR - ( 02 Feb 2020 11:09 )   PT: 19.8 sec;   INR: 1.69 ratio               RADIOLOGY & ADDITIONAL TESTS:

## 2020-02-03 NOTE — CHART NOTE - NSCHARTNOTEFT_GEN_A_CORE
Source: Patient [x ]  Family [x ]   other [ ] sister present    Current Diet: Diet, Regular (01-21-20 @ 12:58)    PO intake: Pt reports consuming 100% of meals    Current Weight:  (1/29) 212.5#  -Obtain current wt, continue to monitor. b/l leg 1+ edema noted.     Pertinent Medications: MEDICATIONS  (STANDING):  albuterol/ipratropium for Nebulization 3 milliLiter(s) Nebulizer every 6 hours  ALPRAZolam 0.25 milliGRAM(s) Oral at bedtime  ceFAZolin   IVPB 2000 milliGRAM(s) IV Intermittent every 8 hours  finasteride 5 milliGRAM(s) Oral daily  lidocaine   Patch 1 Patch Transdermal daily  melatonin 3 milliGRAM(s) Oral at bedtime  oxyCODONE    IR 10 milliGRAM(s) Oral every 6 hours  polyethylene glycol 3350 17 Gram(s) Oral at bedtime  predniSONE   Tablet 5 milliGRAM(s) Oral two times a day  tamsulosin 0.4 milliGRAM(s) Oral at bedtime    MEDICATIONS  (PRN):  acetaminophen   Tablet .. 650 milliGRAM(s) Oral every 6 hours PRN Temp greater or equal to 38C (100.4F), Mild Pain (1 - 3)  lactulose Syrup 10 Gram(s) Oral two times a day PRN Constipation  oxycodone    5 mG/acetaminophen 325 mG 1 Tablet(s) Oral every 4 hours PRN Moderate Pain (4 - 6)  oxyCODONE    IR 10 milliGRAM(s) Oral every 4 hours PRN Severe Pain (7 - 10)    Pertinent Labs: CBC Full  -  ( 03 Feb 2020 08:39 )  WBC Count : 4.94 K/uL  RBC Count : 2.97 M/uL  Hemoglobin : 8.1 g/dL  Hematocrit : 26.6 %  Platelet Count - Automated : 377 K/uL  Mean Cell Volume : 89.6 fl  Mean Cell Hemoglobin : 27.3 pg  Mean Cell Hemoglobin Concentration : 30.5 gm/dL  Auto Neutrophil # : x  Auto Lymphocyte # : x  Auto Monocyte # : x  Auto Eosinophil # : x  Auto Basophil # : x  Auto Neutrophil % : x  Auto Lymphocyte % : x  Auto Monocyte % : x  Auto Eosinophil % : x  Auto Basophil % : x    -No recent labs    Skin: Intact    Nutrition focused physical exam previously conducted - found signs of malnutrition [x ]absent [ ]present    Subcutaneous fat loss: [ ] Orbital fat pads region, [ ]Buccal fat region, [ ]Triceps region,  [ ]Ribs region    Muscle wasting: [ ]Temples region, [ ]Clavicle region, [ ]Shoulder region, [ ]Scapula region, [ ]Interosseous region,  [ ]thigh region, [ ]Calf region    Estimated Needs:   [ x ] no change since previous assessment  [ ] recalculated:     Current Nutrition Diagnosis: Pt remains at high nutrition risk secondary to  Increased Nutrient Needs related to increased physiological demand for nutrient as evidenced by Prostate Adenocarcinoma with Mets to Bone, Lung and Lymp. Pt reported good po intake and good appetite. Pt had no current questions or concerns. RD to remain available.     Recommendations:   1) Encourage po intake  2) Monitor wts and labs    Monitoring and Evaluation:   [x ] PO intake [x ] Tolerance to diet prescription [X] Weights  [X] Follow up per protocol [X] Labs:

## 2020-02-03 NOTE — PROGRESS NOTE ADULT - SUBJECTIVE AND OBJECTIVE BOX
Brief Hospital Course: pt known to thoracic surgery service. 1/20 p/w SOB, found to have large located left effusion with elevated INR s/p IR thoracentesis 1/27, thoracic surgery signed off, now pleural fluid culture growing MSSA. ID reconsulted thoracic surgery for concerns of empyema;    Subjective:    Review of Systems:         Patient is a 64y old  Male who presents with a chief complaint of Dyspnea secondary to Large located effusion and suspected recurrent Bilateral PNA (03 Feb 2020 14:20)    HPI:  Pt was seen and evaluated on 1/20/20 at approx 9: 30pm. Delayed documentation completion.     ED HPI: 64yoM; with pmh signif for Prostate Ca with mets to ribs(currently on chemo), HTN; now p/w cough, sob, fever x1 week, progressively worsening.  denies sick contacts. reports traveling from [place] after diagnosis of prostate ca complicated by b/l ptx in november and another hospitalization in december. denies cp/palp. denies abd pain: SOCIAL: No tobacco/illicit substance use/social/EtOH    Above ED HPI reviewed and noted: patient reports having a history of Bilateral Pneumonia approx 1.5 months prior with associated bilateral Pleural effusions which required draining. In addition to above, patient is scheduled palliative Radiation therapy today. He is on ADT (lupron), Taxotere and Xgeva for Prostate adenocarcinoma with Mets to Lung, Bone and Lymph. (20 Jan 2020 21:22)    PAST MEDICAL & SURGICAL HISTORY:  Prostate CA: Adenocarcinoma with mets to Lung, Bone, and Lymph  No significant past surgical history    FAMILY HISTORY:  FH: multiple myeloma: Mother    Vitals   ICU Vital Signs Last 24 Hrs  T(C): 36.6 (03 Feb 2020 15:51), Max: 36.6 (03 Feb 2020 00:36)  T(F): 97.8 (03 Feb 2020 15:51), Max: 97.8 (03 Feb 2020 00:36)  HR: 87 (03 Feb 2020 15:51) (84 - 87)  BP: 119/74 (03 Feb 2020 15:51) (119/74 - 132/76)  RR: 19 (03 Feb 2020 15:51) (18 - 19)  SpO2: 100% (03 Feb 2020 15:51) (92% - 100%)    LABS                        8.1    4.94  )-----------( 377      ( 03 Feb 2020 08:39 )             26.6     02-02    138  |  100  |  9.0  ----------------------------<  103<H>  3.9   |  28.0  |  0.32<L>    Ca    8.4<L>      02 Feb 2020 06:04  Phos  2.1     02-02  Mg     1.7     02-02  PT/INR - ( 02 Feb 2020 11:09 )   PT: 19.8 sec;   INR: 1.69 ratio      MEDICATIONS  (STANDING):  albuterol/ipratropium for Nebulization 3 milliLiter(s) Nebulizer every 6 hours  ALPRAZolam 0.25 milliGRAM(s) Oral at bedtime  ceFAZolin   IVPB 2000 milliGRAM(s) IV Intermittent every 8 hours  finasteride 5 milliGRAM(s) Oral daily  lidocaine   Patch 1 Patch Transdermal daily  melatonin 3 milliGRAM(s) Oral at bedtime  oxyCODONE    IR 10 milliGRAM(s) Oral every 6 hours  polyethylene glycol 3350 17 Gram(s) Oral at bedtime  predniSONE   Tablet 5 milliGRAM(s) Oral two times a day  tamsulosin 0.4 milliGRAM(s) Oral at bedtime    MEDICATIONS  (PRN):  acetaminophen   Tablet .. 650 milliGRAM(s) Oral every 6 hours PRN Temp greater or equal to 38C (100.4F), Mild Pain (1 - 3)  lactulose Syrup 10 Gram(s) Oral two times a day PRN Constipation  oxycodone    5 mG/acetaminophen 325 mG 1 Tablet(s) Oral every 4 hours PRN Moderate Pain (4 - 6)  oxyCODONE    IR 10 milliGRAM(s) Oral every 4 hours PRN Severe Pain (7 - 10)    Allergies:  No Known Allergies    Physical Exam: Brief Hospital Course: pt known to thoracic surgery service. 1/20 p/w SOB, found to have large located left effusion with elevated INR s/p IR thoracentesis 1/27, thoracic surgery signed off, now pleural fluid culture growing MSSA. ID reconsulted thoracic surgery for concerns of empyema;    Subjective:    Review of Systems:         Patient is a 64y old  Male who presents with a chief complaint of Dyspnea secondary to Large located effusion and suspected recurrent Bilateral PNA (03 Feb 2020 14:20)    HPI:  Pt was seen and evaluated on 1/20/20 at approx 9: 30pm. Delayed documentation completion.     ED HPI: 64yoM; with pmh signif for Prostate Ca with mets to ribs(currently on chemo), HTN; now p/w cough, sob, fever x1 week, progressively worsening.  denies sick contacts. reports traveling from [place] after diagnosis of prostate ca complicated by b/l ptx in november and another hospitalization in december. denies cp/palp. denies abd pain: SOCIAL: No tobacco/illicit substance use/social/EtOH    Above ED HPI reviewed and noted: patient reports having a history of Bilateral Pneumonia approx 1.5 months prior with associated bilateral Pleural effusions which required draining. In addition to above, patient is scheduled palliative Radiation therapy today. He is on ADT (lupron), Taxotere and Xgeva for Prostate adenocarcinoma with Mets to Lung, Bone and Lymph. (20 Jan 2020 21:22)    PAST MEDICAL & SURGICAL HISTORY:  Prostate CA: Adenocarcinoma with mets to Lung, Bone, and Lymph  No significant past surgical history    FAMILY HISTORY:  FH: multiple myeloma: Mother    Vitals   ICU Vital Signs Last 24 Hrs  T(C): 36.6 (03 Feb 2020 15:51), Max: 36.6 (03 Feb 2020 00:36)  T(F): 97.8 (03 Feb 2020 15:51), Max: 97.8 (03 Feb 2020 00:36)  HR: 87 (03 Feb 2020 15:51) (84 - 87)  BP: 119/74 (03 Feb 2020 15:51) (119/74 - 132/76)  RR: 19 (03 Feb 2020 15:51) (18 - 19)  SpO2: 100% (03 Feb 2020 15:51) (92% - 100%)    LABS                        8.1    4.94  )-----------( 377      ( 03 Feb 2020 08:39 )             26.6     02-02    138  |  100  |  9.0  ----------------------------<  103<H>  3.9   |  28.0  |  0.32<L>    Ca    8.4<L>      02 Feb 2020 06:04  Phos  2.1     02-02  Mg     1.7     02-02  PT/INR - ( 02 Feb 2020 11:09 )   PT: 19.8 sec;   INR: 1.69 ratio      MEDICATIONS  (STANDING):  albuterol/ipratropium for Nebulization 3 milliLiter(s) Nebulizer every 6 hours  ALPRAZolam 0.25 milliGRAM(s) Oral at bedtime  ceFAZolin   IVPB 2000 milliGRAM(s) IV Intermittent every 8 hours  finasteride 5 milliGRAM(s) Oral daily  lidocaine   Patch 1 Patch Transdermal daily  melatonin 3 milliGRAM(s) Oral at bedtime  oxyCODONE    IR 10 milliGRAM(s) Oral every 6 hours  polyethylene glycol 3350 17 Gram(s) Oral at bedtime  predniSONE   Tablet 5 milliGRAM(s) Oral two times a day  tamsulosin 0.4 milliGRAM(s) Oral at bedtime    MEDICATIONS  (PRN):  acetaminophen   Tablet .. 650 milliGRAM(s) Oral every 6 hours PRN Temp greater or equal to 38C (100.4F), Mild Pain (1 - 3)  lactulose Syrup 10 Gram(s) Oral two times a day PRN Constipation  oxycodone    5 mG/acetaminophen 325 mG 1 Tablet(s) Oral every 4 hours PRN Moderate Pain (4 - 6)  oxyCODONE    IR 10 milliGRAM(s) Oral every 4 hours PRN Severe Pain (7 - 10)    Allergies:  No Known Allergies    Physical Exam  Constitutional: NAD, well developed, well nourished  Neuro: A+O x 3, non-focal, speech clear and intact  Neck: supple, no JVD  CV: S1S2 RRR, no murmur  Pulm/chest: decreased BS on left, with fine crackles, no wheezing, clear on right  Abd: +BS soft NT ND  Ext: TANG x 4, trace -1+ b/l LE edema, no clubbing/cyanosis  Skin: warm, well perfused  Psych: calm, appropriate affect

## 2020-02-03 NOTE — PROGRESS NOTE ADULT - ASSESSMENT
This 64yoM; with pmh signif for Prostate Ca with mets to ribs diagnosed in 11/2019 (currently on chemo), HTN; now p/w cough, sob, fever x1 week, progressively worsening.  denies sick contacts. reports traveling from [place] after diagnosis of prostate ca complicated by b/l ptx in november and another hospitalization in december. denies cp/palp. denies abd pain: SOCIAL: No tobacco/illicit substance use/social/EtOH    patient reports having a history of Bilateral Pneumonia approx 1.5 months prior with associated bilateral Pleural effusions which required draining. 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 (20 Jan 2020 21:22)       patient was initially started On vanco & Zosyn, then Ceftriaxone and Zithromax was added as well.      Impression:  PNA  l      repeat CT scan with "Patent central airways.  Bilateral consolidation and pneumatoceles are reidentified, without significant change from prior examination."  s/p thoracentesis on 1/27/2020; cultures with Staph AUREUS  -  WILL CHANGE TO KEFZOL  FOR MSSA   CT SURGERY TO REEVALUATE  WILL FOLLOW UP WITH FURTHER RECOMMENDATIONS

## 2020-02-04 ENCOUNTER — OUTPATIENT (OUTPATIENT)
Dept: OUTPATIENT SERVICES | Facility: HOSPITAL | Age: 65
LOS: 1 days | Discharge: HOME | End: 2020-02-04
Payer: MEDICAID

## 2020-02-04 DIAGNOSIS — C61 MALIGNANT NEOPLASM OF PROSTATE: ICD-10-CM

## 2020-02-04 PROCEDURE — 99232 SBSQ HOSP IP/OBS MODERATE 35: CPT

## 2020-02-04 PROCEDURE — 99231 SBSQ HOSP IP/OBS SF/LOW 25: CPT

## 2020-02-04 PROCEDURE — 99233 SBSQ HOSP IP/OBS HIGH 50: CPT

## 2020-02-04 RX ADMIN — Medication 5 MILLIGRAM(S): at 17:31

## 2020-02-04 RX ADMIN — FINASTERIDE 5 MILLIGRAM(S): 5 TABLET, FILM COATED ORAL at 07:52

## 2020-02-04 RX ADMIN — OXYCODONE HYDROCHLORIDE 10 MILLIGRAM(S): 5 TABLET ORAL at 23:17

## 2020-02-04 RX ADMIN — Medication 5 MILLIGRAM(S): at 05:02

## 2020-02-04 RX ADMIN — OXYCODONE HYDROCHLORIDE 10 MILLIGRAM(S): 5 TABLET ORAL at 12:30

## 2020-02-04 RX ADMIN — POLYETHYLENE GLYCOL 3350 17 GRAM(S): 17 POWDER, FOR SOLUTION ORAL at 23:17

## 2020-02-04 RX ADMIN — Medication 100 MILLIGRAM(S): at 23:17

## 2020-02-04 RX ADMIN — Medication 100 MILLIGRAM(S): at 14:32

## 2020-02-04 RX ADMIN — Medication 100 MILLIGRAM(S): at 05:02

## 2020-02-04 RX ADMIN — Medication 3 MILLIGRAM(S): at 23:18

## 2020-02-04 RX ADMIN — OXYCODONE HYDROCHLORIDE 10 MILLIGRAM(S): 5 TABLET ORAL at 17:31

## 2020-02-04 RX ADMIN — OXYCODONE HYDROCHLORIDE 10 MILLIGRAM(S): 5 TABLET ORAL at 05:02

## 2020-02-04 RX ADMIN — Medication 3 MILLILITER(S): at 09:44

## 2020-02-04 RX ADMIN — Medication 3 MILLILITER(S): at 20:46

## 2020-02-04 RX ADMIN — OXYCODONE HYDROCHLORIDE 10 MILLIGRAM(S): 5 TABLET ORAL at 23:32

## 2020-02-04 RX ADMIN — Medication 3 MILLILITER(S): at 15:33

## 2020-02-04 NOTE — PROGRESS NOTE ADULT - ASSESSMENT
This 64yoM; with pmh signif for Prostate Ca with mets to ribs diagnosed in 11/2019 (currently on chemo), HTN; now p/w cough, sob, fever x1 week, progressively worsening.  denies sick contacts. reports traveling from [place] after diagnosis of prostate ca complicated by b/l ptx in november and another hospitalization in december. denies cp/palp. denies abd pain: SOCIAL: No tobacco/illicit substance use/social/EtOH    patient reports having a history of Bilateral Pneumonia approx 1.5 months prior with associated bilateral Pleural effusions which required draining. 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 (20 Jan 2020 21:22)       patient was initially started On vanco & Zosyn, then Ceftriaxone and Zithromax was added as well.      Impression:  PNA  l      repeat CT scan with "Patent central airways.  Bilateral consolidation and pneumatoceles are reidentified, without significant change from prior examination."  s/p thoracentesis on 1/27/2020; cultures with Staph AUREUS  -   on KEFZOL  FOR MSSA  WILL CONTINUE  CT SURGERY   REEVALUATION NOTED  IR TO REEVALUATE   WILL FOLLOW UP WITH FURTHER RECOMMENDATIONS

## 2020-02-04 NOTE — PROGRESS NOTE ADULT - ASSESSMENT
Impression:        1. Acute hypoxic respiratory failure secondary to bilateral pneumonia complicated by large loculated pleural effusion;   Culture positive for MSSA  Changed to IV Cefazolin  Repeat CT chest with minimal improvement  Dyspnea still problematic O2 dependent  Re-evaluated by CT surgery; Spoke with IR plan for left chest tube on 2/5     2. Pneumonia  Repeat CT scan with "Patent central airways.  Bilateral consolidation and pneumatoceles are reidentified, without significant change from prior examination."  s/p thoracentesis on 1/27/2020; cultures with Staph AUREUS  KEFZOL  FOR MSSA  WILL CONTINUE  CT SURGERY   REEVALUATION NOTED  IR TO REEVALUATE Thoracentesis with possible pleurex catheter placement     3. Metastatic prostate cancer: Continue prednisone  Completed 5 sessions of RT  Intends to F/U with Oncologist in [facility] for aggressive treatment plan    4. Frequency of Urination-nocturnal  Drink quantity of fluids during day  Requesting we change time of Flomax administration to: 8am after breakfast.  Discussed with RN-will speak with Pharmacy    4. Anemia secondary to malignancy  Hb/hct stable  Monitor cbc  No acute signs or symptoms of active bleeding    GOC   HCP completed  Will undergo second Thoracentesis  May need to leave a pleurex in place following procedure  Patient increasing his activity despite his dyspnea on exertion  Remains Full Code- actively seeking oncologic treatment when physically stable

## 2020-02-04 NOTE — PROGRESS NOTE ADULT - PROBLEM SELECTOR PLAN 1
MSSA in pleural fluid cx form 1/27  cont kefzol as per ID  d/w Dr. Singleton  Noted IR arranged to place pigtail for residual fluid, pt with h/o recent decortication/pleurodesis, no OR at this time  thoracic surgery to follow

## 2020-02-04 NOTE — PROGRESS NOTE ADULT - SUBJECTIVE AND OBJECTIVE BOX
CC: Follow up    INTERVAL HPI/OVERNIGHT EVENTS: Patient seen and examined, denies shortness of breath chest pain or palpitations    Vital Signs Last 24 Hrs  T(C): 36.5 (04 Feb 2020 07:37), Max: 36.7 (04 Feb 2020 00:08)  T(F): 97.7 (04 Feb 2020 07:37), Max: 98.1 (04 Feb 2020 00:08)  HR: 84 (04 Feb 2020 10:01) (83 - 88)  BP: 111/70 (04 Feb 2020 07:37) (111/70 - 119/74)  BP(mean): --  RR: 18 (04 Feb 2020 07:37) (18 - 19)  SpO2: 92% (04 Feb 2020 07:37) (92% - 100%)    PHYSICAL EXAM:    GENERAL: NAD, AOX3  HEAD:  Atraumatic, Normocephalic  EYES: EOMI, PERRLA, conjunctiva and sclera clear  ENMT: Moist mucous membranes  CHEST/LUNG: Clear to auscultation bilaterally; No rales, rhonchi, wheezing, or rubs  HEART: Regular rate and rhythm; No murmurs, rubs, or gallops  ABDOMEN: Soft, Nontender, Nondistended; Bowel sounds present  EXTREMITIES:  2+ Peripheral Pulses, No clubbing, cyanosis, or edema        MEDICATIONS  (STANDING):  albuterol/ipratropium for Nebulization 3 milliLiter(s) Nebulizer every 6 hours  ALPRAZolam 0.25 milliGRAM(s) Oral at bedtime  ceFAZolin   IVPB 2000 milliGRAM(s) IV Intermittent every 8 hours  finasteride 5 milliGRAM(s) Oral daily  lidocaine   Patch 1 Patch Transdermal daily  melatonin 3 milliGRAM(s) Oral at bedtime  oxyCODONE    IR 10 milliGRAM(s) Oral every 6 hours  polyethylene glycol 3350 17 Gram(s) Oral at bedtime  predniSONE   Tablet 5 milliGRAM(s) Oral two times a day  tamsulosin 0.4 milliGRAM(s) Oral at bedtime    MEDICATIONS  (PRN):  acetaminophen   Tablet .. 650 milliGRAM(s) Oral every 6 hours PRN Temp greater or equal to 38C (100.4F), Mild Pain (1 - 3)  lactulose Syrup 10 Gram(s) Oral two times a day PRN Constipation  oxyCODONE    IR 10 milliGRAM(s) Oral every 4 hours PRN Severe Pain (7 - 10)      Allergies    No Known Allergies    Intolerances          LABS:                          8.1    4.94  )-----------( 377      ( 03 Feb 2020 08:39 )             26.6           PT/INR - ( 02 Feb 2020 11:09 )   PT: 19.8 sec;   INR: 1.69 ratio               RADIOLOGY & ADDITIONAL TESTS:

## 2020-02-04 NOTE — PROGRESS NOTE ADULT - SUBJECTIVE AND OBJECTIVE BOX
[facility]  INFECTIOUS DISEASES AND INTERNAL MEDICINE at [place]  =======================================================  Chandler Mccrary MD  Diplomates American Board of Internal Medicine and Infectious Diseases  Telephone [phone]  Fax            [phone]  =======================================================    [id number]  DEL JANETTE   follow up:  Pneumonia    no new issues   FEELS OK ON O2.     =======================================================  REVIEW OF SYSTEMS:  CONSTITUTIONAL:  no fevers  HEENT:  No diplopia or blurred vision.  No earache, sore throat or runny nose.  CARDIOVASCULAR:  No pressure, squeezing, strangling, tightness, heaviness or aching about the chest, neck, axilla or epigastrium.  RESPIRATORY:  + shortness of breath  GASTROINTESTINAL:  No nausea, vomiting or diarrhea.  GENITOURINARY:  No dysuria, frequency or urgency. No Blood in urine  MUSCULOSKELETAL:  no joint aches, no muscle pain  SKIN:  No change in skin, hair or nails.  NEUROLOGIC:  No Headaches, seizures or weakness.  PSYCHIATRIC:  No disorder of thought or mood.  ENDOCRINE:  No heat or cold intolerance  HEMATOLOGICAL:  No easy bruising or bleeding.   =======================================================  Allergies  No Known Allergies     ======================================================  Physical Exam:  ============  (see section below)    General:  No acute distress.  Eye: Pupils are equal, round and reactive to light, Extraocular movements are intact, Normal conjunctiva.  HENT: Normocephalic, Oral mucosa is moist, No pharyngeal erythema, No sinus tenderness.  Neck: Supple, No lymphadenopathy.  Respiratory:   POOR aeration of the LEFT side to mid lung zone,  + DULLNESS to percuss  Cardiovascular: Normal rate, Regular rhythm,   Gastrointestinal: Soft, Non-tender, Non-distended, Normal bowel sounds.  Genitourinary: No costovertebral angle tenderness.  Lymphatics: No lymphadenopathy neck,   Musculoskeletal: Normal range of motion, Normal strength.  Integumentary: No rash.  Neurologic: Alert, Oriented, No focal deficits, Cranial Nerves II-XII are grossly intact.  Psychiatric: Appropriate mood & affect.    =======================================================   EXAM:  CT CHEST                        PROCEDURE DATE:  01/31/2020    INTERPRETATION:  CLINICAL INFORMATION: Chest pain and shortness of breath. Evaluate loculated pleural effusion and pneumothorax. Metastatic prostate cancer.  COMPARISON: 1/20/2020  PROCEDURE:   CT of the Chest was performed without intravenous contrast.  Sagittal and coronal reformats were performed.  FINDINGS:  LUNGS AND AIRWAYS: Patent central airways.  Bilateral consolidation and pneumatoceles are reidentified, without significant change from prior examination.   PLEURA: Small right pneumothorax is reidentified, slightly increased in size compared to previous examination 1/20/2020. Loculated left pleural effusion has decreased in size. Left pleural calcifications are noted.  MEDIASTINUM AND MARIELA: No lymphadenopathy.  VESSELS: Atherosclerotic calcifications.  HEART: Heart size is normal. No pericardial effusion.  CHEST WALL AND LOWER NECK: Within normal limits.  VISUALIZED UPPER ABDOMEN: Within normal limits.  BONES: Bony destructive changes are present in the left second and sixth ribs. There are scattered small vague lucencies within the spine with mixed lytic and sclerotic changes in T6 vertebral body.    IMPRESSION:   Small right-sided pneumothorax, slightly increased in size compared to previous examination 1/20/2020.  Loculated left pleural effusion has decreased in size.  Bilateral consolidation and pneumatoceles, without significant change from previous exam.  Bony metastatic disease.    ALLY CASTANON M.D., ATTENDING RADIOLOGIST  This document has been electronically signed. Jan 31 2020  3:52PM      =====================================================  Vitals:  ============    Vital Signs Last 24 Hrs  T(C): 36.5 (04 Feb 2020 07:37), Max: 36.7 (04 Feb 2020 00:08)  T(F): 97.7 (04 Feb 2020 07:37), Max: 98.1 (04 Feb 2020 00:08)  HR: 83 (04 Feb 2020 07:37) (83 - 88)  BP: 111/70 (04 Feb 2020 07:37) (111/70 - 119/74)  BP(mean): --  RR: 18 (04 Feb 2020 07:37) (18 - 19)  SpO2: 92% (04 Feb 2020 07:37) (92% - 100%)  =======================================================  Current Antibiotics:  KEFZOL    Other medications:  albuterol/ipratropium for Nebulization 3 milliLiter(s) Nebulizer every 6 hours  ALPRAZolam 0.25 milliGRAM(s) Oral at bedtime  finasteride 5 milliGRAM(s) Oral daily  lidocaine   Patch 1 Patch Transdermal daily  melatonin 3 milliGRAM(s) Oral at bedtime  oxyCODONE    IR 10 milliGRAM(s) Oral every 6 hours  polyethylene glycol 3350 17 Gram(s) Oral at bedtime  predniSONE   Tablet 5 milliGRAM(s) Oral two times a day  tamsulosin 0.4 milliGRAM(s) Oral at bedtime      =======================================================  Labs:                                                  8.1    4.94  )-----------( 377      ( 03 Feb 2020 08:39 )             26.6         Culture - Acid Fast - Body Fluid w/Smear (collected 01-27-20 @ 21:54)  Source: .Body Fluid    Culture - Fungal, Body Fluid (collected 01-27-20 @ 13:05)  Source: .Body Fluid    Culture - Body Fluid with Gram Stain (collected 01-27-20 @ 13:04)  Source: .Body Fluid  Gram Stain (01-27-20 @ 15:34):    Numerous White blood cells    No organisms seen  Final Report (02-01-20 @ 10:17):    Numerous Staphylococcus aureus  Organism: Staphylococcus aureus (02-01-20 @ 10:17)  Organism: Staphylococcus aureus (02-01-20 @ 10:17)    Sensitivities:      -  Ampicillin/Sulbactam: S <=8/4      -  Cefazolin: S <=4      -  Clindamycin: S <=0.5      -  Erythromycin: S <=0.5      -  Gentamicin: S <=4 Should not be used as monotherapy      -  Oxacillin: S <=0.25      -  Penicillin: R >8      -  RIF- Rifampin: S <=1 Should not be used as monotherapy      -  Tetra/Doxy: S <=4      -  Trimethoprim/Sulfamethoxazole: S <=0.5/9.5      -  Vancomycin: S 2      Method Type: KERRI    Culture - Blood (collected 01-26-20 @ 18:25)  Source: .Blood  Final Report (01-31-20 @ 19:01):    No growth at 5 days.    Culture - Blood (collected 01-26-20 @ 18:25)  Source: .Blood  Final Report (01-31-20 @ 19:01):    No growth at 5 days.    Culture - Sputum (collected 01-23-20 @ 09:17)  Source: .Sputum  Gram Stain (01-23-20 @ 14:01):    Moderate WBC's    No organisms seen  Final Report (01-25-20 @ 09:57):    Moderate Routine respiratory teresa present    Culture - Blood (collected 01-21-20 @ 10:00)  Source: .Blood  Final Report (01-26-20 @ 11:00):    No growth at 5 days.    Culture - Blood (collected 01-20-20 @ 10:24)  Source: .Blood  Final Report (01-25-20 @ 11:01):    No growth at 5 days.    Culture - Blood (collected 01-20-20 @ 10:24)  Source: .Blood  Final Report (01-25-20 @ 11:01):    No growth at 5 days.      Creatinine, Serum: 0.32 mg/dL (02-02-20 @ 06:04)  Creatinine, Serum: 0.29 mg/dL (02-01-20 @ 08:35)  Creatinine, Serum: 0.28 mg/dL (01-31-20 @ 08:51)  Creatinine, Serum: 0.34 mg/dL (01-30-20 @ 09:30)  Creatinine, Serum: 0.35 mg/dL (01-29-20 @ 10:20)

## 2020-02-04 NOTE — PROGRESS NOTE ADULT - SUBJECTIVE AND OBJECTIVE BOX
INTERVAL HPI/OVERNIGHT EVENTS: 63 yo Male Patient PMH of Metastatic Prostate Cancer admitted with Severe Dyspnea, L loculated pleural effusion and severe back and flank pain.  F/U Note  Patient is awake and alert calm, cooperative. Using Nasal O2 at 3L continuous, is very sensitive to decreasing flow rate to 2L. He has an occasional productive cough usually after Neb treatments.  Dyspnea on exertion is persistent, he still needs to ambulate with portable O2. Appetite good, no BM today. Pain is well controlled on present analgesic plan.  When asked what the plan is going forward, he replied he will be getting another thoracentesis tomorrow with Pleurex catheter possibly left in place. Still on ABX. C/O frequency of urination  following a PM administration of Flomax-requesting that the timing be changed to after breakfast. Slept well.    64y old  Male who presents with a chief complaint of Dyspnea secondary to Large located effusion and suspected recurrent Bilateral PNA (04 Feb 2020 10:33)    Present Symptoms:     Dyspnea:  1 2  Sensitive to decreasing O2 flow lower than 3L  Nausea/Vomiting: No  Anxiety:   No  Depression:  No  Fatigue: Yes   Loss of appetite: No    Pain: mild to moderate            Character-            Duration-            Effect-            Factors-            Frequency-            Location-            Severity-Back and L flank pain    Review of Systems: Reviewed                       All others negative    MEDICATIONS  (STANDING):  albuterol/ipratropium for Nebulization 3 milliLiter(s) Nebulizer every 6 hours  ALPRAZolam 0.25 milliGRAM(s) Oral at bedtime  ceFAZolin   IVPB 2000 milliGRAM(s) IV Intermittent every 8 hours  finasteride 5 milliGRAM(s) Oral daily  lidocaine   Patch 1 Patch Transdermal daily  melatonin 3 milliGRAM(s) Oral at bedtime  oxyCODONE    IR 10 milliGRAM(s) Oral every 6 hours  polyethylene glycol 3350 17 Gram(s) Oral at bedtime  predniSONE   Tablet 5 milliGRAM(s) Oral two times a day  tamsulosin 0.4 milliGRAM(s) Oral at bedtime    MEDICATIONS  (PRN):  acetaminophen   Tablet .. 650 milliGRAM(s) Oral every 6 hours PRN Temp greater or equal to 38C (100.4F), Mild Pain (1 - 3)  lactulose Syrup 10 Gram(s) Oral two times a day PRN Constipation  oxyCODONE    IR 10 milliGRAM(s) Oral every 4 hours PRN Severe Pain (7 - 10)      PHYSICAL EXAM:    Vital Signs Last 24 Hrs  T(C): 36.5 (04 Feb 2020 07:37), Max: 36.7 (04 Feb 2020 00:08)  T(F): 97.7 (04 Feb 2020 07:37), Max: 98.1 (04 Feb 2020 00:08)  HR: 84 (04 Feb 2020 10:01) (83 - 88)  BP: 111/70 (04 Feb 2020 07:37) (111/70 - 119/74)  BP(mean): --  RR: 18 (04 Feb 2020 07:37) (18 - 19)  SpO2: 92% (04 Feb 2020 07:37) (92% - 100%)    General: alert  oriented x __3__                   HEENT: normal  dry mouth      Lungs:  labored breathing L pleural effusion    CV: normal      GI: normal  distended                    constipation  last BM: yesterday    : voiding- frequency at night    MSK: normal              ambulatory    Skin: normal  no rash    LABS:                        8.1    4.94  )-----------( 377      ( 03 Feb 2020 08:39 )             26.6     I&O's Summary    03 Feb 2020 07:01  -  04 Feb 2020 07:00  --------------------------------------------------------  IN: 0 mL / OUT: 700 mL / NET: -700 mL    RADIOLOGY & ADDITIONAL STUDIES:  < from: Xray Chest 1 View- PORTABLE-Routine (01.28.20 @ 05:19) >  IMPRESSION:   Bilateral multifocal airspace consolidations RIGHT lung base and LEFT of lower lobe upper lobe with loculated effusions as seen on prior sinus CT scan 1/20/2020 and LEFT lung base. A complete atelectasis of the LEFT lower lobe..    ADVANCE DIRECTIVES:   DNR  NO  Completed on:                     MOLST   NO   Completed on:  Living Will   NO   Completed on:

## 2020-02-04 NOTE — PROGRESS NOTE ADULT - ASSESSMENT
The patient is a 64 year old male with prostate adenocarcinoma with mets to the  Bone, Lung and Lymp on ADT (Lupron), Taxotere, Xgeva therapy who [facility] to [facility] with complaints of worsening shortness of breath. CTA of the chest was negative for PE,  Diffuse bilateral areas of pulmonary consolidation with pneumatocele formation and Large loculated left pleural effusions with compressive atelectasis of the left lower lobe. Status post thoracentesis by IR. Pleural fluid was consistent with exudative fluid; cultures positive for MSSA. Started on IV antibiotics, ID consulted.     Assessment/plan:    1. Acute hypoxic respiratory failure secondary to bilateral pneumonia complicated by large loculated pleural effusion;   Culture positive for MSSA  Changed to IV Cefazolin  Repeat CT chest with minimal improvement  Re-evaluated by CT surgery; Spoke with IR plan for left chest tube on 2/5       2. Hyponatremia: Resolved  Monitor BMP    3. Metastatic prostate cancer: Continue prednisone  Completed 5 sessions of RT    4. Anemia secondary to malignancy  Hb/hct stable  Monitor cbc  No acute signs or symptoms of active bleeding    VTE_ SCDS bilaterally

## 2020-02-04 NOTE — PROGRESS NOTE ADULT - SUBJECTIVE AND OBJECTIVE BOX
Subjective: "I'm not great" no acute distress but reports difficulty breathing. Patient not requiring supplemental oxygen at this time. Denies cough, chest pain, productive sputum.    Vital Signs:  Vital Signs Last 24 Hrs  T(C): 36.7 (02-04-20 @ 16:28), Max: 36.7 (02-04-20 @ 00:08)  T(F): 98 (02-04-20 @ 16:28), Max: 98.1 (02-04-20 @ 00:08)  HR: 104 (02-04-20 @ 16:28) (82 - 104)  BP: 140/84 (02-04-20 @ 16:28) (111/70 - 140/84)  RR: 18 (02-04-20 @ 16:28) (18 - 18)  SpO2: 94% (02-04-20 @ 16:28) (92% - 100%) on RA    Relevant labs, radiology and Medications reviewed    Pertinent Physical Exam  AAOx3  Diminished left base  RRR  abd soft NT ND + BS  no edema    02-03 @ 07:01  -  02-04 @ 07:00  --------------------------------------------------------  IN:  Total IN: 0 mL    OUT:    Voided: 700 mL  Total OUT: 700 mL    Total NET: -700 mL

## 2020-02-04 NOTE — PROGRESS NOTE ADULT - ASSESSMENT
64M, with known history of prostate cancer undergoing chemo therapy with Heme/Onc Dr. Oshea (1 treatment every 3 weeks, last Chemo treatment was 1/14) presented to [facility] 1/20/20 for worsening shortness of breath, recently admitted to a hospital  in [place] 11/2019 and was treated for pneumothorax and pleural effusions, ? decortication vs pleurodesis performed, found to have large loculated left pleural effusion, initially had elevated INR requiring Vit K, now s/p IR thoracentesis 1/27 for 1200cc turbid fluid, now reconsulted by ID for MSSA in pleural fluid, concerning for empyema.    Plan for IR drainage of left recurrent pleural effusion in AM 2/5.

## 2020-02-05 LAB
ANION GAP SERPL CALC-SCNC: 11 MMOL/L — SIGNIFICANT CHANGE UP (ref 5–17)
BUN SERPL-MCNC: 9 MG/DL — SIGNIFICANT CHANGE UP (ref 8–20)
CALCIUM SERPL-MCNC: 7.8 MG/DL — LOW (ref 8.6–10.2)
CHLORIDE SERPL-SCNC: 99 MMOL/L — SIGNIFICANT CHANGE UP (ref 98–107)
CO2 SERPL-SCNC: 27 MMOL/L — SIGNIFICANT CHANGE UP (ref 22–29)
CREAT SERPL-MCNC: 0.32 MG/DL — LOW (ref 0.5–1.3)
GLUCOSE SERPL-MCNC: 96 MG/DL — SIGNIFICANT CHANGE UP (ref 70–99)
GRAM STN FLD: SIGNIFICANT CHANGE UP
HCT VFR BLD CALC: 24.7 % — LOW (ref 39–50)
HGB BLD-MCNC: 7.5 G/DL — LOW (ref 13–17)
INR BLD: 1.73 RATIO — HIGH (ref 0.88–1.16)
MCHC RBC-ENTMCNC: 27.1 PG — SIGNIFICANT CHANGE UP (ref 27–34)
MCHC RBC-ENTMCNC: 30.4 GM/DL — LOW (ref 32–36)
MCV RBC AUTO: 89.2 FL — SIGNIFICANT CHANGE UP (ref 80–100)
PLATELET # BLD AUTO: 321 K/UL — SIGNIFICANT CHANGE UP (ref 150–400)
POTASSIUM SERPL-MCNC: 3.5 MMOL/L — SIGNIFICANT CHANGE UP (ref 3.5–5.3)
POTASSIUM SERPL-SCNC: 3.5 MMOL/L — SIGNIFICANT CHANGE UP (ref 3.5–5.3)
PROTHROM AB SERPL-ACNC: 20.2 SEC — HIGH (ref 10–12.9)
RBC # BLD: 2.77 M/UL — LOW (ref 4.2–5.8)
RBC # FLD: 18.6 % — HIGH (ref 10.3–14.5)
SODIUM SERPL-SCNC: 137 MMOL/L — SIGNIFICANT CHANGE UP (ref 135–145)
SPECIMEN SOURCE: SIGNIFICANT CHANGE UP
WBC # BLD: 4.15 K/UL — SIGNIFICANT CHANGE UP (ref 3.8–10.5)
WBC # FLD AUTO: 4.15 K/UL — SIGNIFICANT CHANGE UP (ref 3.8–10.5)

## 2020-02-05 PROCEDURE — 32557 INSERT CATH PLEURA W/ IMAGE: CPT | Mod: LT

## 2020-02-05 PROCEDURE — 99233 SBSQ HOSP IP/OBS HIGH 50: CPT

## 2020-02-05 PROCEDURE — 99232 SBSQ HOSP IP/OBS MODERATE 35: CPT

## 2020-02-05 RX ADMIN — Medication 3 MILLIGRAM(S): at 22:15

## 2020-02-05 RX ADMIN — OXYCODONE HYDROCHLORIDE 10 MILLIGRAM(S): 5 TABLET ORAL at 05:30

## 2020-02-05 RX ADMIN — Medication 100 MILLIGRAM(S): at 05:29

## 2020-02-05 RX ADMIN — OXYCODONE HYDROCHLORIDE 10 MILLIGRAM(S): 5 TABLET ORAL at 17:19

## 2020-02-05 RX ADMIN — Medication 3 MILLILITER(S): at 03:08

## 2020-02-05 RX ADMIN — FINASTERIDE 5 MILLIGRAM(S): 5 TABLET, FILM COATED ORAL at 11:23

## 2020-02-05 RX ADMIN — Medication 5 MILLIGRAM(S): at 17:21

## 2020-02-05 RX ADMIN — Medication 100 MILLIGRAM(S): at 22:14

## 2020-02-05 RX ADMIN — POLYETHYLENE GLYCOL 3350 17 GRAM(S): 17 POWDER, FOR SOLUTION ORAL at 22:15

## 2020-02-05 RX ADMIN — OXYCODONE HYDROCHLORIDE 10 MILLIGRAM(S): 5 TABLET ORAL at 23:00

## 2020-02-05 RX ADMIN — OXYCODONE HYDROCHLORIDE 10 MILLIGRAM(S): 5 TABLET ORAL at 22:14

## 2020-02-05 RX ADMIN — OXYCODONE HYDROCHLORIDE 10 MILLIGRAM(S): 5 TABLET ORAL at 10:54

## 2020-02-05 RX ADMIN — Medication 3 MILLILITER(S): at 15:10

## 2020-02-05 RX ADMIN — OXYCODONE HYDROCHLORIDE 10 MILLIGRAM(S): 5 TABLET ORAL at 18:29

## 2020-02-05 RX ADMIN — OXYCODONE HYDROCHLORIDE 10 MILLIGRAM(S): 5 TABLET ORAL at 05:29

## 2020-02-05 RX ADMIN — Medication 5 MILLIGRAM(S): at 05:29

## 2020-02-05 RX ADMIN — Medication 3 MILLILITER(S): at 20:52

## 2020-02-05 RX ADMIN — TAMSULOSIN HYDROCHLORIDE 0.4 MILLIGRAM(S): 0.4 CAPSULE ORAL at 10:59

## 2020-02-05 RX ADMIN — Medication 100 MILLIGRAM(S): at 14:01

## 2020-02-05 RX ADMIN — OXYCODONE HYDROCHLORIDE 10 MILLIGRAM(S): 5 TABLET ORAL at 11:50

## 2020-02-05 NOTE — PROGRESS NOTE ADULT - PROBLEM SELECTOR PLAN 1
MSSA in pleural fluid cx form 1/27  ID following.  pt with h/o recent decortication/pleurodesis, no OR at this time  Left IR pigtail placed today for thick brown colored fluid.  Will continue to follow.

## 2020-02-05 NOTE — PROGRESS NOTE ADULT - PROBLEM SELECTOR PLAN 2
known R PTX, ? cavitary lesion, cont to monitor, no intervention at this time    Discussed with Dr. Singleton.
management per oncology
radiation today  management per oncology

## 2020-02-05 NOTE — PROGRESS NOTE ADULT - SUBJECTIVE AND OBJECTIVE BOX
Vascular & Interventional Radiology Pre-Procedure Note    Procedure Name: Left chest tube placement    HPI: 64y Male with posterior loculated left pleural effusion. Cardiothoracic surgery and infectious disease are requesting tube placement as prior thoracentesis fluid culture came back positive for bacteria.     Allergies:   Medications (Abx/Cardiac/Anticoagulation/Blood Products)    ceFAZolin   IVPB: 100 mL/Hr IV Intermittent (02-05 @ 05:29)    Data:    T(C): 36.6  HR: 91  BP: 125/82  RR: 18  SpO2: 95%    -WBC 4.15 / HgB 7.5 / Hct 24.7 / Plt 321  -Na 137 / Cl 99 / BUN 9.0 / Glucose 96  -K 3.5 / CO2 27.0 / Cr 0.32  -ALT -- / Alk Phos -- / T.Bili --  -INR1.73    Plan:   -64y Male presents for left posterior loculated effusion chest tube placement. I discussed the procedure and risks with the patient and he is agreeable to proceed.   -Risks/Benefits/alternatives explained with the patient and/or healthcare proxy and witnessed informed consent obtained.

## 2020-02-05 NOTE — PROGRESS NOTE ADULT - ASSESSMENT
This 64yoM; with pmh signif for Prostate Ca with mets to ribs diagnosed in 11/2019 (currently on chemo), HTN; now p/w cough, sob, fever x1 week, progressively worsening.  denies sick contacts. reports traveling from [place] after diagnosis of prostate ca complicated by b/l ptx in november and another hospitalization in december. denies cp/palp. denies abd pain: SOCIAL: No tobacco/illicit substance use/social/EtOH  patient reports having a history of Bilateral Pneumonia approx 1.5 months prior with associated bilateral Pleural effusions which required draining. 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 (20 Jan 2020 21:22)  patient was initially started On vanco & Zosyn, then Ceftriaxone and Zithromax was added as well.   Impression:  PNA  lrepeat CT scan with "Patent central airways.  Bilateral consolidation and pneumatoceles are reidentified, without significant change from prior examination."  s/p thoracentesis on 1/27/2020; cultures with Staph AUREUS  -   on KEFZOL  FOR MSSA  WILL CONTINUE  CT SURGERY   REEVALUATION NOTED  IR  FOR PLACEMENT OF PIGTAIL CATHTER  OVERALL IMPROVED   WILL FOLLOW UP WITH FURTHER RECOMMENDATIONS

## 2020-02-05 NOTE — PROGRESS NOTE ADULT - PROBLEM SELECTOR PROBLEM 2
Pneumothorax, unspecified type
Prostate cancer metastatic to multiple sites

## 2020-02-05 NOTE — PROGRESS NOTE ADULT - ASSESSMENT
64M, with known history of prostate cancer undergoing chemo therapy with Heme/Onc Dr. Oshea (1 treatment every 3 weeks, last Chemo treatment was 1/14) presented to [facility] 1/20/20 for worsening shortness of breath, recently admitted to a hospital  in [place] 11/2019 and was treated for pneumothorax and pleural effusions, ? decortication vs pleurodesis performed, found to have large loculated left pleural effusion, initially had elevated INR requiring Vit K, now s/p IR thoracentesis 1/27 for 1200cc turbid fluid, now reconsulted by ID for MSSA in pleural fluid, concerning for empyema.    2/5 s/p IR pigtail placement on left.

## 2020-02-05 NOTE — PROGRESS NOTE ADULT - SUBJECTIVE AND OBJECTIVE BOX
CC: Follow up    INTERVAL HPI/OVERNIGHT EVENTS: Patient seen and examined, status post IR drainage of the left effusion with removal of 60cc of fluid and chest tube placement. COmplaints of pain at the chest tube site    Vital Signs Last 24 Hrs  T(C): 36.6 (05 Feb 2020 10:26), Max: 36.8 (05 Feb 2020 00:02)  T(F): 97.9 (05 Feb 2020 10:26), Max: 98.3 (05 Feb 2020 00:02)  HR: 85 (05 Feb 2020 10:26) (82 - 104)  BP: 122/75 (05 Feb 2020 10:26) (111/71 - 140/84)  BP(mean): --  RR: 18 (05 Feb 2020 10:26) (18 - 18)  SpO2: 96% (05 Feb 2020 10:26) (94% - 96%)    PHYSICAL EXAM:    GENERAL: NAD, AOX3  HEAD:  Atraumatic, Normocephalic  EYES: EOMI, PERRLA, conjunctiva and sclera clear  ENMT: Moist mucous membranes  CHEST/LUNG: Bibasilar crackles; left sided chest tube   HEART: Regular rate and rhythm; No murmurs, rubs, or gallops  ABDOMEN: Soft, Nontender, Nondistended; Bowel sounds present  EXTREMITIES:  2+ Peripheral Pulses, No clubbing, cyanosis, or edema        MEDICATIONS  (STANDING):  albuterol/ipratropium for Nebulization 3 milliLiter(s) Nebulizer every 6 hours  ALPRAZolam 0.25 milliGRAM(s) Oral at bedtime  ceFAZolin   IVPB 2000 milliGRAM(s) IV Intermittent every 8 hours  finasteride 5 milliGRAM(s) Oral daily  lidocaine   Patch 1 Patch Transdermal daily  melatonin 3 milliGRAM(s) Oral at bedtime  polyethylene glycol 3350 17 Gram(s) Oral at bedtime  predniSONE   Tablet 5 milliGRAM(s) Oral two times a day  tamsulosin 0.4 milliGRAM(s) Oral at bedtime    MEDICATIONS  (PRN):  acetaminophen   Tablet .. 650 milliGRAM(s) Oral every 6 hours PRN Temp greater or equal to 38C (100.4F), Mild Pain (1 - 3)  lactulose Syrup 10 Gram(s) Oral two times a day PRN Constipation  oxyCODONE    IR 10 milliGRAM(s) Oral every 4 hours PRN Severe Pain (7 - 10)      Allergies    No Known Allergies    Intolerances          LABS:                          7.5    4.15  )-----------( 321      ( 05 Feb 2020 06:56 )             24.7     02-05    137  |  99  |  9.0  ----------------------------<  96  3.5   |  27.0  |  0.32<L>    Ca    7.8<L>      05 Feb 2020 06:56      PT/INR - ( 05 Feb 2020 06:56 )   PT: 20.2 sec;   INR: 1.73 ratio               RADIOLOGY & ADDITIONAL TESTS:

## 2020-02-05 NOTE — PROGRESS NOTE ADULT - SUBJECTIVE AND OBJECTIVE BOX
[facility]  INFECTIOUS DISEASES AND INTERNAL MEDICINE at [place]  =======================================================  Chandler Mccrary MD  Diplomates American Board of Internal Medicine and Infectious Diseases  Telephone [phone]  Fax            [phone]  =======================================================    [facility]-[id number]  [place]   follow up:  Pneumonia    no new issues   FEELS OK ON O2.   FOR IR CATHTER PLACEMENT TODAY     =======================================================  REVIEW OF SYSTEMS:  CONSTITUTIONAL:  no fevers  HEENT:  No diplopia or blurred vision.  No earache, sore throat or runny nose.  CARDIOVASCULAR:  No pressure, squeezing, strangling, tightness, heaviness or aching about the chest, neck, axilla or epigastrium.  RESPIRATORY:  + shortness of breath  GASTROINTESTINAL:  No nausea, vomiting or diarrhea.  GENITOURINARY:  No dysuria, frequency or urgency. No Blood in urine  MUSCULOSKELETAL:  no joint aches, no muscle pain  SKIN:  No change in skin, hair or nails.  NEUROLOGIC:  No Headaches, seizures or weakness.  PSYCHIATRIC:  No disorder of thought or mood.  ENDOCRINE:  No heat or cold intolerance  HEMATOLOGICAL:  No easy bruising or bleeding.   =======================================================  Allergies  No Known Allergies     ======================================================  Physical Exam:  ============  (see section below)    General:  No acute distress.  Eye: Pupils are equal, round and reactive to light, Extraocular movements are intact, Normal conjunctiva.  HENT: Normocephalic, Oral mucosa is moist, No pharyngeal erythema, No sinus tenderness.  Neck: Supple, No lymphadenopathy.  Respiratory:   POOR aeration of the LEFT side to mid lung zone,   Cardiovascular: Normal rate, Regular rhythm,   Gastrointestinal: Soft, Non-tender, Non-distended, Normal bowel sounds.  Genitourinary: No costovertebral angle tenderness.  Lymphatics: No lymphadenopathy neck,   Musculoskeletal: Normal range of motion, Normal strength.  Integumentary: No rash.  Neurologic: Alert, Oriented, No focal deficits, Cranial Nerves II-XII are grossly intact.  Psychiatric: Appropriate mood & affect.    =======================================================   EXAM:  CT CHEST                        PROCEDURE DATE:  01/31/2020    INTERPRETATION:  CLINICAL INFORMATION: Chest pain and shortness of breath. Evaluate loculated pleural effusion and pneumothorax. Metastatic prostate cancer.  COMPARISON: 1/20/2020  PROCEDURE:   CT of the Chest was performed without intravenous contrast.  Sagittal and coronal reformats were performed.  FINDINGS:  LUNGS AND AIRWAYS: Patent central airways.  Bilateral consolidation and pneumatoceles are reidentified, without significant change from prior examination.   PLEURA: Small right pneumothorax is reidentified, slightly increased in size compared to previous examination 1/20/2020. Loculated left pleural effusion has decreased in size. Left pleural calcifications are noted.  MEDIASTINUM AND MARIELA: No lymphadenopathy.  VESSELS: Atherosclerotic calcifications.  HEART: Heart size is normal. No pericardial effusion.  CHEST WALL AND LOWER NECK: Within normal limits.  VISUALIZED UPPER ABDOMEN: Within normal limits.  BONES: Bony destructive changes are present in the left second and sixth ribs. There are scattered small vague lucencies within the spine with mixed lytic and sclerotic changes in T6 vertebral body.    IMPRESSION:   Small right-sided pneumothorax, slightly increased in size compared to previous examination 1/20/2020.  Loculated left pleural effusion has decreased in size.  Bilateral consolidation and pneumatoceles, without significant change from previous exam.  Bony metastatic disease.    ALLY CASTANON M.D., ATTENDING RADIOLOGIST  This document has been electronically signed. Jan 31 2020  3:52PM      =====================================================  Vitals:  ============    Vital Signs Last 24 Hrs  T(C): 36.6 (05 Feb 2020 08:16), Max: 36.8 (05 Feb 2020 00:02)  T(F): 97.9 (05 Feb 2020 08:16), Max: 98.3 (05 Feb 2020 00:02)  HR: 91 (05 Feb 2020 08:16) (82 - 104)  BP: 125/82 (05 Feb 2020 08:16) (111/71 - 140/84)  BP(mean): --  RR: 18 (05 Feb 2020 08:16) (18 - 18)  SpO2: 95% (05 Feb 2020 08:16) (94% - 95%)  =======================================================  Current Antibiotics:  KEFZOL    Other medications:  albuterol/ipratropium for Nebulization 3 milliLiter(s) Nebulizer every 6 hours  ALPRAZolam 0.25 milliGRAM(s) Oral at bedtime  finasteride 5 milliGRAM(s) Oral daily  lidocaine   Patch 1 Patch Transdermal daily  melatonin 3 milliGRAM(s) Oral at bedtime  oxyCODONE    IR 10 milliGRAM(s) Oral every 6 hours  polyethylene glycol 3350 17 Gram(s) Oral at bedtime  predniSONE   Tablet 5 milliGRAM(s) Oral two times a day  tamsulosin 0.4 milliGRAM(s) Oral at bedtime      =======================================================  Labs:                                                      7.5    4.15  )-----------( 321      ( 05 Feb 2020 06:56 )             24.7           Culture - Acid Fast - Body Fluid w/Smear (collected 01-27-20 @ 21:54)  Source: .Body Fluid    Culture - Fungal, Body Fluid (collected 01-27-20 @ 13:05)  Source: .Body Fluid    Culture - Body Fluid with Gram Stain (collected 01-27-20 @ 13:04)  Source: .Body Fluid  Gram Stain (01-27-20 @ 15:34):    Numerous White blood cells    No organisms seen  Final Report (02-01-20 @ 10:17):    Numerous Staphylococcus aureus  Organism: Staphylococcus aureus (02-01-20 @ 10:17)  Organism: Staphylococcus aureus (02-01-20 @ 10:17)    Sensitivities:      -  Ampicillin/Sulbactam: S <=8/4      -  Cefazolin: S <=4      -  Clindamycin: S <=0.5      -  Erythromycin: S <=0.5      -  Gentamicin: S <=4 Should not be used as monotherapy      -  Oxacillin: S <=0.25      -  Penicillin: R >8      -  RIF- Rifampin: S <=1 Should not be used as monotherapy      -  Tetra/Doxy: S <=4      -  Trimethoprim/Sulfamethoxazole: S <=0.5/9.5      -  Vancomycin: S 2      Method Type: KERRI    Culture - Blood (collected 01-26-20 @ 18:25)  Source: .Blood  Final Report (01-31-20 @ 19:01):    No growth at 5 days.    Culture - Blood (collected 01-26-20 @ 18:25)  Source: .Blood  Final Report (01-31-20 @ 19:01):    No growth at 5 days.    Culture - Sputum (collected 01-23-20 @ 09:17)  Source: .Sputum  Gram Stain (01-23-20 @ 14:01):    Moderate WBC's    No organisms seen  Final Report (01-25-20 @ 09:57):    Moderate Routine respiratory teresa present    Culture - Blood (collected 01-21-20 @ 10:00)  Source: .Blood  Final Report (01-26-20 @ 11:00):    No growth at 5 days.    Culture - Blood (collected 01-20-20 @ 10:24)  Source: .Blood  Final Report (01-25-20 @ 11:01):    No growth at 5 days.    Culture - Blood (collected 01-20-20 @ 10:24)  Source: .Blood  Final Report (01-25-20 @ 11:01):    No growth at 5 days.      Creatinine, Serum: 0.32 mg/dL (02-02-20 @ 06:04)  Creatinine, Serum: 0.29 mg/dL (02-01-20 @ 08:35)  Creatinine, Serum: 0.28 mg/dL (01-31-20 @ 08:51)  Creatinine, Serum: 0.34 mg/dL (01-30-20 @ 09:30)  Creatinine, Serum: 0.35 mg/dL (01-29-20 @ 10:20)

## 2020-02-05 NOTE — PROGRESS NOTE ADULT - SUBJECTIVE AND OBJECTIVE BOX
Interventional Radiology Brief Post-Procedure Note    Procedure: Left chest tube placement    Operators: Angelito Medina MD    Anesthesia (type): Local lidocaine    Contrast: None.    EBL: Minimal.     Findings/Follow up Plan of Care: Successful left chest tube placement into loculated posterior effusion. The patient could not tolerate prone positioning so the procedure was done under ultrasound guidance.     Specimens Removed: 60 mL cloudy fluid.     Implants: 10 [place] left posterior chest tube.    Complications: No immediate complications.     Condition/Disposition: Back to inpatient room. Keep tube gravity drainage. Cardiothoracic surgery to manage tubes.    Please call Interventional Radiology x [phone] with any questions, concerns, or issues.

## 2020-02-05 NOTE — PROGRESS NOTE ADULT - SUBJECTIVE AND OBJECTIVE BOX
Subjective: Pt sitting up in bed.  Denies SOB or CP. NAD noted.    Vital Signs:  Vital Signs Last 24 Hrs  T(C): 37 (02-05-20 @ 16:12), Max: 37 (02-05-20 @ 16:12)  T(F): 98.6 (02-05-20 @ 16:12), Max: 98.6 (02-05-20 @ 16:12)  HR: 89 (02-05-20 @ 16:12) (82 - 91)  BP: 132/78 (02-05-20 @ 16:12) (111/71 - 132/78)  RR: 18 (02-05-20 @ 16:12) (18 - 18)  SpO2: 96% (02-05-20 @ 16:12) (94% - 96%)       Physical Assessment:  AAOx3  Diminished left base  RRR  abd soft NT ND + BS  no edema

## 2020-02-05 NOTE — PROGRESS NOTE ADULT - ASSESSMENT
The patient is a 64 year old male with prostate adenocarcinoma with mets to the  Bone, Lung and Lymp on ADT (Lupron), Taxotere, Xgeva therapy who presented to the Er with complaints of worsening shortness of breath. CTA of the chest was negative for PE,  Diffuse bilateral areas of pulmonary consolidation with pneumatocele formation and Large loculated left pleural effusions with compressive atelectasis of the left lower lobe. Status post thoracentesis by IR. Pleural fluid was consistent with exudative fluid; cultures positive for MSSA. Started on IV antibiotics, ID consulted.     Assessment/plan:    1. Acute hypoxic respiratory failure secondary to bilateral pneumonia complicated by large loculated pleural effusion;   Culture positive for MSSA  IV Cefazolin  Status post IR drainage and chest tube placement of left effusion      2. Hyponatremia: Resolved  Monitor BMP    3. Metastatic prostate cancer: Continue prednisone  Completed 5 sessions of RT    4. Anemia secondary to malignancy  Hb/hct stable  Monitor cbc  No acute signs or symptoms of active bleeding    VTE_ SCDS bilaterally

## 2020-02-06 PROCEDURE — 99231 SBSQ HOSP IP/OBS SF/LOW 25: CPT

## 2020-02-06 PROCEDURE — 99232 SBSQ HOSP IP/OBS MODERATE 35: CPT

## 2020-02-06 PROCEDURE — 99233 SBSQ HOSP IP/OBS HIGH 50: CPT

## 2020-02-06 RX ORDER — OXYCODONE HYDROCHLORIDE 5 MG/1
10 TABLET ORAL EVERY 4 HOURS
Refills: 0 | Status: DISCONTINUED | OUTPATIENT
Start: 2020-02-06 | End: 2020-02-13

## 2020-02-06 RX ORDER — TAMSULOSIN HYDROCHLORIDE 0.4 MG/1
0.4 CAPSULE ORAL DAILY
Refills: 0 | Status: DISCONTINUED | OUTPATIENT
Start: 2020-02-06 | End: 2020-02-19

## 2020-02-06 RX ORDER — LIDOCAINE 4 G/100G
1 CREAM TOPICAL DAILY
Refills: 0 | Status: DISCONTINUED | OUTPATIENT
Start: 2020-02-06 | End: 2020-02-19

## 2020-02-06 RX ORDER — OXYCODONE HYDROCHLORIDE 5 MG/1
10 TABLET ORAL EVERY 6 HOURS
Refills: 0 | Status: DISCONTINUED | OUTPATIENT
Start: 2020-02-06 | End: 2020-02-13

## 2020-02-06 RX ORDER — ALPRAZOLAM 0.25 MG
0.25 TABLET ORAL AT BEDTIME
Refills: 0 | Status: DISCONTINUED | OUTPATIENT
Start: 2020-02-06 | End: 2020-02-13

## 2020-02-06 RX ADMIN — OXYCODONE HYDROCHLORIDE 10 MILLIGRAM(S): 5 TABLET ORAL at 06:49

## 2020-02-06 RX ADMIN — Medication 3 MILLILITER(S): at 09:01

## 2020-02-06 RX ADMIN — TAMSULOSIN HYDROCHLORIDE 0.4 MILLIGRAM(S): 0.4 CAPSULE ORAL at 11:35

## 2020-02-06 RX ADMIN — OXYCODONE HYDROCHLORIDE 10 MILLIGRAM(S): 5 TABLET ORAL at 13:36

## 2020-02-06 RX ADMIN — OXYCODONE HYDROCHLORIDE 10 MILLIGRAM(S): 5 TABLET ORAL at 17:52

## 2020-02-06 RX ADMIN — Medication 5 MILLIGRAM(S): at 06:42

## 2020-02-06 RX ADMIN — Medication 100 MILLIGRAM(S): at 17:51

## 2020-02-06 RX ADMIN — FINASTERIDE 5 MILLIGRAM(S): 5 TABLET, FILM COATED ORAL at 06:43

## 2020-02-06 RX ADMIN — Medication 100 MILLIGRAM(S): at 06:41

## 2020-02-06 RX ADMIN — OXYCODONE HYDROCHLORIDE 10 MILLIGRAM(S): 5 TABLET ORAL at 23:59

## 2020-02-06 RX ADMIN — Medication 3 MILLILITER(S): at 20:53

## 2020-02-06 RX ADMIN — OXYCODONE HYDROCHLORIDE 10 MILLIGRAM(S): 5 TABLET ORAL at 11:37

## 2020-02-06 RX ADMIN — POLYETHYLENE GLYCOL 3350 17 GRAM(S): 17 POWDER, FOR SOLUTION ORAL at 23:59

## 2020-02-06 RX ADMIN — Medication 3 MILLILITER(S): at 03:23

## 2020-02-06 RX ADMIN — Medication 5 MILLIGRAM(S): at 17:53

## 2020-02-06 RX ADMIN — Medication 3 MILLILITER(S): at 15:07

## 2020-02-06 RX ADMIN — OXYCODONE HYDROCHLORIDE 10 MILLIGRAM(S): 5 TABLET ORAL at 06:41

## 2020-02-06 NOTE — PROGRESS NOTE ADULT - ASSESSMENT
This 64yoM; with pmh signif for Prostate Ca with mets to ribs diagnosed in 11/2019 (currently on chemo), HTN; now p/w cough, sob, fever x1 week, progressively worsening.  denies sick contacts. reports traveling from [place] after diagnosis of prostate ca complicated by b/l ptx in november and another hospitalization in december. denies cp/palp. denies abd pain: SOCIAL: No tobacco/illicit substance use/social/EtOH  patient reports having a history of Bilateral Pneumonia approx 1.5 months prior with associated bilateral Pleural effusions which required draining. 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 (20 Jan 2020 21:22)  patient was initially started On vanco & Zosyn, then Ceftriaxone and Zithromax was added as well.   Impression:  PNA  lrepeat CT scan with "Patent central airways.  Bilateral consolidation and pneumatoceles are reidentified, without significant change from prior examination."  s/p thoracentesis on 1/27/2020; cultures with Staph AUREUS  -   on KEFZOL  FOR MSSA  WILL CONTINUE  CT SURGERY   REEVALUATION NOTED   S/P CHEST TUBE PLACEMENT BY IR   OVERALL IMPROVED   CONTINUE IV ABX  WILL FOLLOW UP WITH FURTHER RECOMMENDATIONS

## 2020-02-06 NOTE — PROGRESS NOTE ADULT - SUBJECTIVE AND OBJECTIVE BOX
CC: Follow up    INTERVAL HPI/OVERNIGHT EVENTS: Patient seen and examined, no acute complaints overnight. denies cough, sob or chest pain. Afebrile. Chest tube in place      Vital Signs Last 24 Hrs  T(C): 36.4 (06 Feb 2020 08:24), Max: 37 (05 Feb 2020 16:12)  T(F): 97.6 (06 Feb 2020 08:24), Max: 98.6 (05 Feb 2020 16:12)  HR: 91 (06 Feb 2020 08:24) (85 - 91)  BP: 119/75 (06 Feb 2020 08:24) (116/68 - 132/78)  BP(mean): --  RR: 18 (06 Feb 2020 08:24) (18 - 18)  SpO2: 95% (06 Feb 2020 08:24) (93% - 98%)    PHYSICAL EXAM:    GENERAL: NAD, AOX3  HEAD:  Atraumatic, Normocephalic  EYES: EOMI, PERRLA, conjunctiva and sclera clear  ENMT: Moist mucous membranes  NECK: Supple, No JVD  CHEST/LUNG: Bibasilar rhonchi L chest tube  HEART: Regular rate and rhythm; No murmurs, rubs, or gallops  ABDOMEN: Soft, Nontender, Nondistended; Bowel sounds present  EXTREMITIES:  2+ Peripheral Pulses, No clubbing, cyanosis, or edema        MEDICATIONS  (STANDING):  albuterol/ipratropium for Nebulization 3 milliLiter(s) Nebulizer every 6 hours  ALPRAZolam 0.25 milliGRAM(s) Oral at bedtime  ceFAZolin   IVPB 2000 milliGRAM(s) IV Intermittent every 8 hours  finasteride 5 milliGRAM(s) Oral daily  lidocaine   Patch 1 Patch Transdermal daily  melatonin 3 milliGRAM(s) Oral at bedtime  polyethylene glycol 3350 17 Gram(s) Oral at bedtime  predniSONE   Tablet 5 milliGRAM(s) Oral two times a day  tamsulosin 0.4 milliGRAM(s) Oral at bedtime    MEDICATIONS  (PRN):  acetaminophen   Tablet .. 650 milliGRAM(s) Oral every 6 hours PRN Temp greater or equal to 38C (100.4F), Mild Pain (1 - 3)  lactulose Syrup 10 Gram(s) Oral two times a day PRN Constipation  oxyCODONE    IR 10 milliGRAM(s) Oral every 4 hours PRN Severe Pain (7 - 10)      Allergies    No Known Allergies    Intolerances          LABS:                          7.5    4.15  )-----------( 321      ( 05 Feb 2020 06:56 )             24.7     02-05    137  |  99  |  9.0  ----------------------------<  96  3.5   |  27.0  |  0.32<L>    Ca    7.8<L>      05 Feb 2020 06:56      PT/INR - ( 05 Feb 2020 06:56 )   PT: 20.2 sec;   INR: 1.73 ratio               RADIOLOGY & ADDITIONAL TESTS:

## 2020-02-06 NOTE — PROGRESS NOTE ADULT - SUBJECTIVE AND OBJECTIVE BOX
[facility]  INFECTIOUS DISEASES AND INTERNAL MEDICINE at [place]  =======================================================  Chandler Mccrary MD  Diplomates American Board of Internal Medicine and Infectious Diseases  Telephone [phone]  Fax            [phone]  =======================================================    [facility]-[id number]  [place]   follow up:  Pneumonia    no new issues   FEELS OK ON O2.   S/P CHEST TUBE PLACEMENT BY IR     =======================================================  REVIEW OF SYSTEMS:  CONSTITUTIONAL:  no fevers  HEENT:  No diplopia or blurred vision.  No earache, sore throat or runny nose.  CARDIOVASCULAR:  No pressure, squeezing, strangling, tightness, heaviness or aching about the chest, neck, axilla or epigastrium.  RESPIRATORY:  + shortness of breath  GASTROINTESTINAL:  No nausea, vomiting or diarrhea.  GENITOURINARY:  No dysuria, frequency or urgency. No Blood in urine  MUSCULOSKELETAL:  no joint aches, no muscle pain  SKIN:  No change in skin, hair or nails.  NEUROLOGIC:  No Headaches, seizures or weakness.  PSYCHIATRIC:  No disorder of thought or mood.  ENDOCRINE:  No heat or cold intolerance  HEMATOLOGICAL:  No easy bruising or bleeding.   =======================================================  Allergies  No Known Allergies     ======================================================  Physical Exam:  ============  (see section below)    General:  No acute distress.  Eye: Pupils are equal, round and reactive to light, Extraocular movements are intact, Normal conjunctiva.  HENT: Normocephalic, Oral mucosa is moist, No pharyngeal erythema, No sinus tenderness.  Neck: Supple, No lymphadenopathy.  Respiratory:   POOR aeration of the LEFT side to mid lung zone,   Cardiovascular: Normal rate, Regular rhythm,   Gastrointestinal: Soft, Non-tender, Non-distended, Normal bowel sounds.  Genitourinary: No costovertebral angle tenderness.  Lymphatics: No lymphadenopathy neck,   Musculoskeletal: Normal range of motion, Normal strength.  Integumentary: No rash.  Neurologic: Alert, Oriented, No focal deficits, Cranial Nerves II-XII are grossly intact.  Psychiatric: Appropriate mood & affect.    =======================================================           =====================================================  Vitals:  ============     Vital Signs Last 24 Hrs  T(C): 36.4 (06 Feb 2020 08:24), Max: 37 (05 Feb 2020 16:12)  T(F): 97.6 (06 Feb 2020 08:24), Max: 98.6 (05 Feb 2020 16:12)  HR: 91 (06 Feb 2020 08:24) (85 - 91)  BP: 119/75 (06 Feb 2020 08:24) (116/68 - 132/78)  BP(mean): --  RR: 18 (06 Feb 2020 08:24) (18 - 18)  SpO2: 95% (06 Feb 2020 08:24) (93% - 98%)  =======================================================  Current Antibiotics:  KEFZOL    Other medications:  albuterol/ipratropium for Nebulization 3 milliLiter(s) Nebulizer every 6 hours  ALPRAZolam 0.25 milliGRAM(s) Oral at bedtime  finasteride 5 milliGRAM(s) Oral daily  lidocaine   Patch 1 Patch Transdermal daily  melatonin 3 milliGRAM(s) Oral at bedtime  oxyCODONE    IR 10 milliGRAM(s) Oral every 6 hours  polyethylene glycol 3350 17 Gram(s) Oral at bedtime  predniSONE   Tablet 5 milliGRAM(s) Oral two times a day  tamsulosin 0.4 milliGRAM(s) Oral at bedtime      =======================================================  Labs:                                              7.5    4.15  )-----------( 321      ( 05 Feb 2020 06:56 )             24.7   02-05    137  |  99  |  9.0  ----------------------------<  96  3.5   |  27.0  |  0.32<L>    Ca    7.8<L>      05 Feb 2020 06:56              Culture - Acid Fast - Body Fluid w/Smear (collected 01-27-20 @ 21:54)  Source: .Body Fluid    Culture - Fungal, Body Fluid (collected 01-27-20 @ 13:05)  Source: .Body Fluid    Culture - Body Fluid with Gram Stain (collected 01-27-20 @ 13:04)  Source: .Body Fluid  Gram Stain (01-27-20 @ 15:34):    Numerous White blood cells    No organisms seen  Final Report (02-01-20 @ 10:17):    Numerous Staphylococcus aureus  Organism: Staphylococcus aureus (02-01-20 @ 10:17)  Organism: Staphylococcus aureus (02-01-20 @ 10:17)    Sensitivities:      -  Ampicillin/Sulbactam: S <=8/4      -  Cefazolin: S <=4      -  Clindamycin: S <=0.5      -  Erythromycin: S <=0.5      -  Gentamicin: S <=4 Should not be used as monotherapy      -  Oxacillin: S <=0.25      -  Penicillin: R >8      -  RIF- Rifampin: S <=1 Should not be used as monotherapy      -  Tetra/Doxy: S <=4      -  Trimethoprim/Sulfamethoxazole: S <=0.5/9.5      -  Vancomycin: S 2      Method Type: KERRI    Culture - Blood (collected 01-26-20 @ 18:25)  Source: .Blood  Final Report (01-31-20 @ 19:01):    No growth at 5 days.    Culture - Blood (collected 01-26-20 @ 18:25)  Source: .Blood  Final Report (01-31-20 @ 19:01):    No growth at 5 days.    Culture - Sputum (collected 01-23-20 @ 09:17)  Source: .Sputum  Gram Stain (01-23-20 @ 14:01):    Moderate WBC's    No organisms seen  Final Report (01-25-20 @ 09:57):    Moderate Routine respiratory teresa present    Culture - Blood (collected 01-21-20 @ 10:00)  Source: .Blood  Final Report (01-26-20 @ 11:00):    No growth at 5 days.    Culture - Blood (collected 01-20-20 @ 10:24)  Source: .Blood  Final Report (01-25-20 @ 11:01):    No growth at 5 days.    Culture - Blood (collected 01-20-20 @ 10:24)  Source: .Blood  Final Report (01-25-20 @ 11:01):    No growth at 5 days.      Creatinine, Serum: 0.32 mg/dL (02-02-20 @ 06:04)  Creatinine, Serum: 0.29 mg/dL (02-01-20 @ 08:35)  Creatinine, Serum: 0.28 mg/dL (01-31-20 @ 08:51)  Creatinine, Serum: 0.34 mg/dL (01-30-20 @ 09:30)  Creatinine, Serum: 0.35 mg/dL (01-29-20 @ 10:20)

## 2020-02-06 NOTE — PROGRESS NOTE ADULT - SUBJECTIVE AND OBJECTIVE BOX
Patient seen and examined.  Denies CP, SOB, N/V.  Reports he feels more comfortable on the nasal cannula    T(C): 36.4 (02-06-20 @ 08:24)  T(F): 97.6 (02-06-20 @ 08:24)  HR: 91 (02-06-20 @ 08:24)  BP: 119/75 (02-06-20 @ 08:24)  BP(mean): --  ABP: --  ABP(mean): --  RR: 18 (02-06-20 @ 08:24)  SpO2: 95% (02-06-20 @ 08:24)  Wt(kg): --  CVP(mm Hg): --  CI: --  PA: --  PA(mean): --  PA(direct): --  SVRI: --      Physical Exam:  Gen: A&Ox3  Pulm:  Decreased BS L lung.  No airleak, sanguinous drainage from CT  CV:  S1S2, RRR, no m/r/g  Abd: +BS, soft, NT, ND  Ext:  +DP b/l, no c/c/e  Incision:  c/d/i    I&O's Detail    05 Feb 2020 07:01  -  06 Feb 2020 07:00  --------------------------------------------------------  IN:  Total IN: 0 mL    OUT:    Chest Tube: 90 mL    Voided: 1600 mL  Total OUT: 1690 mL    Total NET: -1690 mL                              7.5    4.15  )-----------( 321      ( 05 Feb 2020 06:56 )             24.7   02-05    137  |  99  |  9.0  ----------------------------<  96  3.5   |  27.0  |  0.32<L>    Ca    7.8<L>      05 Feb 2020 06:56    aPTT: x    ; PT: 20.2 sec; INR: 1.73 ratio  02-05-20 @ 06:56         CAPILLARY BLOOD GLUCOSE            Medications:  acetaminophen   Tablet .. 650 milliGRAM(s) Oral every 6 hours PRN  albuterol/ipratropium for Nebulization 3 milliLiter(s) Nebulizer every 6 hours  ALPRAZolam 0.25 milliGRAM(s) Oral at bedtime  ceFAZolin   IVPB 2000 milliGRAM(s) IV Intermittent every 8 hours  finasteride 5 milliGRAM(s) Oral daily  lactulose Syrup 10 Gram(s) Oral two times a day PRN  lidocaine   Patch 1 Patch Transdermal daily  melatonin 3 milliGRAM(s) Oral at bedtime  oxyCODONE    IR 10 milliGRAM(s) Oral every 4 hours PRN  polyethylene glycol 3350 17 Gram(s) Oral at bedtime  predniSONE   Tablet 5 milliGRAM(s) Oral two times a day  tamsulosin 0.4 milliGRAM(s) Oral at bedtime      CXR:  pending    Assessment:  Pt is a 64y year old Male  s/p     Patent currently stable, NAD.    Plan:

## 2020-02-06 NOTE — PROGRESS NOTE ADULT - SUBJECTIVE AND OBJECTIVE BOX
INTERVAL HPI/OVERNIGHT EVENTS: 63yo Male Patient with Metastatic Prostate Cancer and L Loculated pleural effusion, s/p 2nd L Thoracentesis this hospitalization  due to persistent dyspnea and Oxygen dependence.   F/U visit CC: Dyspnea-oxygen dependence THOMPSON.  He is sensitive to reducing O2-NC flow from 3L to 2L-does not want medical staff reducing oxygen settings.  Patient is S/P Thoracentesis and pigtail catheter placement yesterday. That puncture and insertion site is sore. Having periods of severe breakthrough pain if   Oxycodone 10 mg IR-maintenance pain medication frequency is reduced to prn. He denies N/V/D, Constipation CP, palpitations headache dizziness or fever    64y old  Male who presents with a chief complaint of Dyspnea secondary to Large located effusion and suspected recurrent Bilateral PNA (06 Feb 2020 10:42)    PAST MEDICAL & SURGICAL HISTORY:  Prostate CA: Adenocarcinoma with mets to Lung, Bone, and Lymph  No significant past surgical history    Present Symptoms:     Dyspnea: 0 1 > with speech and THOMPSON-needs portable O2 tank to ambulate any distance   Nausea/Vomiting:  No  Anxiety:  Yes   Depression: No  Fatigue: Yes   Loss of appetite:  No    Pain: Lower back L flank and at CT site            Character-            Duration-            Effect-            Factors-            Frequency-            Location-            Severity-moderate to severe    Review of Systems: Reviewed                   All others negative    MEDICATIONS  (STANDING):  albuterol/ipratropium for Nebulization 3 milliLiter(s) Nebulizer every 6 hours  ALPRAZolam 0.25 milliGRAM(s) Oral at bedtime  ceFAZolin   IVPB 2000 milliGRAM(s) IV Intermittent every 8 hours  finasteride 5 milliGRAM(s) Oral daily  lidocaine   Patch 1 Patch Transdermal daily  lidocaine   Patch 1 Patch Transdermal daily  melatonin 3 milliGRAM(s) Oral at bedtime  oxyCODONE    IR 10 milliGRAM(s) Oral every 6 hours  polyethylene glycol 3350 17 Gram(s) Oral at bedtime  predniSONE   Tablet 5 milliGRAM(s) Oral two times a day  tamsulosin 0.4 milliGRAM(s) Oral daily    MEDICATIONS  (PRN):  acetaminophen   Tablet .. 650 milliGRAM(s) Oral every 6 hours PRN Temp greater or equal to 38C (100.4F), Mild Pain (1 - 3)  lactulose Syrup 10 Gram(s) Oral two times a day PRN Constipation  oxyCODONE    IR 10 milliGRAM(s) Oral every 4 hours PRN Severe Pain (7 - 10)      PHYSICAL EXAM:    Vital Signs Last 24 Hrs  T(C): 36.4 (06 Feb 2020 08:24), Max: 37 (05 Feb 2020 16:12)  T(F): 97.6 (06 Feb 2020 08:24), Max: 98.6 (05 Feb 2020 16:12)  HR: 91 (06 Feb 2020 08:24) (85 - 91)  BP: 119/75 (06 Feb 2020 08:24) (116/68 - 132/78)  BP(mean): --  RR: 18 (06 Feb 2020 08:24) (18 - 18)  SpO2: 95% (06 Feb 2020 08:24) (93% - 98%)    General: alert  oriented x3 ____anxious about medical team changing his medication/oxygen concentration orders                  Thin    HEENT:  dry mouth     Lungs: comfortable at rest tachypnea/labored breathing  on exertion    CV: normal      GI:  distended  tender                   constipation  last BM: daily    : normal  voiding kb urine    MSK:  weakness              ambulatory      Skin: normal  __  no rash Pigtail insertion site clean/dry no redness or swelling    LABS:                        7.5    4.15  )-----------( 321      ( 05 Feb 2020 06:56 )             24.7     02-05    137  |  99  |  9.0  ----------------------------<  96  3.5   |  27.0  |  0.32<L>    Ca    7.8<L>      05 Feb 2020 06:56      PT/INR - ( 05 Feb 2020 06:56 )   PT: 20.2 sec;   INR: 1.73 ratio       I&O's Summary    05 Feb 2020 07:01  -  06 Feb 2020 07:00  --------------------------------------------------------  IN: 0 mL / OUT: 1690 mL / NET: -1690 mL    RADIOLOGY & ADDITIONAL STUDIES:      ADVANCE DIRECTIVES:   DNR  NO  Completed on:                     MOLST  NO   Completed on:  Living Will   NO   Completed on:

## 2020-02-06 NOTE — PROGRESS NOTE ADULT - ASSESSMENT
65 YO M with Metastatic Prostate Cancer to Lung, Bone and Lymph  with Persistent Loculated L Pleural Effusion      Assessment and Plan:   · Assessment		  64M with   Problem/Plan - 1:  ·  Problem: Loculated Pleural effusion.    MSSA in pleural fluid had CT placed by IR yesterday for empyema.    Recent ?decortication at hospital in [place].    CT draining, patient stable NAD.      Plan: maintain ct on suction  Supplemental oxygen as tolerated  Daily CXR  Continue antibiotics and care per primary medical team     Problem/Plan - 2-  Acute Pain  Oxycodone 10 mg IR PO Q6 ATC since pre-admission  Acute LBP intensity has improved since RT treatments  He still has significant pain, and will experience severe exacerbation of pain when ATC oxycodone is reduced to prn only  Reordered Oxycodone 10mg IR Q6 ATC  Oxy IR 10 mg PO Q4 prn-has not  used it  Lidoderm patch to L Throracentesis- pigtail placement to Pleurovac    Problem/ Plan -3-  Metastatic Prostate Cancer  Is anxious due to prolonged hospitalization and persistent pleural effusion and dyspnea  He intends to pursue chemotherapy when discharged home when stable  Treat pain, adjusted Timing of Flomax to be given after breakfast-to avoid nocturnal oliguria

## 2020-02-06 NOTE — PROGRESS NOTE ADULT - PROBLEM SELECTOR PLAN 1
maintain ct  wean supplemental oxygen as tolerated  daily CXR  abx and care per primary medical team  d/w attending in Am rounds maintain ct on suction  wean supplemental oxygen as tolerated  daily CXR  abx and care per primary medical team  d/w attending in Am rounds

## 2020-02-06 NOTE — PROGRESS NOTE ADULT - ASSESSMENT
64M with MSSA in pleural fluid had CT placed by IR yesterday for empyema.  Recent ?decortication at hospital in [place].  CT draining, patient stable NAD.

## 2020-02-07 LAB
-  AMPICILLIN/SULBACTAM: SIGNIFICANT CHANGE UP
-  CEFAZOLIN: SIGNIFICANT CHANGE UP
-  CLINDAMYCIN: SIGNIFICANT CHANGE UP
-  ERYTHROMYCIN: SIGNIFICANT CHANGE UP
-  GENTAMICIN: SIGNIFICANT CHANGE UP
-  OXACILLIN: SIGNIFICANT CHANGE UP
-  PENICILLIN: SIGNIFICANT CHANGE UP
-  RIFAMPIN: SIGNIFICANT CHANGE UP
-  TETRACYCLINE: SIGNIFICANT CHANGE UP
-  TRIMETHOPRIM/SULFAMETHOXAZOLE: SIGNIFICANT CHANGE UP
-  VANCOMYCIN: SIGNIFICANT CHANGE UP
ALBUMIN SERPL ELPH-MCNC: 2.8 G/DL — LOW (ref 3.3–5.2)
ALP SERPL-CCNC: 111 U/L — SIGNIFICANT CHANGE UP (ref 40–120)
ALT FLD-CCNC: 5 U/L — SIGNIFICANT CHANGE UP
ANION GAP SERPL CALC-SCNC: 11 MMOL/L — SIGNIFICANT CHANGE UP (ref 5–17)
AST SERPL-CCNC: 13 U/L — SIGNIFICANT CHANGE UP
BILIRUB SERPL-MCNC: 0.3 MG/DL — LOW (ref 0.4–2)
BUN SERPL-MCNC: 7 MG/DL — LOW (ref 8–20)
CALCIUM SERPL-MCNC: 7.5 MG/DL — LOW (ref 8.6–10.2)
CHLORIDE SERPL-SCNC: 102 MMOL/L — SIGNIFICANT CHANGE UP (ref 98–107)
CO2 SERPL-SCNC: 24 MMOL/L — SIGNIFICANT CHANGE UP (ref 22–29)
CREAT SERPL-MCNC: 0.29 MG/DL — LOW (ref 0.5–1.3)
GLUCOSE SERPL-MCNC: 94 MG/DL — SIGNIFICANT CHANGE UP (ref 70–99)
HCT VFR BLD CALC: 27.4 % — LOW (ref 39–50)
HGB BLD-MCNC: 8.3 G/DL — LOW (ref 13–17)
MCHC RBC-ENTMCNC: 27.3 PG — SIGNIFICANT CHANGE UP (ref 27–34)
MCHC RBC-ENTMCNC: 30.3 GM/DL — LOW (ref 32–36)
MCV RBC AUTO: 90.1 FL — SIGNIFICANT CHANGE UP (ref 80–100)
METHOD TYPE: SIGNIFICANT CHANGE UP
PLATELET # BLD AUTO: 365 K/UL — SIGNIFICANT CHANGE UP (ref 150–400)
POTASSIUM SERPL-MCNC: 4 MMOL/L — SIGNIFICANT CHANGE UP (ref 3.5–5.3)
POTASSIUM SERPL-SCNC: 4 MMOL/L — SIGNIFICANT CHANGE UP (ref 3.5–5.3)
PROT SERPL-MCNC: 7.2 G/DL — SIGNIFICANT CHANGE UP (ref 6.6–8.7)
RBC # BLD: 3.04 M/UL — LOW (ref 4.2–5.8)
RBC # FLD: 18.7 % — HIGH (ref 10.3–14.5)
SODIUM SERPL-SCNC: 137 MMOL/L — SIGNIFICANT CHANGE UP (ref 135–145)
WBC # BLD: 4 K/UL — SIGNIFICANT CHANGE UP (ref 3.8–10.5)
WBC # FLD AUTO: 4 K/UL — SIGNIFICANT CHANGE UP (ref 3.8–10.5)

## 2020-02-07 PROCEDURE — 99233 SBSQ HOSP IP/OBS HIGH 50: CPT

## 2020-02-07 PROCEDURE — 99221 1ST HOSP IP/OBS SF/LOW 40: CPT

## 2020-02-07 PROCEDURE — 71045 X-RAY EXAM CHEST 1 VIEW: CPT | Mod: 26

## 2020-02-07 PROCEDURE — 99232 SBSQ HOSP IP/OBS MODERATE 35: CPT

## 2020-02-07 RX ADMIN — OXYCODONE HYDROCHLORIDE 10 MILLIGRAM(S): 5 TABLET ORAL at 23:44

## 2020-02-07 RX ADMIN — OXYCODONE HYDROCHLORIDE 10 MILLIGRAM(S): 5 TABLET ORAL at 18:52

## 2020-02-07 RX ADMIN — Medication 3 MILLILITER(S): at 08:53

## 2020-02-07 RX ADMIN — Medication 3 MILLILITER(S): at 03:48

## 2020-02-07 RX ADMIN — POLYETHYLENE GLYCOL 3350 17 GRAM(S): 17 POWDER, FOR SOLUTION ORAL at 21:53

## 2020-02-07 RX ADMIN — Medication 3 MILLIGRAM(S): at 00:00

## 2020-02-07 RX ADMIN — OXYCODONE HYDROCHLORIDE 10 MILLIGRAM(S): 5 TABLET ORAL at 05:59

## 2020-02-07 RX ADMIN — OXYCODONE HYDROCHLORIDE 10 MILLIGRAM(S): 5 TABLET ORAL at 06:00

## 2020-02-07 RX ADMIN — FINASTERIDE 5 MILLIGRAM(S): 5 TABLET, FILM COATED ORAL at 05:59

## 2020-02-07 RX ADMIN — OXYCODONE HYDROCHLORIDE 10 MILLIGRAM(S): 5 TABLET ORAL at 13:10

## 2020-02-07 RX ADMIN — Medication 5 MILLIGRAM(S): at 18:52

## 2020-02-07 RX ADMIN — OXYCODONE HYDROCHLORIDE 10 MILLIGRAM(S): 5 TABLET ORAL at 12:10

## 2020-02-07 RX ADMIN — Medication 100 MILLIGRAM(S): at 09:34

## 2020-02-07 RX ADMIN — Medication 100 MILLIGRAM(S): at 18:52

## 2020-02-07 RX ADMIN — Medication 5 MILLIGRAM(S): at 05:59

## 2020-02-07 RX ADMIN — OXYCODONE HYDROCHLORIDE 10 MILLIGRAM(S): 5 TABLET ORAL at 19:30

## 2020-02-07 RX ADMIN — Medication 3 MILLILITER(S): at 16:01

## 2020-02-07 RX ADMIN — Medication 100 MILLIGRAM(S): at 02:30

## 2020-02-07 RX ADMIN — TAMSULOSIN HYDROCHLORIDE 0.4 MILLIGRAM(S): 0.4 CAPSULE ORAL at 12:09

## 2020-02-07 RX ADMIN — OXYCODONE HYDROCHLORIDE 10 MILLIGRAM(S): 5 TABLET ORAL at 00:43

## 2020-02-07 RX ADMIN — Medication 3 MILLIGRAM(S): at 21:52

## 2020-02-07 NOTE — PROGRESS NOTE ADULT - ASSESSMENT
This 64yoM; with pmh signif for Prostate Ca with mets to ribs diagnosed in 11/2019 (currently on chemo), HTN; now p/w cough, sob, fever x1 week, progressively worsening.  denies sick contacts. reports traveling from [place] after diagnosis of prostate ca complicated by b/l ptx in november and another hospitalization in december. denies cp/palp. denies abd pain: SOCIAL: No tobacco/illicit substance use/social/EtOH  patient reports having a history of Bilateral Pneumonia approx 1.5 months prior with associated bilateral Pleural effusions which required draining. 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 (20 Jan 2020 21:22)  patient was initially started On vanco & Zosyn, then Ceftriaxone and Zithromax was added as well.   Impression:  PNA  lrepeat CT scan with "Patent central airways.  Bilateral consolidation and pneumatoceles are reidentified, without significant change from prior examination."  s/p thoracentesis on 1/27/2020; cultures with Staph AUREUS  -   on KEFZOL  FOR MSSA  WILL CONTINUE  CT SURGERY   REEVALUATION NOTED   S/P CHEST TUBE PLACEMENT BY IR   OVERALL IMPROVED   CONTINUE IV ABX  WILL FOLLOW UP    MAY NEED LONG TERM IV AS CX STILL POSITIVE

## 2020-02-07 NOTE — PROGRESS NOTE ADULT - ASSESSMENT
The patient is a 64 year old male with prostate adenocarcinoma with mets to the  Bone, Lung and Lymp on ADT (Lupron), Taxotere, Xgeva therapy who presented to the [facility] with complaints of worsening shortness of breath. CTA of the chest was negative for PE,  Diffuse bilateral areas of pulmonary consolidation with pneumatocele formation and Large loculated left pleural effusions with compressive atelectasis of the left lower lobe. Status post thoracentesis by IR on 1/27 with removal of 1200cc of fluid. Pleural fluid was consistent with exudative fluid; cultures positive for MSSA. Started on IV antibiotics, ID consulted.  Repeat CT chest with minimal improvement in loculation. CT surgery and IR were reconsulted. Status post IR chest tube placement on 2/5 Antibiotics changed to IV cefazolin to cover MSSA.     Assessment/plan:    1. Acute hypoxic respiratory failure secondary to bilateral pneumonia complicated by large loculated pleural effusion;   REpeat pleural fluid cultures thus far positive for staph  Continue IV cefazolin  Status post IR drainage and chest tube placement of left effusion; currently on suction  CT surgery following  Daily chest xray       2. Hyponatremia: Resolved  Monitor BMP    3. Metastatic prostate cancer: Continue prednisone  Completed 5 sessions of RT    4. Anemia secondary to malignancy  Hb/hct stable  Monitor cbc  No acute signs or symptoms of active bleeding    VTE_ SCDS bilaterally

## 2020-02-07 NOTE — PROGRESS NOTE ADULT - SUBJECTIVE AND OBJECTIVE BOX
[facility]  INFECTIOUS DISEASES AND INTERNAL MEDICINE at [place]  =======================================================  Chandler Mccrary MD  Diplomates American Board of Internal Medicine and Infectious Diseases  Telephone [phone]  Fax            [phone]  =======================================================    [facility]-[id number]  [place]   follow up:  Pneumonia    no new issues   FEELS OK ON O2.   S/P CHEST TUBE PLACEMENT BY IR     =======================================================  REVIEW OF SYSTEMS:  CONSTITUTIONAL:  no fevers  HEENT:  No diplopia or blurred vision.  No earache, sore throat or runny nose.  CARDIOVASCULAR:  No pressure, squeezing, strangling, tightness, heaviness or aching about the chest, neck, axilla or epigastrium.  RESPIRATORY:  + shortness of breath  GASTROINTESTINAL:  No nausea, vomiting or diarrhea.  GENITOURINARY:  No dysuria, frequency or urgency. No Blood in urine  MUSCULOSKELETAL:  no joint aches, no muscle pain  SKIN:  No change in skin, hair or nails.  NEUROLOGIC:  No Headaches, seizures or weakness.  PSYCHIATRIC:  No disorder of thought or mood.  ENDOCRINE:  No heat or cold intolerance  HEMATOLOGICAL:  No easy bruising or bleeding.   =======================================================  Allergies  No Known Allergies     ======================================================  Physical Exam:  ============  (see section below)    General:  No acute distress.  Eye: Pupils are equal, round and reactive to light, Extraocular movements are intact, Normal conjunctiva.  HENT: Normocephalic, Oral mucosa is moist, No pharyngeal erythema, No sinus tenderness.  Neck: Supple, No lymphadenopathy.  Respiratory:   POOR aeration of the LEFT side to mid lung zone,   Cardiovascular: Normal rate, Regular rhythm,   Gastrointestinal: Soft, Non-tender, Non-distended, Normal bowel sounds.  Genitourinary: No costovertebral angle tenderness.  Lymphatics: No lymphadenopathy neck,   Musculoskeletal: Normal range of motion, Normal strength.  Integumentary: No rash.  Neurologic: Alert, Oriented, No focal deficits, Cranial Nerves II-XII are grossly intact.  Psychiatric: Appropriate mood & affect.    =======================================================           =====================================================  Vitals:  ============      Vital Signs Last 24 Hrs  T(C): 36.6 (07 Feb 2020 01:03), Max: 36.8 (06 Feb 2020 16:26)  T(F): 97.8 (07 Feb 2020 01:03), Max: 98.3 (06 Feb 2020 16:26)  HR: 84 (07 Feb 2020 08:53) (80 - 84)  BP: 127/74 (07 Feb 2020 01:03) (127/74 - 147/71)  BP(mean): --  RR: 18 (07 Feb 2020 01:03) (18 - 18)  SpO2: 96% (06 Feb 2020 16:26) (96% - 96%)  =======================================================  Current Antibiotics:  KEFZOL    Other medications:  albuterol/ipratropium for Nebulization 3 milliLiter(s) Nebulizer every 6 hours  ALPRAZolam 0.25 milliGRAM(s) Oral at bedtime  finasteride 5 milliGRAM(s) Oral daily  lidocaine   Patch 1 Patch Transdermal daily  melatonin 3 milliGRAM(s) Oral at bedtime  oxyCODONE    IR 10 milliGRAM(s) Oral every 6 hours  polyethylene glycol 3350 17 Gram(s) Oral at bedtime  predniSONE   Tablet 5 milliGRAM(s) Oral two times a day  tamsulosin 0.4 milliGRAM(s) Oral at bedtime      =======================================================  Labs:                                                  8.3    4.00  )-----------( 365      ( 07 Feb 2020 07:56 )             27.4   02-07    137  |  102  |  7.0<L>  ----------------------------<  94  4.0   |  24.0  |  0.29<L>    Ca    7.5<L>      07 Feb 2020 07:56    TPro  7.2  /  Alb  2.8<L>  /  TBili  0.3<L>  /  DBili  x   /  AST  13  /  ALT  5   /  AlkPhos  111  02-07              Culture - Acid Fast - Body Fluid w/Smear (collected 01-27-20 @ 21:54)  Source: .Body Fluid    Culture - Fungal, Body Fluid (collected 01-27-20 @ 13:05)  Source: .Body Fluid    Culture - Body Fluid with Gram Stain (collected 01-27-20 @ 13:04)  Source: .Body Fluid  Gram Stain (01-27-20 @ 15:34):    Numerous White blood cells    No organisms seen  Final Report (02-01-20 @ 10:17):    Numerous Staphylococcus aureus  Organism: Staphylococcus aureus (02-01-20 @ 10:17)  Organism: Staphylococcus aureus (02-01-20 @ 10:17)    Sensitivities:      -  Ampicillin/Sulbactam: S <=8/4      -  Cefazolin: S <=4      -  Clindamycin: S <=0.5      -  Erythromycin: S <=0.5      -  Gentamicin: S <=4 Should not be used as monotherapy      -  Oxacillin: S <=0.25      -  Penicillin: R >8      -  RIF- Rifampin: S <=1 Should not be used as monotherapy      -  Tetra/Doxy: S <=4      -  Trimethoprim/Sulfamethoxazole: S <=0.5/9.5      -  Vancomycin: S 2      Method Type: KERRI    Culture - Blood (collected 01-26-20 @ 18:25)  Source: .Blood  Final Report (01-31-20 @ 19:01):    No growth at 5 days.    Culture - Blood (collected 01-26-20 @ 18:25)  Source: .Blood  Final Report (01-31-20 @ 19:01):    No growth at 5 days.    Culture - Sputum (collected 01-23-20 @ 09:17)  Source: .Sputum  Gram Stain (01-23-20 @ 14:01):    Moderate WBC's    No organisms seen  Final Report (01-25-20 @ 09:57):    Moderate Routine respiratory teresa present    Culture - Blood (collected 01-21-20 @ 10:00)  Source: .Blood  Final Report (01-26-20 @ 11:00):    No growth at 5 days.    Culture - Blood (collected 01-20-20 @ 10:24)  Source: .Blood  Final Report (01-25-20 @ 11:01):    No growth at 5 days.    Culture - Blood (collected 01-20-20 @ 10:24)  Source: .Blood  Final Report (01-25-20 @ 11:01):    No growth at 5 days.      Creatinine, Serum: 0.32 mg/dL (02-02-20 @ 06:04)  Creatinine, Serum: 0.29 mg/dL (02-01-20 @ 08:35)  Creatinine, Serum: 0.28 mg/dL (01-31-20 @ 08:51)  Creatinine, Serum: 0.34 mg/dL (01-30-20 @ 09:30)  Creatinine, Serum: 0.35 mg/dL (01-29-20 @ 10:20)

## 2020-02-07 NOTE — PROGRESS NOTE ADULT - PROBLEM SELECTOR PLAN 1
maintain ct on suction  wean supplemental oxygen as tolerated  daily CXR  abx and care per primary medical team  d/w attending in Am rounds maintain ct on suction  wean supplemental oxygen as tolerated  daily CXR  abx and care per primary medical team  culture results in progress   d/w attending in Am rounds

## 2020-02-07 NOTE — PROGRESS NOTE ADULT - SUBJECTIVE AND OBJECTIVE BOX
CC: Follow up    INTERVAL HPI/OVERNIGHT EVENTS: Patient seen and examined, no acute complaints overnight. Left sided chest tube to suction.       Vital Signs Last 24 Hrs  T(C): 36.6 (07 Feb 2020 01:03), Max: 36.8 (06 Feb 2020 16:26)  T(F): 97.8 (07 Feb 2020 01:03), Max: 98.3 (06 Feb 2020 16:26)  HR: 84 (07 Feb 2020 08:53) (80 - 84)  BP: 127/74 (07 Feb 2020 01:03) (127/74 - 147/71)  BP(mean): --  RR: 18 (07 Feb 2020 01:03) (18 - 18)  SpO2: 96% (06 Feb 2020 16:26) (96% - 96%)    PHYSICAL EXAM:    GENERAL: NAD, AOX3  HEAD:  Atraumatic, Normocephalic  EYES: EOMI, PERRLA, conjunctiva and sclera clear  ENMT: Moist mucous membranes  CHEST/LUNG: Clear to auscultation bilaterally; No rales, rhonchi, wheezing, or rubs  Left sided chest tube in place  HEART: Regular rate and rhythm; No murmurs, rubs, or gallops  ABDOMEN: Soft, Nontender, Nondistended; Bowel sounds present  EXTREMITIES:  2+ Peripheral Pulses, No clubbing, cyanosis, or edema        MEDICATIONS  (STANDING):  albuterol/ipratropium for Nebulization 3 milliLiter(s) Nebulizer every 6 hours  ALPRAZolam 0.25 milliGRAM(s) Oral at bedtime  ceFAZolin   IVPB 2000 milliGRAM(s) IV Intermittent every 8 hours  finasteride 5 milliGRAM(s) Oral daily  lidocaine   Patch 1 Patch Transdermal daily  lidocaine   Patch 1 Patch Transdermal daily  melatonin 3 milliGRAM(s) Oral at bedtime  oxyCODONE    IR 10 milliGRAM(s) Oral every 6 hours  polyethylene glycol 3350 17 Gram(s) Oral at bedtime  predniSONE   Tablet 5 milliGRAM(s) Oral two times a day  tamsulosin 0.4 milliGRAM(s) Oral daily    MEDICATIONS  (PRN):  acetaminophen   Tablet .. 650 milliGRAM(s) Oral every 6 hours PRN Temp greater or equal to 38C (100.4F), Mild Pain (1 - 3)  lactulose Syrup 10 Gram(s) Oral two times a day PRN Constipation  oxyCODONE    IR 10 milliGRAM(s) Oral every 4 hours PRN Severe Pain (7 - 10)      Allergies    No Known Allergies    Intolerances          LABS:                          8.3    4.00  )-----------( 365      ( 07 Feb 2020 07:56 )             27.4     02-07    137  |  102  |  7.0<L>  ----------------------------<  94  4.0   |  24.0  |  0.29<L>    Ca    7.5<L>      07 Feb 2020 07:56    TPro  7.2  /  Alb  2.8<L>  /  TBili  0.3<L>  /  DBili  x   /  AST  13  /  ALT  5   /  AlkPhos  111  02-07          RADIOLOGY & ADDITIONAL TESTS:

## 2020-02-07 NOTE — PROGRESS NOTE ADULT - SUBJECTIVE AND OBJECTIVE BOX
Subjective:    Vital Signs:  Vital Signs Last 24 Hrs  T(C): 36.6 (02-07-20 @ 01:03), Max: 36.8 (02-06-20 @ 16:26)  T(F): 97.8 (02-07-20 @ 01:03), Max: 98.3 (02-06-20 @ 16:26)  HR: 84 (02-07-20 @ 08:53) (80 - 84)  BP: 127/74 (02-07-20 @ 01:03) (127/74 - 147/71)  RR: 18 (02-07-20 @ 01:03) (18 - 18)  SpO2: 96% (02-06-20 @ 16:26) (96% - 96%) on (O2)    Telemetry/Alarms:  General: WN/WD NAD  Neurology: Awake, nonfocal, TANG x 4  Eyes: Scleras clear, PERRLA/ EOMI, Gross vision intact  ENT:Gross hearing intact, grossly patent pharynx, no stridor  Neck: Neck supple, trachea midline, No JVD,   Respiratory: CTA B/L, No wheezing, rales, rhonchi  CV: RRR, S1S2, no murmurs, rubs or gallops  Abdominal: Soft, NT, ND +BS,   Extremities: No edema, + peripheral pulses  Skin: No Rashes, Hematoma, Ecchymosis  Lymphatic: No Neck, axilla, groin LAD  Psych: Oriented x 3, normal affect  Incisions:   Tubes:  Relevant labs, radiology and Medications reviewed                        8.3    4.00  )-----------( 365      ( 07 Feb 2020 07:56 )             27.4     02-07    137  |  102  |  7.0<L>  ----------------------------<  94  4.0   |  24.0  |  0.29<L>    Ca    7.5<L>      07 Feb 2020 07:56    TPro  7.2  /  Alb  2.8<L>  /  TBili  0.3<L>  /  DBili  x   /  AST  13  /  ALT  5   /  AlkPhos  111  02-07      MEDICATIONS  (STANDING):  albuterol/ipratropium for Nebulization 3 milliLiter(s) Nebulizer every 6 hours  ALPRAZolam 0.25 milliGRAM(s) Oral at bedtime  ceFAZolin   IVPB 2000 milliGRAM(s) IV Intermittent every 8 hours  finasteride 5 milliGRAM(s) Oral daily  lidocaine   Patch 1 Patch Transdermal daily  lidocaine   Patch 1 Patch Transdermal daily  melatonin 3 milliGRAM(s) Oral at bedtime  oxyCODONE    IR 10 milliGRAM(s) Oral every 6 hours  polyethylene glycol 3350 17 Gram(s) Oral at bedtime  predniSONE   Tablet 5 milliGRAM(s) Oral two times a day  tamsulosin 0.4 milliGRAM(s) Oral daily    MEDICATIONS  (PRN):  acetaminophen   Tablet .. 650 milliGRAM(s) Oral every 6 hours PRN Temp greater or equal to 38C (100.4F), Mild Pain (1 - 3)  lactulose Syrup 10 Gram(s) Oral two times a day PRN Constipation  oxyCODONE    IR 10 milliGRAM(s) Oral every 4 hours PRN Severe Pain (7 - 10)    Pertinent Physical Exam  I&O's Summary    06 Feb 2020 07:01  -  07 Feb 2020 07:00  --------------------------------------------------------  IN: 0 mL / OUT: 150 mL / NET: -150 mL        Assessment  64y Male  w/ PAST MEDICAL & SURGICAL HISTORY:  Prostate CA: Adenocarcinoma with mets to Lung, Bone, and Lymph  No significant past surgical history  admitted with complaints of Patient is a 64y old  Male who presents with a chief complaint of Dyspnea secondary to Large located effusion and suspected recurrent Bilateral PNA (06 Feb 2020 13:46)  .  On (Date), patient underwent . Postoperative course/issues: Subjective: resting comfortably in bed at this time, no complaints     Vital Signs:  Vital Signs Last 24 Hrs  T(C): 36.6 (02-07-20 @ 01:03), Max: 36.8 (02-06-20 @ 16:26)  T(F): 97.8 (02-07-20 @ 01:03), Max: 98.3 (02-06-20 @ 16:26)  HR: 84 (02-07-20 @ 08:53) (80 - 84)  BP: 127/74 (02-07-20 @ 01:03) (127/74 - 147/71)  RR: 18 (02-07-20 @ 01:03) (18 - 18)  SpO2: 96% (02-06-20 @ 16:26) (96% - 96%) on (O2)    Telemetry/Alarms:  General: WN/WD NAD  Neurology: Awake, nonfocal, TANG x 4  Neck: Neck supple, trachea midline, No JVD,   Respiratory: CTA B/L, No wheezing, rales, rhonchi  CV: RRR, S1S2, no murmurs, rubs or gallops  Abdominal: Soft, NT, ND +BS,   Extremities: No edema, + peripheral pulses  Skin: No Rashes, Hematoma, Ecchymosis  Lymphatic: No Neck, axilla, groin LAD  Psych: Oriented x 3, normal affect  Tubes: left side is to suction, no leak, will continue to monitor   Relevant labs, radiology and Medications reviewed                        8.3    4.00  )-----------( 365      ( 07 Feb 2020 07:56 )             27.4     02-07    137  |  102  |  7.0<L>  ----------------------------<  94  4.0   |  24.0  |  0.29<L>    Ca    7.5<L>      07 Feb 2020 07:56    TPro  7.2  /  Alb  2.8<L>  /  TBili  0.3<L>  /  DBili  x   /  AST  13  /  ALT  5   /  AlkPhos  111  02-07      MEDICATIONS  (STANDING):  albuterol/ipratropium for Nebulization 3 milliLiter(s) Nebulizer every 6 hours  ALPRAZolam 0.25 milliGRAM(s) Oral at bedtime  ceFAZolin   IVPB 2000 milliGRAM(s) IV Intermittent every 8 hours  finasteride 5 milliGRAM(s) Oral daily  lidocaine   Patch 1 Patch Transdermal daily  lidocaine   Patch 1 Patch Transdermal daily  melatonin 3 milliGRAM(s) Oral at bedtime  oxyCODONE    IR 10 milliGRAM(s) Oral every 6 hours  polyethylene glycol 3350 17 Gram(s) Oral at bedtime  predniSONE   Tablet 5 milliGRAM(s) Oral two times a day  tamsulosin 0.4 milliGRAM(s) Oral daily    MEDICATIONS  (PRN):  acetaminophen   Tablet .. 650 milliGRAM(s) Oral every 6 hours PRN Temp greater or equal to 38C (100.4F), Mild Pain (1 - 3)  lactulose Syrup 10 Gram(s) Oral two times a day PRN Constipation  oxyCODONE    IR 10 milliGRAM(s) Oral every 4 hours PRN Severe Pain (7 - 10)    Pertinent Physical Exam  I&O's Summary    06 Feb 2020 07:01  -  07 Feb 2020 07:00  --------------------------------------------------------  IN: 0 mL / OUT: 150 mL / NET: -150 mL      Assessment  64y Male  w/ PAST MEDICAL & SURGICAL HISTORY:  Prostate CA: Adenocarcinoma with mets to Lung, Bone, and Lymph  No significant past surgical history  admitted with complaints of Patient is a 64y old  Male who presents with a chief complaint of Dyspnea secondary to Large located effusion and suspected recurrent Bilateral PNA (06 Feb 2020 13:46)

## 2020-02-08 LAB
ANION GAP SERPL CALC-SCNC: 11 MMOL/L — SIGNIFICANT CHANGE UP (ref 5–17)
BUN SERPL-MCNC: 10 MG/DL — SIGNIFICANT CHANGE UP (ref 8–20)
CALCIUM SERPL-MCNC: 7.8 MG/DL — LOW (ref 8.6–10.2)
CHLORIDE SERPL-SCNC: 99 MMOL/L — SIGNIFICANT CHANGE UP (ref 98–107)
CO2 SERPL-SCNC: 25 MMOL/L — SIGNIFICANT CHANGE UP (ref 22–29)
CREAT SERPL-MCNC: 0.33 MG/DL — LOW (ref 0.5–1.3)
FERRITIN SERPL-MCNC: 645 NG/ML — HIGH (ref 30–400)
GLUCOSE SERPL-MCNC: 96 MG/DL — SIGNIFICANT CHANGE UP (ref 70–99)
HCT VFR BLD CALC: 29.9 % — LOW (ref 39–50)
HGB BLD-MCNC: 8.8 G/DL — LOW (ref 13–17)
IRON SATN MFR SERPL: 11 % — LOW (ref 16–55)
IRON SATN MFR SERPL: 29 UG/DL — LOW (ref 59–158)
MCHC RBC-ENTMCNC: 27.2 PG — SIGNIFICANT CHANGE UP (ref 27–34)
MCHC RBC-ENTMCNC: 29.4 GM/DL — LOW (ref 32–36)
MCV RBC AUTO: 92.3 FL — SIGNIFICANT CHANGE UP (ref 80–100)
OB PNL STL: NEGATIVE — SIGNIFICANT CHANGE UP
PLATELET # BLD AUTO: 343 K/UL — SIGNIFICANT CHANGE UP (ref 150–400)
POTASSIUM SERPL-MCNC: 3.9 MMOL/L — SIGNIFICANT CHANGE UP (ref 3.5–5.3)
POTASSIUM SERPL-SCNC: 3.9 MMOL/L — SIGNIFICANT CHANGE UP (ref 3.5–5.3)
RBC # BLD: 3.24 M/UL — LOW (ref 4.2–5.8)
RBC # FLD: 19 % — HIGH (ref 10.3–14.5)
SODIUM SERPL-SCNC: 135 MMOL/L — SIGNIFICANT CHANGE UP (ref 135–145)
TIBC SERPL-MCNC: 263 UG/DL — SIGNIFICANT CHANGE UP (ref 220–430)
TRANSFERRIN SERPL-MCNC: 184 MG/DL — SIGNIFICANT CHANGE UP (ref 180–329)
WBC # BLD: 3.61 K/UL — LOW (ref 3.8–10.5)
WBC # FLD AUTO: 3.61 K/UL — LOW (ref 3.8–10.5)

## 2020-02-08 PROCEDURE — 71045 X-RAY EXAM CHEST 1 VIEW: CPT | Mod: 26

## 2020-02-08 PROCEDURE — 99232 SBSQ HOSP IP/OBS MODERATE 35: CPT

## 2020-02-08 RX ADMIN — Medication 100 MILLIGRAM(S): at 09:41

## 2020-02-08 RX ADMIN — Medication 5 MILLIGRAM(S): at 17:48

## 2020-02-08 RX ADMIN — OXYCODONE HYDROCHLORIDE 10 MILLIGRAM(S): 5 TABLET ORAL at 00:20

## 2020-02-08 RX ADMIN — OXYCODONE HYDROCHLORIDE 10 MILLIGRAM(S): 5 TABLET ORAL at 05:23

## 2020-02-08 RX ADMIN — Medication 5 MILLIGRAM(S): at 05:23

## 2020-02-08 RX ADMIN — Medication 100 MILLIGRAM(S): at 17:47

## 2020-02-08 RX ADMIN — Medication 3 MILLILITER(S): at 03:22

## 2020-02-08 RX ADMIN — TAMSULOSIN HYDROCHLORIDE 0.4 MILLIGRAM(S): 0.4 CAPSULE ORAL at 12:03

## 2020-02-08 RX ADMIN — Medication 3 MILLILITER(S): at 15:27

## 2020-02-08 RX ADMIN — FINASTERIDE 5 MILLIGRAM(S): 5 TABLET, FILM COATED ORAL at 08:28

## 2020-02-08 RX ADMIN — Medication 3 MILLILITER(S): at 09:15

## 2020-02-08 RX ADMIN — Medication 100 MILLIGRAM(S): at 01:22

## 2020-02-08 RX ADMIN — OXYCODONE HYDROCHLORIDE 10 MILLIGRAM(S): 5 TABLET ORAL at 17:48

## 2020-02-08 RX ADMIN — Medication 3 MILLILITER(S): at 21:08

## 2020-02-08 RX ADMIN — Medication 3 MILLIGRAM(S): at 20:50

## 2020-02-08 RX ADMIN — OXYCODONE HYDROCHLORIDE 10 MILLIGRAM(S): 5 TABLET ORAL at 06:17

## 2020-02-08 RX ADMIN — OXYCODONE HYDROCHLORIDE 10 MILLIGRAM(S): 5 TABLET ORAL at 12:03

## 2020-02-08 RX ADMIN — OXYCODONE HYDROCHLORIDE 10 MILLIGRAM(S): 5 TABLET ORAL at 12:30

## 2020-02-08 NOTE — PROGRESS NOTE ADULT - PROBLEM SELECTOR PLAN 1
maintain ct on suction  Pt may need a bedside pleurodesis in the future ? Monday?  wean supplemental oxygen as tolerated  daily CXR  abx and care per primary medical team  culture results in progress   d/w attending in Am rounds

## 2020-02-08 NOTE — PROGRESS NOTE ADULT - SUBJECTIVE AND OBJECTIVE BOX
Subjective: Pt in bed NAD no issues overnight Pt with left CT,  still draining     T(C): 36.9 (02-08-20 @ 00:54), Max: 36.9 (02-08-20 @ 00:54)  HR: 80 (02-08-20 @ 03:23) (80 - 90)  BP: 120/74 (02-08-20 @ 00:54) (120/74 - 128/74)    RR: 18 (02-08-20 @ 00:54) (18 - 18)  SpO2: 97% (02-08-20 @ 03:23) (96% - 97%) SR   Tele:  SR     CHEST TUBE:  LT                             OUTPUT:   100cc  per 24 hours    AIR LEAKS:  [ ] YES [x ] NO          02-08    135  |  99  |  10.0  ----------------------------<  96  3.9   |  25.0  |  0.33<L>    Ca    7.8<L>      08 Feb 2020 10:20    TPro  7.2  /  Alb  2.8<L>  /  TBili  0.3<L>  /  DBili  x   /  AST  13  /  ALT  5   /  AlkPhos  111  02-07                               8.8    3.61  )-----------( 343      ( 08 Feb 2020 10:20 )             29.9                 CAPILLARY BLOOD GLUCOSE                   Assessment  Neuro:  Alert Awake NAD   Pulm:  Decreased at bases L>R  CV: RRR S1 S2   Abd:  Soft NT ND + BS           Assessment:  64yMale    with PAST MEDICAL & SURGICAL HISTORY:  Prostate CA: Adenocarcinoma with mets to Lung, Bone, and Lymph  No significant past surgical history

## 2020-02-08 NOTE — PROGRESS NOTE ADULT - ASSESSMENT
1. Left sided pleural effusion infectious in origin   exudative , cultures positive for MSSA.  ID on board , maintained on cefazolin .   chest tube in place , ID and CTS following .  might     2. Prostate adeno CA with metastatic disease   on hormone therapy and pain control.  continue with current regimen .  on prednisone  .     3. BPH   flomax and finasteride .    4. Anemia   could be due to malignant disease   will send work up.  transfuse if less than 7 .    5. DVT prophylaxis  boots , no chemical prophylaxis due to chest tube and anemia as well.    details discussed with patient , all concerns answered

## 2020-02-08 NOTE — PROGRESS NOTE ADULT - SUBJECTIVE AND OBJECTIVE BOX
CC: effusion     INTERVAL HPI/OVERNIGHT EVENTS: seen and examined , feels ok , still has chest tube in place .   denies SOB , some discomfort from chest tube .           Vital Signs Last 24 Hrs  T(C): 36.8 (08 Feb 2020 15:42), Max: 36.9 (08 Feb 2020 00:54)  T(F): 98.3 (08 Feb 2020 15:42), Max: 98.4 (08 Feb 2020 00:54)  HR: 84 (08 Feb 2020 15:42) (80 - 90)  BP: 117/67 (08 Feb 2020 15:42) (117/67 - 128/74)  BP(mean): --  RR: 18 (08 Feb 2020 15:42) (18 - 18)  SpO2: 97% (08 Feb 2020 15:42) (96% - 97%)    PHYSICAL EXAM:    GENERAL: NAD, resting comfortable in bed   CHEST/LUNG: diminished air entry BL , left sided chest tube in place   HEART: S1S2+, Regular rate and rhythm; No murmurs, rubs, or gallops  ABDOMEN: Soft, Nontender, Nondistended; Bowel sounds present  EXTREMITIES:  no pitting edema , pulses palpated BL.        LABS:                        8.8    3.61  )-----------( 343      ( 08 Feb 2020 10:20 )             29.9     02-08    135  |  99  |  10.0  ----------------------------<  96  3.9   |  25.0  |  0.33<L>    Ca    7.8<L>      08 Feb 2020 10:20    TPro  7.2  /  Alb  2.8<L>  /  TBili  0.3<L>  /  DBili  x   /  AST  13  /  ALT  5   /  AlkPhos  111  02-07            MEDICATIONS  (STANDING):  albuterol/ipratropium for Nebulization 3 milliLiter(s) Nebulizer every 6 hours  ALPRAZolam 0.25 milliGRAM(s) Oral at bedtime  ceFAZolin   IVPB 2000 milliGRAM(s) IV Intermittent every 8 hours  finasteride 5 milliGRAM(s) Oral daily  lidocaine   Patch 1 Patch Transdermal daily  lidocaine   Patch 1 Patch Transdermal daily  melatonin 3 milliGRAM(s) Oral at bedtime  oxyCODONE    IR 10 milliGRAM(s) Oral every 6 hours  polyethylene glycol 3350 17 Gram(s) Oral at bedtime  predniSONE   Tablet 5 milliGRAM(s) Oral two times a day  tamsulosin 0.4 milliGRAM(s) Oral daily    MEDICATIONS  (PRN):  acetaminophen   Tablet .. 650 milliGRAM(s) Oral every 6 hours PRN Temp greater or equal to 38C (100.4F), Mild Pain (1 - 3)  lactulose Syrup 10 Gram(s) Oral two times a day PRN Constipation  oxyCODONE    IR 10 milliGRAM(s) Oral every 4 hours PRN Severe Pain (7 - 10)      RADIOLOGY & ADDITIONAL TESTS:

## 2020-02-09 LAB
ANION GAP SERPL CALC-SCNC: 11 MMOL/L — SIGNIFICANT CHANGE UP (ref 5–17)
BUN SERPL-MCNC: 10 MG/DL — SIGNIFICANT CHANGE UP (ref 8–20)
CALCIUM SERPL-MCNC: 7.9 MG/DL — LOW (ref 8.6–10.2)
CHLORIDE SERPL-SCNC: 99 MMOL/L — SIGNIFICANT CHANGE UP (ref 98–107)
CO2 SERPL-SCNC: 24 MMOL/L — SIGNIFICANT CHANGE UP (ref 22–29)
CREAT SERPL-MCNC: 0.35 MG/DL — LOW (ref 0.5–1.3)
GLUCOSE SERPL-MCNC: 97 MG/DL — SIGNIFICANT CHANGE UP (ref 70–99)
HCT VFR BLD CALC: 27.5 % — LOW (ref 39–50)
HGB BLD-MCNC: 8.4 G/DL — LOW (ref 13–17)
MCHC RBC-ENTMCNC: 27.5 PG — SIGNIFICANT CHANGE UP (ref 27–34)
MCHC RBC-ENTMCNC: 30.5 GM/DL — LOW (ref 32–36)
MCV RBC AUTO: 90.2 FL — SIGNIFICANT CHANGE UP (ref 80–100)
PLATELET # BLD AUTO: 355 K/UL — SIGNIFICANT CHANGE UP (ref 150–400)
POTASSIUM SERPL-MCNC: 4 MMOL/L — SIGNIFICANT CHANGE UP (ref 3.5–5.3)
POTASSIUM SERPL-SCNC: 4 MMOL/L — SIGNIFICANT CHANGE UP (ref 3.5–5.3)
RBC # BLD: 3.05 M/UL — LOW (ref 4.2–5.8)
RBC # FLD: 19 % — HIGH (ref 10.3–14.5)
SODIUM SERPL-SCNC: 134 MMOL/L — LOW (ref 135–145)
WBC # BLD: 3.07 K/UL — LOW (ref 3.8–10.5)
WBC # FLD AUTO: 3.07 K/UL — LOW (ref 3.8–10.5)

## 2020-02-09 PROCEDURE — 99232 SBSQ HOSP IP/OBS MODERATE 35: CPT

## 2020-02-09 PROCEDURE — 71045 X-RAY EXAM CHEST 1 VIEW: CPT | Mod: 26

## 2020-02-09 PROCEDURE — 99231 SBSQ HOSP IP/OBS SF/LOW 25: CPT

## 2020-02-09 RX ADMIN — OXYCODONE HYDROCHLORIDE 10 MILLIGRAM(S): 5 TABLET ORAL at 13:54

## 2020-02-09 RX ADMIN — Medication 3 MILLILITER(S): at 09:46

## 2020-02-09 RX ADMIN — OXYCODONE HYDROCHLORIDE 10 MILLIGRAM(S): 5 TABLET ORAL at 06:15

## 2020-02-09 RX ADMIN — Medication 100 MILLIGRAM(S): at 17:42

## 2020-02-09 RX ADMIN — TAMSULOSIN HYDROCHLORIDE 0.4 MILLIGRAM(S): 0.4 CAPSULE ORAL at 12:58

## 2020-02-09 RX ADMIN — OXYCODONE HYDROCHLORIDE 10 MILLIGRAM(S): 5 TABLET ORAL at 12:54

## 2020-02-09 RX ADMIN — Medication 3 MILLILITER(S): at 21:01

## 2020-02-09 RX ADMIN — Medication 100 MILLIGRAM(S): at 00:15

## 2020-02-09 RX ADMIN — OXYCODONE HYDROCHLORIDE 10 MILLIGRAM(S): 5 TABLET ORAL at 05:34

## 2020-02-09 RX ADMIN — Medication 5 MILLIGRAM(S): at 05:33

## 2020-02-09 RX ADMIN — OXYCODONE HYDROCHLORIDE 10 MILLIGRAM(S): 5 TABLET ORAL at 18:43

## 2020-02-09 RX ADMIN — Medication 100 MILLIGRAM(S): at 10:10

## 2020-02-09 RX ADMIN — OXYCODONE HYDROCHLORIDE 10 MILLIGRAM(S): 5 TABLET ORAL at 00:35

## 2020-02-09 RX ADMIN — OXYCODONE HYDROCHLORIDE 10 MILLIGRAM(S): 5 TABLET ORAL at 00:12

## 2020-02-09 RX ADMIN — OXYCODONE HYDROCHLORIDE 10 MILLIGRAM(S): 5 TABLET ORAL at 17:43

## 2020-02-09 RX ADMIN — FINASTERIDE 5 MILLIGRAM(S): 5 TABLET, FILM COATED ORAL at 12:54

## 2020-02-09 RX ADMIN — Medication 3 MILLILITER(S): at 15:28

## 2020-02-09 RX ADMIN — Medication 5 MILLIGRAM(S): at 17:44

## 2020-02-09 NOTE — PROGRESS NOTE ADULT - ASSESSMENT
1. Left sided pleural effusion/empyema  infectious in origin   exudative , cultures positive for MSSA.  ID on board , maintained on cefazolin .   chest tube in place , ID and CTS following .  might need pleurodesis     2. Prostate adeno CA with metastatic disease   on hormone therapy and pain control.  continue with current regimen .  on prednisone  .     3. BPH   flomax and finasteride .    4. Anemia   AOCD , FOBT negative , anemia panel not suggestive of iron deficiency .  monitor     5. DVT prophylaxis  boots , no chemical prophylaxis due to chest tube and anemia as well.    details discussed with patient , all concerns answered

## 2020-02-09 NOTE — PROGRESS NOTE ADULT - SUBJECTIVE AND OBJECTIVE BOX
CC: left sided effusion     INTERVAL HPI/OVERNIGHT EVENTS: seen and examined , still has chest tube in place . CTS is considering pleurodesis in am.   feels ok otherwise           Vital Signs Last 24 Hrs  T(C): 36.6 (09 Feb 2020 08:44), Max: 36.8 (08 Feb 2020 15:42)  T(F): 97.8 (09 Feb 2020 08:44), Max: 98.3 (08 Feb 2020 15:42)  HR: 61 (09 Feb 2020 09:46) (61 - 87)  BP: 117/69 (09 Feb 2020 08:44) (117/67 - 122/73)  BP(mean): --  RR: 18 (09 Feb 2020 08:44) (18 - 18)  SpO2: 95% (09 Feb 2020 09:46) (92% - 97%)    PHYSICAL EXAM:    GENERAL: NAD, resting comfortable in bed   CHEST/LUNG: CTAB , no wheezing , rales, rhonchi heard , left chest tube in place   HEART: S1S2+, Regular rate and rhythm; No murmurs, rubs, or gallops  ABDOMEN: Soft, Nontender, Nondistended; Bowel sounds present  EXTREMITIES:  no pitting edema , pulses palpated BL.        LABS:                        8.4    3.07  )-----------( 355      ( 09 Feb 2020 09:25 )             27.5     02-09    134<L>  |  99  |  10.0  ----------------------------<  97  4.0   |  24.0  |  0.35<L>    Ca    7.9<L>      09 Feb 2020 09:25              MEDICATIONS  (STANDING):  albuterol/ipratropium for Nebulization 3 milliLiter(s) Nebulizer every 6 hours  ALPRAZolam 0.25 milliGRAM(s) Oral at bedtime  ceFAZolin   IVPB 2000 milliGRAM(s) IV Intermittent every 8 hours  finasteride 5 milliGRAM(s) Oral daily  lidocaine   Patch 1 Patch Transdermal daily  lidocaine   Patch 1 Patch Transdermal daily  melatonin 3 milliGRAM(s) Oral at bedtime  oxyCODONE    IR 10 milliGRAM(s) Oral every 6 hours  polyethylene glycol 3350 17 Gram(s) Oral at bedtime  predniSONE   Tablet 5 milliGRAM(s) Oral two times a day  tamsulosin 0.4 milliGRAM(s) Oral daily    MEDICATIONS  (PRN):  acetaminophen   Tablet .. 650 milliGRAM(s) Oral every 6 hours PRN Temp greater or equal to 38C (100.4F), Mild Pain (1 - 3)  lactulose Syrup 10 Gram(s) Oral two times a day PRN Constipation  oxyCODONE    IR 10 milliGRAM(s) Oral every 4 hours PRN Severe Pain (7 - 10)      RADIOLOGY & ADDITIONAL TESTS:

## 2020-02-09 NOTE — PROGRESS NOTE ADULT - PROBLEM SELECTOR PLAN 1
maintain ct on suction  Pt may need a bedside pleurodesis in the future, thoracic team will reassess Monday 2/10.    wean supplemental oxygen as tolerated  daily CXR  abx and care per primary medical team  culture results - Staph Aureus  d/w attending in Am rounds

## 2020-02-09 NOTE — PROGRESS NOTE ADULT - SUBJECTIVE AND OBJECTIVE BOX
64y Male s/p IR pigtail drainage of L pleural effusion, suspect empyema    Subjective: No new complaints    T(C): 36.6 (02-09-20 @ 08:44), Max: 36.8 (02-08-20 @ 15:42)  HR: 61 (02-09-20 @ 09:46) (61 - 87)  BP: 117/69 (02-09-20 @ 08:44) (117/67 - 122/73)  ABP: --  ABP(mean): --  RR: 18 (02-09-20 @ 08:44) (18 - 18)  SpO2: 95% (02-09-20 @ 09:46) (92% - 97%)  Wt(kg): --  CVP(mm Hg): --  CO: --  CI: --  PA: --         02-09    134<L>  |  99  |  10.0  ----------------------------<  97  4.0   |  24.0  |  0.35<L>    Ca    7.9<L>      09 Feb 2020 09:25                                 8.4    3.07  )-----------( 355      ( 09 Feb 2020 09:25 )             27.5                     CAPILLARY BLOOD GLUCOSE               CXR:    I&O's Detail    08 Feb 2020 07:01  -  09 Feb 2020 07:00  --------------------------------------------------------  IN:  Total IN: 0 mL    OUT:    Chest Tube: 50 mL   Total OUT: 50 mL    Total NET: -50 mL          MEDICATIONS  (STANDING):  albuterol/ipratropium for Nebulization 3 milliLiter(s) Nebulizer every 6 hours  ALPRAZolam 0.25 milliGRAM(s) Oral at bedtime  ceFAZolin   IVPB 2000 milliGRAM(s) IV Intermittent every 8 hours  finasteride 5 milliGRAM(s) Oral daily  lidocaine   Patch 1 Patch Transdermal daily  lidocaine   Patch 1 Patch Transdermal daily  melatonin 3 milliGRAM(s) Oral at bedtime  oxyCODONE    IR 10 milliGRAM(s) Oral every 6 hours  polyethylene glycol 3350 17 Gram(s) Oral at bedtime  predniSONE   Tablet 5 milliGRAM(s) Oral two times a day  tamsulosin 0.4 milliGRAM(s) Oral daily    MEDICATIONS  (PRN):  acetaminophen   Tablet .. 650 milliGRAM(s) Oral every 6 hours PRN Temp greater or equal to 38C (100.4F), Mild Pain (1 - 3)  lactulose Syrup 10 Gram(s) Oral two times a day PRN Constipation  oxyCODONE    IR 10 milliGRAM(s) Oral every 4 hours PRN Severe Pain (7 - 10)    Assessment  Neuro:  Alert Awake NAD   Pulm:  Decreased at bases L>R  CV: RRR S1 S2   Abd:  Soft NT ND + BS           -----------------------------------------------------------------------------------------------------------------------------------------------------------------------------  -----------------------------------------------------------------------------------------------------------------------------------------------------------------------------

## 2020-02-09 NOTE — PROGRESS NOTE ADULT - ASSESSMENT
64M with MSSA in pleural fluid had CT placed by IR 2/5 for empyema.  Recent decortication at hospital in [place].  CT draining, patient stable NAD.

## 2020-02-10 ENCOUNTER — APPOINTMENT (OUTPATIENT)
Age: 65
End: 2020-02-10

## 2020-02-10 LAB
ANION GAP SERPL CALC-SCNC: 10 MMOL/L — SIGNIFICANT CHANGE UP (ref 5–17)
BUN SERPL-MCNC: 13 MG/DL — SIGNIFICANT CHANGE UP (ref 8–20)
CALCIUM SERPL-MCNC: 7.8 MG/DL — LOW (ref 8.6–10.2)
CHLORIDE SERPL-SCNC: 98 MMOL/L — SIGNIFICANT CHANGE UP (ref 98–107)
CO2 SERPL-SCNC: 27 MMOL/L — SIGNIFICANT CHANGE UP (ref 22–29)
CREAT SERPL-MCNC: 0.34 MG/DL — LOW (ref 0.5–1.3)
CULTURE RESULTS: SIGNIFICANT CHANGE UP
GLUCOSE SERPL-MCNC: 94 MG/DL — SIGNIFICANT CHANGE UP (ref 70–99)
HCT VFR BLD CALC: 27.3 % — LOW (ref 39–50)
HGB BLD-MCNC: 8.2 G/DL — LOW (ref 13–17)
MCHC RBC-ENTMCNC: 27.1 PG — SIGNIFICANT CHANGE UP (ref 27–34)
MCHC RBC-ENTMCNC: 30 GM/DL — LOW (ref 32–36)
MCV RBC AUTO: 90.1 FL — SIGNIFICANT CHANGE UP (ref 80–100)
ORGANISM # SPEC MICROSCOPIC CNT: SIGNIFICANT CHANGE UP
ORGANISM # SPEC MICROSCOPIC CNT: SIGNIFICANT CHANGE UP
PLATELET # BLD AUTO: 315 K/UL — SIGNIFICANT CHANGE UP (ref 150–400)
POTASSIUM SERPL-MCNC: 4.2 MMOL/L — SIGNIFICANT CHANGE UP (ref 3.5–5.3)
POTASSIUM SERPL-SCNC: 4.2 MMOL/L — SIGNIFICANT CHANGE UP (ref 3.5–5.3)
RBC # BLD: 3.03 M/UL — LOW (ref 4.2–5.8)
RBC # FLD: 19.2 % — HIGH (ref 10.3–14.5)
SODIUM SERPL-SCNC: 135 MMOL/L — SIGNIFICANT CHANGE UP (ref 135–145)
SPECIMEN SOURCE: SIGNIFICANT CHANGE UP
WBC # BLD: 3.19 K/UL — LOW (ref 3.8–10.5)
WBC # FLD AUTO: 3.19 K/UL — LOW (ref 3.8–10.5)

## 2020-02-10 PROCEDURE — 99232 SBSQ HOSP IP/OBS MODERATE 35: CPT

## 2020-02-10 PROCEDURE — 99231 SBSQ HOSP IP/OBS SF/LOW 25: CPT

## 2020-02-10 PROCEDURE — 71045 X-RAY EXAM CHEST 1 VIEW: CPT | Mod: 26

## 2020-02-10 RX ADMIN — OXYCODONE HYDROCHLORIDE 10 MILLIGRAM(S): 5 TABLET ORAL at 23:14

## 2020-02-10 RX ADMIN — Medication 3 MILLIGRAM(S): at 23:14

## 2020-02-10 RX ADMIN — Medication 5 MILLIGRAM(S): at 06:31

## 2020-02-10 RX ADMIN — Medication 3 MILLILITER(S): at 09:25

## 2020-02-10 RX ADMIN — Medication 650 MILLIGRAM(S): at 09:37

## 2020-02-10 RX ADMIN — Medication 5 MILLIGRAM(S): at 17:20

## 2020-02-10 RX ADMIN — OXYCODONE HYDROCHLORIDE 10 MILLIGRAM(S): 5 TABLET ORAL at 06:31

## 2020-02-10 RX ADMIN — OXYCODONE HYDROCHLORIDE 10 MILLIGRAM(S): 5 TABLET ORAL at 17:18

## 2020-02-10 RX ADMIN — OXYCODONE HYDROCHLORIDE 10 MILLIGRAM(S): 5 TABLET ORAL at 00:45

## 2020-02-10 RX ADMIN — TAMSULOSIN HYDROCHLORIDE 0.4 MILLIGRAM(S): 0.4 CAPSULE ORAL at 13:14

## 2020-02-10 RX ADMIN — OXYCODONE HYDROCHLORIDE 10 MILLIGRAM(S): 5 TABLET ORAL at 18:18

## 2020-02-10 RX ADMIN — Medication 3 MILLIGRAM(S): at 00:57

## 2020-02-10 RX ADMIN — Medication 100 MILLIGRAM(S): at 17:18

## 2020-02-10 RX ADMIN — Medication 100 MILLIGRAM(S): at 10:14

## 2020-02-10 RX ADMIN — Medication 100 MILLIGRAM(S): at 01:00

## 2020-02-10 RX ADMIN — OXYCODONE HYDROCHLORIDE 10 MILLIGRAM(S): 5 TABLET ORAL at 00:00

## 2020-02-10 RX ADMIN — POLYETHYLENE GLYCOL 3350 17 GRAM(S): 17 POWDER, FOR SOLUTION ORAL at 00:57

## 2020-02-10 RX ADMIN — Medication 3 MILLILITER(S): at 15:34

## 2020-02-10 RX ADMIN — FINASTERIDE 5 MILLIGRAM(S): 5 TABLET, FILM COATED ORAL at 13:13

## 2020-02-10 RX ADMIN — Medication 3 MILLILITER(S): at 20:57

## 2020-02-10 RX ADMIN — OXYCODONE HYDROCHLORIDE 10 MILLIGRAM(S): 5 TABLET ORAL at 13:13

## 2020-02-10 RX ADMIN — Medication 3 MILLILITER(S): at 03:20

## 2020-02-10 NOTE — PROGRESS NOTE ADULT - SUBJECTIVE AND OBJECTIVE BOX
Subjective:  Pt in bed NAD No issues overnight.  No CT output noted. CXR improved.  plan to d/c Ct today.    T(C): 36.6 (02-10-20 @ 08:14), Max: 36.8 (02-09-20 @ 16:07)  HR: 74 (02-10-20 @ 08:14) (61 - 78)  BP: 126/78 (02-10-20 @ 08:14) (125/76 - 136/86)  RR: 18 (02-10-20 @ 08:14) (18 - 18)  SpO2: 98% (02-10-20 @ 08:14) (95% - 99%) 2 L NC  Tele: SR     CHEST TUBE:    left                            OUTPUT: 5cc    per 24 hours    AIR LEAKS:  [ ] YES [x ] NO          02-10    135  |  98  |  13.0  ----------------------------<  94  4.2   |  27.0  |  0.34<L>    Ca    7.8<L>      10 Feb 2020 07:00                                 8.2    3.19  )-----------( 315      ( 10 Feb 2020 07:00 )             27.3                 CAPILLARY BLOOD GLUCOSE               CXR:  < from: Xray Chest 1 View- PORTABLE-Routine (02.09.20 @ 11:57) >  INTERPRETATION:  Clinical history: Indwelling pigtail catheter    Single portable view obtained. Comparison is made to priors    Again noted is a pigtail catheter overlying left lower chest. Bibasilar and left upper lung disease is not significantly changed. No evidence of pneumothorax.    Impression: No significant change from yesterday    < end of copied text >          Assessment  Neuro:  alert awake NAD   Pulm: decreased at left base   CV: RRR S1 S2   Abd: soft NT ND + BS         Assessment:  64yMale    with PAST MEDICAL & SURGICAL HISTORY:  Prostate CA: Adenocarcinoma with mets to Lung, Bone, and Lymph  No significant past surgical history        Plan:

## 2020-02-10 NOTE — PROGRESS NOTE ADULT - ASSESSMENT
1. Left sided pleural effusion/empyema  infectious in origin   exudative , cultures positive for MSSA.  ID on board , maintained on cefazolin .   chest tube removed,   plan is for prolonged course of antibiotics IV     2. Prostate adeno CA with metastatic disease   on hormone therapy and pain control.  continue with current regimen .  on prednisone  .   will follow up outpatient     3. BPH   flomax and finasteride .    4. Anemia   AOCD , FOBT negative , anemia panel not suggestive of iron deficiency .  monitor     5. DVT prophylaxis  boots , no chemical prophylaxis due to chest tube and anemia as well.    details discussed with patient , all concerns answered, his sister was also at bedside.   might need rehab for IV antibiotics

## 2020-02-10 NOTE — PROGRESS NOTE ADULT - SUBJECTIVE AND OBJECTIVE BOX
CC: empyema     INTERVAL HPI/OVERNIGHT EVENTS: seen and examined , CT removed today , as per ID will need prolonged course of IV antibiotics   feels ok , no SOB , no chest pain           Vital Signs Last 24 Hrs  T(C): 36.6 (10 Feb 2020 08:14), Max: 36.8 (09 Feb 2020 16:07)  T(F): 97.9 (10 Feb 2020 08:14), Max: 98.2 (09 Feb 2020 16:07)  HR: 74 (10 Feb 2020 08:14) (70 - 78)  BP: 126/78 (10 Feb 2020 08:14) (125/76 - 136/86)  BP(mean): --  RR: 18 (10 Feb 2020 08:14) (18 - 18)  SpO2: 98% (10 Feb 2020 08:14) (95% - 99%)    PHYSICAL EXAM:    GENERAL: NAD, resting comfortable in bed   CHEST/LUNG: CTAB , no wheezing , rales, rhonchi heard   HEART: S1S2+, Regular rate and rhythm; No murmurs, rubs, or gallops  ABDOMEN: Soft, Nontender, Nondistended; Bowel sounds present  EXTREMITIES:  no pitting edema , pulses palpated BL.        LABS:                        8.2    3.19  )-----------( 315      ( 10 Feb 2020 07:00 )             27.3     02-10    135  |  98  |  13.0  ----------------------------<  94  4.2   |  27.0  |  0.34<L>    Ca    7.8<L>      10 Feb 2020 07:00              MEDICATIONS  (STANDING):  albuterol/ipratropium for Nebulization 3 milliLiter(s) Nebulizer every 6 hours  ALPRAZolam 0.25 milliGRAM(s) Oral at bedtime  ceFAZolin   IVPB 2000 milliGRAM(s) IV Intermittent every 8 hours  finasteride 5 milliGRAM(s) Oral daily  lidocaine   Patch 1 Patch Transdermal daily  lidocaine   Patch 1 Patch Transdermal daily  melatonin 3 milliGRAM(s) Oral at bedtime  oxyCODONE    IR 10 milliGRAM(s) Oral every 6 hours  polyethylene glycol 3350 17 Gram(s) Oral at bedtime  predniSONE   Tablet 5 milliGRAM(s) Oral two times a day  tamsulosin 0.4 milliGRAM(s) Oral daily    MEDICATIONS  (PRN):  acetaminophen   Tablet .. 650 milliGRAM(s) Oral every 6 hours PRN Temp greater or equal to 38C (100.4F), Mild Pain (1 - 3)  lactulose Syrup 10 Gram(s) Oral two times a day PRN Constipation  oxyCODONE    IR 10 milliGRAM(s) Oral every 4 hours PRN Severe Pain (7 - 10)      RADIOLOGY & ADDITIONAL TESTS:

## 2020-02-10 NOTE — PROGRESS NOTE ADULT - ASSESSMENT
Assessment and Plan:   · Assessment		  1. Left sided pleural effusion/empyema  infectious in origin   exudative , cultures positive for MSSA.-will need to continue Antibiotics  ID on board , maintained on cefazolin .   chest tube in place , ID and CTS following .  might need pleurodesis     2. Prostate adeno CA with metastatic disease   on hormone therapy and pain control.  continue with current regimen .  on prednisone  .     3. Anemia   AOCD , FOBT negative ,   anemia panel not suggestive of iron deficiency .  monitor     Goals of Care  Full code  F/U with Oncologist in Community  Intends to continue with chemotherapy treatment  RT treatments to LBP 5/5 completed

## 2020-02-10 NOTE — PROGRESS NOTE ADULT - ASSESSMENT
This 64yoM; with pmh signif for Prostate Ca with mets to ribs diagnosed in 11/2019 (currently on chemo), HTN; now p/w cough, sob, fever x1 week, progressively worsening.  denies sick contacts. reports traveling from [place] after diagnosis of prostate ca complicated by b/l ptx in november and another hospitalization in december. denies cp/palp. denies abd pain: SOCIAL: No tobacco/illicit substance use/social/EtOH  patient reports having a history of Bilateral Pneumonia approx 1.5 months prior with associated bilateral Pleural effusions which required draining. 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 (20 Jan 2020 21:22)  patient was initially started On vanco & Zosyn, then Ceftriaxone and Zithromax was added as well.   Impression:  PNA  lrepeat CT scan with "Patent central airways.  Bilateral consolidation and pneumatoceles are reidentified, without significant change from prior examination."  s/p thoracentesis on 1/27/2020; cultures with Staph AUREUS  -   on KEFZOL  FOR MSSA  WILL CONTINUE  CT SURGERY   REEVALUATION NOTED   S/P CHEST TUBE PLACEMENT BY IR   OVERALL IMPROVED   CONTINUE IV ABX  WILL  D/W CT SURGERY  MAY NEED CONTINUED IV ABX AS PT HAD REPEAT STAPH POSITIVE  WILL FOLLOW UP

## 2020-02-10 NOTE — PROGRESS NOTE ADULT - PROBLEM SELECTOR PLAN 1
d/c CT today  Cont ABX as per ID  plan treatment as per heme/onc   no bedside pleurodesis indicated at this time. CXR improved     wean supplemental oxygen as tolerated  daily CXR  care per primary medical team  culture results - Staph Aureus  d/w attending in Am rounds  Will Sign off case for now.  please call with questions or concerns

## 2020-02-10 NOTE — CHART NOTE - NSCHARTNOTEFT_GEN_A_CORE
Source: Patient [ ]  Family [ ]   other [ x] EMR and nursing    Current Diet: Diet, Regular (01-21-20 @ 12:58)    PO intake:  Pt consuming 100% of meals    Current Weight:   (1/29) 212.5#  -Obtain current wt, continue to monitor. b/l leg 1+ edema noted (1/22).     Pertinent Medications: MEDICATIONS  (STANDING):  albuterol/ipratropium for Nebulization 3 milliLiter(s) Nebulizer every 6 hours  ALPRAZolam 0.25 milliGRAM(s) Oral at bedtime  ceFAZolin   IVPB 2000 milliGRAM(s) IV Intermittent every 8 hours  finasteride 5 milliGRAM(s) Oral daily  lidocaine   Patch 1 Patch Transdermal daily  lidocaine   Patch 1 Patch Transdermal daily  melatonin 3 milliGRAM(s) Oral at bedtime  oxyCODONE    IR 10 milliGRAM(s) Oral every 6 hours  polyethylene glycol 3350 17 Gram(s) Oral at bedtime  predniSONE   Tablet 5 milliGRAM(s) Oral two times a day  tamsulosin 0.4 milliGRAM(s) Oral daily    MEDICATIONS  (PRN):  acetaminophen   Tablet .. 650 milliGRAM(s) Oral every 6 hours PRN Temp greater or equal to 38C (100.4F), Mild Pain (1 - 3)  lactulose Syrup 10 Gram(s) Oral two times a day PRN Constipation  oxyCODONE    IR 10 milliGRAM(s) Oral every 4 hours PRN Severe Pain (7 - 10)    Pertinent Labs: CBC Full  -  ( 10 Feb 2020 07:00 )  WBC Count : 3.19 K/uL  RBC Count : 3.03 M/uL  Hemoglobin : 8.2 g/dL  Hematocrit : 27.3 %  Platelet Count - Automated : 315 K/uL  Mean Cell Volume : 90.1 fl  Mean Cell Hemoglobin : 27.1 pg  Mean Cell Hemoglobin Concentration : 30.0 gm/dL  Auto Neutrophil # : x  Auto Lymphocyte # : x  Auto Monocyte # : x  Auto Eosinophil # : x  Auto Basophil # : x  Auto Neutrophil % : x  Auto Lymphocyte % : x  Auto Monocyte % : x  Auto Eosinophil % : x  Auto Basophil % : x  02-10 Na135 mmol/L Glu 94 mg/dL K+ 4.2 mmol/L Cr  0.34 mg/dL<L> BUN 13.0 mg/dL Phos n/a   Alb n/a   PAB n/a       Skin: Intact    Nutrition focused physical exam previously conducted - found signs of malnutrition [x ]absent [ ]present    Subcutaneous fat loss: [ ] Orbital fat pads region, [ ]Buccal fat region, [ ]Triceps region,  [ ]Ribs region    Muscle wasting: [ ]Temples region, [ ]Clavicle region, [ ]Shoulder region, [ ]Scapula region, [ ]Interosseous region,  [ ]thigh region, [ ]Calf region    Estimated Needs:   [ x ] no change since previous assessment  [ ] recalculated:     Current Nutrition Diagnosis: Pt remains at high nutrition risk secondary to  Increased Nutrient Needs related to increased physiological demand for nutrient as evidenced by Prostate Adenocarcinoma with Mets to Bone, Lung and Lymp. Pt not in room, multiple attempts made. Pt continues to have good po intake, consuming 100% of meals per EMR. Last documented BM 2/9. RD to remain available.     Recommendations:   1) Encourage po intake  2) Monitor wts and labs    Monitoring and Evaluation:   [x ] PO intake [x ] Tolerance to diet prescription [X] Weights  [X] Follow up per protocol [X] Labs:

## 2020-02-10 NOTE — PROGRESS NOTE ADULT - SUBJECTIVE AND OBJECTIVE BOX
[facility]  INFECTIOUS DISEASES AND INTERNAL MEDICINE at [place]  =======================================================  Chandler Mccrary MD  Diplomates American Board of Internal Medicine and Infectious Diseases  Telephone [phone]  Fax            [phone]  =======================================================    [facility]-[id number]  [place]   follow up:  Pneumonia    no new issues   FEELS OK       CHEST TUBE PLACEMENT BY IR     =======================================================  REVIEW OF SYSTEMS:  CONSTITUTIONAL:  no fevers  HEENT:  No diplopia or blurred vision.  No earache, sore throat or runny nose.  CARDIOVASCULAR:  No pressure, squeezing, strangling, tightness, heaviness or aching about the chest, neck, axilla or epigastrium.  RESPIRATORY:  + shortness of breath  GASTROINTESTINAL:  No nausea, vomiting or diarrhea.  GENITOURINARY:  No dysuria, frequency or urgency. No Blood in urine  MUSCULOSKELETAL:  no joint aches, no muscle pain  SKIN:  No change in skin, hair or nails.  NEUROLOGIC:  No Headaches, seizures or weakness.  PSYCHIATRIC:  No disorder of thought or mood.  ENDOCRINE:  No heat or cold intolerance  HEMATOLOGICAL:  No easy bruising or bleeding.   =======================================================  Allergies  No Known Allergies     ======================================================  Physical Exam:  ============  (see section below)    General:  No acute distress.  Eye: Pupils are equal, round and reactive to light, Extraocular movements are intact, Normal conjunctiva.  HENT: Normocephalic, Oral mucosa is moist, No pharyngeal erythema, No sinus tenderness.  Neck: Supple, No lymphadenopathy.  Respiratory:   POOR aeration of the LEFT side to mid lung zone,   Cardiovascular: Normal rate, Regular rhythm,   Gastrointestinal: Soft, Non-tender, Non-distended, Normal bowel sounds.  Genitourinary: No costovertebral angle tenderness.  Lymphatics: No lymphadenopathy neck,   Musculoskeletal: Normal range of motion, Normal strength.  Integumentary: No rash.  Neurologic: Alert, Oriented, No focal deficits, Cranial Nerves II-XII are grossly intact.  Psychiatric: Appropriate mood & affect.    =======================================================           =====================================================  Vitals:  ============       Vital Signs Last 24 Hrs  T(C): 36.6 (10 Feb 2020 08:14), Max: 36.8 (09 Feb 2020 16:07)  T(F): 97.9 (10 Feb 2020 08:14), Max: 98.2 (09 Feb 2020 16:07)  HR: 74 (10 Feb 2020 08:14) (61 - 83)  BP: 126/78 (10 Feb 2020 08:14) (117/69 - 136/86)  BP(mean): --  RR: 18 (10 Feb 2020 08:14) (18 - 18)  SpO2: 98% (10 Feb 2020 08:14) (92% - 99%)  =======================================================  Current Antibiotics:  KEFZOL    Other medications:  albuterol/ipratropium for Nebulization 3 milliLiter(s) Nebulizer every 6 hours  ALPRAZolam 0.25 milliGRAM(s) Oral at bedtime  finasteride 5 milliGRAM(s) Oral daily  lidocaine   Patch 1 Patch Transdermal daily  melatonin 3 milliGRAM(s) Oral at bedtime  oxyCODONE    IR 10 milliGRAM(s) Oral every 6 hours  polyethylene glycol 3350 17 Gram(s) Oral at bedtime  predniSONE   Tablet 5 milliGRAM(s) Oral two times a day  tamsulosin 0.4 milliGRAM(s) Oral at bedtime      =======================================================  Labs:                                           8.2    3.19  )-----------( 315      ( 10 Feb 2020 07:00 )             27.3   02-10    135  |  98  |  13.0  ----------------------------<  94  4.2   |  27.0  |  0.34<L>    Ca    7.8<L>      10 Feb 2020 07:00                Culture - Acid Fast - Body Fluid w/Smear (collected 01-27-20 @ 21:54)  Source: .Body Fluid    Culture - Fungal, Body Fluid (collected 01-27-20 @ 13:05)  Source: .Body Fluid    Culture - Body Fluid with Gram Stain (collected 01-27-20 @ 13:04)  Source: .Body Fluid  Gram Stain (01-27-20 @ 15:34):    Numerous White blood cells    No organisms seen  Final Report (02-01-20 @ 10:17):    Numerous Staphylococcus aureus  Organism: Staphylococcus aureus (02-01-20 @ 10:17)  Organism: Staphylococcus aureus (02-01-20 @ 10:17)    Sensitivities:      -  Ampicillin/Sulbactam: S <=8/4      -  Cefazolin: S <=4      -  Clindamycin: S <=0.5      -  Erythromycin: S <=0.5      -  Gentamicin: S <=4 Should not be used as monotherapy      -  Oxacillin: S <=0.25      -  Penicillin: R >8      -  RIF- Rifampin: S <=1 Should not be used as monotherapy      -  Tetra/Doxy: S <=4      -  Trimethoprim/Sulfamethoxazole: S <=0.5/9.5      -  Vancomycin: S 2      Method Type: KERRI    Culture - Blood (collected 01-26-20 @ 18:25)  Source: .Blood  Final Report (01-31-20 @ 19:01):    No growth at 5 days.    Culture - Blood (collected 01-26-20 @ 18:25)  Source: .Blood  Final Report (01-31-20 @ 19:01):    No growth at 5 days.    Culture - Sputum (collected 01-23-20 @ 09:17)  Source: .Sputum  Gram Stain (01-23-20 @ 14:01):    Moderate WBC's    No organisms seen  Final Report (01-25-20 @ 09:57):    Moderate Routine respiratory teresa present    Culture - Blood (collected 01-21-20 @ 10:00)  Source: .Blood  Final Report (01-26-20 @ 11:00):    No growth at 5 days.    Culture - Blood (collected 01-20-20 @ 10:24)  Source: .Blood  Final Report (01-25-20 @ 11:01):    No growth at 5 days.    Culture - Blood (collected 01-20-20 @ 10:24)  Source: .Blood  Final Report (01-25-20 @ 11:01):    No growth at 5 days.      Creatinine, Serum: 0.32 mg/dL (02-02-20 @ 06:04)  Creatinine, Serum: 0.29 mg/dL (02-01-20 @ 08:35)  Creatinine, Serum: 0.28 mg/dL (01-31-20 @ 08:51)  Creatinine, Serum: 0.34 mg/dL (01-30-20 @ 09:30)  Creatinine, Serum: 0.35 mg/dL (01-29-20 @ 10:20)

## 2020-02-10 NOTE — PROGRESS NOTE ADULT - SUBJECTIVE AND OBJECTIVE BOX
INTERVAL HPI/OVERNIGHT EVENTS: 63yo Male Patient with Metastatic Prostate Cancer to Lung and Bone. Admitted with Loculated Pleural effusion, thoracentesis  at that time, IV Abx since admission, Under ID's care.. SOB at rest, THOMPSON on exertion on continuous Nasal O2. Pigtail catheter placed last week, drained again-to pleurovac,  once drainage resolved CT removed yesterday w/o complications.   F/U Note  Patient is awake alert and oriented x 3, Feeling better, relieved the CT is now out. Managing with Oxycodone IR 10 Q6 very little breakthrough medication.  He ambulates the perimeter of  6T several times daily with O2 tank in tow.  Appetite good, voiding QS denies N/V/D/constipation CP, palpitations     64y old  Male who presents with a chief complaint of Dyspnea secondary to Large located effusion and suspected recurrent Bilateral PNA (10 Feb 2020 09:44)    Present Symptoms:     Dyspnea: 0 1 2   Nausea/Vomiting: No  Anxiety:  Yes   Depression:  No  Fatigue: Yes   Loss of appetite:  No    Pain: L Flank, LBP            Character-            Duration-            Effect-            Factors-            Frequency-            Location-            Severity-moderate-severe with end of dose failure    Review of Systems: Reviewed                       All others negative    MEDICATIONS  (STANDING):  albuterol/ipratropium for Nebulization 3 milliLiter(s) Nebulizer every 6 hours  ALPRAZolam 0.25 milliGRAM(s) Oral at bedtime  ceFAZolin   IVPB 2000 milliGRAM(s) IV Intermittent every 8 hours  finasteride 5 milliGRAM(s) Oral daily  lidocaine   Patch 1 Patch Transdermal daily  lidocaine   Patch 1 Patch Transdermal daily  melatonin 3 milliGRAM(s) Oral at bedtime  oxyCODONE    IR 10 milliGRAM(s) Oral every 6 hours  polyethylene glycol 3350 17 Gram(s) Oral at bedtime  predniSONE   Tablet 5 milliGRAM(s) Oral two times a day  tamsulosin 0.4 milliGRAM(s) Oral daily    MEDICATIONS  (PRN):  acetaminophen   Tablet .. 650 milliGRAM(s) Oral every 6 hours PRN Temp greater or equal to 38C (100.4F), Mild Pain (1 - 3)  lactulose Syrup 10 Gram(s) Oral two times a day PRN Constipation  oxyCODONE    IR 10 milliGRAM(s) Oral every 4 hours PRN Severe Pain (7 - 10)    PHYSICAL EXAM:    Vital Signs Last 24 Hrs  T(C): 36.6 (10 Feb 2020 08:14), Max: 36.8 (09 Feb 2020 16:07)  T(F): 97.9 (10 Feb 2020 08:14), Max: 98.2 (09 Feb 2020 16:07)  HR: 74 (10 Feb 2020 08:14) (70 - 78)  BP: 126/78 (10 Feb 2020 08:14) (125/76 - 136/86)  BP(mean): --  RR: 18 (10 Feb 2020 08:14) (18 - 18)  SpO2: 98% (10 Feb 2020 08:14) (95% - 99%)    General: alert  oriented x _3___                 well noourished     HEENT: normal     Lungs: comfortable THOMPSON O2 dependent 3L minimal cough "dry"    CV: normal     GI: normal              : voiding    MSK: weakness  edema             ambulatory      Skin: normal  _  no rash    LABS:                        8.2    3.19  )-----------( 315      ( 10 Feb 2020 07:00 )             27.3     02-10    135  |  98  |  13.0  ----------------------------<  94  4.2   |  27.0  |  0.34<L>    Ca    7.8<L>      10 Feb 2020 07:00    I&O's Summary    09 Feb 2020 07:01  -  10 Feb 2020 07:00  --------------------------------------------------------  IN: 0 mL / OUT: 5 mL / NET: -5 mL    RADIOLOGY & ADDITIONAL STUDIES:    ADVANCE DIRECTIVES:   DNR NO  Completed on:                     MOLST   NO   Completed on:  Living Will  NO   Completed on:

## 2020-02-10 NOTE — PROGRESS NOTE ADULT - PROBLEM SELECTOR PROBLEM 1
Prostate CA
Pleural effusion

## 2020-02-11 PROCEDURE — 99232 SBSQ HOSP IP/OBS MODERATE 35: CPT

## 2020-02-11 PROCEDURE — 71045 X-RAY EXAM CHEST 1 VIEW: CPT | Mod: 26

## 2020-02-11 PROCEDURE — 99233 SBSQ HOSP IP/OBS HIGH 50: CPT

## 2020-02-11 RX ADMIN — Medication 5 MILLIGRAM(S): at 05:56

## 2020-02-11 RX ADMIN — Medication 3 MILLILITER(S): at 08:41

## 2020-02-11 RX ADMIN — OXYCODONE HYDROCHLORIDE 10 MILLIGRAM(S): 5 TABLET ORAL at 13:08

## 2020-02-11 RX ADMIN — OXYCODONE HYDROCHLORIDE 10 MILLIGRAM(S): 5 TABLET ORAL at 14:08

## 2020-02-11 RX ADMIN — Medication 5 MILLIGRAM(S): at 17:40

## 2020-02-11 RX ADMIN — TAMSULOSIN HYDROCHLORIDE 0.4 MILLIGRAM(S): 0.4 CAPSULE ORAL at 13:09

## 2020-02-11 RX ADMIN — FINASTERIDE 5 MILLIGRAM(S): 5 TABLET, FILM COATED ORAL at 13:09

## 2020-02-11 RX ADMIN — OXYCODONE HYDROCHLORIDE 10 MILLIGRAM(S): 5 TABLET ORAL at 05:57

## 2020-02-11 RX ADMIN — Medication 3 MILLILITER(S): at 14:38

## 2020-02-11 RX ADMIN — Medication 3 MILLIGRAM(S): at 23:10

## 2020-02-11 RX ADMIN — Medication 100 MILLIGRAM(S): at 17:40

## 2020-02-11 RX ADMIN — Medication 100 MILLIGRAM(S): at 11:21

## 2020-02-11 RX ADMIN — Medication 3 MILLILITER(S): at 20:57

## 2020-02-11 RX ADMIN — Medication 650 MILLIGRAM(S): at 08:37

## 2020-02-11 RX ADMIN — OXYCODONE HYDROCHLORIDE 10 MILLIGRAM(S): 5 TABLET ORAL at 23:11

## 2020-02-11 RX ADMIN — OXYCODONE HYDROCHLORIDE 10 MILLIGRAM(S): 5 TABLET ORAL at 17:39

## 2020-02-11 RX ADMIN — Medication 100 MILLIGRAM(S): at 01:09

## 2020-02-11 NOTE — PROGRESS NOTE ADULT - ASSESSMENT
1. Left sided pleural effusion/empyema  infectious in origin   exudative , cultures positive for MSSA.  ID on board , maintained on cefazolin .   chest tube removed,   plan is for prolonged course of antibiotics IV   due to new fever today , will hold off on PICC line placement .  if spikes another temperature will send cultures     2. Prostate adeno CA with metastatic disease   on hormone therapy and pain control.  continue with current regimen .  on prednisone  .   will follow up outpatient     3. BPH   flomax and finasteride .    4. Anemia   AOCD , FOBT negative , anemia panel not suggestive of iron deficiency .  monitor     5. DVT prophylaxis  boots , no chemical prophylaxis due to chest tube and anemia as well.    details discussed with patient , all concerns answered, also discussed with ID.

## 2020-02-11 NOTE — PROGRESS NOTE ADULT - ASSESSMENT
This 64yoM; with pmh signif for Prostate Ca with mets to ribs diagnosed in 11/2019 (currently on chemo), HTN; now p/w cough, sob, fever x1 week, progressively worsening.  denies sick contacts. reports traveling from [place] after diagnosis of prostate ca complicated by b/l ptx in november and another hospitalization in december. denies cp/palp. denies abd pain: SOCIAL: No tobacco/illicit substance use/social/EtOH  patient reports having a history of Bilateral Pneumonia approx 1.5 months prior with associated bilateral Pleural effusions which required draining. 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 (20 Jan 2020 21:22)  patient was initially started On vanco & Zosyn, then Ceftriaxone and Zithromax was added as well.   Impression:  PNA  lrepeat CT scan with "Patent central airways.  Bilateral consolidation and pneumatoceles are reidentified, without significant change from prior examination."  s/p thoracentesis on 1/27/2020; cultures with Staph AUREUS  -   on KEFZOL  FOR MSSA  WILL CONTINUE  CT SURGERY   REEVALUATION NOTED   S/P CHEST TUBE PLACEMENT BY IR NOW REMOVED   OVERALL IMPROVED   CONTINUE IV ABX THROUGH 3/10  PICC LINE ORDERED      D/W CT SURGERY

## 2020-02-11 NOTE — PROGRESS NOTE ADULT - SUBJECTIVE AND OBJECTIVE BOX
[facility]  INFECTIOUS DISEASES AND INTERNAL MEDICINE at [place]  =======================================================  Chandler Mccrary MD  Diplomates American Board of Internal Medicine and Infectious Diseases  Telephone [phone]  Fax            [phone]  =======================================================    [facility]-[id number]  [place]   follow up:  Pneumonia    no new issues   FEELS OK       CHEST TUBE  REMOVED YESTERDAY    =======================================================  REVIEW OF SYSTEMS:  CONSTITUTIONAL:  no fevers  HEENT:  No diplopia or blurred vision.  No earache, sore throat or runny nose.  CARDIOVASCULAR:  No pressure, squeezing, strangling, tightness, heaviness or aching about the chest, neck, axilla or epigastrium.  RESPIRATORY:  + shortness of breath  GASTROINTESTINAL:  No nausea, vomiting or diarrhea.  GENITOURINARY:  No dysuria, frequency or urgency. No Blood in urine  MUSCULOSKELETAL:  no joint aches, no muscle pain  SKIN:  No change in skin, hair or nails.  NEUROLOGIC:  No Headaches, seizures or weakness.  PSYCHIATRIC:  No disorder of thought or mood.  ENDOCRINE:  No heat or cold intolerance  HEMATOLOGICAL:  No easy bruising or bleeding.   =======================================================  Allergies  No Known Allergies     ======================================================  Physical Exam:  ============  (see section below)    General:  No acute distress.  Eye: Pupils are equal, round and reactive to light, Extraocular movements are intact, Normal conjunctiva.  HENT: Normocephalic, Oral mucosa is moist, No pharyngeal erythema, No sinus tenderness.  Neck: Supple, No lymphadenopathy.  Respiratory:   POOR aeration of the LEFT side to mid lung zone,   Cardiovascular: Normal rate, Regular rhythm,   Gastrointestinal: Soft, Non-tender, Non-distended, Normal bowel sounds.  Genitourinary: No costovertebral angle tenderness.  Lymphatics: No lymphadenopathy neck,   Musculoskeletal: Normal range of motion, Normal strength.  Integumentary: No rash.  Neurologic: Alert, Oriented, No focal deficits, Cranial Nerves II-XII are grossly intact.  Psychiatric: Appropriate mood & affect.    =======================================================           =====================================================  Vitals:  ============       Vital Signs Last 24 Hrs  T(C): 38.2 (11 Feb 2020 08:15), Max: 38.2 (11 Feb 2020 08:15)  T(F): 100.8 (11 Feb 2020 08:15), Max: 100.8 (11 Feb 2020 08:15)  HR: 98 (11 Feb 2020 08:15) (74 - 98)  BP: 115/70 (11 Feb 2020 08:15) (113/65 - 115/70)  BP(mean): --  RR: 18 (11 Feb 2020 08:15) (18 - 18)  SpO2: 91% (11 Feb 2020 08:15) (91% - 98%)===============================================  Current Antibiotics:  KEFZOL    Other medications:  albuterol/ipratropium for Nebulization 3 milliLiter(s) Nebulizer every 6 hours  ALPRAZolam 0.25 milliGRAM(s) Oral at bedtime  finasteride 5 milliGRAM(s) Oral daily  lidocaine   Patch 1 Patch Transdermal daily  melatonin 3 milliGRAM(s) Oral at bedtime  oxyCODONE    IR 10 milliGRAM(s) Oral every 6 hours  polyethylene glycol 3350 17 Gram(s) Oral at bedtime  predniSONE   Tablet 5 milliGRAM(s) Oral two times a day  tamsulosin 0.4 milliGRAM(s) Oral at bedtime      =======================================================  Labs:                                                    8.2    3.19  )-----------( 315      ( 10 Feb 2020 07:00 )             27.3   02-10    135  |  98  |  13.0  ----------------------------<  94  4.2   |  27.0  |  0.34<L>    Ca    7.8<L>      10 Feb 2020 07:00                  Culture - Acid Fast - Body Fluid w/Smear (collected 01-27-20 @ 21:54)  Source: .Body Fluid    Culture - Fungal, Body Fluid (collected 01-27-20 @ 13:05)  Source: .Body Fluid    Culture - Body Fluid with Gram Stain (collected 01-27-20 @ 13:04)  Source: .Body Fluid  Gram Stain (01-27-20 @ 15:34):    Numerous White blood cells    No organisms seen  Final Report (02-01-20 @ 10:17):    Numerous Staphylococcus aureus  Organism: Staphylococcus aureus (02-01-20 @ 10:17)  Organism: Staphylococcus aureus (02-01-20 @ 10:17)    Sensitivities:      -  Ampicillin/Sulbactam: S <=8/4      -  Cefazolin: S <=4      -  Clindamycin: S <=0.5      -  Erythromycin: S <=0.5      -  Gentamicin: S <=4 Should not be used as monotherapy      -  Oxacillin: S <=0.25      -  Penicillin: R >8      -  RIF- Rifampin: S <=1 Should not be used as monotherapy      -  Tetra/Doxy: S <=4      -  Trimethoprim/Sulfamethoxazole: S <=0.5/9.5      -  Vancomycin: S 2      Method Type: KERRI    Culture - Blood (collected 01-26-20 @ 18:25)  Source: .Blood  Final Report (01-31-20 @ 19:01):    No growth at 5 days.    Culture - Blood (collected 01-26-20 @ 18:25)  Source: .Blood  Final Report (01-31-20 @ 19:01):    No growth at 5 days.    Culture - Sputum (collected 01-23-20 @ 09:17)  Source: .Sputum  Gram Stain (01-23-20 @ 14:01):    Moderate WBC's    No organisms seen  Final Report (01-25-20 @ 09:57):    Moderate Routine respiratory teresa present    Culture - Blood (collected 01-21-20 @ 10:00)  Source: .Blood  Final Report (01-26-20 @ 11:00):    No growth at 5 days.    Culture - Blood (collected 01-20-20 @ 10:24)  Source: .Blood  Final Report (01-25-20 @ 11:01):    No growth at 5 days.    Culture - Blood (collected 01-20-20 @ 10:24)  Source: .Blood  Final Report (01-25-20 @ 11:01):    No growth at 5 days.      Creatinine, Serum: 0.32 mg/dL (02-02-20 @ 06:04)  Creatinine, Serum: 0.29 mg/dL (02-01-20 @ 08:35)  Creatinine, Serum: 0.28 mg/dL (01-31-20 @ 08:51)  Creatinine, Serum: 0.34 mg/dL (01-30-20 @ 09:30)  Creatinine, Serum: 0.35 mg/dL (01-29-20 @ 10:20)

## 2020-02-11 NOTE — PROGRESS NOTE ADULT - SUBJECTIVE AND OBJECTIVE BOX
CC: fever     INTERVAL HPI/OVERNIGHT EVENTS: seen and examined , had fever of 100.8 today , discussed with ID no need to change antibiotics , will hold off on placing PICC line .  patient felt warm but no SOB , no chest pain , no other complains           Vital Signs Last 24 Hrs  T(C): 38.2 (11 Feb 2020 08:15), Max: 38.2 (11 Feb 2020 08:15)  T(F): 100.8 (11 Feb 2020 08:15), Max: 100.8 (11 Feb 2020 08:15)  HR: 98 (11 Feb 2020 08:15) (78 - 98)  BP: 115/70 (11 Feb 2020 08:15) (113/65 - 115/70)  BP(mean): --  RR: 18 (11 Feb 2020 08:15) (18 - 18)  SpO2: 91% (11 Feb 2020 08:15) (91% - 95%)    PHYSICAL EXAM:    GENERAL: NAD, resting comfortable in bed   CHEST/LUNG: CTAB , no wheezing , rales, rhonchi heard   HEART: S1S2+, Regular rate and rhythm; No murmurs, rubs, or gallops  ABDOMEN: Soft, Nontender, Nondistended; Bowel sounds present  EXTREMITIES:  no pitting edema , pulses palpated BL.        LABS:                        8.2    3.19  )-----------( 315      ( 10 Feb 2020 07:00 )             27.3     02-10    135  |  98  |  13.0  ----------------------------<  94  4.2   |  27.0  |  0.34<L>    Ca    7.8<L>      10 Feb 2020 07:00              MEDICATIONS  (STANDING):  albuterol/ipratropium for Nebulization 3 milliLiter(s) Nebulizer every 6 hours  ALPRAZolam 0.25 milliGRAM(s) Oral at bedtime  ceFAZolin   IVPB 2000 milliGRAM(s) IV Intermittent every 8 hours  finasteride 5 milliGRAM(s) Oral daily  lidocaine   Patch 1 Patch Transdermal daily  lidocaine   Patch 1 Patch Transdermal daily  melatonin 3 milliGRAM(s) Oral at bedtime  oxyCODONE    IR 10 milliGRAM(s) Oral every 6 hours  polyethylene glycol 3350 17 Gram(s) Oral at bedtime  predniSONE   Tablet 5 milliGRAM(s) Oral two times a day  tamsulosin 0.4 milliGRAM(s) Oral daily    MEDICATIONS  (PRN):  acetaminophen   Tablet .. 650 milliGRAM(s) Oral every 6 hours PRN Temp greater or equal to 38C (100.4F), Mild Pain (1 - 3)  lactulose Syrup 10 Gram(s) Oral two times a day PRN Constipation  oxyCODONE    IR 10 milliGRAM(s) Oral every 4 hours PRN Severe Pain (7 - 10)      RADIOLOGY & ADDITIONAL TESTS:

## 2020-02-11 NOTE — PROGRESS NOTE ADULT - ASSESSMENT
Assessment and Plan:   · Assessment		  1. Left sided pleural effusion/empyema  infectious in origin   exudative , cultures positive for MSSA.  ID on board , maintained on cefazolin .   prolonged course of antibiotics IV   New fever today , holding  off on PICC line placement .  Monitor temp if he spikes another temperature will send cultures     2. Prostate adeno CA with metastatic disease   on hormone therapy and pain control.  continue with current regimen .  on prednisone  .   will follow up outpatient Assessment and Plan:   · Assessment		  1. Left sided pleural effusion/empyema  infectious in origin   exudative , cultures positive for MSSA.  ID on board , maintained on cefazolin .   Planning prolonged course of antibiotics IV   New fever today , holding  off on PICC line placement .  Monitor temp if he spikes another temperature will send cultures     2. Prostate adeno CA with metastatic disease   on hormone therapy and pain control.  continue with current regimen .  on prednisone  .   He will need to be discharged to [facility] for antibiotic therapy  Energy level slightly less active, he is concerned about recurring fever.    3. Will need PICC line for antibiotic therapy      Monitor Temp and blood work    4. Palliative Encounter  We were discussing intermittent fever, what that could mean?  Very concerned about disease progression, and will he be able to  follow up with Oncology for future chemotherapy

## 2020-02-11 NOTE — PROGRESS NOTE ADULT - SUBJECTIVE AND OBJECTIVE BOX
· Subjective and Objective: 	  CC: fever     INTERVAL HPI/OVERNIGHT EVENTS: seen and examined , had fever of 100.8 today , discussed with ID no need to change antibiotics    holding off on placing PICC line .  Patient felt warm but no SOB , no chest pain , no other complains.   He has been sweating on and off. Did not ambulate today  Denies pain    64y old  Male who presents with a chief complaint of Dyspnea secondary to Large located effusion and suspected recurrent Bilateral PNA (11 Feb 2020 11:33)      Present Symptoms:     Dyspnea: 0 1 2   Nausea/Vomiting: No  Anxiety:  Yes   Depression: No  Fatigue: Yes   Loss of appetite:  No    Pain: baseline L flank and back pain-effectively treated with Oxy IR 10 Q6            Character-            Duration-            Effect-            Factors-            Frequency-            Location-            Severity-moderate    Review of Systems: Reviewed                      All others negative    MEDICATIONS  (STANDING):  albuterol/ipratropium for Nebulization 3 milliLiter(s) Nebulizer every 6 hours  ALPRAZolam 0.25 milliGRAM(s) Oral at bedtime  ceFAZolin   IVPB 2000 milliGRAM(s) IV Intermittent every 8 hours  finasteride 5 milliGRAM(s) Oral daily  lidocaine   Patch 1 Patch Transdermal daily  lidocaine   Patch 1 Patch Transdermal daily  melatonin 3 milliGRAM(s) Oral at bedtime  oxyCODONE    IR 10 milliGRAM(s) Oral every 6 hours  polyethylene glycol 3350 17 Gram(s) Oral at bedtime  predniSONE   Tablet 5 milliGRAM(s) Oral two times a day  tamsulosin 0.4 milliGRAM(s) Oral daily    MEDICATIONS  (PRN):  acetaminophen   Tablet .. 650 milliGRAM(s) Oral every 6 hours PRN Temp greater or equal to 38C (100.4F), Mild Pain (1 - 3)  lactulose Syrup 10 Gram(s) Oral two times a day PRN Constipation  oxyCODONE    IR 10 milliGRAM(s) Oral every 4 hours PRN Severe Pain (7 - 10)      PHYSICAL EXAM:    Vital Signs Last 24 Hrs  T(C): 38 (11 Feb 2020 15:32), Max: 38.2 (11 Feb 2020 08:15)  T(F): 100.4 (11 Feb 2020 15:32), Max: 100.8 (11 Feb 2020 08:15)  HR: 89 (11 Feb 2020 15:32) (78 - 98)  BP: 110/66 (11 Feb 2020 15:32) (110/66 - 115/70)  BP(mean): --  RR: 18 (11 Feb 2020 15:32) (18 - 18)  SpO2: 94% (11 Feb 2020 15:32) (91% - 95%)    General: alert  oriented x _3___                 well nourished     HEENT: normal      Lungs: /labored breathing  THOMPSON    CV: normal     GI: normal  distended                constipation  last BM: [place]    : voiding    MSK:  weakness  edema trace BLLE            ambulatory      Skin: normal ___  no rash    LABS:                        8.2    3.19  )-----------( 315      ( 10 Feb 2020 07:00 )             27.3     02-10    135  |  98  |  13.0  ----------------------------<  94  4.2   |  27.0  |  0.34<L>    Ca    7.8<L>      10 Feb 2020 07:00    I&O's Summary    RADIOLOGY & ADDITIONAL STUDIES:      ADVANCE DIRECTIVES:   DNR NO  Completed on:                     MOLST  NO   Completed on:  Living Will   NO   Completed on: · Subjective and Objective: 	  CC: fever     INTERVAL HPI/OVERNIGHT EVENTS: seen and examined , had fever of 100.8 today , discussed with ID no need to change antibiotics    holding off on placing PICC line .  Patient felt warm but no SOB , no chest pain , no other complains.   He has been sweating on and off. Did not ambulate today  Denies pain    64y old  Male who presents with a chief complaint of Dyspnea secondary to Large located effusion and suspected recurrent Bilateral PNA (11 Feb 2020 11:33)      Present Symptoms:     Dyspnea: 0 1 2   Nausea/Vomiting: No  Anxiety:  Yes   Depression: No  Fatigue: Yes   Loss of appetite:  No    Pain: baseline L flank and back pain-effectively treated with Oxy IR 10 Q6            Character-            Duration-            Effect-            Factors-            Frequency-            Location-            Severity-moderate    Review of Systems: Reviewed                      All others negative    MEDICATIONS  (STANDING):  albuterol/ipratropium for Nebulization 3 milliLiter(s) Nebulizer every 6 hours  ALPRAZolam 0.25 milliGRAM(s) Oral at bedtime  ceFAZolin   IVPB 2000 milliGRAM(s) IV Intermittent every 8 hours  finasteride 5 milliGRAM(s) Oral daily  lidocaine   Patch 1 Patch Transdermal daily  lidocaine   Patch 1 Patch Transdermal daily  melatonin 3 milliGRAM(s) Oral at bedtime  oxyCODONE    IR 10 milliGRAM(s) Oral every 6 hours  polyethylene glycol 3350 17 Gram(s) Oral at bedtime  predniSONE   Tablet 5 milliGRAM(s) Oral two times a day  tamsulosin 0.4 milliGRAM(s) Oral daily    MEDICATIONS  (PRN):  acetaminophen   Tablet .. 650 milliGRAM(s) Oral every 6 hours PRN Temp greater or equal to 38C (100.4F), Mild Pain (1 - 3)  lactulose Syrup 10 Gram(s) Oral two times a day PRN Constipation  oxyCODONE    IR 10 milliGRAM(s) Oral every 4 hours PRN Severe Pain (7 - 10)      PHYSICAL EXAM:    Vital Signs Last 24 Hrs  T(C): 38 (11 Feb 2020 15:32), Max: 38.2 (11 Feb 2020 08:15)  T(F): 100.4 (11 Feb 2020 15:32), Max: 100.8 (11 Feb 2020 08:15)  HR: 89 (11 Feb 2020 15:32) (78 - 98)  BP: 110/66 (11 Feb 2020 15:32) (110/66 - 115/70)  BP(mean): --  RR: 18 (11 Feb 2020 15:32) (18 - 18)  SpO2: 94% (11 Feb 2020 15:32) (91% - 95%)    General: alert  oriented x _3___                 well nourished     HEENT: normal      Lungs: /labored breathing  THOMPSON    CV: normal     GI:  distended                constipation  last BM: [place]    : voiding    MSK:  weakness  edema trace BLLE            ambulatory      Skin: normal ___  no rash    LABS:                        8.2    3.19  )-----------( 315      ( 10 Feb 2020 07:00 )             27.3     02-10    135  |  98  |  13.0  ----------------------------<  94  4.2   |  27.0  |  0.34<L>    Ca    7.8<L>      10 Feb 2020 07:00    I&O's Summary    RADIOLOGY & ADDITIONAL STUDIES:      ADVANCE DIRECTIVES:   DNR NO  Completed on:                     MOLST  NO   Completed on:  Living Will   NO   Completed on:

## 2020-02-12 LAB
ANION GAP SERPL CALC-SCNC: 13 MMOL/L — SIGNIFICANT CHANGE UP (ref 5–17)
BUN SERPL-MCNC: 11 MG/DL — SIGNIFICANT CHANGE UP (ref 8–20)
CALCIUM SERPL-MCNC: 7.5 MG/DL — LOW (ref 8.6–10.2)
CHLORIDE SERPL-SCNC: 97 MMOL/L — LOW (ref 98–107)
CO2 SERPL-SCNC: 23 MMOL/L — SIGNIFICANT CHANGE UP (ref 22–29)
CREAT SERPL-MCNC: 0.39 MG/DL — LOW (ref 0.5–1.3)
GLUCOSE SERPL-MCNC: 136 MG/DL — HIGH (ref 70–99)
HCT VFR BLD CALC: 29.2 % — LOW (ref 39–50)
HGB BLD-MCNC: 8.9 G/DL — LOW (ref 13–17)
MCHC RBC-ENTMCNC: 27.7 PG — SIGNIFICANT CHANGE UP (ref 27–34)
MCHC RBC-ENTMCNC: 30.5 GM/DL — LOW (ref 32–36)
MCV RBC AUTO: 91 FL — SIGNIFICANT CHANGE UP (ref 80–100)
PLATELET # BLD AUTO: 287 K/UL — SIGNIFICANT CHANGE UP (ref 150–400)
POTASSIUM SERPL-MCNC: 3.4 MMOL/L — LOW (ref 3.5–5.3)
POTASSIUM SERPL-SCNC: 3.4 MMOL/L — LOW (ref 3.5–5.3)
RBC # BLD: 3.21 M/UL — LOW (ref 4.2–5.8)
RBC # FLD: 19.1 % — HIGH (ref 10.3–14.5)
SODIUM SERPL-SCNC: 133 MMOL/L — LOW (ref 135–145)
WBC # BLD: 3.13 K/UL — LOW (ref 3.8–10.5)
WBC # FLD AUTO: 3.13 K/UL — LOW (ref 3.8–10.5)

## 2020-02-12 PROCEDURE — 99232 SBSQ HOSP IP/OBS MODERATE 35: CPT

## 2020-02-12 RX ORDER — ENOXAPARIN SODIUM 100 MG/ML
40 INJECTION SUBCUTANEOUS DAILY
Refills: 0 | Status: DISCONTINUED | OUTPATIENT
Start: 2020-02-12 | End: 2020-02-19

## 2020-02-12 RX ADMIN — OXYCODONE HYDROCHLORIDE 10 MILLIGRAM(S): 5 TABLET ORAL at 11:18

## 2020-02-12 RX ADMIN — Medication 3 MILLILITER(S): at 21:05

## 2020-02-12 RX ADMIN — OXYCODONE HYDROCHLORIDE 10 MILLIGRAM(S): 5 TABLET ORAL at 13:16

## 2020-02-12 RX ADMIN — Medication 3 MILLILITER(S): at 17:02

## 2020-02-12 RX ADMIN — OXYCODONE HYDROCHLORIDE 10 MILLIGRAM(S): 5 TABLET ORAL at 05:36

## 2020-02-12 RX ADMIN — FINASTERIDE 5 MILLIGRAM(S): 5 TABLET, FILM COATED ORAL at 11:14

## 2020-02-12 RX ADMIN — TAMSULOSIN HYDROCHLORIDE 0.4 MILLIGRAM(S): 0.4 CAPSULE ORAL at 11:14

## 2020-02-12 RX ADMIN — Medication 5 MILLIGRAM(S): at 17:10

## 2020-02-12 RX ADMIN — Medication 100 MILLIGRAM(S): at 17:10

## 2020-02-12 RX ADMIN — Medication 100 MILLIGRAM(S): at 08:36

## 2020-02-12 RX ADMIN — OXYCODONE HYDROCHLORIDE 10 MILLIGRAM(S): 5 TABLET ORAL at 17:09

## 2020-02-12 RX ADMIN — Medication 100 MILLIGRAM(S): at 01:25

## 2020-02-12 RX ADMIN — Medication 5 MILLIGRAM(S): at 05:35

## 2020-02-12 RX ADMIN — POLYETHYLENE GLYCOL 3350 17 GRAM(S): 17 POWDER, FOR SOLUTION ORAL at 22:05

## 2020-02-12 RX ADMIN — OXYCODONE HYDROCHLORIDE 10 MILLIGRAM(S): 5 TABLET ORAL at 18:28

## 2020-02-12 RX ADMIN — Medication 3 MILLIGRAM(S): at 22:05

## 2020-02-12 RX ADMIN — Medication 3 MILLILITER(S): at 09:08

## 2020-02-12 NOTE — PHYSICAL THERAPY INITIAL EVALUATION ADULT - ADDITIONAL COMMENTS
ER
Pt. states he lives in a private home with 3 steps to enter with handrail.
Pt lives in a private home with 3 steps to enter with 1 rail and no stairs inside.  Pt owns medical equipment: RW  Pt lives with: mom & sister who have currently been assisting him as needed with grocery shopping, driving him to appts etc. Pt reports being independent with all functional mobility with use of RW.

## 2020-02-12 NOTE — PROGRESS NOTE ADULT - SUBJECTIVE AND OBJECTIVE BOX
[facility]  INFECTIOUS DISEASES AND INTERNAL MEDICINE at [place]  =======================================================  Chandler Mccrary MD  Diplomates American Board of Internal Medicine and Infectious Diseases  Telephone [phone]  Fax            [phone]  =======================================================    [facility]-[id number]  [place]   follow up:  Pneumonia    no new issues   FEELS OK       CHEST TUBE  REMOVED   hat temp 100.8 yesterday now fevers down    =======================================================  REVIEW OF SYSTEMS:  CONSTITUTIONAL:  no fevers  HEENT:  No diplopia or blurred vision.  No earache, sore throat or runny nose.  CARDIOVASCULAR:  No pressure, squeezing, strangling, tightness, heaviness or aching about the chest, neck, axilla or epigastrium.  RESPIRATORY:  + shortness of breath  GASTROINTESTINAL:  No nausea, vomiting or diarrhea.  GENITOURINARY:  No dysuria, frequency or urgency. No Blood in urine  MUSCULOSKELETAL:  no joint aches, no muscle pain  SKIN:  No change in skin, hair or nails.  NEUROLOGIC:  No Headaches, seizures or weakness.  PSYCHIATRIC:  No disorder of thought or mood.  ENDOCRINE:  No heat or cold intolerance  HEMATOLOGICAL:  No easy bruising or bleeding.   =======================================================  Allergies  No Known Allergies     ======================================================  Physical Exam:  ============  (see section below)    General:  No acute distress.  Eye: Pupils are equal, round and reactive to light, Extraocular movements are intact, Normal conjunctiva.  HENT: Normocephalic, Oral mucosa is moist, No pharyngeal erythema, No sinus tenderness.  Neck: Supple, No lymphadenopathy.  Respiratory:   POOR aeration of the LEFT side to mid lung zone,   Cardiovascular: Normal rate, Regular rhythm,   Gastrointestinal: Soft, Non-tender, Non-distended, Normal bowel sounds.  Genitourinary: No costovertebral angle tenderness.  Lymphatics: No lymphadenopathy neck,   Musculoskeletal: Normal range of motion, Normal strength.  Integumentary: No rash.  Neurologic: Alert, Oriented, No focal deficits, Cranial Nerves II-XII are grossly intact.  Psychiatric: Appropriate mood & affect.    =======================================================           =====================================================  Vitals:  ============         Vital Signs Last 24 Hrs  T(C): 37.4 (12 Feb 2020 08:24), Max: 38 (11 Feb 2020 15:32)  T(F): 99.4 (12 Feb 2020 08:24), Max: 100.4 (11 Feb 2020 15:32)  HR: 80 (12 Feb 2020 08:24) (80 - 90)  BP: 112/60 (12 Feb 2020 08:24) (110/66 - 125/68)  BP(mean): --  RR: 18 (12 Feb 2020 08:24) (18 - 18)  SpO2: 96% (12 Feb 2020 08:29) (90% - 96%)  Current Antibiotics:  KEFZOL    Other medications:  albuterol/ipratropium for Nebulization 3 milliLiter(s) Nebulizer every 6 hours  ALPRAZolam 0.25 milliGRAM(s) Oral at bedtime  finasteride 5 milliGRAM(s) Oral daily  lidocaine   Patch 1 Patch Transdermal daily  melatonin 3 milliGRAM(s) Oral at bedtime  oxyCODONE    IR 10 milliGRAM(s) Oral every 6 hours  polyethylene glycol 3350 17 Gram(s) Oral at bedtime  predniSONE   Tablet 5 milliGRAM(s) Oral two times a day  tamsulosin 0.4 milliGRAM(s) Oral at bedtime      =======================================================  Labs:                                                    8.2    3.19  )-----------( 315      ( 10 Feb 2020 07:00 )             27.3   02-10    135  |  98  |  13.0  ----------------------------<  94  4.2   |  27.0  |  0.34<L>    Ca    7.8<L>      10 Feb 2020 07:00                  Culture - Acid Fast - Body Fluid w/Smear (collected 01-27-20 @ 21:54)  Source: .Body Fluid    Culture - Fungal, Body Fluid (collected 01-27-20 @ 13:05)  Source: .Body Fluid    Culture - Body Fluid with Gram Stain (collected 01-27-20 @ 13:04)  Source: .Body Fluid  Gram Stain (01-27-20 @ 15:34):    Numerous White blood cells    No organisms seen  Final Report (02-01-20 @ 10:17):    Numerous Staphylococcus aureus  Organism: Staphylococcus aureus (02-01-20 @ 10:17)  Organism: Staphylococcus aureus (02-01-20 @ 10:17)    Sensitivities:      -  Ampicillin/Sulbactam: S <=8/4      -  Cefazolin: S <=4      -  Clindamycin: S <=0.5      -  Erythromycin: S <=0.5      -  Gentamicin: S <=4 Should not be used as monotherapy      -  Oxacillin: S <=0.25      -  Penicillin: R >8      -  RIF- Rifampin: S <=1 Should not be used as monotherapy      -  Tetra/Doxy: S <=4      -  Trimethoprim/Sulfamethoxazole: S <=0.5/9.5      -  Vancomycin: S 2      Method Type: KERRI    Culture - Blood (collected 01-26-20 @ 18:25)  Source: .Blood  Final Report (01-31-20 @ 19:01):    No growth at 5 days.    Culture - Blood (collected 01-26-20 @ 18:25)  Source: .Blood  Final Report (01-31-20 @ 19:01):    No growth at 5 days.    Culture - Sputum (collected 01-23-20 @ 09:17)  Source: .Sputum  Gram Stain (01-23-20 @ 14:01):    Moderate WBC's    No organisms seen  Final Report (01-25-20 @ 09:57):    Moderate Routine respiratory teresa present    Culture - Blood (collected 01-21-20 @ 10:00)  Source: .Blood  Final Report (01-26-20 @ 11:00):    No growth at 5 days.    Culture - Blood (collected 01-20-20 @ 10:24)  Source: .Blood  Final Report (01-25-20 @ 11:01):    No growth at 5 days.    Culture - Blood (collected 01-20-20 @ 10:24)  Source: .Blood  Final Report (01-25-20 @ 11:01):    No growth at 5 days.      Creatinine, Serum: 0.32 mg/dL (02-02-20 @ 06:04)  Creatinine, Serum: 0.29 mg/dL (02-01-20 @ 08:35)  Creatinine, Serum: 0.28 mg/dL (01-31-20 @ 08:51)  Creatinine, Serum: 0.34 mg/dL (01-30-20 @ 09:30)  Creatinine, Serum: 0.35 mg/dL (01-29-20 @ 10:20)

## 2020-02-12 NOTE — PROCEDURE NOTE - NSICDXPROCEDURE_GEN_ALL_CORE_FT
PROCEDURES:  Midline catheter insertion 12-Feb-2020 13:38:35 4FR 15CM 32CIRC BARD POWER MIDLINE ns flush good heme back left basilic vein [place]o [place]

## 2020-02-12 NOTE — PROCEDURE NOTE - NSPROCDETAILS_GEN_ALL_CORE
ultrasound guidance/ultrasound assessment/sterile technique, catheter placed/location identified, draped/prepped, sterile technique used/supine position/sterile dressing applied

## 2020-02-12 NOTE — PROGRESS NOTE ADULT - ASSESSMENT
This 64yoM; with pmh signif for Prostate Ca with mets to ribs diagnosed in 11/2019 (currently on chemo), HTN; now p/w cough, sob, fever x1 week, progressively worsening.  denies sick contacts. reports traveling from [place] after diagnosis of prostate ca complicated by b/l ptx in november and another hospitalization in december. denies cp/palp. denies abd pain: SOCIAL: No tobacco/illicit substance use/social/EtOH  patient reports having a history of Bilateral Pneumonia approx 1.5 months prior with associated bilateral Pleural effusions which required draining. 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 (20 Jan 2020 21:22)  patient was initially started On vanco & Zosyn, then Ceftriaxone and Zithromax was added as well.   Impression:  PNA  lrepeat CT scan with "Patent central airways.  Bilateral consolidation and pneumatoceles are reidentified, without significant change from prior examination."  s/p thoracentesis on 1/27/2020; cultures with Staph AUREUS  -   on KEFZOL  FOR MSSA  WILL CONTINUE  CT SURGERY   REEVALUATION NOTED   S/P CHEST TUBE PLACEMENT BY IR NOW REMOVED   OVERALL IMPROVED   CONTINUE IV ABX THROUGH 3/10  PICC LINE ORDERED YESTERDAY BUT HAD T100.8 NOW AFEBRILE  OK FOR PICC LINE AND D/C   WILL D/W HOSPITALIST

## 2020-02-12 NOTE — PHYSICAL THERAPY INITIAL EVALUATION ADULT - CRITERIA FOR SKILLED THERAPEUTIC INTERVENTIONS
rehab potential/anticipated discharge recommendation/impairments found
Pt is modified independent with all functional mobility; PT will no longer follow

## 2020-02-12 NOTE — PROGRESS NOTE ADULT - SUBJECTIVE AND OBJECTIVE BOX
CC: fever     INTERVAL HPI/OVERNIGHT EVENTS: feels ok , on chart review still had fever on and off low grade , cleared by ID to get PICC line .   no other complains           Vital Signs Last 24 Hrs  T(C): 37.4 (12 Feb 2020 08:24), Max: 38 (11 Feb 2020 15:32)  T(F): 99.4 (12 Feb 2020 08:24), Max: 100.4 (11 Feb 2020 15:32)  HR: 85 (12 Feb 2020 09:10) (80 - 90)  BP: 112/60 (12 Feb 2020 08:24) (110/66 - 125/68)  BP(mean): --  RR: 18 (12 Feb 2020 08:24) (18 - 18)  SpO2: 95% (12 Feb 2020 09:10) (90% - 96%)    PHYSICAL EXAM:    GENERAL: NAD, resting comfortable in bed   CHEST/LUNG: CTAB , no wheezing , rales, rhonchi heard   HEART: S1S2+, Regular rate and rhythm; No murmurs, rubs, or gallops  ABDOMEN: Soft, Nontender, Nondistended; Bowel sounds present  EXTREMITIES:  no pitting edema , pulses palpated BL.        LABS:                        8.9    3.13  )-----------( 287      ( 12 Feb 2020 09:25 )             29.2     02-12    133<L>  |  97<L>  |  11.0  ----------------------------<  136<H>  3.4<L>   |  23.0  |  0.39<L>    Ca    7.5<L>      12 Feb 2020 09:25              MEDICATIONS  (STANDING):  albuterol/ipratropium for Nebulization 3 milliLiter(s) Nebulizer every 6 hours  ALPRAZolam 0.25 milliGRAM(s) Oral at bedtime  ceFAZolin   IVPB 2000 milliGRAM(s) IV Intermittent every 8 hours  finasteride 5 milliGRAM(s) Oral daily  lidocaine   Patch 1 Patch Transdermal daily  lidocaine   Patch 1 Patch Transdermal daily  melatonin 3 milliGRAM(s) Oral at bedtime  oxyCODONE    IR 10 milliGRAM(s) Oral every 6 hours  polyethylene glycol 3350 17 Gram(s) Oral at bedtime  predniSONE   Tablet 5 milliGRAM(s) Oral two times a day  tamsulosin 0.4 milliGRAM(s) Oral daily    MEDICATIONS  (PRN):  acetaminophen   Tablet .. 650 milliGRAM(s) Oral every 6 hours PRN Temp greater or equal to 38C (100.4F), Mild Pain (1 - 3)  lactulose Syrup 10 Gram(s) Oral two times a day PRN Constipation  oxyCODONE    IR 10 milliGRAM(s) Oral every 4 hours PRN Severe Pain (7 - 10)      RADIOLOGY & ADDITIONAL TESTS:

## 2020-02-12 NOTE — PROGRESS NOTE ADULT - ASSESSMENT
1. Left sided pleural effusion/empyema  infectious in origin   exudative , cultures positive for MSSA.  ID on board , maintained on cefazolin .   chest tube removed,   plan is for prolonged course of antibiotics IV   patient has ongoing low grade fever , cleared by ID for PICC line.     2. Prostate adeno CA with metastatic disease   on hormone therapy and pain control.  continue with current regimen .  on prednisone  .   will follow up outpatient     3. BPH   flomax and finasteride .    4. Anemia   AOCD , FOBT negative , anemia panel not suggestive of iron deficiency .  monitor     5. DVT prophylaxis  will start lovenox.     details discussed with patient , all concerns answered, also discussed with ID.  picc line today , rehab discharge

## 2020-02-13 ENCOUNTER — APPOINTMENT (OUTPATIENT)
Dept: HEMATOLOGY ONCOLOGY | Facility: CLINIC | Age: 65
End: 2020-02-13

## 2020-02-13 LAB — RAPID RVP RESULT: SIGNIFICANT CHANGE UP

## 2020-02-13 PROCEDURE — 99232 SBSQ HOSP IP/OBS MODERATE 35: CPT

## 2020-02-13 RX ORDER — CEFAZOLIN SODIUM 1 G
2 VIAL (EA) INJECTION
Qty: 2 | Refills: 0
Start: 2020-02-13 | End: 2020-03-04

## 2020-02-13 RX ORDER — OXYCODONE HYDROCHLORIDE 5 MG/1
10 TABLET ORAL EVERY 6 HOURS
Refills: 0 | Status: DISCONTINUED | OUTPATIENT
Start: 2020-02-13 | End: 2020-02-19

## 2020-02-13 RX ORDER — OXYCODONE HYDROCHLORIDE 5 MG/1
10 TABLET ORAL EVERY 4 HOURS
Refills: 0 | Status: DISCONTINUED | OUTPATIENT
Start: 2020-02-13 | End: 2020-02-19

## 2020-02-13 RX ORDER — ALPRAZOLAM 0.25 MG
0.25 TABLET ORAL AT BEDTIME
Refills: 0 | Status: DISCONTINUED | OUTPATIENT
Start: 2020-02-13 | End: 2020-02-19

## 2020-02-13 RX ADMIN — OXYCODONE HYDROCHLORIDE 10 MILLIGRAM(S): 5 TABLET ORAL at 06:08

## 2020-02-13 RX ADMIN — FINASTERIDE 5 MILLIGRAM(S): 5 TABLET, FILM COATED ORAL at 11:52

## 2020-02-13 RX ADMIN — Medication 100 MILLIGRAM(S): at 01:02

## 2020-02-13 RX ADMIN — Medication 3 MILLIGRAM(S): at 23:11

## 2020-02-13 RX ADMIN — OXYCODONE HYDROCHLORIDE 10 MILLIGRAM(S): 5 TABLET ORAL at 17:13

## 2020-02-13 RX ADMIN — Medication 3 MILLILITER(S): at 15:38

## 2020-02-13 RX ADMIN — Medication 5 MILLIGRAM(S): at 06:07

## 2020-02-13 RX ADMIN — Medication 100 MILLIGRAM(S): at 17:14

## 2020-02-13 RX ADMIN — Medication 3 MILLILITER(S): at 20:23

## 2020-02-13 RX ADMIN — OXYCODONE HYDROCHLORIDE 10 MILLIGRAM(S): 5 TABLET ORAL at 23:10

## 2020-02-13 RX ADMIN — OXYCODONE HYDROCHLORIDE 10 MILLIGRAM(S): 5 TABLET ORAL at 12:30

## 2020-02-13 RX ADMIN — OXYCODONE HYDROCHLORIDE 10 MILLIGRAM(S): 5 TABLET ORAL at 01:33

## 2020-02-13 RX ADMIN — OXYCODONE HYDROCHLORIDE 10 MILLIGRAM(S): 5 TABLET ORAL at 06:33

## 2020-02-13 RX ADMIN — TAMSULOSIN HYDROCHLORIDE 0.4 MILLIGRAM(S): 0.4 CAPSULE ORAL at 11:52

## 2020-02-13 RX ADMIN — OXYCODONE HYDROCHLORIDE 10 MILLIGRAM(S): 5 TABLET ORAL at 01:03

## 2020-02-13 RX ADMIN — Medication 3 MILLILITER(S): at 08:38

## 2020-02-13 RX ADMIN — OXYCODONE HYDROCHLORIDE 10 MILLIGRAM(S): 5 TABLET ORAL at 11:56

## 2020-02-13 RX ADMIN — Medication 100 MILLIGRAM(S): at 11:51

## 2020-02-13 RX ADMIN — Medication 5 MILLIGRAM(S): at 17:12

## 2020-02-13 NOTE — PROGRESS NOTE ADULT - ASSESSMENT
1. Left sided pleural effusion/empyema  infectious in origin   exudative , cultures positive for MSSA.  ID on board , maintained on cefazolin .   chest tube removed,   plan is for prolonged course of antibiotics IV   had some low grade fever , resolved without any changes     2. Prostate adeno CA with metastatic disease   on hormone therapy and pain control.  continue with current regimen .  on prednisone  .   will follow up outpatient     3. BPH   flomax and finasteride .    4. Anemia   AOCD , FOBT negative , anemia panel not suggestive of iron deficiency .  monitor     5. DVT prophylaxis  on lovenox.     details discussed with patient , encouraged and recommended for TED , patient will discuss with sister .   detailed discussion held with case management regrading same issue.

## 2020-02-13 NOTE — PROGRESS NOTE ADULT - SUBJECTIVE AND OBJECTIVE BOX
CC: empyema     INTERVAL HPI/OVERNIGHT EVENTS: seen and examined , plan was to discharge home , discussed with patient at length he will consider TED placement . case management also involved , patient will decide today           Vital Signs Last 24 Hrs  T(C): 36.2 (12 Feb 2020 23:51), Max: 36.7 (12 Feb 2020 15:47)  T(F): 97.2 (12 Feb 2020 23:51), Max: 98.1 (12 Feb 2020 15:47)  HR: 68 (13 Feb 2020 08:39) (68 - 78)  BP: 111/68 (12 Feb 2020 23:51) (111/68 - 120/71)  BP(mean): --  RR: 18 (12 Feb 2020 23:51) (18 - 18)  SpO2: 99% (13 Feb 2020 08:39) (96% - 99%)    PHYSICAL EXAM:    GENERAL: NAD, resting comfortable in bed   CHEST/LUNG: CTAB , no wheezing , rales, rhonchi heard   HEART: S1S2+, Regular rate and rhythm; No murmurs, rubs, or gallops  ABDOMEN: Soft, Nontender, Nondistended; Bowel sounds present  EXTREMITIES:  no pitting edema , pulses palpated BL.        LABS:                        8.9    3.13  )-----------( 287      ( 12 Feb 2020 09:25 )             29.2     02-13    136  |  99  |  12.0  ----------------------------<  87  3.6   |  25.0  |  0.30<L>    Ca    8.0<L>      13 Feb 2020 08:43              MEDICATIONS  (STANDING):  albuterol/ipratropium for Nebulization 3 milliLiter(s) Nebulizer every 6 hours  ALPRAZolam 0.25 milliGRAM(s) Oral at bedtime  ceFAZolin   IVPB 2000 milliGRAM(s) IV Intermittent every 8 hours  enoxaparin Injectable 40 milliGRAM(s) SubCutaneous daily  finasteride 5 milliGRAM(s) Oral daily  lidocaine   Patch 1 Patch Transdermal daily  lidocaine   Patch 1 Patch Transdermal daily  melatonin 3 milliGRAM(s) Oral at bedtime  oxyCODONE    IR 10 milliGRAM(s) Oral every 6 hours  polyethylene glycol 3350 17 Gram(s) Oral at bedtime  predniSONE   Tablet 5 milliGRAM(s) Oral two times a day  tamsulosin 0.4 milliGRAM(s) Oral daily    MEDICATIONS  (PRN):  acetaminophen   Tablet .. 650 milliGRAM(s) Oral every 6 hours PRN Temp greater or equal to 38C (100.4F), Mild Pain (1 - 3)  lactulose Syrup 10 Gram(s) Oral two times a day PRN Constipation  oxyCODONE    IR 10 milliGRAM(s) Oral every 4 hours PRN Severe Pain (7 - 10)      RADIOLOGY & ADDITIONAL TESTS:

## 2020-02-13 NOTE — PROGRESS NOTE ADULT - SUBJECTIVE AND OBJECTIVE BOX
[facility]  INFECTIOUS DISEASES AND INTERNAL MEDICINE at [place]  =======================================================  Chandler Mccrary MD  Diplomates American Board of Internal Medicine and Infectious Diseases  Telephone [phone]  Fax            [phone]  =======================================================    [facility]-[id number]  [place]   follow up:  Pneumonia    no new issues   FEELS OK       afebrile    =======================================================  REVIEW OF SYSTEMS:  CONSTITUTIONAL:  no fevers  HEENT:  No diplopia or blurred vision.  No earache, sore throat or runny nose.  CARDIOVASCULAR:  No pressure, squeezing, strangling, tightness, heaviness or aching about the chest, neck, axilla or epigastrium.  RESPIRATORY:  + shortness of breath  GASTROINTESTINAL:  No nausea, vomiting or diarrhea.  GENITOURINARY:  No dysuria, frequency or urgency. No Blood in urine  MUSCULOSKELETAL:  no joint aches, no muscle pain  SKIN:  No change in skin, hair or nails.  NEUROLOGIC:  No Headaches, seizures or weakness.  PSYCHIATRIC:  No disorder of thought or mood.  ENDOCRINE:  No heat or cold intolerance  HEMATOLOGICAL:  No easy bruising or bleeding.   =======================================================  Allergies  No Known Allergies     ======================================================  Physical Exam:  ============  (see section below)    General:  No acute distress.  Eye: Pupils are equal, round and reactive to light, Extraocular movements are intact, Normal conjunctiva.  HENT: Normocephalic, Oral mucosa is moist, No pharyngeal erythema, No sinus tenderness.  Neck: Supple, No lymphadenopathy.  Respiratory:   POOR aeration of the LEFT side to mid lung zone,   Cardiovascular: Normal rate, Regular rhythm,   Gastrointestinal: Soft, Non-tender, Non-distended, Normal bowel sounds.  Genitourinary: No costovertebral angle tenderness.  Lymphatics: No lymphadenopathy neck,   Musculoskeletal: Normal range of motion, Normal strength.  Integumentary: No rash.  Neurologic: Alert, Oriented, No focal deficits, Cranial Nerves II-XII are grossly intact.  Psychiatric: Appropriate mood & affect.    =======================================================           =====================================================  Vitals:  ============        Vital Signs Last 24 Hrs  T(C): 36.2 (12 Feb 2020 23:51), Max: 36.7 (12 Feb 2020 15:47)  T(F): 97.2 (12 Feb 2020 23:51), Max: 98.1 (12 Feb 2020 15:47)  HR: 68 (13 Feb 2020 08:39) (68 - 78)  BP: 111/68 (12 Feb 2020 23:51) (111/68 - 120/71)  BP(mean): --  RR: 18 (12 Feb 2020 23:51) (18 - 18)  SpO2: 99% (13 Feb 2020 08:39) (96% - 99%)  KEFZOL    Other medications:  albuterol/ipratropium for Nebulization 3 milliLiter(s) Nebulizer every 6 hours  ALPRAZolam 0.25 milliGRAM(s) Oral at bedtime  finasteride 5 milliGRAM(s) Oral daily  lidocaine   Patch 1 Patch Transdermal daily  melatonin 3 milliGRAM(s) Oral at bedtime  oxyCODONE    IR 10 milliGRAM(s) Oral every 6 hours  polyethylene glycol 3350 17 Gram(s) Oral at bedtime  predniSONE   Tablet 5 milliGRAM(s) Oral two times a day  tamsulosin 0.4 milliGRAM(s) Oral at bedtime      =======================================================  Labs:                                                  8.9    3.13  )-----------( 287      ( 12 Feb 2020 09:25 )             29.2   02-13    136  |  99  |  12.0  ----------------------------<  87  3.6   |  25.0  |  0.30<L>    Ca    8.0<L>      13 Feb 2020 08:43              Culture - Acid Fast - Body Fluid w/Smear (collected 01-27-20 @ 21:54)  Source: .Body Fluid    Culture - Fungal, Body Fluid (collected 01-27-20 @ 13:05)  Source: .Body Fluid    Culture - Body Fluid with Gram Stain (collected 01-27-20 @ 13:04)  Source: .Body Fluid  Gram Stain (01-27-20 @ 15:34):    Numerous White blood cells    No organisms seen  Final Report (02-01-20 @ 10:17):    Numerous Staphylococcus aureus  Organism: Staphylococcus aureus (02-01-20 @ 10:17)  Organism: Staphylococcus aureus (02-01-20 @ 10:17)    Sensitivities:      -  Ampicillin/Sulbactam: S <=8/4      -  Cefazolin: S <=4      -  Clindamycin: S <=0.5      -  Erythromycin: S <=0.5      -  Gentamicin: S <=4 Should not be used as monotherapy      -  Oxacillin: S <=0.25      -  Penicillin: R >8      -  RIF- Rifampin: S <=1 Should not be used as monotherapy      -  Tetra/Doxy: S <=4      -  Trimethoprim/Sulfamethoxazole: S <=0.5/9.5      -  Vancomycin: S 2      Method Type: KERRI    Culture - Blood (collected 01-26-20 @ 18:25)  Source: .Blood  Final Report (01-31-20 @ 19:01):    No growth at 5 days.    Culture - Blood (collected 01-26-20 @ 18:25)  Source: .Blood  Final Report (01-31-20 @ 19:01):    No growth at 5 days.    Culture - Sputum (collected 01-23-20 @ 09:17)  Source: .Sputum  Gram Stain (01-23-20 @ 14:01):    Moderate WBC's    No organisms seen  Final Report (01-25-20 @ 09:57):    Moderate Routine respiratory teresa present    Culture - Blood (collected 01-21-20 @ 10:00)  Source: .Blood  Final Report (01-26-20 @ 11:00):    No growth at 5 days.    Culture - Blood (collected 01-20-20 @ 10:24)  Source: .Blood  Final Report (01-25-20 @ 11:01):    No growth at 5 days.    Culture - Blood (collected 01-20-20 @ 10:24)  Source: .Blood  Final Report (01-25-20 @ 11:01):    No growth at 5 days.      Creatinine, Serum: 0.32 mg/dL (02-02-20 @ 06:04)  Creatinine, Serum: 0.29 mg/dL (02-01-20 @ 08:35)  Creatinine, Serum: 0.28 mg/dL (01-31-20 @ 08:51)  Creatinine, Serum: 0.34 mg/dL (01-30-20 @ 09:30)  Creatinine, Serum: 0.35 mg/dL (01-29-20 @ 10:20)

## 2020-02-13 NOTE — PROGRESS NOTE ADULT - ASSESSMENT
Bilateral Pleural effusions  L>R CT out, less SOB   which required draining.    patient was initially started On vanco & Zosyn, then Ceftriaxone and Zithromax was added as well.   PNA  Repeat CT scan with "Patent central airways.    Bilateral consolidation and pneumatoceles are reidentified, without significant change from prior examination."  s/p thoracentesis on 1/27/2020; cultures with Staph AUREUS   KEFZOL  FOR MSSA  WILL CONTINUE     Prostate adenocarcinoma with Mets to Lung, Bone and Lymph.  He is on ADT (lupron), Taxotere and Xgeva for (20 Jan 2020 21:22)  Plan is to continue Chemotherapy    Acute on Chronic Cancer Pain  Renewed Oxycodone 10 mg Q6 ATC  Oxycodone IR 10mg Q4 prn has not needed it for weeks  Lidoderm patch to back     Fevers Unknown Origin  PICC LINE  placed yesterday  OVERALL IMPROVED   No fevers today  CONTINUE IV ABX THROUGH 3/10    Insomnia  Related to anxiety regarding cancer diagnosis and persistent complications delaying reinitiating  chemotherapy treatments  Renewed Xanax 0.25 mg HS      PLAN  TED VS HOME-  he is considering TED, to complete IV ABX   Weaning off Continuous Nasal O2 today  Directives: Full Code, Aggressive treatment will be pursued  WILL D/W HOSPITALIST

## 2020-02-13 NOTE — PROGRESS NOTE ADULT - SUBJECTIVE AND OBJECTIVE BOX
INTERVAL HPI/OVERNIGHT EVENTS: 65 yo Male Patient with Metastatic Prostate Cancer to Bone and Lung.  Admitted with Loculated Pleural Effusion, S/P CT placement thoracentesis twice this admission, is on prolonged  antibiotics for + Staph infection. He began spiking fever the day before last, is afebrile today. Midline IV access placed.  Nasal O2 not in use at rest. Will be ambulating with RN to check Pulse Oximetry when increasing his activity.  Voiding without nocturnal frequency.  Appetite remains good, + BM's mood stable. He denies CP, Palpitations N/V/D dysuria voiding Qs.  Mother at bedside. Offers no complaints    64y old  Male who presents with a chief complaint of Dyspnea secondary to Large located effusion and suspected recurrent Bilateral PNA (13 Feb 2020 14:31)    Present Symptoms:     Dyspnea: 0 1 at rest  Nausea/Vomiting: No  Anxiety:  Yes   Depression:  No  Fatigue: Yes   Loss of appetite:  No    Pain: Pain intensity mild to moderate on present analgesic plan          Oxycodone 10mg IR Q6h ATC and prn            Character-            Duration-            Effect-            Factors-            Frequency-            Location-            Severity-    Review of Systems: Reviewed                    All others negative    MEDICATIONS  (STANDING):  albuterol/ipratropium for Nebulization 3 milliLiter(s) Nebulizer every 6 hours  ALPRAZolam 0.25 milliGRAM(s) Oral at bedtime  ceFAZolin   IVPB 2000 milliGRAM(s) IV Intermittent every 8 hours  enoxaparin Injectable 40 milliGRAM(s) SubCutaneous daily  finasteride 5 milliGRAM(s) Oral daily  lidocaine   Patch 1 Patch Transdermal daily  lidocaine   Patch 1 Patch Transdermal daily  melatonin 3 milliGRAM(s) Oral at bedtime  oxyCODONE    IR 10 milliGRAM(s) Oral every 6 hours  oxyCODONE    IR 10 milliGRAM(s) Oral every 4 hours  polyethylene glycol 3350 17 Gram(s) Oral at bedtime  predniSONE   Tablet 5 milliGRAM(s) Oral two times a day  tamsulosin 0.4 milliGRAM(s) Oral daily    MEDICATIONS  (PRN):  acetaminophen   Tablet .. 650 milliGRAM(s) Oral every 6 hours PRN Temp greater or equal to 38C (100.4F), Mild Pain (1 - 3)  lactulose Syrup 10 Gram(s) Oral two times a day PRN Constipation      PHYSICAL EXAM:    Vital Signs Last 24 Hrs  T(C): 36.2 (12 Feb 2020 23:51), Max: 36.7 (12 Feb 2020 15:47)  T(F): 97.2 (12 Feb 2020 23:51), Max: 98.1 (12 Feb 2020 15:47)  HR: 70 (13 Feb 2020 15:39) (68 - 78)  BP: 111/68 (12 Feb 2020 23:51) (111/68 - 120/71)  BP(mean): --  RR: 18 (12 Feb 2020 23:51) (18 - 18)  SpO2: 99% (13 Feb 2020 15:39) (96% - 99%)    General: alert  oriented x 3____                 well nourished    HEENT:   dry mouth      Lungs: comfortable     CV: normal      GI:  distended  tender               constipation  last BM: regular bowel movements    : voiding    MSK:   weakness  edema             ambulatory     Skin: normal  _  no rash    LABS:                        8.9    3.13  )-----------( 287      ( 12 Feb 2020 09:25 )             29.2     02-13    136  |  99  |  12.0  ----------------------------<  87  3.6   |  25.0  |  0.30<L>    Ca    8.0<L>      13 Feb 2020 08:43    I&O's Summary    12 Feb 2020 07:01  -  13 Feb 2020 07:00  --------------------------------------------------------  IN: 0 mL / OUT: 700 mL / NET: -700 mL    RADIOLOGY & ADDITIONAL STUDIES:    ADVANCE DIRECTIVES:   DNR  NO  Completed on:                     MOLST   NO   Completed on:  Living Will   NO   Completed on:

## 2020-02-13 NOTE — PROGRESS NOTE ADULT - ASSESSMENT
This 64yoM; with pmh signif for Prostate Ca with mets to ribs diagnosed in 11/2019 (currently on chemo), HTN; now p/w cough, sob, fever x1 week, progressively worsening.  denies sick contacts. reports traveling from [place] after diagnosis of prostate ca complicated by b/l ptx in november and another hospitalization in december. denies cp/palp. denies abd pain: SOCIAL: No tobacco/illicit substance use/social/EtOH  patient reports having a history of Bilateral Pneumonia approx 1.5 months prior with associated bilateral Pleural effusions which required draining. 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 (20 Jan 2020 21:22)  patient was initially started On vanco & Zosyn, then Ceftriaxone and Zithromax was added as well.   Impression:  PNA  lrepeat CT scan with "Patent central airways.  Bilateral consolidation and pneumatoceles are reidentified, without significant change from prior examination."  s/p thoracentesis on 1/27/2020; cultures with Staph AUREUS  -   on KEFZOL  FOR MSSA  WILL CONTINUE  CT SURGERY   REEVALUATION NOTED      OVERALL IMPROVED   CONTINUE IV ABX THROUGH 3/10  PICC LINE     IN PLACE  TED VS HOME   WILL D/W HOSPITALIST

## 2020-02-14 DIAGNOSIS — Z71.89 OTHER SPECIFIED COUNSELING: ICD-10-CM

## 2020-02-14 PROCEDURE — 99232 SBSQ HOSP IP/OBS MODERATE 35: CPT

## 2020-02-14 RX ADMIN — Medication 3 MILLILITER(S): at 20:24

## 2020-02-14 RX ADMIN — Medication 5 MILLIGRAM(S): at 05:30

## 2020-02-14 RX ADMIN — Medication 3 MILLIGRAM(S): at 23:56

## 2020-02-14 RX ADMIN — Medication 3 MILLILITER(S): at 15:00

## 2020-02-14 RX ADMIN — Medication 100 MILLIGRAM(S): at 17:20

## 2020-02-14 RX ADMIN — ENOXAPARIN SODIUM 40 MILLIGRAM(S): 100 INJECTION SUBCUTANEOUS at 10:29

## 2020-02-14 RX ADMIN — Medication 5 MILLIGRAM(S): at 17:20

## 2020-02-14 RX ADMIN — OXYCODONE HYDROCHLORIDE 10 MILLIGRAM(S): 5 TABLET ORAL at 23:56

## 2020-02-14 RX ADMIN — OXYCODONE HYDROCHLORIDE 10 MILLIGRAM(S): 5 TABLET ORAL at 17:20

## 2020-02-14 RX ADMIN — OXYCODONE HYDROCHLORIDE 10 MILLIGRAM(S): 5 TABLET ORAL at 13:07

## 2020-02-14 RX ADMIN — Medication 100 MILLIGRAM(S): at 02:29

## 2020-02-14 RX ADMIN — Medication 3 MILLILITER(S): at 09:29

## 2020-02-14 RX ADMIN — TAMSULOSIN HYDROCHLORIDE 0.4 MILLIGRAM(S): 0.4 CAPSULE ORAL at 10:29

## 2020-02-14 RX ADMIN — OXYCODONE HYDROCHLORIDE 10 MILLIGRAM(S): 5 TABLET ORAL at 18:58

## 2020-02-14 RX ADMIN — OXYCODONE HYDROCHLORIDE 10 MILLIGRAM(S): 5 TABLET ORAL at 11:30

## 2020-02-14 RX ADMIN — Medication 100 MILLIGRAM(S): at 10:24

## 2020-02-14 RX ADMIN — OXYCODONE HYDROCHLORIDE 10 MILLIGRAM(S): 5 TABLET ORAL at 06:00

## 2020-02-14 RX ADMIN — OXYCODONE HYDROCHLORIDE 10 MILLIGRAM(S): 5 TABLET ORAL at 05:31

## 2020-02-14 RX ADMIN — FINASTERIDE 5 MILLIGRAM(S): 5 TABLET, FILM COATED ORAL at 10:29

## 2020-02-14 NOTE — PROGRESS NOTE ADULT - SUBJECTIVE AND OBJECTIVE BOX
· Subjective and Objective: 	  CC: pleural effusion     INTERVAL HPI/OVERNIGHT EVENTS: seen and examined.   O2 Sat remains stable at rest w/o O2, but desaturated with ambulation yesterday , did not qualify for home oxygen .  Encouraging Patient to build up his stamina and  ambulate several times today-we met in hallway while he  was walking; speed OK not SOB at this time. He may be be able to  wean entirely off as tolerated.  Setting up services at home for long term IV Antibiotics.     64y old  Male who presents with a chief complaint of Dyspnea secondary to Large located effusion and suspected recurrent Bilateral PNA (14 Feb 2020 11:33)    Present Symptoms:     Dyspnea:  1-2on exertion    Nausea/Vomiting:  No  Anxiety:  No  Depression:  No  Fatigue: Yes   Loss of appetite:  No    Pain: L flank thoracic cavity and back            Character-            Duration-            Effect-            Factors-            Frequency-            Location-            Severity-mild to moderate depending on activity level    Review of Systems: Reviewed                      All others negative    MEDICATIONS  (STANDING):  albuterol/ipratropium for Nebulization 3 milliLiter(s) Nebulizer every 6 hours  ALPRAZolam 0.25 milliGRAM(s) Oral at bedtime  ceFAZolin   IVPB 2000 milliGRAM(s) IV Intermittent every 8 hours  enoxaparin Injectable 40 milliGRAM(s) SubCutaneous daily  finasteride 5 milliGRAM(s) Oral daily  lidocaine   Patch 1 Patch Transdermal daily  lidocaine   Patch 1 Patch Transdermal daily  melatonin 3 milliGRAM(s) Oral at bedtime  oxyCODONE    IR 10 milliGRAM(s) Oral every 6 hours  polyethylene glycol 3350 17 Gram(s) Oral at bedtime  predniSONE   Tablet 5 milliGRAM(s) Oral two times a day  tamsulosin 0.4 milliGRAM(s) Oral daily    MEDICATIONS  (PRN):  acetaminophen   Tablet .. 650 milliGRAM(s) Oral every 6 hours PRN Temp greater or equal to 38C (100.4F), Mild Pain (1 - 3)  lactulose Syrup 10 Gram(s) Oral two times a day PRN Constipation  oxyCODONE    IR 10 milliGRAM(s) Oral every 4 hours PRN Breakthrough Pain      PHYSICAL EXAM:    Vital Signs Last 24 Hrs  T(C): 36.1 (14 Feb 2020 08:30), Max: 36.3 (13 Feb 2020 16:22)  T(F): 97 (14 Feb 2020 08:30), Max: 97.4 (13 Feb 2020 16:22)  HR: 69 (14 Feb 2020 09:31) (68 - 82)  BP: 125/73 (14 Feb 2020 08:30) (112/62 - 125/73)  BP(mean): --  RR: 18 (14 Feb 2020 08:30) (18 - 18)  SpO2: 91% (14 Feb 2020 14:43) (91% - 99%)    General: alert  oriented x _3___                  thin conversational, looks well    HEENT: normal      Lungs: comfortable     CV: normal     GI: normal  distended               : voiding    MSK:  weakness  tires easily edema             ambulatory      Skin: normal  _  no rash    LABS:    02-13    136  |  99  |  12.0  ----------------------------<  87  3.6   |  25.0  |  0.30<L>    Ca    8.0<L>      13 Feb 2020 08:43    I&O's Summary    RADIOLOGY & ADDITIONAL STUDIES:    ADVANCE DIRECTIVES:  I  introduced the MOLST Directive today, explained the rationale to have it completed.  Discussed DNR does not mean" Do not Treat" Encouraged him to read it at home, discuss with his sister (HCP)  so she knows how much medical intervention he would want at end of life.  His goal is to o\continue Oncologic treatment when he returns home.    DNR  NO  Completed on:                     MOLST  NO   Completed on:  Living Will  NO   Completed on:    [facility] [phone].

## 2020-02-14 NOTE — PROGRESS NOTE ADULT - NSHPATTENDINGPLANDISCUSS_GEN_ALL_CORE
medical team
the patient.  All imaging and results of lab/other studies reviewed by me. All questions answered to their satisfaction. At this time they agree with the current plan of therapy.
the patient.  All imaging and results of lab/other studies reviewed by me. All questions answered to their satisfaction. At this time they agree with the current plan of therapy.
Dr. Lanier
Patient
RN
pt
pt, rn
pt, rn
pt, rn, ID
pt, rn, ID
pt, rn, ID, hemonc
the patient.  All imaging and results of lab/other studies reviewed by me. All questions answered to their satisfaction. At this time they agree with the current plan of therapy.
Patient
pt, rn, ID
the patient.  All imaging and results of lab/other studies reviewed by me. All questions answered to their satisfaction. At this time they agree with the current plan of therapy.

## 2020-02-14 NOTE — PROGRESS NOTE ADULT - SUBJECTIVE AND OBJECTIVE BOX
[facility]  INFECTIOUS DISEASES AND INTERNAL MEDICINE at [place]  =======================================================  Chandler Mccrary MD  Diplomates American Board of Internal Medicine and Infectious Diseases  Telephone [phone]  Fax            [phone]  =======================================================    [facility]-[id number]  [place]   follow up:  Pneumonia    no new issues   FEELS OK       afebrile non toxic      =======================================================  REVIEW OF SYSTEMS:  CONSTITUTIONAL:  no fevers  HEENT:  No diplopia or blurred vision.  No earache, sore throat or runny nose.  CARDIOVASCULAR:  No pressure, squeezing, strangling, tightness, heaviness or aching about the chest, neck, axilla or epigastrium.  RESPIRATORY:  + shortness of breath  GASTROINTESTINAL:  No nausea, vomiting or diarrhea.  GENITOURINARY:  No dysuria, frequency or urgency. No Blood in urine  MUSCULOSKELETAL:  no joint aches, no muscle pain  SKIN:  No change in skin, hair or nails.  NEUROLOGIC:  No Headaches, seizures or weakness.  PSYCHIATRIC:  No disorder of thought or mood.  ENDOCRINE:  No heat or cold intolerance  HEMATOLOGICAL:  No easy bruising or bleeding.   =======================================================  Allergies  No Known Allergies     ======================================================  Physical Exam:  ============  (see section below)    General:  No acute distress.  Eye: Pupils are equal, round and reactive to light, Extraocular movements are intact, Normal conjunctiva.  HENT: Normocephalic, Oral mucosa is moist, No pharyngeal erythema, No sinus tenderness.  Neck: Supple, No lymphadenopathy.  Respiratory:   POOR aeration of the LEFT side to mid lung zone,   Cardiovascular: Normal rate, Regular rhythm,   Gastrointestinal: Soft, Non-tender, Non-distended, Normal bowel sounds.  Genitourinary: No costovertebral angle tenderness.  Lymphatics: No lymphadenopathy neck,   Musculoskeletal: Normal range of motion, Normal strength.  Integumentary: No rash.  Neurologic: Alert, Oriented, No focal deficits, Cranial Nerves II-XII are grossly intact.  Psychiatric: Appropriate mood & affect.    =======================================================           =====================================================  Vitals:  ============         Vital Signs Last 24 Hrs  T(C): 36.1 (14 Feb 2020 08:30), Max: 36.3 (13 Feb 2020 16:22)  T(F): 97 (14 Feb 2020 08:30), Max: 97.4 (13 Feb 2020 16:22)  HR: 69 (14 Feb 2020 09:31) (68 - 82)  BP: 125/73 (14 Feb 2020 08:30) (112/62 - 125/73)  BP(mean): --  RR: 18 (14 Feb 2020 08:30) (18 - 18)  SpO2: 94% (14 Feb 2020 09:31) (93% - 99%)  KEFZOL    Other medications:  albuterol/ipratropium for Nebulization 3 milliLiter(s) Nebulizer every 6 hours  ALPRAZolam 0.25 milliGRAM(s) Oral at bedtime  finasteride 5 milliGRAM(s) Oral daily  lidocaine   Patch 1 Patch Transdermal daily  melatonin 3 milliGRAM(s) Oral at bedtime  oxyCODONE    IR 10 milliGRAM(s) Oral every 6 hours  polyethylene glycol 3350 17 Gram(s) Oral at bedtime  predniSONE   Tablet 5 milliGRAM(s) Oral two times a day  tamsulosin 0.4 milliGRAM(s) Oral at bedtime      =======================================================  Labs:                                   Culture - Acid Fast - Body Fluid w/Smear (collected 01-27-20 @ 21:54)  Source: .Body Fluid    Culture - Fungal, Body Fluid (collected 01-27-20 @ 13:05)  Source: .Body Fluid    Culture - Body Fluid with Gram Stain (collected 01-27-20 @ 13:04)  Source: .Body Fluid  Gram Stain (01-27-20 @ 15:34):    Numerous White blood cells    No organisms seen  Final Report (02-01-20 @ 10:17):    Numerous Staphylococcus aureus  Organism: Staphylococcus aureus (02-01-20 @ 10:17)  Organism: Staphylococcus aureus (02-01-20 @ 10:17)    Sensitivities:      -  Ampicillin/Sulbactam: S <=8/4      -  Cefazolin: S <=4      -  Clindamycin: S <=0.5      -  Erythromycin: S <=0.5      -  Gentamicin: S <=4 Should not be used as monotherapy      -  Oxacillin: S <=0.25      -  Penicillin: R >8      -  RIF- Rifampin: S <=1 Should not be used as monotherapy      -  Tetra/Doxy: S <=4      -  Trimethoprim/Sulfamethoxazole: S <=0.5/9.5      -  Vancomycin: S 2      Method Type: KERRI    Culture - Blood (collected 01-26-20 @ 18:25)  Source: .Blood  Final Report (01-31-20 @ 19:01):    No growth at 5 days.    Culture - Blood (collected 01-26-20 @ 18:25)  Source: .Blood  Final Report (01-31-20 @ 19:01):    No growth at 5 days.    Culture - Sputum (collected 01-23-20 @ 09:17)  Source: .Sputum  Gram Stain (01-23-20 @ 14:01):    Moderate WBC's    No organisms seen  Final Report (01-25-20 @ 09:57):    Moderate Routine respiratory teresa present    Culture - Blood (collected 01-21-20 @ 10:00)  Source: .Blood  Final Report (01-26-20 @ 11:00):    No growth at 5 days.    Culture - Blood (collected 01-20-20 @ 10:24)  Source: .Blood  Final Report (01-25-20 @ 11:01):    No growth at 5 days.    Culture - Blood (collected 01-20-20 @ 10:24)  Source: .Blood  Final Report (01-25-20 @ 11:01):    No growth at 5 days.      Creatinine, Serum: 0.32 mg/dL (02-02-20 @ 06:04)  Creatinine, Serum: 0.29 mg/dL (02-01-20 @ 08:35)  Creatinine, Serum: 0.28 mg/dL (01-31-20 @ 08:51)  Creatinine, Serum: 0.34 mg/dL (01-30-20 @ 09:30)  Creatinine, Serum: 0.35 mg/dL (01-29-20 @ 10:20)

## 2020-02-14 NOTE — PROGRESS NOTE ADULT - ASSESSMENT
This 64yoM; with pmh signif for Prostate Ca with mets to ribs diagnosed in 11/2019 (currently on chemo), HTN; now p/w cough, sob, fever x1 week, progressively worsening.  denies sick contacts. reports traveling from [place] after diagnosis of prostate ca complicated by b/l ptx in november and another hospitalization in december. denies cp/palp. denies abd pain: SOCIAL: No tobacco/illicit substance use/social/EtOH  patient reports having a history of Bilateral Pneumonia approx 1.5 months prior with associated bilateral Pleural effusions which required draining. 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 (20 Jan 2020 21:22)  patient was initially started On vanco & Zosyn, then Ceftriaxone and Zithromax was added as well.   Impression:  PNA  lrepeat CT scan with "Patent central airways.  Bilateral consolidation and pneumatoceles are reidentified, without significant change from prior examination."  s/p thoracentesis on 1/27/2020; cultures with Staph AUREUS  -   on KEFZOL  FOR MSSA  WILL CONTINUE  CT SURGERY   REEVALUATION NOTED    OVERALL IMPROVED   CONTINUE IV ABX THROUGH 3/10  PICC LINE  left arm    PT WANT TO G HOME AWAITING ARRANGEMENTS OF IV AX  TED VS HOME   WILL  FOLLOW UP IN OFFICE IF DISCHARGED TODAY

## 2020-02-14 NOTE — PROGRESS NOTE ADULT - ATTENDING COMMENTS
COUNSELING:    Face to face meeting to discuss Advanced Care Planning - Time Spent ______ Minutes.  See goals of care note.    More than 50% time spent in counseling and coordinating care. _25_____ Minutes.     Thank you for the opportunity to assist with the care of this patient.   [facility] [phone].
COUNSELING:    Face to face meeting to discuss Advanced Care Planning - Time Spent ______ Minutes.  See goals of care note.    More than 50% time spent in counseling and coordinating care. __15____ Minutes.     Thank you for the opportunity to assist with the care of this patient.   [facility] [phone].
COUNSELING:    Face to face meeting to discuss Advanced Care Planning - Time Spent ______ Minutes.  See goals of care note.    More than 50% time spent in counseling and coordinating care. __20____ Minutes.     Thank you for the opportunity to assist with the care of this patient.   [facility] [phone].
COUNSELING:    Face to face meeting to discuss Advanced Care Planning - Time Spent ______ Minutes.  See goals of care note.    More than 50% time spent in counseling and coordinating care. __20____ Minutes.     Thank you for the opportunity to assist with the care of this patient.   [facility] [phone].
COUNSELING:    Face to face meeting to discuss Advanced Care Planning - Time Spent ______ Minutes.  See goals of care note.    More than 50% time spent in counseling and coordinating care. __20____ Minutes.     Thank you for the opportunity to assist with the care of this patient.   [facility] [phone].
COUNSELING:    Face to face meeting to discuss Advanced Care Planning - Time Spent ______ Minutes.  See goals of care note.    More than 50% time spent in counseling and coordinating care. __30___ Minutes.     Thank you for the opportunity to assist with the care of this patient.   [facility] [phone].
COUNSELING:    Face to face meeting to discuss Advanced Care Planning - Time Spent ______ Minutes.  See goals of care note.    More than 50% time spent in counseling and coordinating care. __30____ Minutes.     Thank you for the opportunity to assist with the care of this patient.   [facility] [phone].
COUNSELING:    Face to face meeting to discuss Advanced Care Planning - Time Spent ______ Minutes.  See goals of care note.    More than 50% time spent in counseling and coordinating care. ___15___ Minutes.     Thank you for the opportunity to assist with the care of this patient.   [facility] [phone].
COUNSELING:    Face to face meeting to discuss Advanced Care Planning - Time Spent ______ Minutes.  See goals of care note.    More than 50% time spent in counseling and coordinating care. ___20_ Minutes.     Thank you for the opportunity to assist with the care of this patient.   [facility] [phone].
COUNSELING:    Face to face meeting to discuss Advanced Care Planning - Time Spent ______ Minutes.  See goals of care note.    More than 50% time spent in counseling and coordinating care. ___20___ Minutes.     Thank you for the opportunity to assist with the care of this patient.     Palliative NP signing off Patient Tariq's case, as his discharged is xiomara planned for Monday. Please reconsult if condition or goals change
also  discussed w/ family @ bedside
patient seen and examined. CT chest reviewed. CT chest shows loculated left pleural effusion. There appears to be two distinct areas in the chest. One collection is anterior and near the lingula and the other collection in closer towards the base of the diaphragm.  There are bilateral pneumatocele present right greater than left.     Patient states that he has had bilateral chest procedures does while he was living in [place]. From what he describes, he had VATS surgery with some sort of pleurodesis. I do not see any staple lines to indicate a wedge resection was performed. He does have multiple incisions on the left chest.   The plan will be to place a pigtail catheter in the lower collection once INR has normalized to see if we can get that lung expanded. He feels comfortable on 2 liters nasal cannula.     Will cont to follow along with you, thank you for the consult.
COUNSELING:    Face to face meeting to discuss Advanced Care Planning - Time Spent ______ Minutes.  See goals of care note.    More than 50% time spent in counseling and coordinating care. __15____ Minutes.     Thank you for the opportunity to assist with the care of this patient.   [facility] [phone].
also  discussed w/ family @ bedside

## 2020-02-14 NOTE — PROGRESS NOTE ADULT - ASSESSMENT
The patient is a 64 year old male with prostate adenocarcinoma with mets to the  Bone, Lung and Lymp on ADT (Lupron), Taxotere, Xgeva therapy who presented to the Er with complaints of worsening shortness of breath. CTA of the chest was negative for PE,  Diffuse bilateral areas of pulmonary consolidation with pneumatocele formation and Large loculated left pleural effusions with compressive atelectasis of the left lower lobe. Status post thoracentesis by IR on 1/27 with removal of 1200cc of fluid. Pleural fluid was consistent with exudative fluid; cultures positive for MSSA. Started on IV antibiotics, ID consulted.  Repeat CT chest with minimal improvement in loculation. CT surgery and IR were reconsulted. Status post IR chest tube placement on 2/5 Antibiotics changed to IV cefazolin to cover MSSA. chest tube has been removed , patient was not a good candidate for decortication . as per ID picc LINE WAS INSERTED , PATIENT WILL NEED PROLONGED COURSE OF ANTIBIOTICS FOR EMPYEMA .patient has refused TED placement , will be discharged home after home antibiotic education , also patient is currently being weaned from oxygen , he desaturates with ambulation , did not qualify for home oxygen as per CM.         1. Left sided pleural effusion/empyema  infectious in origin   exudative , cultures positive for MSSA.  ID on board , maintained on cefazolin .   chest tube removed,   plan is for prolonged course of antibiotics IV   had some low grade fever , resolved without any changes .  PICC line in place     2. Prostate adeno CA with metastatic disease   on hormone therapy and pain control.  continue with current regimen .  on prednisone  .   will follow up outpatient     3. BPH   flomax and finasteride .    4. Anemia   AOCD , FOBT negative , anemia panel not suggestive of iron deficiency .  monitor     5. DVT prophylaxis  on lovenox.     details discussed with patient , encouraged ambulation , discussed with nurse to wean oxygen as tolerate .  monitor sats with ambulation . also discussed with CM , sister will be assisting with home antibiotics

## 2020-02-14 NOTE — PROGRESS NOTE ADULT - ASSESSMENT
65 yo M with Advanced Metastatic Prostate Cancer to Lung bone and Lymph. He developed a loculated L pleural effusion which was drained two seperate occasions to Pleurovac suction. His L Lung has diminshed BS but he has not become SOB since CT removed.He had a few fever spikes that have not reocurred past few days    1. Left sided pleural effusion/empyema  infectious in origin   exudative , cultures positive for MSSA.  ID on board , maintained on cefazolin .   chest tube removed,   plan is for prolonged course of antibiotics IV   had some low grade fever , resolved without any changes .  PICC line in place     2. Acute Cancer Pain  Going home on same analgesic plan he is stable on here in Hospital  Oxycodone 10 mg  IR Q^ ATC not needing breakthrough med  Lidoderm patch for topical relief    3. BPH   Oncology is planning on planting a seed in Prostate  Continue Flomax to be given after breakfast avoiding frequency at night with sleep disturbance   and Finasteride .      4. Prostate adeno CA with metastatic disease   on hormone therapy and pain control.  continue with current regimen .  on prednisone  .   will follow up outpatient       5. Anxiety related to Illness and prolonged hospitalization  Using Xanax 0.25 mg at night to help with sleep  He had been using O2 at night-plan to go home with O2 still undecided  We talked for about 10 minutes today about hospitalization, to be encouraged by his  progress here and ability to ambulate longer distances withought becoming SOB    6. Goals of Care  Discussed Molst-gave him a copy encouraged him to discuss with his sister who he has named as his HCP.  Left him in a positive mood, to continue working with Oncology to fight disease as long as possible.

## 2020-02-14 NOTE — PROGRESS NOTE ADULT - SUBJECTIVE AND OBJECTIVE BOX
CC: pleural effusion     INTERVAL HPI/OVERNIGHT EVENTS: seen and examined , desaturated with ambulation yesterday , did not qualify for home oxygen .  will encourage ambulation today and wean oxygen off as tolerated , details discussed with sister for home antibiotic education           Vital Signs Last 24 Hrs  T(C): 36.1 (14 Feb 2020 08:30), Max: 36.3 (13 Feb 2020 16:22)  T(F): 97 (14 Feb 2020 08:30), Max: 97.4 (13 Feb 2020 16:22)  HR: 69 (14 Feb 2020 09:31) (68 - 82)  BP: 125/73 (14 Feb 2020 08:30) (112/62 - 125/73)  BP(mean): --  RR: 18 (14 Feb 2020 08:30) (18 - 18)  SpO2: 94% (14 Feb 2020 09:31) (93% - 99%)    PHYSICAL EXAM:    GENERAL: NAD, resting comfortable in bed   CHEST/LUNG: CTAB , no wheezing , rales, rhonchi heard   HEART: S1S2+, Regular rate and rhythm; No murmurs, rubs, or gallops  ABDOMEN: Soft, Nontender, Nondistended; Bowel sounds present  EXTREMITIES:  no pitting edema , pulses palpated BL.        LABS:    02-13    136  |  99  |  12.0  ----------------------------<  87  3.6   |  25.0  |  0.30<L>    Ca    8.0<L>      13 Feb 2020 08:43              MEDICATIONS  (STANDING):  albuterol/ipratropium for Nebulization 3 milliLiter(s) Nebulizer every 6 hours  ALPRAZolam 0.25 milliGRAM(s) Oral at bedtime  ceFAZolin   IVPB 2000 milliGRAM(s) IV Intermittent every 8 hours  enoxaparin Injectable 40 milliGRAM(s) SubCutaneous daily  finasteride 5 milliGRAM(s) Oral daily  lidocaine   Patch 1 Patch Transdermal daily  lidocaine   Patch 1 Patch Transdermal daily  melatonin 3 milliGRAM(s) Oral at bedtime  oxyCODONE    IR 10 milliGRAM(s) Oral every 6 hours  polyethylene glycol 3350 17 Gram(s) Oral at bedtime  predniSONE   Tablet 5 milliGRAM(s) Oral two times a day  tamsulosin 0.4 milliGRAM(s) Oral daily    MEDICATIONS  (PRN):  acetaminophen   Tablet .. 650 milliGRAM(s) Oral every 6 hours PRN Temp greater or equal to 38C (100.4F), Mild Pain (1 - 3)  lactulose Syrup 10 Gram(s) Oral two times a day PRN Constipation  oxyCODONE    IR 10 milliGRAM(s) Oral every 4 hours PRN Breakthrough Pain      RADIOLOGY & ADDITIONAL TESTS:

## 2020-02-15 PROCEDURE — 99232 SBSQ HOSP IP/OBS MODERATE 35: CPT

## 2020-02-15 RX ADMIN — OXYCODONE HYDROCHLORIDE 10 MILLIGRAM(S): 5 TABLET ORAL at 12:10

## 2020-02-15 RX ADMIN — Medication 3 MILLILITER(S): at 09:11

## 2020-02-15 RX ADMIN — Medication 3 MILLILITER(S): at 15:33

## 2020-02-15 RX ADMIN — POLYETHYLENE GLYCOL 3350 17 GRAM(S): 17 POWDER, FOR SOLUTION ORAL at 21:55

## 2020-02-15 RX ADMIN — OXYCODONE HYDROCHLORIDE 10 MILLIGRAM(S): 5 TABLET ORAL at 00:45

## 2020-02-15 RX ADMIN — Medication 100 MILLIGRAM(S): at 17:29

## 2020-02-15 RX ADMIN — OXYCODONE HYDROCHLORIDE 10 MILLIGRAM(S): 5 TABLET ORAL at 11:39

## 2020-02-15 RX ADMIN — Medication 3 MILLILITER(S): at 21:38

## 2020-02-15 RX ADMIN — OXYCODONE HYDROCHLORIDE 10 MILLIGRAM(S): 5 TABLET ORAL at 05:19

## 2020-02-15 RX ADMIN — Medication 5 MILLIGRAM(S): at 05:19

## 2020-02-15 RX ADMIN — OXYCODONE HYDROCHLORIDE 10 MILLIGRAM(S): 5 TABLET ORAL at 17:29

## 2020-02-15 RX ADMIN — Medication 100 MILLIGRAM(S): at 09:17

## 2020-02-15 RX ADMIN — OXYCODONE HYDROCHLORIDE 10 MILLIGRAM(S): 5 TABLET ORAL at 18:00

## 2020-02-15 RX ADMIN — Medication 100 MILLIGRAM(S): at 02:15

## 2020-02-15 RX ADMIN — Medication 5 MILLIGRAM(S): at 17:29

## 2020-02-15 RX ADMIN — OXYCODONE HYDROCHLORIDE 10 MILLIGRAM(S): 5 TABLET ORAL at 23:46

## 2020-02-15 RX ADMIN — FINASTERIDE 5 MILLIGRAM(S): 5 TABLET, FILM COATED ORAL at 09:18

## 2020-02-15 RX ADMIN — Medication 3 MILLIGRAM(S): at 23:45

## 2020-02-15 RX ADMIN — OXYCODONE HYDROCHLORIDE 10 MILLIGRAM(S): 5 TABLET ORAL at 06:19

## 2020-02-15 RX ADMIN — TAMSULOSIN HYDROCHLORIDE 0.4 MILLIGRAM(S): 0.4 CAPSULE ORAL at 11:39

## 2020-02-15 NOTE — PROGRESS NOTE ADULT - SUBJECTIVE AND OBJECTIVE BOX
Patient: SENTHIL SAVAGE [id number] 64y Male                           Internal Medicine Hospitalist Progress Note    Interval History:  No complaints.  SOB improving.  Off O2 at rest.  Reports some dyspnea with ambulation.      ____________________PHYSICAL EXAM:  GENERAL:  NAD Alert and Oriented x 3   HEENT: NCAT  CARDIOVASCULAR:  S1, S2  LUNGS: coarse BS b/l  ABDOMEN:  soft, (-) tenderness, (-) distension, (+) bowel sounds, (-) guarding, (-) rebound (-) rigidity  EXTREMITIES:  no cyanosis / clubbing / edema.   ____________________     VITALS:  Vital Signs Last 24 Hrs  T(C): 36.4 (15 Feb 2020 08:17), Max: 36.4 (15 Feb 2020 08:17)  T(F): 97.6 (15 Feb 2020 08:17), Max: 97.6 (15 Feb 2020 08:17)  HR: 82 (15 Feb 2020 09:11) (66 - 86)  BP: 132/88 (15 Feb 2020 08:17) (118/68 - 132/88)  BP(mean): --  RR: 18 (15 Feb 2020 08:17) (18 - 18)  SpO2: 96% (15 Feb 2020 08:17) (91% - 97%) Daily     Daily   CAPILLARY BLOOD GLUCOSE        I&O's Summary        BACKGROUND:  HEALTH ISSUES - PROBLEM Dx:  Pneumothorax, unspecified type: Pneumothorax, unspecified type  Prostate cancer metastatic to multiple sites: Prostate cancer metastatic to multiple sites  Pleural effusion: Pleural effusion  Counseling regarding goals of care: Counseling regarding goals of care  Pain from bone metastases: Pain from bone metastases  Loculated pneumothorax of lateral aspect of left lung: Loculated pneumothorax of lateral aspect of left lung  Loculated pleural effusion: Loculated pleural effusion  Malignant neoplasm of prostate metastatic to bone: Malignant neoplasm of prostate metastatic to bone  Pneumonia of both lower lobes due to infectious organism: Pneumonia of both lower lobes due to infectious organism  BPH (benign prostatic hyperplasia): BPH (benign prostatic hyperplasia)  Bilateral pneumonia: Bilateral pneumonia  Prostate CA: Prostate CA  Pleural effusion on left: Pleural effusion on left        Allergies    No Known Allergies    Intolerances      PAST MEDICAL & SURGICAL HISTORY:  Prostate CA: Adenocarcinoma with mets to Lung, Bone, and Lymph  No significant past surgical history        LABS:                        MEDICATIONS:  MEDICATIONS  (STANDING):  albuterol/ipratropium for Nebulization 3 milliLiter(s) Nebulizer every 6 hours  ALPRAZolam 0.25 milliGRAM(s) Oral at bedtime  ceFAZolin   IVPB 2000 milliGRAM(s) IV Intermittent every 8 hours  enoxaparin Injectable 40 milliGRAM(s) SubCutaneous daily  finasteride 5 milliGRAM(s) Oral daily  lidocaine   Patch 1 Patch Transdermal daily  lidocaine   Patch 1 Patch Transdermal daily  melatonin 3 milliGRAM(s) Oral at bedtime  oxyCODONE    IR 10 milliGRAM(s) Oral every 6 hours  polyethylene glycol 3350 17 Gram(s) Oral at bedtime  predniSONE   Tablet 5 milliGRAM(s) Oral two times a day  tamsulosin 0.4 milliGRAM(s) Oral daily    MEDICATIONS  (PRN):  acetaminophen   Tablet .. 650 milliGRAM(s) Oral every 6 hours PRN Temp greater or equal to 38C (100.4F), Mild Pain (1 - 3)  lactulose Syrup 10 Gram(s) Oral two times a day PRN Constipation  oxyCODONE    IR 10 milliGRAM(s) Oral every 4 hours PRN Breakthrough Pain

## 2020-02-15 NOTE — PROGRESS NOTE ADULT - ASSESSMENT
The patient is a 64 year old male with prostate adenocarcinoma with mets to the  Bone, Lung and Lymp on ADT (Lupron), Taxotere, Xgeva therapy who presented to the Er with complaints of worsening shortness of breath. CTA of the chest was negative for PE,  Diffuse bilateral areas of pulmonary consolidation with pneumatocele formation and Large loculated left pleural effusions with compressive atelectasis of the left lower lobe. Status post thoracentesis by IR on 1/27 with removal of 1200cc of fluid. Pleural fluid was consistent with exudative fluid; cultures positive for MSSA. Started on IV antibiotics, ID consulted.  Repeat CT chest with minimal improvement in loculation. CT surgery and IR were reconsulted. Status post IR chest tube placement on 2/5 Antibiotics changed to IV cefazolin to cover MSSA. chest tube has been removed , patient was not a good candidate for decortication . as per ID picc LINE WAS INSERTED , PATIENT WILL NEED PROLONGED COURSE OF ANTIBIOTICS FOR EMPYEMA .patient has refused TED placement , will be discharged home after home antibiotic education , also patient is currently being weaned from oxygen , he desaturates with ambulation , did not qualify for home oxygen as per CM.       1. Left sided pleural effusion/empyema  infectious in origin   exudative , cultures positive for MSSA.  ID on board , maintained on cefazolin .   chest tube removed,   plan is for prolonged course of antibiotics IV   had some low grade fever , resolved without any changes .  PICC line in place     2. Prostate adeno CA with metastatic disease   on hormone therapy and pain control.  continue with current regimen .  on prednisone  .   will follow up outpatient     3. BPH   flomax and finasteride .    4. Anemia   AOCD , FOBT negative , anemia panel not suggestive of iron deficiency .  monitor     5. DVT prophylaxis  on lovenox.     OOB as tolerated.  Check SaO2 with ambulation.

## 2020-02-15 NOTE — PROGRESS NOTE ADULT - SUBJECTIVE AND OBJECTIVE BOX
[facility]  INFECTIOUS DISEASES AND INTERNAL MEDICINE at [place]  =======================================================  Chandler Mccrary MD  Diplomates American Board of Internal Medicine and Infectious Diseases  Telephone [phone]  Fax            [phone]  =======================================================    [facility]-[id number]  [place]ANETTE   follow up:  Pneumonia    no new issues   FEELS OK       afebrile non toxic      =======================================================  REVIEW OF SYSTEMS:  CONSTITUTIONAL:  no fevers  HEENT:  No diplopia or blurred vision.  No earache, sore throat or runny nose.  CARDIOVASCULAR:  No pressure, squeezing, strangling, tightness, heaviness or aching about the chest, neck, axilla or epigastrium.  RESPIRATORY:  + shortness of breath  GASTROINTESTINAL:  No nausea, vomiting or diarrhea.  GENITOURINARY:  No dysuria, frequency or urgency. No Blood in urine  MUSCULOSKELETAL:  no joint aches, no muscle pain  SKIN:  No change in skin, hair or nails.  NEUROLOGIC:  No Headaches, seizures or weakness.  PSYCHIATRIC:  No disorder of thought or mood.  ENDOCRINE:  No heat or cold intolerance  HEMATOLOGICAL:  No easy bruising or bleeding.   =======================================================  Allergies  No Known Allergies     ======================================================  Physical Exam:  ============  (see section below)    General:  No acute distress.  Eye: Pupils are equal, round and reactive to light, Extraocular movements are intact, Normal conjunctiva.  HENT: Normocephalic, Oral mucosa is moist, No pharyngeal erythema, No sinus tenderness.  Neck: Supple, No lymphadenopathy.  Respiratory:   POOR aeration of the LEFT side to mid lung zone,   Cardiovascular: Normal rate, Regular rhythm,   Gastrointestinal: Soft, Non-tender, Non-distended, Normal bowel sounds.  Genitourinary: No costovertebral angle tenderness.  Lymphatics: No lymphadenopathy neck,   Musculoskeletal: Normal range of motion, Normal strength.  Integumentary: No rash.  Neurologic: Alert, Oriented, No focal deficits, Cranial Nerves II-XII are grossly intact.  Psychiatric: Appropriate mood & affect.    =======================================================           =====================================================  Vitals:  ============          Vital Signs Last 24 Hrs  T(C): 36.4 (15 Feb 2020 08:17), Max: 36.4 (15 Feb 2020 08:17)  T(F): 97.6 (15 Feb 2020 08:17), Max: 97.6 (15 Feb 2020 08:17)  HR: 111 (15 Feb 2020 12:34) (66 - 111)  BP: 132/88 (15 Feb 2020 08:17) (118/68 - 132/88)  BP(mean): --  RR: 22 (15 Feb 2020 12:34) (18 - 22)  SpO2: 99% (15 Feb 2020 12:34) (91% - 99%)    Other medications:  albuterol/ipratropium for Nebulization 3 milliLiter(s) Nebulizer every 6 hours  ALPRAZolam 0.25 milliGRAM(s) Oral at bedtime  finasteride 5 milliGRAM(s) Oral daily  lidocaine   Patch 1 Patch Transdermal daily  melatonin 3 milliGRAM(s) Oral at bedtime  oxyCODONE    IR 10 milliGRAM(s) Oral every 6 hours  polyethylene glycol 3350 17 Gram(s) Oral at bedtime  predniSONE   Tablet 5 milliGRAM(s) Oral two times a day  tamsulosin 0.4 milliGRAM(s) Oral at bedtime      =======================================================  Labs:                                   Culture - Acid Fast - Body Fluid w/Smear (collected 01-27-20 @ 21:54)  Source: .Body Fluid    Culture - Fungal, Body Fluid (collected 01-27-20 @ 13:05)  Source: .Body Fluid    Culture - Body Fluid with Gram Stain (collected 01-27-20 @ 13:04)  Source: .Body Fluid  Gram Stain (01-27-20 @ 15:34):    Numerous White blood cells    No organisms seen  Final Report (02-01-20 @ 10:17):    Numerous Staphylococcus aureus  Organism: Staphylococcus aureus (02-01-20 @ 10:17)  Organism: Staphylococcus aureus (02-01-20 @ 10:17)    Sensitivities:      -  Ampicillin/Sulbactam: S <=8/4      -  Cefazolin: S <=4      -  Clindamycin: S <=0.5      -  Erythromycin: S <=0.5      -  Gentamicin: S <=4 Should not be used as monotherapy      -  Oxacillin: S <=0.25      -  Penicillin: R >8      -  RIF- Rifampin: S <=1 Should not be used as monotherapy      -  Tetra/Doxy: S <=4      -  Trimethoprim/Sulfamethoxazole: S <=0.5/9.5      -  Vancomycin: S 2      Method Type: KERRI    Culture - Blood (collected 01-26-20 @ 18:25)  Source: .Blood  Final Report (01-31-20 @ 19:01):    No growth at 5 days.    Culture - Blood (collected 01-26-20 @ 18:25)  Source: .Blood  Final Report (01-31-20 @ 19:01):    No growth at 5 days.    Culture - Sputum (collected 01-23-20 @ 09:17)  Source: .Sputum  Gram Stain (01-23-20 @ 14:01):    Moderate WBC's    No organisms seen  Final Report (01-25-20 @ 09:57):    Moderate Routine respiratory teresa present    Culture - Blood (collected 01-21-20 @ 10:00)  Source: .Blood  Final Report (01-26-20 @ 11:00):    No growth at 5 days.    Culture - Blood (collected 01-20-20 @ 10:24)  Source: .Blood  Final Report (01-25-20 @ 11:01):    No growth at 5 days.    Culture - Blood (collected 01-20-20 @ 10:24)  Source: .Blood  Final Report (01-25-20 @ 11:01):    No growth at 5 days.      Creatinine, Serum: 0.32 mg/dL (02-02-20 @ 06:04)  Creatinine, Serum: 0.29 mg/dL (02-01-20 @ 08:35)  Creatinine, Serum: 0.28 mg/dL (01-31-20 @ 08:51)  Creatinine, Serum: 0.34 mg/dL (01-30-20 @ 09:30)  Creatinine, Serum: 0.35 mg/dL (01-29-20 @ 10:20)

## 2020-02-15 NOTE — PROGRESS NOTE ADULT - ASSESSMENT
This 64yoM; with pmh signif for Prostate Ca with mets to ribs diagnosed in 11/2019 (currently on chemo), HTN; now p/w cough, sob, fever x1 week, progressively worsening.  denies sick contacts. reports traveling from [place] after diagnosis of prostate ca complicated by b/l ptx in november and another hospitalization in december. denies cp/palp. denies abd pain: SOCIAL: No tobacco/illicit substance use/social/EtOH  patient reports having a history of Bilateral Pneumonia approx 1.5 months prior with associated bilateral Pleural effusions which required draining. 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 (20 Jan 2020 21:22)  patient was initially started On vanco & Zosyn, then Ceftriaxone and Zithromax was added as well.   Impression:  PNA  lrepeat CT scan with "Patent central airways.  Bilateral consolidation and pneumatoceles are reidentified, without significant change from prior examination."  s/p thoracentesis on 1/27/2020; cultures with Staph AUREUS  -   on KEFZOL  FOR MSSA  WILL CONTINUE  CT SURGERY   REEVALUATION NOTED    OVERALL IMPROVED   CONTINUE IV ABX THROUGH 3/10  PICC LINE  left arm    PT WANT TO  HOME AWAITING ARRANGEMENTS OF IV AX     WILL  FOLLOW UP IN OFFICE IF DISCHARGED TODAY

## 2020-02-16 ENCOUNTER — TRANSCRIPTION ENCOUNTER (OUTPATIENT)
Age: 65
End: 2020-02-16

## 2020-02-16 LAB
ANION GAP SERPL CALC-SCNC: 12 MMOL/L — SIGNIFICANT CHANGE UP (ref 5–17)
BUN SERPL-MCNC: 11 MG/DL — SIGNIFICANT CHANGE UP (ref 8–20)
CALCIUM SERPL-MCNC: 8.6 MG/DL — SIGNIFICANT CHANGE UP (ref 8.6–10.2)
CHLORIDE SERPL-SCNC: 99 MMOL/L — SIGNIFICANT CHANGE UP (ref 98–107)
CO2 SERPL-SCNC: 25 MMOL/L — SIGNIFICANT CHANGE UP (ref 22–29)
CREAT SERPL-MCNC: 0.39 MG/DL — LOW (ref 0.5–1.3)
GLUCOSE SERPL-MCNC: 101 MG/DL — HIGH (ref 70–99)
HCT VFR BLD CALC: 31.3 % — LOW (ref 39–50)
HGB BLD-MCNC: 9.8 G/DL — LOW (ref 13–17)
MCHC RBC-ENTMCNC: 27.6 PG — SIGNIFICANT CHANGE UP (ref 27–34)
MCHC RBC-ENTMCNC: 31.3 GM/DL — LOW (ref 32–36)
MCV RBC AUTO: 88.2 FL — SIGNIFICANT CHANGE UP (ref 80–100)
PLATELET # BLD AUTO: 361 K/UL — SIGNIFICANT CHANGE UP (ref 150–400)
POTASSIUM SERPL-MCNC: 3.6 MMOL/L — SIGNIFICANT CHANGE UP (ref 3.5–5.3)
POTASSIUM SERPL-SCNC: 3.6 MMOL/L — SIGNIFICANT CHANGE UP (ref 3.5–5.3)
RBC # BLD: 3.55 M/UL — LOW (ref 4.2–5.8)
RBC # FLD: 18.9 % — HIGH (ref 10.3–14.5)
SODIUM SERPL-SCNC: 136 MMOL/L — SIGNIFICANT CHANGE UP (ref 135–145)
WBC # BLD: 3.04 K/UL — LOW (ref 3.8–10.5)
WBC # FLD AUTO: 3.04 K/UL — LOW (ref 3.8–10.5)

## 2020-02-16 PROCEDURE — 99232 SBSQ HOSP IP/OBS MODERATE 35: CPT

## 2020-02-16 RX ORDER — LACTULOSE 10 G/15ML
0 SOLUTION ORAL
Qty: 0 | Refills: 0 | DISCHARGE

## 2020-02-16 RX ORDER — TAMSULOSIN HYDROCHLORIDE 0.4 MG/1
1 CAPSULE ORAL
Qty: 0 | Refills: 0 | DISCHARGE
Start: 2020-02-16

## 2020-02-16 RX ORDER — DOCETAXEL 20 MG/ML
0 INJECTION, SOLUTION, CONCENTRATE INTRAVENOUS
Qty: 0 | Refills: 0 | DISCHARGE

## 2020-02-16 RX ADMIN — OXYCODONE HYDROCHLORIDE 10 MILLIGRAM(S): 5 TABLET ORAL at 05:41

## 2020-02-16 RX ADMIN — OXYCODONE HYDROCHLORIDE 10 MILLIGRAM(S): 5 TABLET ORAL at 06:41

## 2020-02-16 RX ADMIN — FINASTERIDE 5 MILLIGRAM(S): 5 TABLET, FILM COATED ORAL at 11:11

## 2020-02-16 RX ADMIN — Medication 3 MILLILITER(S): at 20:27

## 2020-02-16 RX ADMIN — Medication 3 MILLILITER(S): at 15:27

## 2020-02-16 RX ADMIN — OXYCODONE HYDROCHLORIDE 10 MILLIGRAM(S): 5 TABLET ORAL at 00:46

## 2020-02-16 RX ADMIN — OXYCODONE HYDROCHLORIDE 10 MILLIGRAM(S): 5 TABLET ORAL at 11:40

## 2020-02-16 RX ADMIN — Medication 3 MILLILITER(S): at 09:10

## 2020-02-16 RX ADMIN — Medication 3 MILLIGRAM(S): at 23:44

## 2020-02-16 RX ADMIN — OXYCODONE HYDROCHLORIDE 10 MILLIGRAM(S): 5 TABLET ORAL at 18:06

## 2020-02-16 RX ADMIN — Medication 100 MILLIGRAM(S): at 18:07

## 2020-02-16 RX ADMIN — Medication 5 MILLIGRAM(S): at 18:07

## 2020-02-16 RX ADMIN — OXYCODONE HYDROCHLORIDE 10 MILLIGRAM(S): 5 TABLET ORAL at 18:35

## 2020-02-16 RX ADMIN — OXYCODONE HYDROCHLORIDE 10 MILLIGRAM(S): 5 TABLET ORAL at 11:10

## 2020-02-16 RX ADMIN — Medication 100 MILLIGRAM(S): at 11:09

## 2020-02-16 RX ADMIN — OXYCODONE HYDROCHLORIDE 10 MILLIGRAM(S): 5 TABLET ORAL at 23:44

## 2020-02-16 RX ADMIN — Medication 100 MILLIGRAM(S): at 01:19

## 2020-02-16 RX ADMIN — TAMSULOSIN HYDROCHLORIDE 0.4 MILLIGRAM(S): 0.4 CAPSULE ORAL at 11:11

## 2020-02-16 RX ADMIN — Medication 5 MILLIGRAM(S): at 05:41

## 2020-02-16 NOTE — DISCHARGE NOTE PROVIDER - HOSPITAL COURSE
64 year old male with prostate adenocarcinoma with mets to the  Bone, Lung and Lymp on ADT (Lupron), Taxotere, Xgeva therapy who presented to the Er with complaints of worsening shortness of breath. CTA of the chest was negative for PE,  Diffuse bilateral areas of pulmonary consolidation with pneumatocele formation and Large loculated left pleural effusions with compressive atelectasis of the left lower lobe. Status post thoracentesis by IR on 1/27 with removal of 1200cc of fluid. Pleural fluid was consistent with exudative fluid; cultures positive for MSSA. Started on IV antibiotics, ID consulted.  Repeat CT chest with minimal improvement in loculation. CT surgery and IR were reconsulted. Status post IR chest tube placement on 2/5 Antibiotics changed to IV cefazolin to cover MSSA. chest tube has been removed , patient was not a good candidate for decortication . as per ID picc LINE WAS INSERTED , PATIENT WILL NEED PROLONGED COURSE OF ANTIBIOTICS FOR EMPYEMA .patient has refused TED placement , will be discharged home after home antibiotic education , also patient is currently being weaned from oxygen , he desaturates with ambulation , did not qualify for home oxygen as per CM.             1. Left sided pleural effusion/empyema  infectious in origin     exudative , cultures positive for MSSA.    ID on board , maintained on cefazolin      chest tube removed,     plan is for prolonged course of antibiotics IV -> 3/10.  Discussed with Dr. restrepo.     had some low grade fever , resolved without any changes .    PICC line in place         2. Prostate adeno CA with metastatic disease     on hormone therapy and pain control.    continue with current regimen .    on prednisone  .     will follow up outpatient         3. BPH     flomax and finasteride .        4. Anemia     AOCD , FOBT negative , anemia panel not suggestive of iron deficiency .    monitor.  DIscussed outpatient f/u with PMD.         5. DVT prophylaxis    on lovenox.         OOB as tolerated.  Stable for d/c home [facility] homecare.

## 2020-02-16 NOTE — PROGRESS NOTE ADULT - ASSESSMENT
The patient is a 64 year old male with prostate adenocarcinoma with mets to the  Bone, Lung and Lymp on ADT (Lupron), Taxotere, Xgeva therapy who presented to the Er with complaints of worsening shortness of breath. CTA of the chest was negative for PE,  Diffuse bilateral areas of pulmonary consolidation with pneumatocele formation and Large loculated left pleural effusions with compressive atelectasis of the left lower lobe. Status post thoracentesis by IR on 1/27 with removal of 1200cc of fluid. Pleural fluid was consistent with exudative fluid; cultures positive for MSSA. Started on IV antibiotics, ID consulted.  Repeat CT chest with minimal improvement in loculation. CT surgery and IR were reconsulted. Status post IR chest tube placement on 2/5 Antibiotics changed to IV cefazolin to cover MSSA. chest tube has been removed , patient was not a good candidate for decortication . as per ID picc LINE WAS INSERTED , PATIENT WILL NEED PROLONGED COURSE OF ANTIBIOTICS FOR EMPYEMA .patient has refused TED placement , will be discharged home after home antibiotic education , also patient is currently being weaned from oxygen , he desaturates with ambulation , did not qualify for home oxygen as per CM.       1. Left sided pleural effusion/empyema  infectious in origin   exudative , cultures positive for MSSA.  ID on board , maintained on cefazolin    chest tube removed,   plan is for prolonged course of antibiotics IV -> 3/10.  Discussed with Dr. restrepo.   had some low grade fever , resolved without any changes .  PICC line in place     2. Prostate adeno CA with metastatic disease   on hormone therapy and pain control.  continue with current regimen .  on prednisone  .   will follow up outpatient     3. BPH   flomax and finasteride .    4. Anemia   AOCD , FOBT negative , anemia panel not suggestive of iron deficiency .  monitor.  DIscussed outpatient f/u with PMD.     5. DVT prophylaxis  on lovenox.     OOB as tolerated.  Stable for d/c home [facility] homecare.

## 2020-02-16 NOTE — DISCHARGE NOTE PROVIDER - NSDCFUSCHEDAPPT_GEN_ALL_CORE_FT
SENTHIL SAVAGE ; 02/21/2020 ; [facility]  SENTHIL SAVAGE ; 03/02/2020 ; [facility]  SENTHIL SAVAGE ; 03/06/2020 ; NPP [address]  SENTHIL SAVAGE ; 03/13/2020 ; [facility]  SENTHIL SAVAGE ; 03/25/2020 ; [facility]  SENTHIL SAVAGE ; 03/30/2020 ; [facility]  SENTHIL SAVAGE ; 04/03/2020 ; [facility]  SENTHIL SAVAGE ; 04/24/2020 ; [facility] SENTHIL SAVAGE ; 02/21/2020 ; [facility]  SENTHIL SAVAGE ; 03/02/2020 ; [facility]  SENTHIL SAVAGE ; 03/06/2020 ; NPP [address]  SENTHIL SAVAGE ; 03/13/2020 ; [facility]  SENTHIL SAVAGE ; 03/25/2020 ; [facility]  SENTHIL SAVAGE ; 03/30/2020 ; [facility]  SENTHIL SAVAGE ; 04/03/2020 ; [facility]  SENTHIL SAVAGE ; 04/24/2020 ; [facility]

## 2020-02-16 NOTE — DISCHARGE NOTE PROVIDER - NSDCMRMEDTOKEN_GEN_ALL_CORE_FT
ceFAZolin 2 g intravenous injection: 2 gram(s) intravenously every 8 hours through 3/10  weekly cbc cmp  finasteride 5 mg oral tablet: 1 tab(s) orally once a day  Lupron 5 mg/mL subcutaneous solution:   oxyCODONE 10 mg oral tablet: 1 tab(s) orally every 6 hours  predniSONE 5 mg oral tablet: 1 tab(s) orally 2 times a day  tamsulosin 0.4 mg oral capsule: 1 cap(s) orally once a day  Xgeva 120 mg/1.7 mL subcutaneous solution:

## 2020-02-16 NOTE — DISCHARGE NOTE PROVIDER - NSDCCPCAREPLAN_GEN_ALL_CORE_FT
PRINCIPAL DISCHARGE DIAGNOSIS  Diagnosis: Bilateral pneumonia  Assessment and Plan of Treatment:       SECONDARY DISCHARGE DIAGNOSES  Diagnosis: Prostate cancer metastatic to multiple sites  Assessment and Plan of Treatment: Prostate cancer metastatic to multiple sites    Diagnosis: Empyema  Assessment and Plan of Treatment:     Diagnosis: Pleural effusion on left  Assessment and Plan of Treatment:

## 2020-02-16 NOTE — DISCHARGE NOTE PROVIDER - CARE PROVIDER_API CALL
Ronnie Yu)  Infectious Disease; Internal Medicine  [address] [facility], [address] [place] [address]  Phone: [phone]  Fax: [phone]  Follow Up Time:     Neetu Huerta)  Thoracic and Cardiac Surgery  [address] [place] [address]  Phone: [phone]  Fax: [phone]  Follow Up Time:     Perry Pascual)  Radiation Oncology  [address] [place] [address]  Phone: [phone]  Fax: [phone]  Follow Up Time:

## 2020-02-16 NOTE — DISCHARGE NOTE PROVIDER - PROVIDER TOKENS
PROVIDER:[TOKEN:[[id number]]],PROVIDER:[TOKEN:[[id number]]],PROVIDER:[TOKEN:[[id number]]]

## 2020-02-16 NOTE — DISCHARGE NOTE PROVIDER - NSDCFUADDINST_GEN_ALL_CORE_FT
It is important to see your primary physician as well as the physicians noted below within the next week to perform a comprehensive medical review.  Call their offices for an appointment as soon as you leave the hospital.  If you do not have a primary physician, contact the [facility] Physician Referral Service at [phone].  Your medical issues appear to be stable at this time, but if your symptoms recur or worsen, contact your physicians and/or return to the hospital if necessary.  If you encounter any issues or questions with your medication, call your physicians before stopping the medication.  Do not drive.  Limit your diet to 2 grams of sodium daily.

## 2020-02-16 NOTE — DISCHARGE NOTE PROVIDER - CARE PROVIDERS DIRECT ADDRESSES
,[email or web address]

## 2020-02-17 LAB
CULTURE RESULTS: SIGNIFICANT CHANGE UP
SPECIMEN SOURCE: SIGNIFICANT CHANGE UP

## 2020-02-17 PROCEDURE — 99232 SBSQ HOSP IP/OBS MODERATE 35: CPT

## 2020-02-17 RX ADMIN — Medication 5 MILLIGRAM(S): at 06:49

## 2020-02-17 RX ADMIN — OXYCODONE HYDROCHLORIDE 10 MILLIGRAM(S): 5 TABLET ORAL at 23:57

## 2020-02-17 RX ADMIN — OXYCODONE HYDROCHLORIDE 10 MILLIGRAM(S): 5 TABLET ORAL at 17:23

## 2020-02-17 RX ADMIN — OXYCODONE HYDROCHLORIDE 10 MILLIGRAM(S): 5 TABLET ORAL at 06:49

## 2020-02-17 RX ADMIN — Medication 3 MILLILITER(S): at 21:20

## 2020-02-17 RX ADMIN — FINASTERIDE 5 MILLIGRAM(S): 5 TABLET, FILM COATED ORAL at 10:35

## 2020-02-17 RX ADMIN — OXYCODONE HYDROCHLORIDE 10 MILLIGRAM(S): 5 TABLET ORAL at 11:45

## 2020-02-17 RX ADMIN — OXYCODONE HYDROCHLORIDE 10 MILLIGRAM(S): 5 TABLET ORAL at 07:30

## 2020-02-17 RX ADMIN — Medication 100 MILLIGRAM(S): at 17:22

## 2020-02-17 RX ADMIN — OXYCODONE HYDROCHLORIDE 10 MILLIGRAM(S): 5 TABLET ORAL at 00:14

## 2020-02-17 RX ADMIN — Medication 3 MILLIGRAM(S): at 23:07

## 2020-02-17 RX ADMIN — OXYCODONE HYDROCHLORIDE 10 MILLIGRAM(S): 5 TABLET ORAL at 12:30

## 2020-02-17 RX ADMIN — OXYCODONE HYDROCHLORIDE 10 MILLIGRAM(S): 5 TABLET ORAL at 18:01

## 2020-02-17 RX ADMIN — Medication 100 MILLIGRAM(S): at 09:00

## 2020-02-17 RX ADMIN — Medication 3 MILLILITER(S): at 09:15

## 2020-02-17 RX ADMIN — Medication 5 MILLIGRAM(S): at 17:22

## 2020-02-17 RX ADMIN — OXYCODONE HYDROCHLORIDE 10 MILLIGRAM(S): 5 TABLET ORAL at 23:07

## 2020-02-17 RX ADMIN — Medication 100 MILLIGRAM(S): at 02:53

## 2020-02-17 RX ADMIN — Medication 3 MILLILITER(S): at 14:42

## 2020-02-17 RX ADMIN — TAMSULOSIN HYDROCHLORIDE 0.4 MILLIGRAM(S): 0.4 CAPSULE ORAL at 10:35

## 2020-02-17 NOTE — PROGRESS NOTE ADULT - ASSESSMENT
The patient is a 64 year old male with prostate adenocarcinoma with mets to the  Bone, Lung and Lymp on ADT (Lupron), Taxotere, Xgeva therapy who presented to the Er with complaints of worsening shortness of breath. CTA of the chest was negative for PE,  Diffuse bilateral areas of pulmonary consolidation with pneumatocele formation and Large loculated left pleural effusions with compressive atelectasis of the left lower lobe. Status post thoracentesis by IR on 1/27 with removal of 1200cc of fluid. Pleural fluid was consistent with exudative fluid; cultures positive for MSSA. Started on IV antibiotics, ID consulted.  Repeat CT chest with minimal improvement in loculation. CT surgery and IR were reconsulted. Status post IR chest tube placement on 2/5 Antibiotics changed to IV cefazolin to cover MSSA. chest tube has been removed , patient was not a good candidate for decortication . as per ID picc LINE WAS INSERTED , PATIENT WILL NEED PROLONGED COURSE OF ANTIBIOTICS FOR EMPYEMA .patient has refused TED placement , will be discharged home after home antibiotic education , also patient is currently being weaned from oxygen , he desaturates with ambulation , did not qualify for home oxygen as per CM.       1. Left sided pleural effusion/empyema  infectious in origin   exudative , cultures positive for MSSA.  ID on board , maintained on cefazolin    chest tube removed,   plan is for prolonged course of antibiotics IV -> 3/10.  Discussed with Dr. restrepo.   had some low grade fever , resolved without any changes .  PICC line in place     2. Prostate adeno CA with metastatic disease   on hormone therapy and pain control.  continue with current regimen .  on prednisone  .   will follow up outpatient     3. BPH   flomax and finasteride .    4. Anemia   AOCD , FOBT negative , anemia panel not suggestive of iron deficiency .  monitor.  DIscussed outpatient f/u with PMD.     5. DVT prophylaxis  on lovenox.     OOB as tolerated.  Stable for d/c home iwth homecare. Awaiting homecare arrangements, likely d/c 2/18

## 2020-02-17 NOTE — PROGRESS NOTE ADULT - SUBJECTIVE AND OBJECTIVE BOX
Patient: SENTHIL SAVAGE [id number] 64y Male                           Internal Medicine Hospitalist Progress Note    Interval History:  No complaints.  SOB improving.  Off O2 at rest.  Sister at bedside, reports she is willing to assist with IV abx administration at home.     ____________________PHYSICAL EXAM:  GENERAL:  NAD Alert and Oriented x 3   HEENT: NCAT  CARDIOVASCULAR:  S1, S2  LUNGS: coarse BS b/l  ABDOMEN:  soft, (-) tenderness, (-) distension, (+) bowel sounds, (-) guarding, (-) rebound (-) rigidity  EXTREMITIES:  no cyanosis / clubbing / edema.   ____________________    VITALS:  Vital Signs Last 24 Hrs  T(C): 36.7 (17 Feb 2020 07:49), Max: 36.7 (16 Feb 2020 17:59)  T(F): 98 (17 Feb 2020 07:49), Max: 98.1 (16 Feb 2020 23:18)  HR: 74 (17 Feb 2020 07:49) (70 - 78)  BP: 130/78 (17 Feb 2020 07:49) (127/80 - 140/81)  BP(mean): --  RR: 18 (17 Feb 2020 07:49) (18 - 100)  SpO2: 100% (17 Feb 2020 07:49) (95% - 100%) Daily     Daily   CAPILLARY BLOOD GLUCOSE        I&O's Summary      LABS:                        9.8    3.04  )-----------( 361      ( 16 Feb 2020 09:57 )             31.3     02-16    136  |  99  |  11.0  ----------------------------<  101<H>  3.6   |  25.0  |  0.39<L>    Ca    8.6      16 Feb 2020 09:57                    MEDICATIONS:  acetaminophen   Tablet .. 650 milliGRAM(s) Oral every 6 hours PRN  albuterol/ipratropium for Nebulization 3 milliLiter(s) Nebulizer every 6 hours  ALPRAZolam 0.25 milliGRAM(s) Oral at bedtime  ceFAZolin   IVPB 2000 milliGRAM(s) IV Intermittent every 8 hours  enoxaparin Injectable 40 milliGRAM(s) SubCutaneous daily  finasteride 5 milliGRAM(s) Oral daily  lactulose Syrup 10 Gram(s) Oral two times a day PRN  lidocaine   Patch 1 Patch Transdermal daily  lidocaine   Patch 1 Patch Transdermal daily  melatonin 3 milliGRAM(s) Oral at bedtime  oxyCODONE    IR 10 milliGRAM(s) Oral every 6 hours  oxyCODONE    IR 10 milliGRAM(s) Oral every 4 hours PRN  polyethylene glycol 3350 17 Gram(s) Oral at bedtime  predniSONE   Tablet 5 milliGRAM(s) Oral two times a day  tamsulosin 0.4 milliGRAM(s) Oral daily

## 2020-02-18 ENCOUNTER — TRANSCRIPTION ENCOUNTER (OUTPATIENT)
Age: 65
End: 2020-02-18

## 2020-02-18 LAB
ANION GAP SERPL CALC-SCNC: 14 MMOL/L — SIGNIFICANT CHANGE UP (ref 5–17)
BUN SERPL-MCNC: 11 MG/DL — SIGNIFICANT CHANGE UP (ref 8–20)
CALCIUM SERPL-MCNC: 8.3 MG/DL — LOW (ref 8.6–10.2)
CHLORIDE SERPL-SCNC: 100 MMOL/L — SIGNIFICANT CHANGE UP (ref 98–107)
CO2 SERPL-SCNC: 23 MMOL/L — SIGNIFICANT CHANGE UP (ref 22–29)
CREAT SERPL-MCNC: 0.41 MG/DL — LOW (ref 0.5–1.3)
GLUCOSE SERPL-MCNC: 103 MG/DL — HIGH (ref 70–99)
HCT VFR BLD CALC: 31.1 % — LOW (ref 39–50)
HGB BLD-MCNC: 9.7 G/DL — LOW (ref 13–17)
MCHC RBC-ENTMCNC: 27.4 PG — SIGNIFICANT CHANGE UP (ref 27–34)
MCHC RBC-ENTMCNC: 31.2 GM/DL — LOW (ref 32–36)
MCV RBC AUTO: 87.9 FL — SIGNIFICANT CHANGE UP (ref 80–100)
PLATELET # BLD AUTO: 341 K/UL — SIGNIFICANT CHANGE UP (ref 150–400)
POTASSIUM SERPL-MCNC: 3.8 MMOL/L — SIGNIFICANT CHANGE UP (ref 3.5–5.3)
POTASSIUM SERPL-SCNC: 3.8 MMOL/L — SIGNIFICANT CHANGE UP (ref 3.5–5.3)
RBC # BLD: 3.54 M/UL — LOW (ref 4.2–5.8)
RBC # FLD: 19.8 % — HIGH (ref 10.3–14.5)
SODIUM SERPL-SCNC: 137 MMOL/L — SIGNIFICANT CHANGE UP (ref 135–145)
WBC # BLD: 4.26 K/UL — SIGNIFICANT CHANGE UP (ref 3.8–10.5)
WBC # FLD AUTO: 4.26 K/UL — SIGNIFICANT CHANGE UP (ref 3.8–10.5)

## 2020-02-18 PROCEDURE — 99239 HOSP IP/OBS DSCHRG MGMT >30: CPT

## 2020-02-18 RX ADMIN — Medication 3 MILLILITER(S): at 09:19

## 2020-02-18 RX ADMIN — OXYCODONE HYDROCHLORIDE 10 MILLIGRAM(S): 5 TABLET ORAL at 06:09

## 2020-02-18 RX ADMIN — OXYCODONE HYDROCHLORIDE 10 MILLIGRAM(S): 5 TABLET ORAL at 18:05

## 2020-02-18 RX ADMIN — Medication 5 MILLIGRAM(S): at 05:40

## 2020-02-18 RX ADMIN — OXYCODONE HYDROCHLORIDE 10 MILLIGRAM(S): 5 TABLET ORAL at 17:06

## 2020-02-18 RX ADMIN — POLYETHYLENE GLYCOL 3350 17 GRAM(S): 17 POWDER, FOR SOLUTION ORAL at 21:51

## 2020-02-18 RX ADMIN — OXYCODONE HYDROCHLORIDE 10 MILLIGRAM(S): 5 TABLET ORAL at 23:41

## 2020-02-18 RX ADMIN — FINASTERIDE 5 MILLIGRAM(S): 5 TABLET, FILM COATED ORAL at 11:05

## 2020-02-18 RX ADMIN — Medication 100 MILLIGRAM(S): at 02:05

## 2020-02-18 RX ADMIN — OXYCODONE HYDROCHLORIDE 10 MILLIGRAM(S): 5 TABLET ORAL at 14:02

## 2020-02-18 RX ADMIN — Medication 5 MILLIGRAM(S): at 16:25

## 2020-02-18 RX ADMIN — OXYCODONE HYDROCHLORIDE 10 MILLIGRAM(S): 5 TABLET ORAL at 11:09

## 2020-02-18 RX ADMIN — Medication 3 MILLILITER(S): at 15:23

## 2020-02-18 RX ADMIN — Medication 3 MILLIGRAM(S): at 21:51

## 2020-02-18 RX ADMIN — Medication 100 MILLIGRAM(S): at 16:25

## 2020-02-18 RX ADMIN — TAMSULOSIN HYDROCHLORIDE 0.4 MILLIGRAM(S): 0.4 CAPSULE ORAL at 11:06

## 2020-02-18 RX ADMIN — OXYCODONE HYDROCHLORIDE 10 MILLIGRAM(S): 5 TABLET ORAL at 05:39

## 2020-02-18 RX ADMIN — Medication 100 MILLIGRAM(S): at 08:54

## 2020-02-18 RX ADMIN — Medication 3 MILLILITER(S): at 20:04

## 2020-02-18 NOTE — CHART NOTE - NSCHARTNOTEFT_GEN_A_CORE
Source: Patient [x ]  Family [ ]   other [ ]    Current Diet: Diet, Regular (01-21-20 @ 12:58)    PO intake: Pt reported good po intake    Current Weight:   (2/12) 183.2#  (1/29) 212.5#  -Obtain current wt, continue to monitor. Question accuracy. b/l leg 1+ edema noted (1/22).     Pertinent Medications: MEDICATIONS  (STANDING):  albuterol/ipratropium for Nebulization 3 milliLiter(s) Nebulizer every 6 hours  ALPRAZolam 0.25 milliGRAM(s) Oral at bedtime  ceFAZolin   IVPB 2000 milliGRAM(s) IV Intermittent every 8 hours  enoxaparin Injectable 40 milliGRAM(s) SubCutaneous daily  finasteride 5 milliGRAM(s) Oral daily  lidocaine   Patch 1 Patch Transdermal daily  lidocaine   Patch 1 Patch Transdermal daily  melatonin 3 milliGRAM(s) Oral at bedtime  oxyCODONE    IR 10 milliGRAM(s) Oral every 6 hours  polyethylene glycol 3350 17 Gram(s) Oral at bedtime  predniSONE   Tablet 5 milliGRAM(s) Oral two times a day  tamsulosin 0.4 milliGRAM(s) Oral daily    MEDICATIONS  (PRN):  acetaminophen   Tablet .. 650 milliGRAM(s) Oral every 6 hours PRN Temp greater or equal to 38C (100.4F), Mild Pain (1 - 3)  lactulose Syrup 10 Gram(s) Oral two times a day PRN Constipation  oxyCODONE    IR 10 milliGRAM(s) Oral every 4 hours PRN Breakthrough Pain    Pertinent Labs: CBC Full  -  ( 18 Feb 2020 09:03 )  WBC Count : 4.26 K/uL  RBC Count : 3.54 M/uL  Hemoglobin : 9.7 g/dL  Hematocrit : 31.1 %  Platelet Count - Automated : 341 K/uL  Mean Cell Volume : 87.9 fl  Mean Cell Hemoglobin : 27.4 pg  Mean Cell Hemoglobin Concentration : 31.2 gm/dL  Auto Neutrophil # : x  Auto Lymphocyte # : x  Auto Monocyte # : x  Auto Eosinophil # : x  Auto Basophil # : x  Auto Neutrophil % : x  Auto Lymphocyte % : x  Auto Monocyte % : x  Auto Eosinophil % : x  Auto Basophil % : x  02-18 Na137 mmol/L Glu 103 mg/dL<H> K+ 3.8 mmol/L Cr  0.41 mg/dL<L> BUN 11.0 mg/dL Phos n/a   Alb n/a   PAB n/a       Skin: Intact    Nutrition focused physical exam previously conducted - found signs of malnutrition [x ]absent [ ]present    Subcutaneous fat loss: [ ] Orbital fat pads region, [ ]Buccal fat region, [ ]Triceps region,  [ ]Ribs region    Muscle wasting: [ ]Temples region, [ ]Clavicle region, [ ]Shoulder region, [ ]Scapula region, [ ]Interosseous region,  [ ]thigh region, [ ]Calf region    Estimated Needs:   [ x ] no change since previous assessment  [ ] recalculated:     Current Nutrition Diagnosis: Pt remains at high nutrition risk secondary to  Increased Nutrient Needs related to increased physiological demand for nutrient as evidenced by Prostate Adenocarcinoma with Mets to Bone, Lung and Lymp. Pt continues to have good po intake, consuming 100% of meals. Pt has no current nutrition questions/concerns. Last documented BM 2/12. RD to remain available.     Recommendations:   1) Encourage po intake  2) Monitor wts and labs    Monitoring and Evaluation:   [x ] PO intake [x ] Tolerance to diet prescription [X] Weights  [X] Follow up per protocol [X] Labs:

## 2020-02-18 NOTE — PROGRESS NOTE ADULT - ASSESSMENT
The patient is a 64 year old male with prostate adenocarcinoma with mets to the  Bone, Lung and Lymp on ADT (Lupron), Taxotere, Xgeva therapy who presented to the Er with complaints of worsening shortness of breath. CTA of the chest was negative for PE,  Diffuse bilateral areas of pulmonary consolidation with pneumatocele formation and Large loculated left pleural effusions with compressive atelectasis of the left lower lobe. Status post thoracentesis by IR on 1/27 with removal of 1200cc of fluid. Pleural fluid was consistent with exudative fluid; cultures positive for MSSA. Started on IV antibiotics, ID consulted.  Repeat CT chest with minimal improvement in loculation. CT surgery and IR were reconsulted. Status post IR chest tube placement on 2/5 Antibiotics changed to IV cefazolin to cover MSSA. chest tube has been removed , patient was not a good candidate for decortication . as per ID picc LINE WAS INSERTED , PATIENT WILL NEED PROLONGED COURSE OF ANTIBIOTICS FOR EMPYEMA .patient has refused TED placement , will be discharged home after home antibiotic education , also patient is currently being weaned from oxygen , he desaturates with ambulation , did not qualify for home oxygen as per CM.       1. Left sided pleural effusion/empyema  infectious in origin   exudative , cultures positive for MSSA.  ID on board , maintained on cefazolin    chest tube removed,   plan is for prolonged course of antibiotics IV -> 3/10.  Discussed with Dr. restrepo.   had some low grade fever , resolved without any changes .  PICC line in place     2. Prostate adeno CA with metastatic disease   on hormone therapy and pain control.  continue with current regimen .  on prednisone  .   will follow up outpatient     3. BPH   flomax and finasteride .    4. Anemia   AOCD , FOBT negative , anemia panel not suggestive of iron deficiency .  monitor.  DIscussed outpatient f/u with PMD.     5. DVT prophylaxis  on lovenox.     OOB as tolerated.  Stable for d/c home iwth homecare. Awaiting homecare arrangements.

## 2020-02-18 NOTE — PROGRESS NOTE ADULT - REASON FOR ADMISSION
Dyspnea secondary to Large located effusion and suspected recurrent Bilateral PNA

## 2020-02-18 NOTE — PROGRESS NOTE ADULT - SUBJECTIVE AND OBJECTIVE BOX
Patient: SENTHIL SAVAGE [id number] 64y Male                           Internal Medicine Hospitalist Progress Note    Interval History:  No complaints.  SOB improving.  Off O2.      ____________________PHYSICAL EXAM:  GENERAL:  NAD Alert and Oriented x 3   HEENT: NCAT  CARDIOVASCULAR:  S1, S2  LUNGS: coarse BS b/l  ABDOMEN:  soft, (-) tenderness, (-) distension, (+) bowel sounds, (-) guarding, (-) rebound (-) rigidity  EXTREMITIES:  no cyanosis / clubbing / edema.   ____________________    VITALS:  Vital Signs Last 24 Hrs  T(C): 36.6 (18 Feb 2020 07:42), Max: 36.7 (17 Feb 2020 23:47)  T(F): 97.9 (18 Feb 2020 07:42), Max: 98.1 (17 Feb 2020 23:47)  HR: 66 (18 Feb 2020 07:42) (66 - 87)  BP: 105/69 (18 Feb 2020 07:42) (105/69 - 125/63)  BP(mean): --  RR: 17 (18 Feb 2020 07:42) (17 - 18)  SpO2: 90% (18 Feb 2020 07:42) (90% - 98%) Daily     Daily   CAPILLARY BLOOD GLUCOSE        I&O's Summary      LABS:                        9.7    4.26  )-----------( 341      ( 18 Feb 2020 09:03 )             31.1     02-18    137  |  100  |  11.0  ----------------------------<  103<H>  3.8   |  23.0  |  0.41<L>    Ca    8.3<L>      18 Feb 2020 09:03                    MEDICATIONS:  acetaminophen   Tablet .. 650 milliGRAM(s) Oral every 6 hours PRN  albuterol/ipratropium for Nebulization 3 milliLiter(s) Nebulizer every 6 hours  ALPRAZolam 0.25 milliGRAM(s) Oral at bedtime  ceFAZolin   IVPB 2000 milliGRAM(s) IV Intermittent every 8 hours  enoxaparin Injectable 40 milliGRAM(s) SubCutaneous daily  finasteride 5 milliGRAM(s) Oral daily  lactulose Syrup 10 Gram(s) Oral two times a day PRN  lidocaine   Patch 1 Patch Transdermal daily  lidocaine   Patch 1 Patch Transdermal daily  melatonin 3 milliGRAM(s) Oral at bedtime  oxyCODONE    IR 10 milliGRAM(s) Oral every 6 hours  oxyCODONE    IR 10 milliGRAM(s) Oral every 4 hours PRN  polyethylene glycol 3350 17 Gram(s) Oral at bedtime  predniSONE   Tablet 5 milliGRAM(s) Oral two times a day  tamsulosin 0.4 milliGRAM(s) Oral daily

## 2020-02-19 VITALS
OXYGEN SATURATION: 90 % | HEART RATE: 82 BPM | SYSTOLIC BLOOD PRESSURE: 117 MMHG | TEMPERATURE: 98 F | RESPIRATION RATE: 20 BRPM | DIASTOLIC BLOOD PRESSURE: 66 MMHG

## 2020-02-19 PROCEDURE — 88305 TISSUE EXAM BY PATHOLOGIST: CPT

## 2020-02-19 PROCEDURE — 85610 PROTHROMBIN TIME: CPT

## 2020-02-19 PROCEDURE — 77012 CT SCAN FOR NEEDLE BIOPSY: CPT

## 2020-02-19 PROCEDURE — 81003 URINALYSIS AUTO W/O SCOPE: CPT

## 2020-02-19 PROCEDURE — 97163 PT EVAL HIGH COMPLEX 45 MIN: CPT

## 2020-02-19 PROCEDURE — 71045 X-RAY EXAM CHEST 1 VIEW: CPT

## 2020-02-19 PROCEDURE — 94640 AIRWAY INHALATION TREATMENT: CPT

## 2020-02-19 PROCEDURE — 71250 CT THORAX DX C-: CPT

## 2020-02-19 PROCEDURE — 83986 ASSAY PH BODY FLUID NOS: CPT

## 2020-02-19 PROCEDURE — 87075 CULTR BACTERIA EXCEPT BLOOD: CPT

## 2020-02-19 PROCEDURE — 94760 N-INVAS EAR/PLS OXIMETRY 1: CPT

## 2020-02-19 PROCEDURE — 87581 M.PNEUMON DNA AMP PROBE: CPT

## 2020-02-19 PROCEDURE — C1769: CPT

## 2020-02-19 PROCEDURE — 93005 ELECTROCARDIOGRAM TRACING: CPT

## 2020-02-19 PROCEDURE — 87086 URINE CULTURE/COLONY COUNT: CPT

## 2020-02-19 PROCEDURE — 99285 EMERGENCY DEPT VISIT HI MDM: CPT | Mod: 25

## 2020-02-19 PROCEDURE — 84155 ASSAY OF PROTEIN SERUM: CPT

## 2020-02-19 PROCEDURE — 76942 ECHO GUIDE FOR BIOPSY: CPT

## 2020-02-19 PROCEDURE — 84484 ASSAY OF TROPONIN QUANT: CPT

## 2020-02-19 PROCEDURE — 87070 CULTURE OTHR SPECIMN AEROBIC: CPT

## 2020-02-19 PROCEDURE — 84100 ASSAY OF PHOSPHORUS: CPT

## 2020-02-19 PROCEDURE — 88112 CYTOPATH CELL ENHANCE TECH: CPT

## 2020-02-19 PROCEDURE — 85027 COMPLETE CBC AUTOMATED: CPT

## 2020-02-19 PROCEDURE — 87633 RESP VIRUS 12-25 TARGETS: CPT

## 2020-02-19 PROCEDURE — 87798 DETECT AGENT NOS DNA AMP: CPT

## 2020-02-19 PROCEDURE — 83615 LACTATE (LD) (LDH) ENZYME: CPT

## 2020-02-19 PROCEDURE — 83550 IRON BINDING TEST: CPT

## 2020-02-19 PROCEDURE — 82042 OTHER SOURCE ALBUMIN QUAN EA: CPT

## 2020-02-19 PROCEDURE — 82728 ASSAY OF FERRITIN: CPT

## 2020-02-19 PROCEDURE — 96367 TX/PROPH/DG ADDL SEQ IV INF: CPT | Mod: XU

## 2020-02-19 PROCEDURE — 83735 ASSAY OF MAGNESIUM: CPT

## 2020-02-19 PROCEDURE — 87449 NOS EACH ORGANISM AG IA: CPT

## 2020-02-19 PROCEDURE — 85611 PROTHROMBIN TEST: CPT

## 2020-02-19 PROCEDURE — 83605 ASSAY OF LACTIC ACID: CPT

## 2020-02-19 PROCEDURE — 87186 SC STD MICRODIL/AGAR DIL: CPT

## 2020-02-19 PROCEDURE — 36415 COLL VENOUS BLD VENIPUNCTURE: CPT

## 2020-02-19 PROCEDURE — 84466 ASSAY OF TRANSFERRIN: CPT

## 2020-02-19 PROCEDURE — 84157 ASSAY OF PROTEIN OTHER: CPT

## 2020-02-19 PROCEDURE — 82945 GLUCOSE OTHER FLUID: CPT

## 2020-02-19 PROCEDURE — 86803 HEPATITIS C AB TEST: CPT

## 2020-02-19 PROCEDURE — 89051 BODY FLUID CELL COUNT: CPT

## 2020-02-19 PROCEDURE — 87015 SPECIMEN INFECT AGNT CONCNTJ: CPT

## 2020-02-19 PROCEDURE — 82272 OCCULT BLD FECES 1-3 TESTS: CPT

## 2020-02-19 PROCEDURE — 83880 ASSAY OF NATRIURETIC PEPTIDE: CPT

## 2020-02-19 PROCEDURE — 96365 THER/PROPH/DIAG IV INF INIT: CPT | Mod: XU

## 2020-02-19 PROCEDURE — 80048 BASIC METABOLIC PNL TOTAL CA: CPT

## 2020-02-19 PROCEDURE — 87206 SMEAR FLUORESCENT/ACID STAI: CPT

## 2020-02-19 PROCEDURE — 96375 TX/PRO/DX INJ NEW DRUG ADDON: CPT | Mod: XU

## 2020-02-19 PROCEDURE — 87040 BLOOD CULTURE FOR BACTERIA: CPT

## 2020-02-19 PROCEDURE — 71275 CT ANGIOGRAPHY CHEST: CPT

## 2020-02-19 PROCEDURE — 85014 HEMATOCRIT: CPT

## 2020-02-19 PROCEDURE — C1729: CPT

## 2020-02-19 PROCEDURE — 87631 RESP VIRUS 3-5 TARGETS: CPT

## 2020-02-19 PROCEDURE — 87205 SMEAR GRAM STAIN: CPT

## 2020-02-19 PROCEDURE — 87102 FUNGUS ISOLATION CULTURE: CPT

## 2020-02-19 PROCEDURE — 83540 ASSAY OF IRON: CPT

## 2020-02-19 PROCEDURE — 87486 CHLMYD PNEUM DNA AMP PROBE: CPT

## 2020-02-19 PROCEDURE — 87116 MYCOBACTERIA CULTURE: CPT

## 2020-02-19 PROCEDURE — 85730 THROMBOPLASTIN TIME PARTIAL: CPT

## 2020-02-19 PROCEDURE — 85018 HEMOGLOBIN: CPT

## 2020-02-19 PROCEDURE — 80053 COMPREHEN METABOLIC PANEL: CPT

## 2020-02-19 RX ADMIN — Medication 5 MILLIGRAM(S): at 05:36

## 2020-02-19 RX ADMIN — OXYCODONE HYDROCHLORIDE 10 MILLIGRAM(S): 5 TABLET ORAL at 05:36

## 2020-02-19 RX ADMIN — Medication 100 MILLIGRAM(S): at 02:30

## 2020-02-19 RX ADMIN — OXYCODONE HYDROCHLORIDE 10 MILLIGRAM(S): 5 TABLET ORAL at 00:10

## 2020-02-19 NOTE — DISCHARGE NOTE NURSING/CASE MANAGEMENT/SOCIAL WORK - PATIENT PORTAL LINK FT
You can access the FollowMyHealth Patient Portal offered by [facility] by registering at the following website: [email or web address]. By joining [employer]’s FollowMyHealth portal, you will also be able to view your health information using other applications (apps) compatible with our system.

## 2020-02-21 ENCOUNTER — APPOINTMENT (OUTPATIENT)
Age: 65
End: 2020-02-21

## 2020-03-02 ENCOUNTER — APPOINTMENT (OUTPATIENT)
Dept: HEMATOLOGY ONCOLOGY | Facility: CLINIC | Age: 65
End: 2020-03-02
Payer: MEDICAID

## 2020-03-02 ENCOUNTER — APPOINTMENT (OUTPATIENT)
Age: 65
End: 2020-03-02

## 2020-03-02 ENCOUNTER — RESULT REVIEW (OUTPATIENT)
Age: 65
End: 2020-03-02

## 2020-03-02 VITALS
DIASTOLIC BLOOD PRESSURE: 88 MMHG | BODY MASS INDEX: 26.38 KG/M2 | WEIGHT: 228 LBS | HEART RATE: 90 BPM | OXYGEN SATURATION: 92 % | TEMPERATURE: 97.6 F | HEIGHT: 78 IN | SYSTOLIC BLOOD PRESSURE: 148 MMHG

## 2020-03-02 LAB
BASOPHILS # BLD AUTO: 0.1 K/UL — SIGNIFICANT CHANGE UP (ref 0–0.2)
BASOPHILS NFR BLD AUTO: 0.8 % — SIGNIFICANT CHANGE UP (ref 0–2)
EOSINOPHIL # BLD AUTO: 0.1 K/UL — SIGNIFICANT CHANGE UP (ref 0–0.5)
EOSINOPHIL NFR BLD AUTO: 1.2 % — SIGNIFICANT CHANGE UP (ref 0–6)
HCT VFR BLD CALC: 34.8 % — LOW (ref 39–50)
HGB BLD-MCNC: 10.6 G/DL — LOW (ref 13–17)
LYMPHOCYTES # BLD AUTO: 0.9 K/UL — LOW (ref 1–3.3)
LYMPHOCYTES # BLD AUTO: 14.4 % — SIGNIFICANT CHANGE UP (ref 13–44)
MCHC RBC-ENTMCNC: 26.7 PG — LOW (ref 27–34)
MCHC RBC-ENTMCNC: 30.3 G/DL — LOW (ref 32–36)
MCV RBC AUTO: 88.1 FL — SIGNIFICANT CHANGE UP (ref 80–100)
MONOCYTES # BLD AUTO: 0.5 K/UL — SIGNIFICANT CHANGE UP (ref 0–0.9)
MONOCYTES NFR BLD AUTO: 7.6 % — SIGNIFICANT CHANGE UP (ref 2–14)
NEUTROPHILS # BLD AUTO: 4.9 K/UL — SIGNIFICANT CHANGE UP (ref 1.8–7.4)
NEUTROPHILS NFR BLD AUTO: 76.1 % — SIGNIFICANT CHANGE UP (ref 43–77)
PLATELET # BLD AUTO: 395 K/UL — SIGNIFICANT CHANGE UP (ref 150–400)
RBC # BLD: 3.96 M/UL — LOW (ref 4.2–5.8)
RBC # FLD: 18.4 % — HIGH (ref 10.3–14.5)
WBC # BLD: 6.4 K/UL — SIGNIFICANT CHANGE UP (ref 3.8–10.5)
WBC # FLD AUTO: 6.4 K/UL — SIGNIFICANT CHANGE UP (ref 3.8–10.5)

## 2020-03-02 PROCEDURE — 99214 OFFICE O/P EST MOD 30 MIN: CPT

## 2020-03-02 NOTE — ASSESSMENT
[FreeTextEntry1] : 65 y/o male with prostate cancer metastatic to lung, chest wall, bone, and retroperitonea/pelvic lymph nodes. S/p RT with Dr. Pascual. Currently on Lupron q 3 months and Taxotere q 3 weeks. \par Recent hospitalization for empyema, now finishing antibiotics next week, and will be able to resume taxotere chemotherapy the following week.\par \par Plan:\par -Continue Lupron, Taxotere, and prednisone \par -Continue Xgeva \par -Will monitor PSA \par -Follow up in 6 weeks \par  13-Jun-2019 13:44

## 2020-03-02 NOTE — REASON FOR VISIT
[Follow-Up Visit] : a follow-up [Family Member] : family member [FreeTextEntry2] : metastatic prostate cancer

## 2020-03-02 NOTE — PHYSICAL EXAM
[Normal] : affect appropriate [de-identified] : mild gynecomastia  [de-identified] : ambulatory walker  [de-identified] : healed biopsy scar left anterior chest, healed chest tube entry site left lateral chest

## 2020-03-02 NOTE — HISTORY OF PRESENT ILLNESS
Assumed care of alert and oriented male pt. Pt V pacing on monitor, On 2.5 liters O2 n/c. [de-identified] : Mr. SAVAGE is a 64 year old male following up for metastatic prostate cancer. Has moved to [place] from [place], here to transfer care. \par \par Cancer hx is as follows per [facility]:\par \par Patient initially presented to [facility] on 11/15/19 with dyspnea and severe weight loss. CT chest revealed moderate to large bilateral pneumothoraces. Multifocal soft tissue infiltration of the chest wall lesions that were resulting in destruction of underlying ribs bilaterally. Multifocal cystic/cavity lung lesions with consolidated lung. Thoracic spine metastases with mild/moderate T6 compression fracture. The patient was seen by pulmonary and thoracic surgery. He underwent CT-guided chest tube placement. CT abdomen/pelvis on 11/21/19 revealed diffuse destructive osseous metastasis. Bulky retroperitoneal and lower pelvic lymphadenopathy. Severely enlarged prostate. PSA level greater than 5000. CT biopsy confirmed prostate cancer. The patient was initiated on Casodex on 11/21/19. He subsequently received Lupron on 11/25/19. He has also received IV Taxotere 75 mg/m2 on 11/26/19 which was complicated by mile neutropenia. The patient had a very long hospitalization and ultimately was transferred to [facility] given the persistent left-sided pneumothorax. 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 The patient underwent a talc pleurodesis at [facility]. He was ultimately discharged from the hospital on 12/16/19 after being hospitalized for close to 1 month. \par \par -- Casodex d/c on 12/17/19\par -- Lupron (11/25/29-on going) \par -- IV Taxotere 75 mg/m2 q 3 weeks (11/26/19-on going). Prednisone 5 mg BID. \par -- Chemotherapy induced leukopenia improved s/p Zarxio.\par -- Oxycodone given for pain \par -- Xgeva to be given pending dental clearance \par \par CT 11/15/19:\par -Bilateral large pneumothoraces and a large destructive right rib mass with diffuse bilateral infiltrates with cavitation. \par \par Pathology 11/18/19: \par Chest wall mass (needle biopsies):\par -Metastatic adenocarcinoma exhibiting features consistent with prostatic primary.  [de-identified] : Was at [facility] for pneumonia from 1/20/2020 - 2/18/2020. Discharge summary below. \par Still on abx, picc line still in place.\par Eating well. \par \par [facility] Discharge note 2/16/2020:\par 64 year old male with prostate adenocarcinoma with mets to the  Bone, Lung and Lymp on ADT (Lupron), Taxotere, Xgeva therapy who presented to the Er with complaints of worsening shortness of breath. CTA of the chest was negative for PE,  Diffuse bilateral areas of pulmonary consolidation with pneumatocele formation and Large loculated left pleural effusions with compressive atelectasis of the left lower lobe. Status post thoracentesis by IR on 1/27 with removal of 1200cc of fluid. Pleural fluid was consistent with exudative fluid; cultures positive for MSSA. Started on IV antibiotics, ID consulted. Repeat CT chest with minimal improvement in loculation. CT surgery and IR were reconsulted. Status post IR chest tube placement on 2/5 Antibiotics changed to IV cefazolin to cover MSSA. chest tube has been removed , patient was not a good candidate for decortication. as per ID picc LINE WAS INSERTED , PATIENT WILL NEED PROLONGED COURSE OF ANTIBIOTICS FOR EMPYEMA .patient has refused TED placement , will be discharged home after home antibiotic education , also patient is currently being weaned from oxygen , he desaturates with ambulation, did not qualify for home oxygen as per CM. \par \par 1. Left sided pleural effusion/empyema  \par infectious in origin exudative , cultures positive for MSSA. ID on board , maintained on cefazolin, chest tube removed, plan is for prolonged course of antibiotics IV -> 3/10.  Discussed with Dr. restrepo. had some low grade fever , resolved without any changes . PICC line in place \par \par 2. Prostate adeno CA with metastatic disease\par  on hormone therapy and pain control. continue with current regimen . on prednisone. will follow up outpatient \par \par 3. BPH, \par flomax and finasteride . \par \par 4. Anemia \par AOCD , FOBT negative , anemia panel not suggestive of iron deficiency . monitor.  DIscussed outpatient f/u with PMD. \par \par 5. DVT prophylaxis \par on lovenox. \par \par \par Del finishes IV antibiotics via PICCl line left arm next week, for empyema - clinically much improved.

## 2020-03-03 LAB
ALBUMIN SERPL ELPH-MCNC: 3.8 G/DL
ALP BLD-CCNC: 108 U/L
ALT SERPL-CCNC: 7 U/L
ANION GAP SERPL CALC-SCNC: 16 MMOL/L
AST SERPL-CCNC: 13 U/L
BILIRUB SERPL-MCNC: 0.2 MG/DL
BUN SERPL-MCNC: 15 MG/DL
CALCIUM SERPL-MCNC: 9.5 MG/DL
CHLORIDE SERPL-SCNC: 106 MMOL/L
CO2 SERPL-SCNC: 21 MMOL/L
CREAT SERPL-MCNC: 0.5 MG/DL
GLUCOSE SERPL-MCNC: 100 MG/DL
POTASSIUM SERPL-SCNC: 3.8 MMOL/L
PROT SERPL-MCNC: 7.6 G/DL
PSA FREE FLD-MCNC: 8 %
PSA FREE SERPL-MCNC: 7.01 NG/ML
PSA SERPL-MCNC: 89.1 NG/ML
SODIUM SERPL-SCNC: 142 MMOL/L

## 2020-03-03 NOTE — PHYSICAL EXAM
[] : no respiratory distress [Exaggerated Use Of Accessory Muscles For Inspiration] : no accessory muscle use [Normal] : oriented to person, place and time, the affect was normal, the mood was normal and not anxious [de-identified] : 2L NC O2 [de-identified] : tenderness along low back/hips

## 2020-03-03 NOTE — DISEASE MANAGEMENT
[Clinical] : TNM Stage: c [IV] : IV [FreeTextEntry4] : metastatic prostate carcinoma to bone [TTNM] : - [NTNM] : - [de-identified] : 2000 [MTNM] : 1 [de-identified] : SI Joints [de-identified] : 2000

## 2020-03-05 ENCOUNTER — OUTPATIENT (OUTPATIENT)
Dept: OUTPATIENT SERVICES | Facility: HOSPITAL | Age: 65
LOS: 1 days | Discharge: HOME | End: 2020-03-05

## 2020-03-05 DIAGNOSIS — C78.00 SECONDARY MALIGNANT NEOPLASM OF UNSPECIFIED LUNG: ICD-10-CM

## 2020-03-05 DIAGNOSIS — C77.5 SECONDARY AND UNSPECIFIED MALIGNANT NEOPLASM OF INTRAPELVIC LYMPH NODES: ICD-10-CM

## 2020-03-05 DIAGNOSIS — C79.51 SECONDARY MALIGNANT NEOPLASM OF BONE: ICD-10-CM

## 2020-03-05 DIAGNOSIS — C61 MALIGNANT NEOPLASM OF PROSTATE: ICD-10-CM

## 2020-03-05 DIAGNOSIS — C79.89 SECONDARY MALIGNANT NEOPLASM OF OTHER SPECIFIED SITES: ICD-10-CM

## 2020-03-06 ENCOUNTER — APPOINTMENT (OUTPATIENT)
Dept: RADIATION ONCOLOGY | Facility: CLINIC | Age: 65
End: 2020-03-06
Payer: MEDICAID

## 2020-03-06 ENCOUNTER — APPOINTMENT (OUTPATIENT)
Age: 65
End: 2020-03-06

## 2020-03-06 VITALS
TEMPERATURE: 97.6 F | DIASTOLIC BLOOD PRESSURE: 78 MMHG | OXYGEN SATURATION: 93 % | WEIGHT: 231 LBS | RESPIRATION RATE: 17 BRPM | HEART RATE: 83 BPM | BODY MASS INDEX: 26.73 KG/M2 | SYSTOLIC BLOOD PRESSURE: 134 MMHG | HEIGHT: 78 IN

## 2020-03-06 PROCEDURE — 99024 POSTOP FOLLOW-UP VISIT: CPT

## 2020-03-06 RX ORDER — TAMSULOSIN HYDROCHLORIDE 0.4 MG/1
0.4 CAPSULE ORAL
Refills: 0 | Status: ACTIVE | COMMUNITY

## 2020-03-06 NOTE — PHYSICAL EXAM
[Normal] : normoactive bowel sounds, soft and nontender, no hepatosplenomegaly or masses appreciated [Motor Exam] : the motor exam was normal [de-identified] : decreased breath sounds left lung fields [de-identified] : hyperpigmentation of the low back, overlying the SI region [de-identified] : 5/5 strength bilateral UE & LE movements

## 2020-03-06 NOTE — DISEASE MANAGEMENT
[IV] : IV [Clinical] : TNM Stage: c [FreeTextEntry4] : metastatic prostate cancer [TTNM] : - [NTNM] : - [MTNM] : 1

## 2020-03-06 NOTE — REVIEW OF SYSTEMS
[Patient Intake Form Reviewed] : Patient intake form was reviewed [Fatigue] : fatigue [Recent Change In Weight] : ~T recent weight change [Shortness Of Breath] : shortness of breath [Cough] : cough [SOB on Exertion] : shortness of breath during exertion [Constipation] : constipation [Urinary Frequency] : urinary frequency [Joint Pain] : joint pain [Muscle Pain] : muscle pain [Muscle Weakness] : muscle weakness [Disturbance Of Gait] : gait disturbance [Constipation: Grade 2 - Persistent symptoms with regular use of laxatives or enemas; limiting instrumental ADL] : Constipation: Grade 2 - Persistent symptoms with regular use of laxatives or enemas; limiting instrumental ADL [Fatigue: Grade 2 - Fatigue not relieved by rest; limiting instrumental ADL] : Fatigue: Grade 2 - Fatigue not relieved by rest; limiting instrumental ADL [Cough: Grade 1 - Mild symptoms; nonprescription intervention indicated] : Cough: Grade 1 - Mild symptoms; nonprescription intervention indicated [Dyspnea: Grade 1 - Shortness of breath with moderate exertion] : Dyspnea: Grade 1 - Shortness of breath with moderate exertion [Nocturia] : nocturia [Negative] : Gastrointestinal [FreeTextEntry8] : hourly [FreeTextEntry9] : lower back stiffness

## 2020-03-06 NOTE — LETTER CLOSING
[FreeTextEntry3] : \par \par \par Jason Sue \par [employer] [facility] [employer]\par

## 2020-03-06 NOTE — REASON FOR VISIT
[Consideration for Non-Curative Therapy] : consideration for non-curative therapy for [Bone Metastasis] : bone metastasis [Prostate Cancer] : prostate cancer [Post-Treatment Evaluation] : post-treatment evaluation for prostate cancer [Family Member] : family member

## 2020-03-06 NOTE — HISTORY OF PRESENT ILLNESS
[FreeTextEntry1] : Mr. Potter is a 64 year old male with prostate cancer metastatic to lung, bone and lymph nodes who is s/p 2,000cGy to the Si joint completed 1/29/2020. \par \par Reports exertional dyspnea, cough with clear sputum , pain 2 /10  bilateral lungs & lower back stiffness with Oxycodone 10/325mg, nocturia hourly, fatigue and weight loss of 19lbs since treatment began with us. \par Denies edema or change in  bowel\par Followed with Dr. Oshea 3/2/2020; continues on Lupron q 3 months and Taxotere q 3 weeks. \par Recent hospitalization for empyema, now finishing antibiotics next week, and will be able to resume Taxotere chemotherapy the following week next follow up 4/27/2020. \par To follow with Dr. Huerta (pulm) 3/11/2020 and Dr. Yu (PCP) 4/2/2020.\par PSA 3/2/2020 89.1\par \par \par

## 2020-03-06 NOTE — VITALS
[Opioid] : opioid [ECOG Performance Status: 2 - Ambulatory and capable of all self care but unable to carry out any work activities] : Performance Status: 2 - Ambulatory and capable of all self care but unable to carry out any work activities. Up and about more than 50% of waking hours [Maximal Pain Intensity: 2/10] : 2/10 [Least Pain Intensity: 2/10] : 2/10 [Pain Description/Quality: ___] : Pain description/quality: [unfilled] [Pain Duration: ___] : Pain duration: [unfilled] [Pain Location: ___] : Pain Location: [unfilled] [Pain Interferes with ADLs] : Pain interferes with activities of daily living. [70: Cares for self; unalbe to carry on normal activity or do active work.] : 70: Cares for self; unable to carry on normal activity or do active work.

## 2020-03-10 PROBLEM — R06.00 DYSPNEA: Status: ACTIVE | Noted: 2020-01-17

## 2020-03-11 ENCOUNTER — APPOINTMENT (OUTPATIENT)
Dept: THORACIC SURGERY | Facility: CLINIC | Age: 65
End: 2020-03-11
Payer: MEDICAID

## 2020-03-11 VITALS
DIASTOLIC BLOOD PRESSURE: 85 MMHG | WEIGHT: 230 LBS | BODY MASS INDEX: 26.61 KG/M2 | OXYGEN SATURATION: 94 % | RESPIRATION RATE: 15 BRPM | SYSTOLIC BLOOD PRESSURE: 133 MMHG | HEART RATE: 86 BPM | HEIGHT: 78 IN

## 2020-03-11 DIAGNOSIS — J86.9 PYOTHORAX W/OUT FISTULA: ICD-10-CM

## 2020-03-11 PROCEDURE — 99214 OFFICE O/P EST MOD 30 MIN: CPT

## 2020-03-12 ENCOUNTER — APPOINTMENT (OUTPATIENT)
Dept: INTERNAL MEDICINE | Facility: CLINIC | Age: 65
End: 2020-03-12
Payer: MEDICAID

## 2020-03-12 VITALS
WEIGHT: 230 LBS | HEIGHT: 78 IN | BODY MASS INDEX: 26.61 KG/M2 | DIASTOLIC BLOOD PRESSURE: 80 MMHG | SYSTOLIC BLOOD PRESSURE: 130 MMHG

## 2020-03-12 VITALS — TEMPERATURE: 97.9 F

## 2020-03-12 DIAGNOSIS — J90 PLEURAL EFFUSION, NOT ELSEWHERE CLASSIFIED: ICD-10-CM

## 2020-03-12 PROCEDURE — 99214 OFFICE O/P EST MOD 30 MIN: CPT

## 2020-03-12 PROCEDURE — 99204 OFFICE O/P NEW MOD 45 MIN: CPT

## 2020-03-12 NOTE — HISTORY OF PRESENT ILLNESS
[FreeTextEntry1] : 65YO MALE S/P HOSPITAL STAY AT [place]  WITH PLEURAL EFFUSION AND EMPYEMA SEEN BY  CT SURGERY  DR PERRY HAS BEEN ON EMPIRIC IV  ABX FOR 3 WEEKS FEELS   OK\par NO FEVERS NO CHILLS \par BREATHING EASIER  NO O2 WALKING WELL

## 2020-03-12 NOTE — ASSESSMENT
[FreeTextEntry1] : 65YO MALE S/P HOSPITAL STAY AT [place]  WITH PLEURAL EFFUSION AND EMPYEMA SEEN BY  CT SURGERY  DR PERRY HAS BEEN ON EMPIRIC IV  ABX FOR 3 WEEKS FEELS   OK\par NO FEVERS NO CHILLS \par BREATHING EASIER  NO O2 WALKING WELL\par D/W CT SURGERY \par WILL D/C PICC LINE AND FOLLOW  CLINICALLY WILL DEFER Decision ON  REPEAT IMAGING TO CT SURGERY OR ONCOLOGY\par WILL FOLLOW UP Reviewed LABS WITH PT \par \par EXTENSIVE  REVIEW OF HOSP RECORDS INCLUDING LABS CONSULT IMAGING\par DURATIION OF REVIEW 35 MINUTES

## 2020-03-16 ENCOUNTER — RESULT REVIEW (OUTPATIENT)
Age: 65
End: 2020-03-16

## 2020-03-16 ENCOUNTER — APPOINTMENT (OUTPATIENT)
Dept: HEMATOLOGY ONCOLOGY | Facility: CLINIC | Age: 65
End: 2020-03-16

## 2020-03-16 ENCOUNTER — APPOINTMENT (OUTPATIENT)
Age: 65
End: 2020-03-16

## 2020-03-16 ENCOUNTER — LABORATORY RESULT (OUTPATIENT)
Age: 65
End: 2020-03-16

## 2020-03-16 DIAGNOSIS — R06.00 DYSPNEA, UNSPECIFIED: ICD-10-CM

## 2020-03-16 LAB
BASOPHILS # BLD AUTO: 0.1 K/UL — SIGNIFICANT CHANGE UP (ref 0–0.2)
BASOPHILS NFR BLD AUTO: 1.5 % — SIGNIFICANT CHANGE UP (ref 0–2)
EOSINOPHIL # BLD AUTO: 0.2 K/UL — SIGNIFICANT CHANGE UP (ref 0–0.5)
EOSINOPHIL NFR BLD AUTO: 4.5 % — SIGNIFICANT CHANGE UP (ref 0–6)
HCT VFR BLD CALC: 36.3 % — LOW (ref 39–50)
HGB BLD-MCNC: 11.4 G/DL — LOW (ref 13–17)
LYMPHOCYTES # BLD AUTO: 1.3 K/UL — SIGNIFICANT CHANGE UP (ref 1–3.3)
LYMPHOCYTES # BLD AUTO: 26.2 % — SIGNIFICANT CHANGE UP (ref 13–44)
MCHC RBC-ENTMCNC: 27.3 PG — SIGNIFICANT CHANGE UP (ref 27–34)
MCHC RBC-ENTMCNC: 31.5 G/DL — LOW (ref 32–36)
MCV RBC AUTO: 86.8 FL — SIGNIFICANT CHANGE UP (ref 80–100)
MONOCYTES # BLD AUTO: 0.5 K/UL — SIGNIFICANT CHANGE UP (ref 0–0.9)
MONOCYTES NFR BLD AUTO: 10.1 % — SIGNIFICANT CHANGE UP (ref 2–14)
NEUTROPHILS # BLD AUTO: 2.9 K/UL — SIGNIFICANT CHANGE UP (ref 1.8–7.4)
NEUTROPHILS NFR BLD AUTO: 57.7 % — SIGNIFICANT CHANGE UP (ref 43–77)
PLATELET # BLD AUTO: 364 K/UL — SIGNIFICANT CHANGE UP (ref 150–400)
RBC # BLD: 4.18 M/UL — LOW (ref 4.2–5.8)
RBC # FLD: 19.3 % — HIGH (ref 10.3–14.5)
WBC # BLD: 5 K/UL — SIGNIFICANT CHANGE UP (ref 3.8–10.5)
WBC # FLD AUTO: 5 K/UL — SIGNIFICANT CHANGE UP (ref 3.8–10.5)

## 2020-03-17 DIAGNOSIS — Z51.11 ENCOUNTER FOR ANTINEOPLASTIC CHEMOTHERAPY: ICD-10-CM

## 2020-03-17 DIAGNOSIS — R11.2 NAUSEA WITH VOMITING, UNSPECIFIED: ICD-10-CM

## 2020-03-17 PROBLEM — J86.9 EMPYEMA: Status: ACTIVE | Noted: 2020-03-12

## 2020-03-17 RX ORDER — TAMSULOSIN HYDROCHLORIDE 0.4 MG/1
0.4 CAPSULE ORAL
Qty: 180 | Refills: 1 | Status: ACTIVE | COMMUNITY
Start: 2020-03-17 | End: 1900-01-01

## 2020-03-17 NOTE — PHYSICAL EXAM
[General Appearance - Alert] : alert [General Appearance - In No Acute Distress] : in no acute distress [General Appearance - Well Nourished] : well nourished [Sclera] : the sclera and conjunctiva were normal [Extraocular Movements] : extraocular movements were intact [Outer Ear] : the ears and nose were normal in appearance [Hearing Threshold Finger Rub Not [place]] : hearing was normal [Neck Appearance] : the appearance of the neck was normal [Neck Cervical Mass (___cm)] : no neck mass was observed [Respiration, Rhythm And Depth] : normal respiratory rhythm and effort [Exaggerated Use Of Accessory Muscles For Inspiration] : no accessory muscle use [Apical Impulse] : the apical impulse was normal [Heart Rate And Rhythm] : heart rate was normal and rhythm regular [Heart Sounds] : normal S1 and S2 [Examination Of The Chest] : the chest was normal in appearance [2+] : left 2+ [Breast Appearance] : normal in appearance [Breast Palpation Mass] : no palpable masses [Bowel Sounds] : normal bowel sounds [Abdomen Tenderness] : non-tender [Cervical Lymph Nodes Enlarged Posterior Bilaterally] : posterior cervical [Supraclavicular Lymph Nodes Enlarged Bilaterally] : supraclavicular [No CVA Tenderness] : no ~M costovertebral angle tenderness [Abnormal Walk] : normal gait [Nail Clubbing] : no clubbing  or cyanosis of the fingernails [Skin Color & Pigmentation] : normal skin color and pigmentation [] : no rash [Sensation] : the sensory exam was normal to light touch and pinprick [Motor Exam] : the motor exam was normal [FreeTextEntry1] : defer

## 2020-03-17 NOTE — ASSESSMENT
[FreeTextEntry1] : Patient seen and examined. He feels well overall, still has some shortness of breath, but this has been stable. He is afebrile. His chemotherapy is currently on hold due to the staph infection.  I do not think that there is a need to repeat imaging at this point unless there is a clinical change in the Patient. He will unfortunately will always have a small amount fluid collection in the chest. This was attempted to be cleared by chest tube while he was hospitalized. \par \par He does not need any further intervention from a thoracic surgery standpoint. He is to continue and finish his antibiotics as per ID. He understands to call thoracic surgery office if there is evidence of fever, shortness of breath, or pleuritic chest pain.

## 2020-03-17 NOTE — REVIEW OF SYSTEMS
[Feeling Poorly] : not feeling poorly [Feeling Tired] : not feeling tired [Eyesight Problems] : no eyesight problems [Discharge From Eyes] : no purulent discharge from the eyes [Nosebleeds] : no nosebleeds [Nasal Discharge] : no nasal discharge [Chest Pain] : no chest pain [Palpitations] : no palpitations [Cough] : no cough [SOB on Exertion] : no shortness of breath during exertion [Constipation] : no constipation [Diarrhea] : no diarrhea [Hesitancy] : no urinary hesitancy [Nocturia] : no nocturia [Joint Swelling] : no joint swelling [Joint Stiffness] : no joint stiffness [Itching] : no itching [Change In A Mole] : no change in a mole [Dizziness] : no dizziness [Fainting] : no fainting [Anxiety] : no anxiety [Depression] : no depression [Muscle Weakness] : no muscle weakness [Erectile Dysfunction] : no erectile dysfunction [Swollen Glands] : no swollen glands [Swollen Glands In The Neck] : no swollen glands in the neck

## 2020-03-17 NOTE — HISTORY OF PRESENT ILLNESS
[FreeTextEntry1] : Mr. Potter is a 64 year old male here today for initial evaluation of pleural effusion. Past medical history significant for Dyspnea, Prostate CA, and Pleural Effusion. Patient was recently admitted to [facility] with a chief complaint of dyspnea, he was found to have a large located effusion and suspected recurrent bilateral PNA. He is now s/p thoracentesis by IR on 01/27/2020 with removal of 1200cc of fluid.  \par \par Today Mr. Mancilla reports feeling well and offers no major complaints. Denies any chest pain, dizziness, palpitations, fevers, cough, rash, shortness of breath or syncopal episode. \par \par His pleural fluid cultures grew out moderate staph Aureus (cultures from 2/5). He has been on thus far 3 weeks of antibiotics . He has a history of bilateral talc pleurodesis (which was done in [place])

## 2020-03-17 NOTE — CONSULT LETTER
[FreeTextEntry2] : RUKHSANA CHILDERS MD [FreeTextEntry3] : Neetu Huerta MD\par Cardiovascular & Thoracic Surgery\par \par [facility] \par [address] \[place] [address]\par Tel [phone]\par Fax [phone]\par [email or web address]\par

## 2020-03-18 LAB
CULTURE RESULTS: SIGNIFICANT CHANGE UP
SPECIMEN SOURCE: SIGNIFICANT CHANGE UP

## 2020-03-23 DIAGNOSIS — R50.9 FEVER, UNSPECIFIED: ICD-10-CM

## 2020-03-25 ENCOUNTER — LABORATORY RESULT (OUTPATIENT)
Age: 65
End: 2020-03-25

## 2020-03-25 ENCOUNTER — APPOINTMENT (OUTPATIENT)
Dept: HEMATOLOGY ONCOLOGY | Facility: CLINIC | Age: 65
End: 2020-03-25

## 2020-03-26 LAB
APPEARANCE: CLEAR
BASOPHILS # BLD AUTO: 0.01 K/UL
BASOPHILS NFR BLD AUTO: 0.5 %
BILIRUBIN URINE: NEGATIVE
BLOOD URINE: NEGATIVE
COLOR: YELLOW
EOSINOPHIL # BLD AUTO: 0 K/UL
EOSINOPHIL NFR BLD AUTO: 0 %
GLUCOSE QUALITATIVE U: NEGATIVE
HCT VFR BLD CALC: 37.2 %
HGB BLD-MCNC: 11.4 G/DL
IMM GRANULOCYTES NFR BLD AUTO: 0.5 %
KETONES URINE: NEGATIVE
LEUKOCYTE ESTERASE URINE: NEGATIVE
LYMPHOCYTES # BLD AUTO: 1.35 K/UL
LYMPHOCYTES NFR BLD AUTO: 68.9 %
MAN DIFF?: NORMAL
MCHC RBC-ENTMCNC: 27.3 PG
MCHC RBC-ENTMCNC: 30.6 GM/DL
MCV RBC AUTO: 89 FL
MONOCYTES # BLD AUTO: 0.17 K/UL
MONOCYTES NFR BLD AUTO: 8.7 %
NEUTROPHILS # BLD AUTO: 0.42 K/UL
NEUTROPHILS NFR BLD AUTO: 21.4 %
NITRITE URINE: NEGATIVE
PH URINE: 6
PLATELET # BLD AUTO: 274 K/UL
PROTEIN URINE: ABNORMAL
RBC # BLD: 4.18 M/UL
RBC # FLD: 19.3 %
SPECIFIC GRAVITY URINE: 1.03
UROBILINOGEN URINE: NORMAL
WBC # FLD AUTO: 1.96 K/UL

## 2020-03-27 LAB
ALBUMIN SERPL ELPH-MCNC: 4.2 G/DL
ALP BLD-CCNC: 91 U/L
ALT SERPL-CCNC: 12 U/L
ANION GAP SERPL CALC-SCNC: 21 MMOL/L
AST SERPL-CCNC: 23 U/L
BACTERIA UR CULT: NORMAL
BILIRUB SERPL-MCNC: 0.2 MG/DL
BUN SERPL-MCNC: 16 MG/DL
CALCIUM SERPL-MCNC: 8.2 MG/DL
CHLORIDE SERPL-SCNC: 100 MMOL/L
CO2 SERPL-SCNC: 19 MMOL/L
CREAT SERPL-MCNC: 0.62 MG/DL
GLUCOSE SERPL-MCNC: 95 MG/DL
POTASSIUM SERPL-SCNC: 4 MMOL/L
PROT SERPL-MCNC: 7.7 G/DL
SODIUM SERPL-SCNC: 140 MMOL/L

## 2020-04-01 ENCOUNTER — OUTPATIENT (OUTPATIENT)
Dept: OUTPATIENT SERVICES | Facility: HOSPITAL | Age: 65
LOS: 1 days | Discharge: HOME | End: 2020-04-01

## 2020-04-01 DIAGNOSIS — C79.51 SECONDARY MALIGNANT NEOPLASM OF BONE: ICD-10-CM

## 2020-04-01 DIAGNOSIS — C61 MALIGNANT NEOPLASM OF PROSTATE: ICD-10-CM

## 2020-04-03 ENCOUNTER — APPOINTMENT (OUTPATIENT)
Age: 65
End: 2020-04-03

## 2020-04-06 ENCOUNTER — APPOINTMENT (OUTPATIENT)
Dept: HEMATOLOGY ONCOLOGY | Facility: CLINIC | Age: 65
End: 2020-04-06

## 2020-04-13 ENCOUNTER — APPOINTMENT (OUTPATIENT)
Age: 65
End: 2020-04-13

## 2020-04-15 ENCOUNTER — APPOINTMENT (OUTPATIENT)
Age: 65
End: 2020-04-15

## 2020-04-20 ENCOUNTER — LABORATORY RESULT (OUTPATIENT)
Age: 65
End: 2020-04-20

## 2020-04-20 ENCOUNTER — RESULT REVIEW (OUTPATIENT)
Age: 65
End: 2020-04-20

## 2020-04-20 ENCOUNTER — APPOINTMENT (OUTPATIENT)
Age: 65
End: 2020-04-20

## 2020-04-20 LAB
BASOPHILS # BLD AUTO: 0 K/UL — SIGNIFICANT CHANGE UP (ref 0–0.2)
BASOPHILS NFR BLD AUTO: 0.8 % — SIGNIFICANT CHANGE UP (ref 0–2)
BUN SERPL-MCNC: 12 MG/DL — SIGNIFICANT CHANGE UP (ref 7–23)
CA-I BLDA-SCNC: 1.25 MMOL/L — SIGNIFICANT CHANGE UP (ref 1.12–1.3)
CHLORIDE SERPL-SCNC: 103 MMOL/L — SIGNIFICANT CHANGE UP (ref 96–108)
CO2 SERPL-SCNC: 27 MMOL/L — SIGNIFICANT CHANGE UP (ref 22–31)
CREAT SERPL-MCNC: 0.5 MG/DL — SIGNIFICANT CHANGE UP (ref 0.5–1.3)
EOSINOPHIL # BLD AUTO: 0.1 K/UL — SIGNIFICANT CHANGE UP (ref 0–0.5)
EOSINOPHIL NFR BLD AUTO: 1.4 % — SIGNIFICANT CHANGE UP (ref 0–6)
GLUCOSE SERPL-MCNC: 95 MG/DL — SIGNIFICANT CHANGE UP (ref 70–99)
HCT VFR BLD CALC: 34.6 % — LOW (ref 39–50)
HGB BLD-MCNC: 10.8 G/DL — LOW (ref 13–17)
LYMPHOCYTES # BLD AUTO: 1.2 K/UL — SIGNIFICANT CHANGE UP (ref 1–3.3)
LYMPHOCYTES # BLD AUTO: 27.8 % — SIGNIFICANT CHANGE UP (ref 13–44)
MCHC RBC-ENTMCNC: 26.7 PG — LOW (ref 27–34)
MCHC RBC-ENTMCNC: 31.3 G/DL — LOW (ref 32–36)
MCV RBC AUTO: 85.2 FL — SIGNIFICANT CHANGE UP (ref 80–100)
MONOCYTES # BLD AUTO: 0.4 K/UL — SIGNIFICANT CHANGE UP (ref 0–0.9)
MONOCYTES NFR BLD AUTO: 9.7 % — SIGNIFICANT CHANGE UP (ref 2–14)
NEUTROPHILS # BLD AUTO: 2.6 K/UL — SIGNIFICANT CHANGE UP (ref 1.8–7.4)
NEUTROPHILS NFR BLD AUTO: 60.2 % — SIGNIFICANT CHANGE UP (ref 43–77)
PLATELET # BLD AUTO: 338 K/UL — SIGNIFICANT CHANGE UP (ref 150–400)
POTASSIUM SERPL-MCNC: 3.5 MMOL/L — SIGNIFICANT CHANGE UP (ref 3.5–5.3)
POTASSIUM SERPL-SCNC: 3.5 MMOL/L — SIGNIFICANT CHANGE UP (ref 3.5–5.3)
RBC # BLD: 4.06 M/UL — LOW (ref 4.2–5.8)
RBC # FLD: 17.8 % — HIGH (ref 10.3–14.5)
SODIUM SERPL-SCNC: 140 MMOL/L — SIGNIFICANT CHANGE UP (ref 135–145)
WBC # BLD: 4.3 K/UL — SIGNIFICANT CHANGE UP (ref 3.8–10.5)
WBC # FLD AUTO: 4.3 K/UL — SIGNIFICANT CHANGE UP (ref 3.8–10.5)

## 2020-04-22 ENCOUNTER — APPOINTMENT (OUTPATIENT)
Age: 65
End: 2020-04-22

## 2020-04-27 ENCOUNTER — APPOINTMENT (OUTPATIENT)
Dept: HEMATOLOGY ONCOLOGY | Facility: CLINIC | Age: 65
End: 2020-04-27
Payer: MEDICAID

## 2020-04-27 ENCOUNTER — APPOINTMENT (OUTPATIENT)
Age: 65
End: 2020-04-27

## 2020-04-27 PROCEDURE — 99213 OFFICE O/P EST LOW 20 MIN: CPT | Mod: 95

## 2020-04-28 NOTE — PHYSICAL EXAM
[Ambulatory and capable of all self care but unable to carry out any work activities] : Status 2- Ambulatory and capable of all self care but unable to carry out any work activities. Up and about more than 50% of waking hours [de-identified] : Fatigued, but comfortable at rest

## 2020-04-28 NOTE — HISTORY OF PRESENT ILLNESS
[Home] : at home, [unfilled] , at the time of the visit. [Other Location: e.g. Home (Enter Location, City,State)___] : at [unfilled] [Family Member] : family member [Patient] : the patient [Self] : self [de-identified] : del is doing nicely, stabilized following recent treatment for pneumonia/empyema.\par Tolerating taxotere better, with PSA down to 89 from 401.\par Continues lupron.\par Pain is manageable.

## 2020-04-28 NOTE — REVIEW OF SYSTEMS
[Fever] : no fever [Fatigue] : fatigue [SOB on Exertion] : shortness of breath during exertion [Negative] : Genitourinary [FreeTextEntry6] : mild

## 2020-04-28 NOTE — ASSESSMENT
[FreeTextEntry1] : Metastatic prostate cancer to bone, responding to taxotere/lupron/xgeva with PSA decline from 401 to 89.\par 20 minutes spent reviewing clinical situation, future treatment plan.

## 2020-05-04 ENCOUNTER — TRANSCRIPTION ENCOUNTER (OUTPATIENT)
Age: 65
End: 2020-05-04

## 2020-05-05 ENCOUNTER — OUTPATIENT (OUTPATIENT)
Dept: OUTPATIENT SERVICES | Facility: HOSPITAL | Age: 65
LOS: 1 days | Discharge: HOME | End: 2020-05-05

## 2020-05-05 DIAGNOSIS — C61 MALIGNANT NEOPLASM OF PROSTATE: ICD-10-CM

## 2020-05-11 ENCOUNTER — RESULT REVIEW (OUTPATIENT)
Age: 65
End: 2020-05-11

## 2020-05-11 ENCOUNTER — APPOINTMENT (OUTPATIENT)
Age: 65
End: 2020-05-11

## 2020-05-11 LAB
BASOPHILS # BLD AUTO: 0.1 K/UL — SIGNIFICANT CHANGE UP (ref 0–0.2)
BASOPHILS NFR BLD AUTO: 1 % — SIGNIFICANT CHANGE UP (ref 0–2)
EOSINOPHIL # BLD AUTO: 0 K/UL — SIGNIFICANT CHANGE UP (ref 0–0.5)
EOSINOPHIL NFR BLD AUTO: 0.7 % — SIGNIFICANT CHANGE UP (ref 0–6)
HCT VFR BLD CALC: 37.6 % — LOW (ref 39–50)
HGB BLD-MCNC: 12 G/DL — LOW (ref 13–17)
LYMPHOCYTES # BLD AUTO: 1.5 K/UL — SIGNIFICANT CHANGE UP (ref 1–3.3)
LYMPHOCYTES # BLD AUTO: 27.3 % — SIGNIFICANT CHANGE UP (ref 13–44)
MCHC RBC-ENTMCNC: 26.9 PG — LOW (ref 27–34)
MCHC RBC-ENTMCNC: 31.8 G/DL — LOW (ref 32–36)
MCV RBC AUTO: 84.4 FL — SIGNIFICANT CHANGE UP (ref 80–100)
MONOCYTES # BLD AUTO: 0.6 K/UL — SIGNIFICANT CHANGE UP (ref 0–0.9)
MONOCYTES NFR BLD AUTO: 11 % — SIGNIFICANT CHANGE UP (ref 2–14)
NEUTROPHILS # BLD AUTO: 3.3 K/UL — SIGNIFICANT CHANGE UP (ref 1.8–7.4)
NEUTROPHILS NFR BLD AUTO: 60.1 % — SIGNIFICANT CHANGE UP (ref 43–77)
PLATELET # BLD AUTO: 181 K/UL — SIGNIFICANT CHANGE UP (ref 150–400)
RBC # BLD: 4.45 M/UL — SIGNIFICANT CHANGE UP (ref 4.2–5.8)
RBC # FLD: 18.9 % — HIGH (ref 10.3–14.5)
WBC # BLD: 5.6 K/UL — SIGNIFICANT CHANGE UP (ref 3.8–10.5)
WBC # FLD AUTO: 5.6 K/UL — SIGNIFICANT CHANGE UP (ref 3.8–10.5)

## 2020-05-12 DIAGNOSIS — Z51.11 ENCOUNTER FOR ANTINEOPLASTIC CHEMOTHERAPY: ICD-10-CM

## 2020-05-12 DIAGNOSIS — R11.2 NAUSEA WITH VOMITING, UNSPECIFIED: ICD-10-CM

## 2020-05-18 ENCOUNTER — RESULT REVIEW (OUTPATIENT)
Age: 65
End: 2020-05-18

## 2020-05-18 ENCOUNTER — APPOINTMENT (OUTPATIENT)
Age: 65
End: 2020-05-18

## 2020-05-18 ENCOUNTER — APPOINTMENT (OUTPATIENT)
Dept: HEMATOLOGY ONCOLOGY | Facility: CLINIC | Age: 65
End: 2020-05-18

## 2020-05-18 LAB
ANISOCYTOSIS BLD QL: SLIGHT — SIGNIFICANT CHANGE UP
BASOPHILS # BLD AUTO: 0 K/UL — SIGNIFICANT CHANGE UP (ref 0–0.2)
BASOPHILS NFR BLD AUTO: 2.6 % — HIGH (ref 0–2)
BUN SERPL-MCNC: 18 MG/DL — SIGNIFICANT CHANGE UP (ref 7–23)
CA-I BLDA-SCNC: 1.32 MMOL/L — HIGH (ref 1.12–1.3)
CHLORIDE SERPL-SCNC: 104 MMOL/L — SIGNIFICANT CHANGE UP (ref 96–108)
CO2 SERPL-SCNC: 29 MMOL/L — SIGNIFICANT CHANGE UP (ref 22–31)
CREAT SERPL-MCNC: 0.6 MG/DL — SIGNIFICANT CHANGE UP (ref 0.5–1.3)
ELLIPTOCYTES BLD QL SMEAR: SLIGHT — SIGNIFICANT CHANGE UP
EOSINOPHIL # BLD AUTO: 0 K/UL — SIGNIFICANT CHANGE UP (ref 0–0.5)
EOSINOPHIL NFR BLD AUTO: 2 % — SIGNIFICANT CHANGE UP (ref 0–6)
GIANT PLATELETS BLD QL SMEAR: PRESENT — SIGNIFICANT CHANGE UP
GLUCOSE SERPL-MCNC: 104 MG/DL — HIGH (ref 70–99)
HCT VFR BLD CALC: 36.3 % — LOW (ref 39–50)
HGB BLD-MCNC: 11.1 G/DL — LOW (ref 13–17)
HYPOCHROMIA BLD QL: SLIGHT — SIGNIFICANT CHANGE UP
LYMPHOCYTES # BLD AUTO: 0.8 K/UL — LOW (ref 1–3.3)
LYMPHOCYTES # BLD AUTO: 45 % — HIGH (ref 13–44)
MACROCYTES BLD QL: SLIGHT — SIGNIFICANT CHANGE UP
MCHC RBC-ENTMCNC: 26.4 PG — LOW (ref 27–34)
MCHC RBC-ENTMCNC: 30.6 G/DL — LOW (ref 32–36)
MCV RBC AUTO: 86.4 FL — SIGNIFICANT CHANGE UP (ref 80–100)
MICROCYTES BLD QL: SLIGHT — SIGNIFICANT CHANGE UP
MONOCYTES # BLD AUTO: 0.1 K/UL — SIGNIFICANT CHANGE UP (ref 0–0.9)
MONOCYTES NFR BLD AUTO: 2 % — SIGNIFICANT CHANGE UP (ref 2–14)
NEUTROPHILS # BLD AUTO: 0.9 K/UL — LOW (ref 1.8–7.4)
NEUTROPHILS NFR BLD AUTO: 48 % — SIGNIFICANT CHANGE UP (ref 43–77)
OVALOCYTES BLD QL SMEAR: SLIGHT — SIGNIFICANT CHANGE UP
PLAT MORPH BLD: NORMAL — SIGNIFICANT CHANGE UP
PLATELET # BLD AUTO: 214 K/UL — SIGNIFICANT CHANGE UP (ref 150–400)
POIKILOCYTOSIS BLD QL AUTO: SLIGHT — SIGNIFICANT CHANGE UP
POLYCHROMASIA BLD QL SMEAR: SLIGHT — SIGNIFICANT CHANGE UP
POTASSIUM SERPL-MCNC: 4.2 MMOL/L — SIGNIFICANT CHANGE UP (ref 3.5–5.3)
POTASSIUM SERPL-SCNC: 4.2 MMOL/L — SIGNIFICANT CHANGE UP (ref 3.5–5.3)
RBC # BLD: 4.2 M/UL — SIGNIFICANT CHANGE UP (ref 4.2–5.8)
RBC # FLD: 17.6 % — HIGH (ref 10.3–14.5)
RBC BLD AUTO: SIGNIFICANT CHANGE UP
SODIUM SERPL-SCNC: 141 MMOL/L — SIGNIFICANT CHANGE UP (ref 135–145)
VARIANT LYMPHS # BLD: 3 % — SIGNIFICANT CHANGE UP (ref 0–6)
WBC # BLD: 1.9 K/UL — LOW (ref 3.8–10.5)
WBC # FLD AUTO: 1.9 K/UL — LOW (ref 3.8–10.5)

## 2020-05-18 RX ORDER — FINASTERIDE 5 MG/1
5 TABLET, FILM COATED ORAL DAILY
Qty: 30 | Refills: 3 | Status: COMPLETED | COMMUNITY
Start: 2020-01-09 | End: 2020-05-18

## 2020-05-19 DIAGNOSIS — Z51.89 ENCOUNTER FOR OTHER SPECIFIED AFTERCARE: ICD-10-CM

## 2020-05-19 LAB — PSA SERPL-MCNC: 38.6 NG/ML

## 2020-06-01 ENCOUNTER — APPOINTMENT (OUTPATIENT)
Age: 65
End: 2020-06-01

## 2020-06-02 ENCOUNTER — RESULT REVIEW (OUTPATIENT)
Age: 65
End: 2020-06-02

## 2020-06-02 ENCOUNTER — APPOINTMENT (OUTPATIENT)
Age: 65
End: 2020-06-02

## 2020-06-02 LAB
BASOPHILS # BLD AUTO: 0.1 K/UL — SIGNIFICANT CHANGE UP (ref 0–0.2)
BASOPHILS NFR BLD AUTO: 1.8 % — SIGNIFICANT CHANGE UP (ref 0–2)
EOSINOPHIL # BLD AUTO: 0.1 K/UL — SIGNIFICANT CHANGE UP (ref 0–0.5)
EOSINOPHIL NFR BLD AUTO: 1.7 % — SIGNIFICANT CHANGE UP (ref 0–6)
HCT VFR BLD CALC: 37.4 % — LOW (ref 39–50)
HGB BLD-MCNC: 12.4 G/DL — LOW (ref 13–17)
LYMPHOCYTES # BLD AUTO: 1.6 K/UL — SIGNIFICANT CHANGE UP (ref 1–3.3)
LYMPHOCYTES # BLD AUTO: 32.6 % — SIGNIFICANT CHANGE UP (ref 13–44)
MCHC RBC-ENTMCNC: 27.8 PG — SIGNIFICANT CHANGE UP (ref 27–34)
MCHC RBC-ENTMCNC: 33.2 G/DL — SIGNIFICANT CHANGE UP (ref 32–36)
MCV RBC AUTO: 83.7 FL — SIGNIFICANT CHANGE UP (ref 80–100)
MONOCYTES # BLD AUTO: 0.6 K/UL — SIGNIFICANT CHANGE UP (ref 0–0.9)
MONOCYTES NFR BLD AUTO: 11.9 % — SIGNIFICANT CHANGE UP (ref 2–14)
NEUTROPHILS # BLD AUTO: 2.5 K/UL — SIGNIFICANT CHANGE UP (ref 1.8–7.4)
NEUTROPHILS NFR BLD AUTO: 51.9 % — SIGNIFICANT CHANGE UP (ref 43–77)
PLATELET # BLD AUTO: 246 K/UL — SIGNIFICANT CHANGE UP (ref 150–400)
RBC # BLD: 4.47 M/UL — SIGNIFICANT CHANGE UP (ref 4.2–5.8)
RBC # FLD: 18.3 % — HIGH (ref 10.3–14.5)
WBC # BLD: 4.9 K/UL — SIGNIFICANT CHANGE UP (ref 3.8–10.5)
WBC # FLD AUTO: 4.9 K/UL — SIGNIFICANT CHANGE UP (ref 3.8–10.5)

## 2020-06-05 ENCOUNTER — APPOINTMENT (OUTPATIENT)
Dept: RADIATION ONCOLOGY | Facility: CLINIC | Age: 65
End: 2020-06-05
Payer: MEDICAID

## 2020-06-05 PROCEDURE — 99214 OFFICE O/P EST MOD 30 MIN: CPT | Mod: 95

## 2020-06-05 NOTE — VITALS
[Pain Description/Quality: ___] : Pain description/quality: [unfilled] [Pain Duration: ___] : Pain duration: [unfilled] [Pain Location: ___] : Pain Location: [unfilled] [Maximal Pain Intensity: 2/10] : 2/10 [Least Pain Intensity: 1/10] : 1/10 [NoTreatment Scheduled] : no treatment scheduled [80: Normal activity with effort; some signs or symptoms of disease.] : 80: Normal activity with effort; some signs or symptoms of disease.  [Pain Interferes with ADLs] : Pain does not interfere with activities of daily living

## 2020-06-05 NOTE — HISTORY OF PRESENT ILLNESS
[Home] : at home, [unfilled] , at the time of the visit. [Medical Office: ([facility])___] : at the medical office located in  [Verbal consent obtained from patient] : the patient, [unfilled] [FreeTextEntry4] : Leidy Clark [FreeTextEntry1] : Mr. Potter is a 64 year old gentleman with stage IV prostate cancer, prior palliative RT to the Si joint completed 1/29/2020. \par \par Reports lingering weakness of the right hip, pain minimal 1-2/10 with Oxycodone 10mg as needed.\par No new aches or pains elsewhere. \par Continues on finasteride and Flomax; denies much urinary bother.\par Denies change in  bowels, change in urination, fatigue or weight loss.\par Continues on Lupron q 3 months and Taxotere q 3 weeks with Dr. Oshea.\par  \par Followed with Dr. Huerta (pulm) 3/11/2020 and Dr. Yu (PCP) 3/12/2020.\par \par PSA 1/2/2020: 401\par PSA 3/2/2020: 89.10\par PSA 5/8/2020 : 38.6

## 2020-06-05 NOTE — REASON FOR VISIT
[Bone Metastasis] : bone metastasis [Prostate Cancer] : prostate cancer [Routine Follow-Up] : routine follow-up visit for

## 2020-06-05 NOTE — PHYSICAL EXAM
[Motor Exam] : the motor exam was normal [de-identified] : decreased breath sounds left lung fields [de-identified] : hyperpigmentation of the low back, overlying the SI region [de-identified] : 5/5 strength bilateral UE & LE movements [Extraocular Movements] : extraocular movements were intact [Normal] : oriented to person, place and time, the affect was normal, the mood was normal and not anxious

## 2020-06-08 ENCOUNTER — APPOINTMENT (OUTPATIENT)
Age: 65
End: 2020-06-08

## 2020-06-08 ENCOUNTER — APPOINTMENT (OUTPATIENT)
Dept: HEMATOLOGY ONCOLOGY | Facility: CLINIC | Age: 65
End: 2020-06-08

## 2020-06-09 ENCOUNTER — OUTPATIENT (OUTPATIENT)
Dept: OUTPATIENT SERVICES | Facility: HOSPITAL | Age: 65
LOS: 1 days | Discharge: HOME | End: 2020-06-09

## 2020-06-09 DIAGNOSIS — C61 MALIGNANT NEOPLASM OF PROSTATE: ICD-10-CM

## 2020-06-15 ENCOUNTER — RESULT REVIEW (OUTPATIENT)
Age: 65
End: 2020-06-15

## 2020-06-15 ENCOUNTER — APPOINTMENT (OUTPATIENT)
Age: 65
End: 2020-06-15

## 2020-06-15 LAB
ANISOCYTOSIS BLD QL: SLIGHT — SIGNIFICANT CHANGE UP
BASOPHILS # BLD AUTO: 0 K/UL — SIGNIFICANT CHANGE UP (ref 0–0.2)
BASOPHILS NFR BLD AUTO: 1 % — SIGNIFICANT CHANGE UP (ref 0–2)
BUN SERPL-MCNC: 20 MG/DL — SIGNIFICANT CHANGE UP (ref 7–23)
CA-I BLDA-SCNC: 1.26 MMOL/L — SIGNIFICANT CHANGE UP (ref 1.12–1.3)
CHLORIDE SERPL-SCNC: 103 MMOL/L — SIGNIFICANT CHANGE UP (ref 96–108)
CO2 SERPL-SCNC: 27 MMOL/L — SIGNIFICANT CHANGE UP (ref 22–31)
CREAT SERPL-MCNC: 0.7 MG/DL — SIGNIFICANT CHANGE UP (ref 0.5–1.3)
EOSINOPHIL # BLD AUTO: 0.1 K/UL — SIGNIFICANT CHANGE UP (ref 0–0.5)
EOSINOPHIL NFR BLD AUTO: 3 % — SIGNIFICANT CHANGE UP (ref 0–6)
GIANT PLATELETS BLD QL SMEAR: PRESENT — SIGNIFICANT CHANGE UP
GLUCOSE SERPL-MCNC: 87 MG/DL — SIGNIFICANT CHANGE UP (ref 70–99)
HCT VFR BLD CALC: 36.4 % — LOW (ref 39–50)
HGB BLD-MCNC: 11.7 G/DL — LOW (ref 13–17)
LG PLATELETS BLD QL AUTO: SLIGHT — SIGNIFICANT CHANGE UP
LYMPHOCYTES # BLD AUTO: 0.9 K/UL — LOW (ref 1–3.3)
LYMPHOCYTES # BLD AUTO: 35 % — SIGNIFICANT CHANGE UP (ref 13–44)
MACROCYTES BLD QL: SLIGHT — SIGNIFICANT CHANGE UP
MCHC RBC-ENTMCNC: 27.5 PG — SIGNIFICANT CHANGE UP (ref 27–34)
MCHC RBC-ENTMCNC: 32.2 G/DL — SIGNIFICANT CHANGE UP (ref 32–36)
MCV RBC AUTO: 85.3 FL — SIGNIFICANT CHANGE UP (ref 80–100)
MICROCYTES BLD QL: SLIGHT — SIGNIFICANT CHANGE UP
MONOCYTES # BLD AUTO: 0.3 K/UL — SIGNIFICANT CHANGE UP (ref 0–0.9)
MONOCYTES NFR BLD AUTO: 12 % — SIGNIFICANT CHANGE UP (ref 2–14)
NEUTROPHILS # BLD AUTO: 0.9 K/UL — LOW (ref 1.8–7.4)
NEUTROPHILS NFR BLD AUTO: 49 % — SIGNIFICANT CHANGE UP (ref 43–77)
PLAT MORPH BLD: NORMAL — SIGNIFICANT CHANGE UP
PLATELET # BLD AUTO: 223 K/UL — SIGNIFICANT CHANGE UP (ref 150–400)
POIKILOCYTOSIS BLD QL AUTO: SLIGHT — SIGNIFICANT CHANGE UP
POLYCHROMASIA BLD QL SMEAR: SLIGHT — SIGNIFICANT CHANGE UP
POTASSIUM SERPL-MCNC: 4.1 MMOL/L — SIGNIFICANT CHANGE UP (ref 3.5–5.3)
POTASSIUM SERPL-SCNC: 4.1 MMOL/L — SIGNIFICANT CHANGE UP (ref 3.5–5.3)
RBC # BLD: 4.27 M/UL — SIGNIFICANT CHANGE UP (ref 4.2–5.8)
RBC # FLD: 17.9 % — HIGH (ref 10.3–14.5)
RBC BLD AUTO: SIGNIFICANT CHANGE UP
SODIUM SERPL-SCNC: 142 MMOL/L — SIGNIFICANT CHANGE UP (ref 135–145)
WBC # BLD: 2.2 K/UL — LOW (ref 3.8–10.5)
WBC # FLD AUTO: 2.2 K/UL — LOW (ref 3.8–10.5)

## 2020-06-16 DIAGNOSIS — C79.51 SECONDARY MALIGNANT NEOPLASM OF BONE: ICD-10-CM

## 2020-06-19 ENCOUNTER — APPOINTMENT (OUTPATIENT)
Dept: HEMATOLOGY ONCOLOGY | Facility: CLINIC | Age: 65
End: 2020-06-19

## 2020-06-22 ENCOUNTER — APPOINTMENT (OUTPATIENT)
Age: 65
End: 2020-06-22

## 2020-06-26 ENCOUNTER — APPOINTMENT (OUTPATIENT)
Age: 65
End: 2020-06-26

## 2020-06-29 ENCOUNTER — APPOINTMENT (OUTPATIENT)
Dept: HEMATOLOGY ONCOLOGY | Facility: CLINIC | Age: 65
End: 2020-06-29

## 2020-07-06 ENCOUNTER — APPOINTMENT (OUTPATIENT)
Age: 65
End: 2020-07-06

## 2020-07-08 ENCOUNTER — APPOINTMENT (OUTPATIENT)
Age: 65
End: 2020-07-08

## 2020-07-13 ENCOUNTER — APPOINTMENT (OUTPATIENT)
Age: 65
End: 2020-07-13

## 2020-07-15 ENCOUNTER — OUTPATIENT (OUTPATIENT)
Dept: OUTPATIENT SERVICES | Facility: HOSPITAL | Age: 65
LOS: 1 days | Discharge: HOME | End: 2020-07-15

## 2020-07-15 DIAGNOSIS — C61 MALIGNANT NEOPLASM OF PROSTATE: ICD-10-CM

## 2020-07-20 ENCOUNTER — APPOINTMENT (OUTPATIENT)
Dept: HEMATOLOGY ONCOLOGY | Facility: CLINIC | Age: 65
End: 2020-07-20

## 2020-07-22 ENCOUNTER — RESULT REVIEW (OUTPATIENT)
Age: 65
End: 2020-07-22

## 2020-07-22 ENCOUNTER — APPOINTMENT (OUTPATIENT)
Age: 65
End: 2020-07-22

## 2020-07-22 ENCOUNTER — APPOINTMENT (OUTPATIENT)
Dept: HEMATOLOGY ONCOLOGY | Facility: CLINIC | Age: 65
End: 2020-07-22
Payer: MEDICAID

## 2020-07-22 VITALS
SYSTOLIC BLOOD PRESSURE: 179 MMHG | HEART RATE: 86 BPM | TEMPERATURE: 97.1 F | DIASTOLIC BLOOD PRESSURE: 99 MMHG | WEIGHT: 261.2 LBS | BODY MASS INDEX: 30.18 KG/M2

## 2020-07-22 LAB
BASOPHILS # BLD AUTO: 0 K/UL — SIGNIFICANT CHANGE UP (ref 0–0.2)
BASOPHILS NFR BLD AUTO: 1 % — SIGNIFICANT CHANGE UP (ref 0–2)
EOSINOPHIL # BLD AUTO: 0.3 K/UL — SIGNIFICANT CHANGE UP (ref 0–0.5)
EOSINOPHIL NFR BLD AUTO: 8.1 % — HIGH (ref 0–6)
HCT VFR BLD CALC: 37.6 % — LOW (ref 39–50)
HGB BLD-MCNC: 12.1 G/DL — LOW (ref 13–17)
LYMPHOCYTES # BLD AUTO: 0.9 K/UL — LOW (ref 1–3.3)
LYMPHOCYTES # BLD AUTO: 26.3 % — SIGNIFICANT CHANGE UP (ref 13–44)
MCHC RBC-ENTMCNC: 28.6 PG — SIGNIFICANT CHANGE UP (ref 27–34)
MCHC RBC-ENTMCNC: 32.2 G/DL — SIGNIFICANT CHANGE UP (ref 32–36)
MCV RBC AUTO: 88.7 FL — SIGNIFICANT CHANGE UP (ref 80–100)
MONOCYTES # BLD AUTO: 0.4 K/UL — SIGNIFICANT CHANGE UP (ref 0–0.9)
MONOCYTES NFR BLD AUTO: 11.5 % — SIGNIFICANT CHANGE UP (ref 2–14)
NEUTROPHILS # BLD AUTO: 1.8 K/UL — SIGNIFICANT CHANGE UP (ref 1.8–7.4)
NEUTROPHILS NFR BLD AUTO: 53.2 % — SIGNIFICANT CHANGE UP (ref 43–77)
PLATELET # BLD AUTO: 224 K/UL — SIGNIFICANT CHANGE UP (ref 150–400)
RBC # BLD: 4.25 M/UL — SIGNIFICANT CHANGE UP (ref 4.2–5.8)
RBC # FLD: 16.8 % — HIGH (ref 10.3–14.5)
WBC # BLD: 3.3 K/UL — LOW (ref 3.8–10.5)
WBC # FLD AUTO: 3.3 K/UL — LOW (ref 3.8–10.5)

## 2020-07-22 PROCEDURE — 99214 OFFICE O/P EST MOD 30 MIN: CPT

## 2020-07-23 DIAGNOSIS — C79.51 SECONDARY MALIGNANT NEOPLASM OF BONE: ICD-10-CM

## 2020-07-23 DIAGNOSIS — C78.00 SECONDARY MALIGNANT NEOPLASM OF UNSPECIFIED LUNG: ICD-10-CM

## 2020-07-23 DIAGNOSIS — C77.5 SECONDARY AND UNSPECIFIED MALIGNANT NEOPLASM OF INTRAPELVIC LYMPH NODES: ICD-10-CM

## 2020-07-23 LAB
ALBUMIN SERPL ELPH-MCNC: 4.2 G/DL
ALP BLD-CCNC: 60 U/L
ALT SERPL-CCNC: 15 U/L
ANION GAP SERPL CALC-SCNC: 15 MMOL/L
AST SERPL-CCNC: 19 U/L
BILIRUB SERPL-MCNC: 0.2 MG/DL
BUN SERPL-MCNC: 21 MG/DL
CALCIUM SERPL-MCNC: 9.4 MG/DL
CHLORIDE SERPL-SCNC: 103 MMOL/L
CO2 SERPL-SCNC: 24 MMOL/L
CREAT SERPL-MCNC: 0.77 MG/DL
GLUCOSE SERPL-MCNC: 94 MG/DL
POTASSIUM SERPL-SCNC: 4 MMOL/L
PROT SERPL-MCNC: 6.9 G/DL
PSA SERPL-MCNC: 29.7 NG/ML
SODIUM SERPL-SCNC: 142 MMOL/L

## 2020-07-23 NOTE — ASSESSMENT
[FreeTextEntry1] : Prostate cancer metastatic to bone, with excellent response to Lupron and Taxotere.\par We will now follow on Lupron and Xgeva every 3 months.

## 2020-07-23 NOTE — HISTORY OF PRESENT ILLNESS
[de-identified] : \par Patient initially presented to [facility] on 11/15/19 with dyspnea and severe weight loss. CT chest revealed moderate to large bilateral pneumothoraces. Multifocal soft tissue infiltration of the chest wall lesions that were resulting in destruction of underlying ribs bilaterally. Multifocal cystic/cavity lung lesions with consolidated lung. Thoracic spine metastases with mild/moderate T6 compression fracture. The patient was seen by pulmonary and thoracic surgery. He underwent CT-guided chest tube placement. CT abdomen/pelvis on 11/21/19 revealed diffuse destructive osseous metastasis. Bulky retroperitoneal and lower pelvic lymphadenopathy. Severely enlarged prostate. PSA level greater than 5000. CT biopsy confirmed prostate cancer. The patient was initiated on Casodex on 11/21/19. He subsequently received Lupron on 11/25/19. He has also received IV Taxotere 75 mg/m2 on 11/26/19 which was complicated by mile neutropenia. The patient had a very long hospitalization and ultimately was transferred to [facility] given the persistent left-sided pneumothorax. 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 The patient underwent a talc pleurodesis at [facility]. He was ultimately discharged from the hospital on 12/16/19 after being hospitalized for close to 1 month. \par \par -- Casodex d/c on 12/17/19\par -- Lupron (11/25/29-on going) \par -- IV Taxotere 75 mg/m2 q 3 weeks (11/26/19-on going). Prednisone 5 mg BID. \par -- Chemotherapy induced leukopenia improved s/p Zarxio.\par -- Oxycodone given for pain \par -- Xgeva to be given pending dental clearance \par \par CT 11/15/19:\par -Bilateral large pneumothoraces and a large destructive right rib mass with diffuse bilateral infiltrates with cavitation. \par \par Pathology 11/18/19: \par Chest wall mass (needle biopsies):\par -Metastatic adenocarcinoma exhibiting features consistent with prostatic primary. \par \par \par \par  Was at [facility] for pneumonia from 1/20/2020 - 2/18/2020. Discharge summary below. \par \par \par [facility] Discharge note 2/16/2020:\par 64 year old male with prostate adenocarcinoma with mets to the Bone, Lung and Lymp on ADT (Lupron), Taxotere, Xgeva therapy who presented to the Er with complaints of worsening shortness of breath. CTA of the chest was negative for PE, Diffuse bilateral areas of pulmonary consolidation with pneumatocele formation and Large loculated left pleural effusions with compressive atelectasis of the left lower lobe. Status post thoracentesis by IR on 1/27 with removal of 1200cc of fluid. Pleural fluid was consistent with exudative fluid; cultures positive for MSSA. Started on IV antibiotics, ID consulted. Repeat CT chest with minimal improvement in loculation. CT surgery and IR were reconsulted. Status post IR chest tube placement on 2/5 Antibiotics changed to IV cefazolin to cover MSSA. chest tube has been removed , patient was not a good candidate for decortication. as per ID picc LINE WAS INSERTED , PATIENT WILL NEED PROLONGED COURSE OF ANTIBIOTICS FOR EMPYEMA.patient has refused TED placement , will be discharged home after home antibiotic education , also patient is currently being weaned from oxygen , he desaturates with ambulation, did not qualify for home oxygen as per CM. \par \par 1. Left sided pleural effusion/empyema \par infectious in origin exudative , cultures positive for MSSA. ID on board , maintained on cefazolin, chest tube removed, plan is for prolonged course of antibiotics IV -> 3/10. Discussed with Dr. restrepo. had some low grade fever , resolved without any changes. PICC line in place \par \par 2. Prostate adeno CA with metastatic disease\par  on hormone therapy and pain control. continue with current regimen. on prednisone. will follow up outpatient \par \par 3. BPH, \par flomax and finasteride. \par \par 4. Anemia \par AOCD , FOBT negative , anemia panel not suggestive of iron deficiency. monitor. DIscussed outpatient f/u with PMD. \par \par  [de-identified] : Del is doing very nicely now.\par He is completed 6 cycles of Taxotere, and will now be followed on Lupron as sole therapy.\par He has no complaints to offer today, denying pain or shortness of breath.\par No urinary symptoms.\par \par PSA now down to 29, CMP normal.

## 2020-07-24 ENCOUNTER — APPOINTMENT (OUTPATIENT)
Age: 65
End: 2020-07-24

## 2020-07-29 ENCOUNTER — APPOINTMENT (OUTPATIENT)
Age: 65
End: 2020-07-29

## 2020-08-03 ENCOUNTER — APPOINTMENT (OUTPATIENT)
Age: 65
End: 2020-08-03

## 2020-08-19 NOTE — PATIENT PROFILE ADULT - NSPROPTRIGHTBILLOFRIGHTS_GEN_A_NUR
"    8/19/2020         RE: Preeti Fox  [address]  [facility] [address]        Dear Colleague,    Thank you for referring your patient, Preeti Fox, to the [facility]. Please see a copy of my visit note below.    Preeti Fox is a 29 year old female who is being evaluated via a billable video visit.      The patient has been notified of following:     \"This video visit will be conducted via a call between you and your physician/provider. We have found that certain health care needs can be provided without the need for an in-person physical exam.  This service lets us provide the care you need with a video conversation.  If a prescription is necessary we can send it directly to your pharmacy.  If lab work is needed we can place an order for that and you can then stop by our lab to have the test done at a later time.    Video visits are billed at different rates depending on your insurance coverage.  Please reach out to your insurance provider with any questions.    If during the course of the call the physician/provider feels a video visit is not appropriate, you will not be charged for this service.\"    Patient has given verbal consent for Video visit? Yes  How would you like to obtain your AVS? MyChart  If you are dropped from the video visit, the video invite should be resent to: Other e-mail: [employer]  Will anyone else be joining your video visit? No        Video-Visit Details    Type of service:  Video Visit    Video Start Time: 10:58 AM  Video End Time: 11:22 AM    Originating Location (pt. Location): Home    Distant Location (provider location):  [facility]     Platform used for Video Visit: Norm      CC: DM.     HPI:   Patient presents for management of DM.   Diagnosed at age 6.     She is using a Medtronic 670 G pump.   She does not use auto-mode and tends to not wear her CGM much.   She shields not feel the CGM helps much as she can sense when she is heading low.   She " does not have a [employer] account.     Notes she runs a higher basal rate as she does not bolus for her food much.   She does not drop low when she boluses.     ROS: 10 point ROS neg other than the symptoms noted above in the HPI.    PMH:   Patient Active Problem List   Diagnosis     Type 1 diabetes mellitus with hyperglycemia (H)     Anxiety     Recurrent major depressive disorder, in full remission (H)       Meds:  Current Outpatient Medications   Medication     ALPRAZolam (XANAX) 0.25 MG tablet     HUMALOG 100 UNIT/ML injection     sertraline (ZOLOFT) 100 MG tablet     Current Facility-Administered Medications   Medication     levonorgestrel (FIDEL) 13.5 MG IUD 13.5 mg     FHX:   Great grandmother had type 1 DM.     SHX:   for [place]aki.   Moved from [place] 1 year ago.     Exam:   GENERAL: Healthy, alert and no distress  EYES: Eyes grossly normal to inspection.  No discharge or erythema, or obvious scleral/conjunctival abnormalities.  HENT: Normal cephalic/atraumatic.  External ears, nose and mouth without ulcers or lesions.  No nasal drainage visible.  RESP: No audible wheeze, cough, or visible cyanosis.  No visible retractions or increased work of breathing.    MS: No gross musculoskeletal defects noted.  Normal range of motion.  No visible edema.  SKIN: Visible skin clear. No significant rash, abnormal pigmentation or lesions.  NEURO: Cranial nerves grossly intact.  Mentation and speech appropriate for age.  PSYCH: Mentation appears normal, affect normal/bright, judgement and insight intact, normal speech and appearance well-groomed.      A/P:   Type 1 DM - Needs help with using her pump and CGM. Admits to being over-reliant on her basal rate. She admits to avoiding looking at her glucoses because she does not like seeing the reading. She is getting  and wants to have children. Discussed how goal HbA1C is 6.5% or less.   -I placed her in contact with a  with type 1 DM.   -Schedule labs.    -Patient provided permission to connect her with Aundrea Marti.   -ASA not indicated.  -BP: normal on last check.  -Lipids: HDL 52, Trg 94, LDL 98 in 1/2019. Statin not indicated.   Repeat lab.   -Microalbumin negative in 1/2020. ACEi not indicated.   -TSH normal in 1/2020.   -Eyes: moderate NDPR in 9/2019. Waiting to get back on regular insurance and off [employer].   -Smoking: none.         Dada Richards MD on 8/19/2020 at 11:23 AM            Again, thank you for allowing me to participate in the care of your patient.        Sincerely,        Dada Richards MD     patient

## 2020-08-27 ENCOUNTER — RX RENEWAL (OUTPATIENT)
Age: 65
End: 2020-08-27

## 2020-08-27 RX ORDER — FINASTERIDE 5 MG/1
5 TABLET, FILM COATED ORAL DAILY
Qty: 90 | Refills: 1 | Status: ACTIVE | COMMUNITY
Start: 2020-03-17 | End: 1900-01-01

## 2020-09-15 ENCOUNTER — APPOINTMENT (OUTPATIENT)
Age: 65
End: 2020-09-15

## 2020-10-07 ENCOUNTER — APPOINTMENT (OUTPATIENT)
Age: 65
End: 2020-10-07

## 2020-10-07 ENCOUNTER — APPOINTMENT (OUTPATIENT)
Dept: RADIATION ONCOLOGY | Facility: CLINIC | Age: 65
End: 2020-10-07
Payer: MEDICAID

## 2020-10-07 DIAGNOSIS — C79.51 SECONDARY MALIGNANT NEOPLASM OF BONE: ICD-10-CM

## 2020-10-07 PROCEDURE — 99442: CPT

## 2020-10-07 RX ORDER — PREDNISONE 5 MG/1
5 TABLET ORAL DAILY
Qty: 30 | Refills: 3 | Status: DISCONTINUED | COMMUNITY
Start: 2020-01-09 | End: 2020-10-07

## 2020-10-07 RX ORDER — LEVOFLOXACIN 500 MG/1
500 TABLET, FILM COATED ORAL DAILY
Qty: 7 | Refills: 0 | Status: DISCONTINUED | COMMUNITY
Start: 2020-03-23 | End: 2020-10-07

## 2020-10-07 RX ORDER — PREDNISONE 5 MG/1
5 TABLET ORAL
Qty: 30 | Refills: 2 | Status: DISCONTINUED | COMMUNITY
Start: 2020-01-08 | End: 2020-10-07

## 2020-10-07 RX ORDER — OXYCODONE AND ACETAMINOPHEN 10; 325 MG/1; MG/1
10-325 TABLET ORAL EVERY 8 HOURS
Qty: 42 | Refills: 0 | Status: DISCONTINUED | COMMUNITY
Start: 2020-01-09 | End: 2020-10-07

## 2020-10-07 RX ORDER — OXYCODONE AND ACETAMINOPHEN 10; 325 MG/1; MG/1
10-325 TABLET ORAL EVERY 8 HOURS
Qty: 42 | Refills: 0 | Status: DISCONTINUED | COMMUNITY
Start: 2020-02-20 | End: 2020-10-07

## 2020-10-07 RX ORDER — LACTULOSE 10 G/15ML
10 SOLUTION ORAL DAILY
Qty: 105 | Refills: 0 | Status: DISCONTINUED | COMMUNITY
Start: 2020-01-13 | End: 2020-10-07

## 2020-10-07 RX ORDER — OXYCODONE AND ACETAMINOPHEN 10; 325 MG/1; MG/1
10-325 TABLET ORAL EVERY 8 HOURS
Qty: 42 | Refills: 0 | Status: DISCONTINUED | COMMUNITY
Start: 2020-03-03 | End: 2020-10-07

## 2020-10-07 RX ORDER — LACTULOSE 10 G/15ML
10 SOLUTION ORAL
Qty: 105 | Refills: 0 | Status: DISCONTINUED | COMMUNITY
Start: 2020-01-14 | End: 2020-10-07

## 2020-10-07 RX ORDER — OXYCODONE AND ACETAMINOPHEN 10; 325 MG/1; MG/1
10-325 TABLET ORAL
Qty: 90 | Refills: 0 | Status: DISCONTINUED | COMMUNITY
Start: 2020-03-17 | End: 2020-10-07

## 2020-10-07 NOTE — HISTORY OF PRESENT ILLNESS
[Home] : at home, [unfilled] , at the time of the visit. [Medical Office: ([facility])___] : at the medical office located in  [Verbal consent obtained from patient] : the patient, [unfilled] [FreeTextEntry4] : Leidy Clark [FreeTextEntry1] : 65 year old gentleman with stage IV prostate cancer, s/p Taxotere and on ADT.  He previously received palliative RT to the SI joint completing 1/29/2020.\par \par Reports current nocturia 2-3.  Denies dyspnea, cough, change in bowel, fatigue or weight loss.  Denies freeman pain currently; rates SI region 0/10.  Notes intermittent ache above that area, and has resumed exercise.\par \par Continues with Lupron depot every 3 months.  \par Dr. Oshea\par Dr. Huerta (pulm)\par Dr. Yu (PCP)\par \par PSA 1/2/2020: 401\par PSA 3/2/2020: 89.10\par PSA 5/8/2020 : 38.6\par PSA 7/22/2020: 29.7\par

## 2020-10-07 NOTE — DISEASE MANAGEMENT
[IV] : IV [Clinical] : TNM Stage: c [X] : NX [1] : M1 [FreeTextEntry4] : metastatic prostate cancer [TTNM] : - [NTNM] : - [MTNM] : 1

## 2020-10-09 ENCOUNTER — RX RENEWAL (OUTPATIENT)
Age: 65
End: 2020-10-09

## 2020-10-09 RX ORDER — PREDNISONE 5 MG/1
5 TABLET ORAL TWICE DAILY
Qty: 60 | Refills: 5 | Status: ACTIVE | COMMUNITY
Start: 2020-03-17 | End: 1900-01-01

## 2020-10-15 ENCOUNTER — OUTPATIENT (OUTPATIENT)
Dept: OUTPATIENT SERVICES | Facility: HOSPITAL | Age: 65
LOS: 1 days | Discharge: HOME | End: 2020-10-15

## 2020-10-15 DIAGNOSIS — C61 MALIGNANT NEOPLASM OF PROSTATE: ICD-10-CM

## 2020-10-21 ENCOUNTER — RESULT REVIEW (OUTPATIENT)
Age: 65
End: 2020-10-21

## 2020-10-21 ENCOUNTER — APPOINTMENT (OUTPATIENT)
Dept: HEMATOLOGY ONCOLOGY | Facility: CLINIC | Age: 65
End: 2020-10-21
Payer: MEDICAID

## 2020-10-21 VITALS
OXYGEN SATURATION: 92 % | BODY MASS INDEX: 30.67 KG/M2 | WEIGHT: 265.03 LBS | HEIGHT: 78 IN | HEART RATE: 86 BPM | DIASTOLIC BLOOD PRESSURE: 93 MMHG | SYSTOLIC BLOOD PRESSURE: 143 MMHG

## 2020-10-21 LAB
BASOPHILS # BLD AUTO: 0 K/UL — SIGNIFICANT CHANGE UP (ref 0–0.2)
BASOPHILS NFR BLD AUTO: 1.5 % — SIGNIFICANT CHANGE UP (ref 0–2)
EOSINOPHIL # BLD AUTO: 0.3 K/UL — SIGNIFICANT CHANGE UP (ref 0–0.5)
EOSINOPHIL NFR BLD AUTO: 8 % — HIGH (ref 0–6)
HCT VFR BLD CALC: 39.3 % — SIGNIFICANT CHANGE UP (ref 39–50)
HGB BLD-MCNC: 13.5 G/DL — SIGNIFICANT CHANGE UP (ref 13–17)
LYMPHOCYTES # BLD AUTO: 1.1 K/UL — SIGNIFICANT CHANGE UP (ref 1–3.3)
LYMPHOCYTES # BLD AUTO: 32 % — SIGNIFICANT CHANGE UP (ref 13–44)
MCHC RBC-ENTMCNC: 30.1 PG — SIGNIFICANT CHANGE UP (ref 27–34)
MCHC RBC-ENTMCNC: 34.3 G/DL — SIGNIFICANT CHANGE UP (ref 32–36)
MCV RBC AUTO: 87.7 FL — SIGNIFICANT CHANGE UP (ref 80–100)
MONOCYTES # BLD AUTO: 0.4 K/UL — SIGNIFICANT CHANGE UP (ref 0–0.9)
MONOCYTES NFR BLD AUTO: 12.2 % — SIGNIFICANT CHANGE UP (ref 2–14)
NEUTROPHILS # BLD AUTO: 1.5 K/UL — LOW (ref 1.8–7.4)
NEUTROPHILS NFR BLD AUTO: 46.4 % — SIGNIFICANT CHANGE UP (ref 43–77)
PLATELET # BLD AUTO: 182 K/UL — SIGNIFICANT CHANGE UP (ref 150–400)
RBC # BLD: 4.48 M/UL — SIGNIFICANT CHANGE UP (ref 4.2–5.8)
RBC # FLD: 13.5 % — SIGNIFICANT CHANGE UP (ref 10.3–14.5)
WBC # BLD: 3.3 K/UL — LOW (ref 3.8–10.5)
WBC # FLD AUTO: 3.3 K/UL — LOW (ref 3.8–10.5)

## 2020-10-21 PROCEDURE — 99213 OFFICE O/P EST LOW 20 MIN: CPT

## 2020-10-21 NOTE — ASSESSMENT
[FreeTextEntry1] : Prostate cancer metastatic to bone, with excellent response to Lupron and Taxotere.\par We will now follow on Lupron and Xgeva.\par \par -CMP/PSA ordered\par -Lupron Xgeva switched to monthly due to shortage. \par -Advised patient to establish care in [place] to ensure continuation of care. Will forward records to provider in [place]

## 2020-10-21 NOTE — HISTORY OF PRESENT ILLNESS
[de-identified] : Patient initially presented to [facility] on 11/15/19 with dyspnea and severe weight loss. CT chest revealed moderate to large bilateral pneumothoraces. Multifocal soft tissue infiltration of the chest wall lesions that were resulting in destruction of underlying ribs bilaterally. Multifocal cystic/cavity lung lesions with consolidated lung. Thoracic spine metastases with mild/moderate T6 compression fracture. The patient was seen by pulmonary and thoracic surgery. He underwent CT-guided chest tube placement. CT abdomen/pelvis on 11/21/19 revealed diffuse destructive osseous metastasis. Bulky retroperitoneal and lower pelvic lymphadenopathy. Severely enlarged prostate. PSA level greater than 5000. CT biopsy confirmed prostate cancer. The patient was initiated on Casodex on 11/21/19. He subsequently received Lupron on 11/25/19. He has also received IV Taxotere 75 mg/m2 on 11/26/19 which was complicated by mile neutropenia. The patient had a very long hospitalization and ultimately was transferred to [facility] given the persistent left-sided pneumothorax. 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 The patient underwent a talc pleurodesis at [facility]. He was ultimately discharged from the hospital on 12/16/19 after being hospitalized for close to 1 month. \par \par -- Casodex d/c on 12/17/19\par -- Lupron (11/25/29-on going) \par -- IV Taxotere 75 mg/m2 q 3 weeks (11/26/19- 6/2020). Prednisone 5 mg BID. \par -- Chemotherapy induced leukopenia improved s/p Zarxio.\par -- Oxycodone given for pain \par -- Xgeva to be given pending dental clearance \par \par CT 11/15/19:\par -Bilateral large pneumothoraces and a large destructive right rib mass with diffuse bilateral infiltrates with cavitation. \par \par Pathology 11/18/19: \par Chest wall mass (needle biopsies):\par -Metastatic adenocarcinoma exhibiting features consistent with prostatic primary. \par \par \par \par  Was at [facility] for pneumonia from 1/20/2020 - 2/18/2020. Discharge summary below. \par \par \par [facility] Discharge note 2/16/2020:\par 64 year old male with prostate adenocarcinoma with mets to the Bone, Lung and Lymp on ADT (Lupron), Taxotere, Xgeva therapy who presented to the Er with complaints of worsening shortness of breath. CTA of the chest was negative for PE, Diffuse bilateral areas of pulmonary consolidation with pneumatocele formation and Large loculated left pleural effusions with compressive atelectasis of the left lower lobe. Status post thoracentesis by IR on 1/27 with removal of 1200cc of fluid. Pleural fluid was consistent with exudative fluid; cultures positive for MSSA. Started on IV antibiotics, ID consulted. Repeat CT chest with minimal improvement in loculation. CT surgery and IR were reconsulted. Status post IR chest tube placement on 2/5 Antibiotics changed to IV cefazolin to cover MSSA. chest tube has been removed , patient was not a good candidate for decortication. as per ID picc LINE WAS INSERTED , PATIENT WILL NEED PROLONGED COURSE OF ANTIBIOTICS FOR EMPYEMA.patient has refused TED placement , will be discharged home after home antibiotic education , also patient is currently being weaned from oxygen , he desaturates with ambulation, did not qualify for home oxygen as per CM. \par \par 1. Left sided pleural effusion/empyema \par infectious in origin exudative , cultures positive for MSSA. ID on board , maintained on cefazolin, chest tube removed, plan is for prolonged course of antibiotics IV -> 3/10. Discussed with Dr. restrepo. had some low grade fever , resolved without any changes. PICC line in place \par \par 2. Prostate adeno CA with metastatic disease\par  on hormone therapy and pain control. continue with current regimen. on prednisone. will follow up outpatient \par \par 3. BPH, \par flomax and finasteride. \par \par 4. Anemia \par AOCD , FOBT negative , anemia panel not suggestive of iron deficiency. monitor. DIscussed outpatient f/u with PMD. \par \par  [de-identified] : Patient doing well, has no acute complaints. Patient tolerating treatment well with minimal side effects. He is completed 6 cycles of Taxotere. He is now on Lupron as sole therapy. Admits hot flashes on regimen. Patient is ambulating well without walker/cane. Patient is planning on returning to work and moving back to [place] end of the month. Denies fever/chills, fatigue,nausea,pain, vomiting, diarrhea,constipation,  abdominal pain, bleeding, easy bruising or visual changes, chest pain,  SOB or THOMPSON,  LE edema.\par \par \par

## 2020-10-22 ENCOUNTER — APPOINTMENT (OUTPATIENT)
Age: 65
End: 2020-10-22

## 2020-10-23 DIAGNOSIS — C77.5 SECONDARY AND UNSPECIFIED MALIGNANT NEOPLASM OF INTRAPELVIC LYMPH NODES: ICD-10-CM

## 2020-10-23 DIAGNOSIS — C79.89 SECONDARY MALIGNANT NEOPLASM OF OTHER SPECIFIED SITES: ICD-10-CM

## 2020-10-23 DIAGNOSIS — C78.00 SECONDARY MALIGNANT NEOPLASM OF UNSPECIFIED LUNG: ICD-10-CM

## 2020-10-23 DIAGNOSIS — C79.51 SECONDARY MALIGNANT NEOPLASM OF BONE: ICD-10-CM

## 2020-10-26 LAB
ALBUMIN SERPL ELPH-MCNC: 4.4 G/DL
ALP BLD-CCNC: 58 U/L
ALT SERPL-CCNC: 23 U/L
ANION GAP SERPL CALC-SCNC: 14 MMOL/L
AST SERPL-CCNC: 25 U/L
BILIRUB SERPL-MCNC: 0.3 MG/DL
BUN SERPL-MCNC: 17 MG/DL
CALCIUM SERPL-MCNC: 9.4 MG/DL
CHLORIDE SERPL-SCNC: 104 MMOL/L
CO2 SERPL-SCNC: 24 MMOL/L
CREAT SERPL-MCNC: 0.76 MG/DL
GLUCOSE SERPL-MCNC: 120 MG/DL
POTASSIUM SERPL-SCNC: 4.1 MMOL/L
PROT SERPL-MCNC: 7.2 G/DL
PSA SERPL-MCNC: 35.2 NG/ML
SODIUM SERPL-SCNC: 141 MMOL/L

## 2020-10-29 ENCOUNTER — APPOINTMENT (OUTPATIENT)
Age: 65
End: 2020-10-29

## 2020-11-03 ENCOUNTER — NON-APPOINTMENT (OUTPATIENT)
Age: 65
End: 2020-11-03

## 2020-11-19 ENCOUNTER — APPOINTMENT (OUTPATIENT)
Age: 65
End: 2020-11-19

## 2020-12-09 ENCOUNTER — NON-APPOINTMENT (OUTPATIENT)
Age: 65
End: 2020-12-09

## 2020-12-15 ENCOUNTER — APPOINTMENT (OUTPATIENT)
Age: 65
End: 2020-12-15

## 2020-12-17 ENCOUNTER — APPOINTMENT (OUTPATIENT)
Age: 65
End: 2020-12-17

## 2021-01-14 ENCOUNTER — APPOINTMENT (OUTPATIENT)
Age: 66
End: 2021-01-14

## 2021-03-29 NOTE — HISTORY OF PRESENT ILLNESS
[FreeTextEntry1] : 65 year old male returns today s/p 2,000cGy to the SI joints for a TxNxM1 metastatic prostate carcinoma completed 1/29/2020.\par dyspnea, cough, pain /10 Oxycodone 10/325mg , edema, nocturia, bowel, fatigue & weight loss\par Followed with Dr. Oshea 7/22/2020 next 10/21/2020 Currently on Lupron q 3 months and completed Taxotere  \par  DR Huerta (pulm) 3/11/2020 & DR Yu (PCP) 3/12/2020\par PSA 1/2/2020: 401\par PSA 3/2/2020: 89.10\par PSA 5/8/2020 : 38.6\par PSA 7/22/2020: 29.7\par \par

## 2021-04-06 ENCOUNTER — APPOINTMENT (OUTPATIENT)
Dept: RADIATION ONCOLOGY | Facility: CLINIC | Age: 66
End: 2021-04-06

## 2021-04-06 ENCOUNTER — NON-APPOINTMENT (OUTPATIENT)
Age: 66
End: 2021-04-06

## 2021-10-26 NOTE — PATIENT PROFILE ADULT - NSPRESCRALCFREQ_GEN_A_NUR
Pt is a 74y/o M with h/o CKD IV, uncontrolled HTN, and NSVT with diastolic dysfunction transferred from [facility] with imaging findings of a liver mass.    ON: KATIE. Denies fever, chills, headache, sob or cp. Reports he has not slept well and was upset at bedside with comments of self-harm.    MEDICATIONS  (STANDING):  aspirin  chewable 81 milliGRAM(s) Oral daily  atorvastatin 20 milliGRAM(s) Oral at bedtime  bisacodyl 5 milliGRAM(s) Oral at bedtime  cloNIDine 0.3 milliGRAM(s) Oral every 8 hours  clopidogrel Tablet 75 milliGRAM(s) Oral daily  doxazosin 4 milliGRAM(s) Oral at bedtime  hydrALAZINE 100 milliGRAM(s) Oral <User Schedule>  influenza   Vaccine 0.5 milliLiter(s) IntraMuscular once  isosorbide   mononitrate ER Tablet (IMDUR) 120 milliGRAM(s) Oral daily  lactulose Syrup 20 Gram(s) Oral two times a day  metoprolol succinate ER 25 milliGRAM(s) Oral daily  NIFEdipine XL 90 milliGRAM(s) Oral daily  polyethylene glycol 3350 17 Gram(s) Oral every 12 hours  polyethylene glycol/electrolyte Solution. 4000 milliLiter(s) Oral once  senna 2 Tablet(s) Oral at bedtime  sodium bicarbonate 650 milliGRAM(s) Oral three times a day    MEDICATIONS  (PRN):  acetaminophen    Suspension .. 650 milliGRAM(s) Oral every 6 hours PRN Mild Pain (1 - 3)  simethicone 80 milliGRAM(s) Chew two times a day PRN Upset Stomach      T(C): 36.7 (10-26-21 @ 13:56), Max: 37.1 (10-26-21 @ 04:45)  T(F): 98.1 (10-26-21 @ 13:56), Max: 98.7 (10-26-21 @ 04:45)  HR: 82 (10-26-21 @ 13:56) (47 - 82)  BP: 157/80 (10-26-21 @ 13:56) (136/82 - 171/76)  ABP: --  ABP(mean): --  RR: 17 (10-26-21 @ 13:56) (16 - 18)  SpO2: 94% (10-26-21 @ 13:56) (94% - 99%)      LOS: 17d    GENERAL: NAD, lying in bed comfortably  HEAD:  Atraumatic, Normocephalic  EYES: EOMI, PERRLA, conjunctiva and sclera clear  ENT: Moist mucous membranes  NECK: Supple, No JVD  CHEST/LUNG: CTA b/l; No rales, rhonchi, wheezing, or rubs. Unlabored respirations  HEART: RRR; No murmurs, rubs, or gallops  ABDOMEN: BS+. Distended but NT. Solid nodularity palpated along the RUQ and RLQ.  EXTREMITIES:  2+ Peripheral Pulses, brisk capillary refill. No clubbing, cyanosis, or edema  NERVOUS SYSTEM:  A&Ox3, no focal deficits   SKIN: No rashes or lesions                          10.7   8.10  )-----------( 812      ( 26 Oct 2021 06:24 )             33.1       10-26    136  |  103  |  52<H>  ----------------------------<  111<H>  4.3   |  18<L>  |  3.12<H>    Ca    9.2      26 Oct 2021 06:24  Phos  3.6     10-26  Mg     2.5     10-26    TPro  6.9  /  Alb  3.8  /  TBili  0.2  /  DBili  x   /  AST  16  /  ALT  10  /  AlkPhos  101  10-26    < from: MR Abdomen w/ IV Cont (10.22.21 @ 23:26) >  IMPRESSION:  Limited post contrast imaging.    Central hepatic mass as above for which the primary differential consideration is cholangiocarcinoma.    Adenopathy in the peripancreatic, savage hepatis and portacaval regions.    < end of copied text >  < from: MR Abdomen w/ IV Cont (10.22.21 @ 23:26) >  LIVER: No morphologic evidence of cirrhosis. An 8.9 x 7.1 cm central hepatic lesion involving hepatic segments 4, 5 and 6 with possible extension into superior right hepatic lobe segments demonstrates restricted diffusion with peripheral rim enhancement and delayed central enhancement. While post contrast imaging is limited, MR characteristics strongly favor cholangiocarcinoma.    < end of copied text >  < from: MR Abdomen w/ IV Cont (10.22.21 @ 23:26) >  KIDNEYS/URETERS: Bilateral renal cysts including a 3.3 cm hemorrhagic right renal cyst.    < end of copied text >       Pt is a 72y/o M with h/o CKD IV, uncontrolled HTN, and NSVT with diastolic dysfunction transferred from [facility] with imaging findings of a liver mass.    ON: KATIE. Denies fever, chills, headache, sob or cp.     MEDICATIONS  (STANDING):  aspirin  chewable 81 milliGRAM(s) Oral daily  atorvastatin 20 milliGRAM(s) Oral at bedtime  bisacodyl 5 milliGRAM(s) Oral at bedtime  cloNIDine 0.3 milliGRAM(s) Oral every 8 hours  clopidogrel Tablet 75 milliGRAM(s) Oral daily  doxazosin 4 milliGRAM(s) Oral at bedtime  hydrALAZINE 100 milliGRAM(s) Oral <User Schedule>  influenza   Vaccine 0.5 milliLiter(s) IntraMuscular once  isosorbide   mononitrate ER Tablet (IMDUR) 120 milliGRAM(s) Oral daily  lactulose Syrup 20 Gram(s) Oral two times a day  metoprolol succinate ER 25 milliGRAM(s) Oral daily  NIFEdipine XL 90 milliGRAM(s) Oral daily  polyethylene glycol 3350 17 Gram(s) Oral every 12 hours  polyethylene glycol/electrolyte Solution. 4000 milliLiter(s) Oral once  senna 2 Tablet(s) Oral at bedtime  sodium bicarbonate 650 milliGRAM(s) Oral three times a day    MEDICATIONS  (PRN):  acetaminophen    Suspension .. 650 milliGRAM(s) Oral every 6 hours PRN Mild Pain (1 - 3)  simethicone 80 milliGRAM(s) Chew two times a day PRN Upset Stomach      T(C): 36.7 (10-26-21 @ 13:56), Max: 37.1 (10-26-21 @ 04:45)  T(F): 98.1 (10-26-21 @ 13:56), Max: 98.7 (10-26-21 @ 04:45)  HR: 82 (10-26-21 @ 13:56) (47 - 82)  BP: 157/80 (10-26-21 @ 13:56) (136/82 - 171/76)  ABP: --  ABP(mean): --  RR: 17 (10-26-21 @ 13:56) (16 - 18)  SpO2: 94% (10-26-21 @ 13:56) (94% - 99%)      LOS: 17d    GENERAL: NAD, lying in bed comfortably  HEAD:  Atraumatic, Normocephalic  EYES: EOMI, PERRLA, conjunctiva and sclera clear  ENT: Moist mucous membranes  NECK: Supple, No JVD  CHEST/LUNG: CTA b/l; No rales, rhonchi, wheezing, or rubs. Unlabored respirations  HEART: RRR; No murmurs, rubs, or gallops  ABDOMEN: BS+. Distended but NT. Solid nodularity palpated along the RUQ and RLQ.  EXTREMITIES:  2+ Peripheral Pulses, brisk capillary refill. No clubbing, cyanosis, or edema  NERVOUS SYSTEM:  A&Ox3, no focal deficits   SKIN: No rashes or lesions                          10.7   8.10  )-----------( 812      ( 26 Oct 2021 06:24 )             33.1       10-26    136  |  103  |  52<H>  ----------------------------<  111<H>  4.3   |  18<L>  |  3.12<H>    Ca    9.2      26 Oct 2021 06:24  Phos  3.6     10-26  Mg     2.5     10-26    TPro  6.9  /  Alb  3.8  /  TBili  0.2  /  DBili  x   /  AST  16  /  ALT  10  /  AlkPhos  101  10-26    < from: MR Abdomen w/ IV Cont (10.22.21 @ 23:26) >  IMPRESSION:  Limited post contrast imaging.    Central hepatic mass as above for which the primary differential consideration is cholangiocarcinoma.    Adenopathy in the peripancreatic, savage hepatis and portacaval regions.    < end of copied text >  < from: MR Abdomen w/ IV Cont (10.22.21 @ 23:26) >  LIVER: No morphologic evidence of cirrhosis. An 8.9 x 7.1 cm central hepatic lesion involving hepatic segments 4, 5 and 6 with possible extension into superior right hepatic lobe segments demonstrates restricted diffusion with peripheral rim enhancement and delayed central enhancement. While post contrast imaging is limited, MR characteristics strongly favor cholangiocarcinoma.    < end of copied text >  < from: MR Abdomen w/ IV Cont (10.22.21 @ 23:26) >  KIDNEYS/URETERS: Bilateral renal cysts including a 3.3 cm hemorrhagic right renal cyst.    < end of copied text >       Never

## 2022-01-01 ENCOUNTER — NON-APPOINTMENT (OUTPATIENT)
Age: 67
End: 2022-01-01

## 2022-01-01 ENCOUNTER — APPOINTMENT (OUTPATIENT)
Dept: HEMATOLOGY ONCOLOGY | Facility: CLINIC | Age: 67
End: 2022-01-01
Payer: MEDICARE

## 2022-01-01 ENCOUNTER — RESULT REVIEW (OUTPATIENT)
Age: 67
End: 2022-01-01

## 2022-01-01 ENCOUNTER — EMERGENCY (EMERGENCY)
Facility: HOSPITAL | Age: 67
LOS: 1 days | Discharge: HOME | End: 2022-01-01
Attending: EMERGENCY MEDICINE
Payer: MEDICARE

## 2022-01-01 ENCOUNTER — APPOINTMENT (OUTPATIENT)
Age: 67
End: 2022-01-01

## 2022-01-01 ENCOUNTER — TRANSCRIPTION ENCOUNTER (OUTPATIENT)
Age: 67
End: 2022-01-01

## 2022-01-01 ENCOUNTER — OUTPATIENT (OUTPATIENT)
Dept: OUTPATIENT SERVICES | Facility: HOSPITAL | Age: 67
LOS: 1 days | Discharge: HOME | End: 2022-01-01

## 2022-01-01 ENCOUNTER — INPATIENT (INPATIENT)
Facility: HOSPITAL | Age: 67
LOS: 10 days | Discharge: HOME | DRG: 722 | End: 2022-12-27
Attending: STUDENT IN AN ORGANIZED HEALTH CARE EDUCATION/TRAINING PROGRAM | Admitting: STUDENT IN AN ORGANIZED HEALTH CARE EDUCATION/TRAINING PROGRAM
Payer: MEDICARE

## 2022-01-01 ENCOUNTER — LABORATORY RESULT (OUTPATIENT)
Age: 67
End: 2022-01-01

## 2022-01-01 VITALS
SYSTOLIC BLOOD PRESSURE: 109 MMHG | BODY MASS INDEX: 26.79 KG/M2 | OXYGEN SATURATION: 91 % | HEART RATE: 96 BPM | HEIGHT: 78 IN | DIASTOLIC BLOOD PRESSURE: 68 MMHG | WEIGHT: 231.56 LBS

## 2022-01-01 VITALS
RESPIRATION RATE: 20 BRPM | DIASTOLIC BLOOD PRESSURE: 90 MMHG | OXYGEN SATURATION: 95 % | WEIGHT: 270.07 LBS | SYSTOLIC BLOOD PRESSURE: 151 MMHG | TEMPERATURE: 98 F | HEART RATE: 94 BPM | HEIGHT: 78 IN

## 2022-01-01 VITALS
RESPIRATION RATE: 18 BRPM | HEART RATE: 84 BPM | TEMPERATURE: 98 F | DIASTOLIC BLOOD PRESSURE: 74 MMHG | SYSTOLIC BLOOD PRESSURE: 136 MMHG | OXYGEN SATURATION: 94 %

## 2022-01-01 VITALS
SYSTOLIC BLOOD PRESSURE: 172 MMHG | HEART RATE: 86 BPM | DIASTOLIC BLOOD PRESSURE: 91 MMHG | TEMPERATURE: 98 F | OXYGEN SATURATION: 96 % | RESPIRATION RATE: 19 BRPM

## 2022-01-01 VITALS
HEART RATE: 90 BPM | SYSTOLIC BLOOD PRESSURE: 161 MMHG | TEMPERATURE: 98 F | HEIGHT: 78 IN | OXYGEN SATURATION: 97 % | DIASTOLIC BLOOD PRESSURE: 81 MMHG | WEIGHT: 244.93 LBS | RESPIRATION RATE: 18 BRPM

## 2022-01-01 VITALS
DIASTOLIC BLOOD PRESSURE: 97 MMHG | HEART RATE: 78 BPM | HEIGHT: 78 IN | RESPIRATION RATE: 20 BRPM | SYSTOLIC BLOOD PRESSURE: 164 MMHG | WEIGHT: 248.9 LBS | OXYGEN SATURATION: 95 % | TEMPERATURE: 98 F

## 2022-01-01 DIAGNOSIS — M25.552 PAIN IN LEFT HIP: ICD-10-CM

## 2022-01-01 DIAGNOSIS — E86.0 DEHYDRATION: ICD-10-CM

## 2022-01-01 DIAGNOSIS — C77.5 SECONDARY AND UNSPECIFIED MALIGNANT NEOPLASM OF INTRAPELVIC LYMPH NODES: ICD-10-CM

## 2022-01-01 DIAGNOSIS — M25.551 PAIN IN RIGHT HIP: ICD-10-CM

## 2022-01-01 DIAGNOSIS — D63.0 ANEMIA IN NEOPLASTIC DISEASE: ICD-10-CM

## 2022-01-01 DIAGNOSIS — C61 MALIGNANT NEOPLASM OF PROSTATE: ICD-10-CM

## 2022-01-01 DIAGNOSIS — C79.51 SECONDARY MALIGNANT NEOPLASM OF BONE: ICD-10-CM

## 2022-01-01 DIAGNOSIS — C79.89 SECONDARY MALIGNANT NEOPLASM OF OTHER SPECIFIED SITES: ICD-10-CM

## 2022-01-01 DIAGNOSIS — C78.00 SECONDARY MALIGNANT NEOPLASM OF UNSPECIFIED LUNG: ICD-10-CM

## 2022-01-01 LAB
ABO RH CONFIRMATION: SIGNIFICANT CHANGE UP
ALBUMIN SERPL ELPH-MCNC: 3.9 G/DL — SIGNIFICANT CHANGE UP (ref 3.3–5.2)
ALBUMIN SERPL ELPH-MCNC: 4.1 G/DL
ALBUMIN SERPL ELPH-MCNC: 4.2 G/DL — SIGNIFICANT CHANGE UP (ref 3.3–5)
ALP BLD-CCNC: 88 U/L
ALP SERPL-CCNC: 84 U/L — SIGNIFICANT CHANGE UP (ref 40–120)
ALP SERPL-CCNC: 85 U/L — SIGNIFICANT CHANGE UP (ref 40–120)
ALT FLD-CCNC: 11 U/L — SIGNIFICANT CHANGE UP (ref 10–45)
ALT FLD-CCNC: 8 U/L — SIGNIFICANT CHANGE UP
ALT SERPL-CCNC: 15 U/L
ANION GAP SERPL CALC-SCNC: 11 MMOL/L — SIGNIFICANT CHANGE UP (ref 5–17)
ANION GAP SERPL CALC-SCNC: 12 MMOL/L — SIGNIFICANT CHANGE UP (ref 5–17)
ANION GAP SERPL CALC-SCNC: 14 MMOL/L — SIGNIFICANT CHANGE UP (ref 5–17)
ANION GAP SERPL CALC-SCNC: 15 MMOL/L
ANION GAP SERPL CALC-SCNC: 15 MMOL/L — SIGNIFICANT CHANGE UP (ref 5–17)
ANISOCYTOSIS BLD QL: SIGNIFICANT CHANGE UP
ANISOCYTOSIS BLD QL: SLIGHT — SIGNIFICANT CHANGE UP
APPEARANCE UR: ABNORMAL
APPEARANCE UR: CLEAR — SIGNIFICANT CHANGE UP
APTT BLD: 30.8 SEC — SIGNIFICANT CHANGE UP (ref 27.5–35.5)
AST SERPL-CCNC: 26 U/L
AST SERPL-CCNC: 27 U/L — SIGNIFICANT CHANGE UP
AST SERPL-CCNC: 27 U/L — SIGNIFICANT CHANGE UP (ref 10–40)
BACTERIA # UR AUTO: ABNORMAL
BASE EXCESS BLDV CALC-SCNC: 10.7 MMOL/L — HIGH (ref -2–3)
BASOPHILS # BLD AUTO: 0 K/UL — SIGNIFICANT CHANGE UP (ref 0–0.2)
BASOPHILS # BLD AUTO: 0.04 K/UL — SIGNIFICANT CHANGE UP (ref 0–0.2)
BASOPHILS NFR BLD AUTO: 0 % — SIGNIFICANT CHANGE UP (ref 0–2)
BASOPHILS NFR BLD AUTO: 1.2 % — SIGNIFICANT CHANGE UP (ref 0–2)
BILIRUB SERPL-MCNC: 0.6 MG/DL — SIGNIFICANT CHANGE UP (ref 0.2–1.2)
BILIRUB SERPL-MCNC: 0.7 MG/DL
BILIRUB SERPL-MCNC: 0.7 MG/DL — SIGNIFICANT CHANGE UP (ref 0.4–2)
BILIRUB UR-MCNC: NEGATIVE — SIGNIFICANT CHANGE UP
BLD GP AB SCN SERPL QL: SIGNIFICANT CHANGE UP
BUN SERPL-MCNC: 10 MG/DL — SIGNIFICANT CHANGE UP (ref 7–23)
BUN SERPL-MCNC: 11 MG/DL
BUN SERPL-MCNC: 11.5 MG/DL — SIGNIFICANT CHANGE UP (ref 8–20)
BUN SERPL-MCNC: 15.3 MG/DL — SIGNIFICANT CHANGE UP (ref 8–20)
BUN SERPL-MCNC: 15.5 MG/DL — SIGNIFICANT CHANGE UP (ref 8–20)
BUN SERPL-MCNC: 16.1 MG/DL — SIGNIFICANT CHANGE UP (ref 8–20)
BUN SERPL-MCNC: 16.8 MG/DL — SIGNIFICANT CHANGE UP (ref 8–20)
BUN SERPL-MCNC: 5.4 MG/DL — LOW (ref 8–20)
BUN SERPL-MCNC: 5.7 MG/DL — LOW (ref 8–20)
BUN SERPL-MCNC: 7.1 MG/DL — LOW (ref 8–20)
BUN SERPL-MCNC: 9.7 MG/DL — SIGNIFICANT CHANGE UP (ref 8–20)
CA-I SERPL-SCNC: 1.39 MMOL/L — HIGH (ref 1.15–1.33)
CALCIUM SERPL-MCNC: 10 MG/DL — SIGNIFICANT CHANGE UP (ref 8.4–10.5)
CALCIUM SERPL-MCNC: 10.5 MG/DL — SIGNIFICANT CHANGE UP (ref 8.4–10.5)
CALCIUM SERPL-MCNC: 10.5 MG/DL — SIGNIFICANT CHANGE UP (ref 8.4–10.5)
CALCIUM SERPL-MCNC: 10.6 MG/DL — HIGH (ref 8.4–10.5)
CALCIUM SERPL-MCNC: 10.7 MG/DL — HIGH (ref 8.4–10.5)
CALCIUM SERPL-MCNC: 10.8 MG/DL — HIGH (ref 8.4–10.5)
CALCIUM SERPL-MCNC: 10.9 MG/DL — HIGH (ref 8.4–10.5)
CALCIUM SERPL-MCNC: 11.4 MG/DL — HIGH (ref 8.4–10.5)
CALCIUM SERPL-MCNC: 11.8 MG/DL
CHLORIDE BLDV-SCNC: 100 MMOL/L — SIGNIFICANT CHANGE UP (ref 96–108)
CHLORIDE SERPL-SCNC: 100 MMOL/L — SIGNIFICANT CHANGE UP (ref 96–108)
CHLORIDE SERPL-SCNC: 100 MMOL/L — SIGNIFICANT CHANGE UP (ref 96–108)
CHLORIDE SERPL-SCNC: 92 MMOL/L — LOW (ref 96–108)
CHLORIDE SERPL-SCNC: 95 MMOL/L
CHLORIDE SERPL-SCNC: 96 MMOL/L — SIGNIFICANT CHANGE UP (ref 96–108)
CHLORIDE SERPL-SCNC: 98 MMOL/L — SIGNIFICANT CHANGE UP (ref 96–108)
CHLORIDE SERPL-SCNC: 99 MMOL/L — SIGNIFICANT CHANGE UP (ref 96–108)
CO2 SERPL-SCNC: 25 MMOL/L — SIGNIFICANT CHANGE UP (ref 22–29)
CO2 SERPL-SCNC: 26 MMOL/L — SIGNIFICANT CHANGE UP (ref 22–29)
CO2 SERPL-SCNC: 27 MMOL/L — SIGNIFICANT CHANGE UP (ref 22–29)
CO2 SERPL-SCNC: 28 MMOL/L — SIGNIFICANT CHANGE UP (ref 22–29)
CO2 SERPL-SCNC: 29 MMOL/L — SIGNIFICANT CHANGE UP (ref 22–29)
CO2 SERPL-SCNC: 29 MMOL/L — SIGNIFICANT CHANGE UP (ref 22–29)
CO2 SERPL-SCNC: 29 MMOL/L — SIGNIFICANT CHANGE UP (ref 22–31)
CO2 SERPL-SCNC: 30 MMOL/L
CO2 SERPL-SCNC: 30 MMOL/L — HIGH (ref 22–29)
COLOR SPEC: ABNORMAL
COLOR SPEC: YELLOW — SIGNIFICANT CHANGE UP
COMMENT - URINE: SIGNIFICANT CHANGE UP
CREAT SERPL-MCNC: 0.38 MG/DL — LOW (ref 0.5–1.3)
CREAT SERPL-MCNC: 0.45 MG/DL — LOW (ref 0.5–1.3)
CREAT SERPL-MCNC: 0.46 MG/DL — LOW (ref 0.5–1.3)
CREAT SERPL-MCNC: 0.47 MG/DL — LOW (ref 0.5–1.3)
CREAT SERPL-MCNC: 0.51 MG/DL — SIGNIFICANT CHANGE UP (ref 0.5–1.3)
CREAT SERPL-MCNC: 0.52 MG/DL — SIGNIFICANT CHANGE UP (ref 0.5–1.3)
CREAT SERPL-MCNC: 0.53 MG/DL — SIGNIFICANT CHANGE UP (ref 0.5–1.3)
CREAT SERPL-MCNC: 0.56 MG/DL — SIGNIFICANT CHANGE UP (ref 0.5–1.3)
CREAT SERPL-MCNC: 0.59 MG/DL — SIGNIFICANT CHANGE UP (ref 0.5–1.3)
CREAT SERPL-MCNC: 0.64 MG/DL — SIGNIFICANT CHANGE UP (ref 0.5–1.3)
CREAT SERPL-MCNC: 0.76 MG/DL
CULTURE RESULTS: SIGNIFICANT CHANGE UP
DIFF PNL FLD: ABNORMAL
EGFR: 104 ML/MIN/1.73M2 — SIGNIFICANT CHANGE UP
EGFR: 107 ML/MIN/1.73M2 — SIGNIFICANT CHANGE UP
EGFR: 109 ML/MIN/1.73M2 — SIGNIFICANT CHANGE UP
EGFR: 111 ML/MIN/1.73M2 — SIGNIFICANT CHANGE UP
EGFR: 111 ML/MIN/1.73M2 — SIGNIFICANT CHANGE UP
EGFR: 112 ML/MIN/1.73M2 — SIGNIFICANT CHANGE UP
EGFR: 115 ML/MIN/1.73M2 — SIGNIFICANT CHANGE UP
EGFR: 115 ML/MIN/1.73M2 — SIGNIFICANT CHANGE UP
EGFR: 116 ML/MIN/1.73M2 — SIGNIFICANT CHANGE UP
EGFR: 122 ML/MIN/1.73M2 — SIGNIFICANT CHANGE UP
EGFR: 99 ML/MIN/1.73M2
EOSINOPHIL # BLD AUTO: 0.04 K/UL — SIGNIFICANT CHANGE UP (ref 0–0.5)
EOSINOPHIL # BLD AUTO: 0.06 K/UL — SIGNIFICANT CHANGE UP (ref 0–0.5)
EOSINOPHIL NFR BLD AUTO: 1.2 % — SIGNIFICANT CHANGE UP (ref 0–6)
EOSINOPHIL NFR BLD AUTO: 2.6 % — SIGNIFICANT CHANGE UP (ref 0–6)
EPI CELLS # UR: SIGNIFICANT CHANGE UP
FLUAV AG NPH QL: SIGNIFICANT CHANGE UP
FLUBV AG NPH QL: SIGNIFICANT CHANGE UP
GAS PNL BLDV: 139 MMOL/L — SIGNIFICANT CHANGE UP (ref 136–145)
GAS PNL BLDV: SIGNIFICANT CHANGE UP
GIANT PLATELETS BLD QL SMEAR: PRESENT — SIGNIFICANT CHANGE UP
GIANT PLATELETS BLD QL SMEAR: PRESENT — SIGNIFICANT CHANGE UP
GLUCOSE BLDV-MCNC: 123 MG/DL — HIGH (ref 70–99)
GLUCOSE SERPL-MCNC: 100 MG/DL — HIGH (ref 70–99)
GLUCOSE SERPL-MCNC: 101 MG/DL — HIGH (ref 70–99)
GLUCOSE SERPL-MCNC: 102 MG/DL — HIGH (ref 70–99)
GLUCOSE SERPL-MCNC: 104 MG/DL — HIGH (ref 70–99)
GLUCOSE SERPL-MCNC: 107 MG/DL — HIGH (ref 70–99)
GLUCOSE SERPL-MCNC: 110 MG/DL — HIGH (ref 70–99)
GLUCOSE SERPL-MCNC: 111 MG/DL — HIGH (ref 70–99)
GLUCOSE SERPL-MCNC: 114 MG/DL — HIGH (ref 70–99)
GLUCOSE SERPL-MCNC: 118 MG/DL — HIGH (ref 70–99)
GLUCOSE SERPL-MCNC: 124 MG/DL
GLUCOSE SERPL-MCNC: 96 MG/DL — SIGNIFICANT CHANGE UP (ref 70–99)
GLUCOSE UR QL: NEGATIVE MG/DL — SIGNIFICANT CHANGE UP
HCO3 BLDV-SCNC: 33 MMOL/L — HIGH (ref 22–29)
HCT VFR BLD CALC: 19.2 % — CRITICAL LOW (ref 39–50)
HCT VFR BLD CALC: 20 % — CRITICAL LOW (ref 39–50)
HCT VFR BLD CALC: 23.1 % — LOW (ref 39–50)
HCT VFR BLD CALC: 23.4 % — LOW (ref 39–50)
HCT VFR BLD CALC: 23.4 % — LOW (ref 39–50)
HCT VFR BLD CALC: 23.5 % — LOW (ref 39–50)
HCT VFR BLD CALC: 23.8 % — LOW (ref 39–50)
HCT VFR BLD CALC: 23.9 % — LOW (ref 39–50)
HCT VFR BLD CALC: 24.1 % — LOW (ref 39–50)
HCT VFR BLDA CALC: 21 % — SIGNIFICANT CHANGE UP
HGB BLD CALC-MCNC: 7 G/DL — CRITICAL LOW (ref 12.6–17.4)
HGB BLD-MCNC: 6.7 G/DL — CRITICAL LOW (ref 13–17)
HGB BLD-MCNC: 6.7 G/DL — CRITICAL LOW (ref 13–17)
HGB BLD-MCNC: 7.8 G/DL — LOW (ref 13–17)
HGB BLD-MCNC: 7.8 G/DL — LOW (ref 13–17)
HGB BLD-MCNC: 8 G/DL — LOW (ref 13–17)
HGB BLD-MCNC: 8 G/DL — LOW (ref 13–17)
HGB BLD-MCNC: 8.1 G/DL — LOW (ref 13–17)
HGB BLD-MCNC: 8.1 G/DL — LOW (ref 13–17)
HGB BLD-MCNC: 8.5 G/DL — LOW (ref 13–17)
INR BLD: 2.05 RATIO — HIGH (ref 0.88–1.16)
KETONES UR-MCNC: ABNORMAL
KETONES UR-MCNC: NEGATIVE — SIGNIFICANT CHANGE UP
LACTATE BLDV-MCNC: 1.8 MMOL/L — SIGNIFICANT CHANGE UP (ref 0.5–2)
LEUKOCYTE ESTERASE UR-ACNC: ABNORMAL
LEUKOCYTE ESTERASE UR-ACNC: NEGATIVE — SIGNIFICANT CHANGE UP
LYMPHOCYTES # BLD AUTO: 0.19 K/UL — LOW (ref 1–3.3)
LYMPHOCYTES # BLD AUTO: 0.39 K/UL — LOW (ref 1–3.3)
LYMPHOCYTES # BLD AUTO: 12.9 % — LOW (ref 13–44)
LYMPHOCYTES # BLD AUTO: 8.5 % — LOW (ref 13–44)
MACROCYTES BLD QL: SLIGHT — SIGNIFICANT CHANGE UP
MAGNESIUM SERPL-MCNC: 1.2 MG/DL — LOW (ref 1.6–2.6)
MAGNESIUM SERPL-MCNC: 1.4 MG/DL — LOW (ref 1.6–2.6)
MAGNESIUM SERPL-MCNC: 1.4 MG/DL — LOW (ref 1.6–2.6)
MAGNESIUM SERPL-MCNC: 1.6 MG/DL — SIGNIFICANT CHANGE UP (ref 1.6–2.6)
MAGNESIUM SERPL-MCNC: 1.6 MG/DL — SIGNIFICANT CHANGE UP (ref 1.6–2.6)
MAGNESIUM SERPL-MCNC: 1.7 MG/DL — LOW (ref 1.8–2.6)
MAGNESIUM SERPL-MCNC: 1.7 MG/DL — LOW (ref 1.8–2.6)
MAGNESIUM SERPL-MCNC: 1.9 MG/DL — SIGNIFICANT CHANGE UP (ref 1.6–2.6)
MANUAL SMEAR VERIFICATION: SIGNIFICANT CHANGE UP
MANUAL SMEAR VERIFICATION: SIGNIFICANT CHANGE UP
MCHC RBC-ENTMCNC: 30.6 PG — SIGNIFICANT CHANGE UP (ref 27–34)
MCHC RBC-ENTMCNC: 30.6 PG — SIGNIFICANT CHANGE UP (ref 27–34)
MCHC RBC-ENTMCNC: 31.3 PG — SIGNIFICANT CHANGE UP (ref 27–34)
MCHC RBC-ENTMCNC: 31.5 PG — SIGNIFICANT CHANGE UP (ref 27–34)
MCHC RBC-ENTMCNC: 31.7 PG — SIGNIFICANT CHANGE UP (ref 27–34)
MCHC RBC-ENTMCNC: 31.9 PG — SIGNIFICANT CHANGE UP (ref 27–34)
MCHC RBC-ENTMCNC: 32.1 PG — SIGNIFICANT CHANGE UP (ref 27–34)
MCHC RBC-ENTMCNC: 32.2 PG — SIGNIFICANT CHANGE UP (ref 27–34)
MCHC RBC-ENTMCNC: 33.3 GM/DL — SIGNIFICANT CHANGE UP (ref 32–36)
MCHC RBC-ENTMCNC: 33.3 PG — SIGNIFICANT CHANGE UP (ref 27–34)
MCHC RBC-ENTMCNC: 33.5 GM/DL — SIGNIFICANT CHANGE UP (ref 32–36)
MCHC RBC-ENTMCNC: 33.8 GM/DL — SIGNIFICANT CHANGE UP (ref 32–36)
MCHC RBC-ENTMCNC: 33.9 GM/DL — SIGNIFICANT CHANGE UP (ref 32–36)
MCHC RBC-ENTMCNC: 34 GM/DL — SIGNIFICANT CHANGE UP (ref 32–36)
MCHC RBC-ENTMCNC: 34 GM/DL — SIGNIFICANT CHANGE UP (ref 32–36)
MCHC RBC-ENTMCNC: 34.2 GM/DL — SIGNIFICANT CHANGE UP (ref 32–36)
MCHC RBC-ENTMCNC: 34.9 GM/DL — SIGNIFICANT CHANGE UP (ref 32–36)
MCHC RBC-ENTMCNC: 35.3 GM/DL — SIGNIFICANT CHANGE UP (ref 32–36)
MCV RBC AUTO: 90.2 FL — SIGNIFICANT CHANGE UP (ref 80–100)
MCV RBC AUTO: 91.3 FL — SIGNIFICANT CHANGE UP (ref 80–100)
MCV RBC AUTO: 91.8 FL — SIGNIFICANT CHANGE UP (ref 80–100)
MCV RBC AUTO: 91.8 FL — SIGNIFICANT CHANGE UP (ref 80–100)
MCV RBC AUTO: 92.9 FL — SIGNIFICANT CHANGE UP (ref 80–100)
MCV RBC AUTO: 93.1 FL — SIGNIFICANT CHANGE UP (ref 80–100)
MCV RBC AUTO: 93.7 FL — SIGNIFICANT CHANGE UP (ref 80–100)
MCV RBC AUTO: 95.5 FL — SIGNIFICANT CHANGE UP (ref 80–100)
MCV RBC AUTO: 95.7 FL — SIGNIFICANT CHANGE UP (ref 80–100)
METAMYELOCYTES # FLD: 0.9 % — HIGH (ref 0–0)
METAMYELOCYTES # FLD: 3.5 % — HIGH (ref 0–0)
MICROCYTES BLD QL: SIGNIFICANT CHANGE UP
MONOCYTES # BLD AUTO: 0.14 K/UL — SIGNIFICANT CHANGE UP (ref 0–0.9)
MONOCYTES # BLD AUTO: 0.19 K/UL — SIGNIFICANT CHANGE UP (ref 0–0.9)
MONOCYTES NFR BLD AUTO: 4.7 % — SIGNIFICANT CHANGE UP (ref 2–14)
MONOCYTES NFR BLD AUTO: 8.5 % — SIGNIFICANT CHANGE UP (ref 2–14)
MYELOCYTES NFR BLD: 1.2 % — HIGH (ref 0–0)
NEUTROPHILS # BLD AUTO: 1.73 K/UL — LOW (ref 1.8–7.4)
NEUTROPHILS # BLD AUTO: 2.3 K/UL — SIGNIFICANT CHANGE UP (ref 1.8–7.4)
NEUTROPHILS NFR BLD AUTO: 69.4 % — SIGNIFICANT CHANGE UP (ref 43–77)
NEUTROPHILS NFR BLD AUTO: 75.2 % — SIGNIFICANT CHANGE UP (ref 43–77)
NEUTS BAND # BLD: 4.3 % — SIGNIFICANT CHANGE UP (ref 0–8)
NEUTS BAND # BLD: 5.9 % — SIGNIFICANT CHANGE UP (ref 0–8)
NITRITE UR-MCNC: NEGATIVE — SIGNIFICANT CHANGE UP
NRBC # BLD: 2 /100 WBCS — HIGH (ref 0–0)
NRBC # BLD: 3 /100 WBCS — HIGH (ref 0–0)
NRBC # BLD: 3 /100 WBCS — HIGH (ref 0–0)
NRBC # BLD: 4 /100 WBCS — HIGH (ref 0–0)
NRBC # BLD: 4 /100 WBCS — HIGH (ref 0–0)
NRBC # BLD: 5 /100 — HIGH (ref 0–0)
OB PNL STL IA: POSITIVE
OVALOCYTES BLD QL SMEAR: SLIGHT — SIGNIFICANT CHANGE UP
OVALOCYTES BLD QL SMEAR: SLIGHT — SIGNIFICANT CHANGE UP
PCO2 BLDV: 40 MMHG — LOW (ref 42–55)
PH BLDV: 7.53 — HIGH (ref 7.32–7.43)
PH UR: 6.5 — SIGNIFICANT CHANGE UP (ref 5–8)
PH UR: 7 — SIGNIFICANT CHANGE UP (ref 5–8)
PH UR: 7 — SIGNIFICANT CHANGE UP (ref 5–8)
PH UR: 8 — SIGNIFICANT CHANGE UP (ref 5–8)
PHOSPHATE SERPL-MCNC: 2.8 MG/DL — SIGNIFICANT CHANGE UP (ref 2.4–4.7)
PHOSPHATE SERPL-MCNC: 3.1 MG/DL — SIGNIFICANT CHANGE UP (ref 2.4–4.7)
PHOSPHATE SERPL-MCNC: 3.3 MG/DL — SIGNIFICANT CHANGE UP (ref 2.4–4.7)
PHOSPHATE SERPL-MCNC: 3.8 MG/DL — SIGNIFICANT CHANGE UP (ref 2.4–4.7)
PHOSPHATE SERPL-MCNC: 4 MG/DL — SIGNIFICANT CHANGE UP (ref 2.4–4.7)
PLAT MORPH BLD: NORMAL — SIGNIFICANT CHANGE UP
PLAT MORPH BLD: NORMAL — SIGNIFICANT CHANGE UP
PLATELET # BLD AUTO: 30 K/UL — LOW (ref 150–400)
PLATELET # BLD AUTO: 32 K/UL — LOW (ref 150–400)
PLATELET # BLD AUTO: 41 K/UL — LOW (ref 150–400)
PLATELET # BLD AUTO: 46 K/UL — LOW (ref 150–400)
PLATELET # BLD AUTO: 46 K/UL — LOW (ref 150–400)
PLATELET # BLD AUTO: 50 K/UL — LOW (ref 150–400)
PLATELET # BLD AUTO: 52 K/UL — LOW (ref 150–400)
PLATELET # BLD AUTO: 57 K/UL — LOW (ref 150–400)
PLATELET # BLD AUTO: 58 K/UL — LOW (ref 150–400)
PO2 BLDV: 140 MMHG — HIGH (ref 25–45)
POIKILOCYTOSIS BLD QL AUTO: SLIGHT — SIGNIFICANT CHANGE UP
POLYCHROMASIA BLD QL SMEAR: SIGNIFICANT CHANGE UP
POLYCHROMASIA BLD QL SMEAR: SLIGHT — SIGNIFICANT CHANGE UP
POTASSIUM BLDV-SCNC: 2.6 MMOL/L — CRITICAL LOW (ref 3.5–5.1)
POTASSIUM SERPL-MCNC: 2.6 MMOL/L — CRITICAL LOW (ref 3.5–5.3)
POTASSIUM SERPL-MCNC: 2.6 MMOL/L — CRITICAL LOW (ref 3.5–5.3)
POTASSIUM SERPL-MCNC: 2.7 MMOL/L — CRITICAL LOW (ref 3.5–5.3)
POTASSIUM SERPL-MCNC: 3.1 MMOL/L — LOW (ref 3.5–5.3)
POTASSIUM SERPL-MCNC: 3.2 MMOL/L — LOW (ref 3.5–5.3)
POTASSIUM SERPL-MCNC: 3.2 MMOL/L — LOW (ref 3.5–5.3)
POTASSIUM SERPL-MCNC: 3.3 MMOL/L — LOW (ref 3.5–5.3)
POTASSIUM SERPL-MCNC: 3.6 MMOL/L — SIGNIFICANT CHANGE UP (ref 3.5–5.3)
POTASSIUM SERPL-MCNC: 3.7 MMOL/L — SIGNIFICANT CHANGE UP (ref 3.5–5.3)
POTASSIUM SERPL-MCNC: 3.7 MMOL/L — SIGNIFICANT CHANGE UP (ref 3.5–5.3)
POTASSIUM SERPL-SCNC: 2.5 MMOL/L
POTASSIUM SERPL-SCNC: 2.6 MMOL/L — CRITICAL LOW (ref 3.5–5.3)
POTASSIUM SERPL-SCNC: 2.6 MMOL/L — CRITICAL LOW (ref 3.5–5.3)
POTASSIUM SERPL-SCNC: 2.7 MMOL/L — CRITICAL LOW (ref 3.5–5.3)
POTASSIUM SERPL-SCNC: 3.1 MMOL/L — LOW (ref 3.5–5.3)
POTASSIUM SERPL-SCNC: 3.2 MMOL/L — LOW (ref 3.5–5.3)
POTASSIUM SERPL-SCNC: 3.2 MMOL/L — LOW (ref 3.5–5.3)
POTASSIUM SERPL-SCNC: 3.3 MMOL/L — LOW (ref 3.5–5.3)
POTASSIUM SERPL-SCNC: 3.6 MMOL/L — SIGNIFICANT CHANGE UP (ref 3.5–5.3)
POTASSIUM SERPL-SCNC: 3.7 MMOL/L — SIGNIFICANT CHANGE UP (ref 3.5–5.3)
POTASSIUM SERPL-SCNC: 3.7 MMOL/L — SIGNIFICANT CHANGE UP (ref 3.5–5.3)
PROT SERPL-MCNC: 6.1 G/DL — LOW (ref 6.6–8.7)
PROT SERPL-MCNC: 6.5 G/DL — SIGNIFICANT CHANGE UP (ref 6–8.3)
PROT SERPL-MCNC: 6.6 G/DL
PROT UR-MCNC: 30 MG/DL
PROT UR-MCNC: NEGATIVE — SIGNIFICANT CHANGE UP
PROTHROM AB SERPL-ACNC: 23.9 SEC — HIGH (ref 10.5–13.4)
PSA SERPL-MCNC: ABNORMAL NG/ML
RAPID RVP RESULT: DETECTED
RBC # BLD: 2.01 M/UL — LOW (ref 4.2–5.8)
RBC # BLD: 2.09 M/UL — LOW (ref 4.2–5.8)
RBC # BLD: 2.48 M/UL — LOW (ref 4.2–5.8)
RBC # BLD: 2.52 M/UL — LOW (ref 4.2–5.8)
RBC # BLD: 2.54 M/UL — LOW (ref 4.2–5.8)
RBC # BLD: 2.55 M/UL — LOW (ref 4.2–5.8)
RBC # BLD: 2.56 M/UL — LOW (ref 4.2–5.8)
RBC # BLD: 2.64 M/UL — LOW (ref 4.2–5.8)
RBC # BLD: 2.65 M/UL — LOW (ref 4.2–5.8)
RBC # FLD: 15.3 % — HIGH (ref 10.3–14.5)
RBC # FLD: 15.5 % — HIGH (ref 10.3–14.5)
RBC # FLD: 15.8 % — HIGH (ref 10.3–14.5)
RBC # FLD: 16.1 % — HIGH (ref 10.3–14.5)
RBC # FLD: 16.3 % — HIGH (ref 10.3–14.5)
RBC # FLD: 16.5 % — HIGH (ref 10.3–14.5)
RBC # FLD: 16.5 % — HIGH (ref 10.3–14.5)
RBC # FLD: 16.7 % — HIGH (ref 10.3–14.5)
RBC # FLD: 16.8 % — HIGH (ref 10.3–14.5)
RBC BLD AUTO: ABNORMAL
RBC BLD AUTO: ABNORMAL
RBC CASTS # UR COMP ASSIST: ABNORMAL /HPF (ref 0–4)
RBC CASTS # UR COMP ASSIST: SIGNIFICANT CHANGE UP /HPF (ref 0–4)
RSV RNA NPH QL NAA+NON-PROBE: SIGNIFICANT CHANGE UP
RV+EV RNA SPEC QL NAA+PROBE: DETECTED
SAO2 % BLDV: 99.4 % — SIGNIFICANT CHANGE UP
SARS-COV-2 RNA SPEC QL NAA+PROBE: SIGNIFICANT CHANGE UP
SCHISTOCYTES BLD QL AUTO: SIGNIFICANT CHANGE UP
SMUDGE CELLS # BLD: PRESENT — SIGNIFICANT CHANGE UP
SODIUM SERPL-SCNC: 136 MMOL/L — SIGNIFICANT CHANGE UP (ref 135–145)
SODIUM SERPL-SCNC: 136 MMOL/L — SIGNIFICANT CHANGE UP (ref 135–145)
SODIUM SERPL-SCNC: 137 MMOL/L — SIGNIFICANT CHANGE UP (ref 135–145)
SODIUM SERPL-SCNC: 137 MMOL/L — SIGNIFICANT CHANGE UP (ref 135–145)
SODIUM SERPL-SCNC: 138 MMOL/L — SIGNIFICANT CHANGE UP (ref 135–145)
SODIUM SERPL-SCNC: 138 MMOL/L — SIGNIFICANT CHANGE UP (ref 135–145)
SODIUM SERPL-SCNC: 139 MMOL/L — SIGNIFICANT CHANGE UP (ref 135–145)
SODIUM SERPL-SCNC: 140 MMOL/L
SODIUM SERPL-SCNC: 140 MMOL/L — SIGNIFICANT CHANGE UP (ref 135–145)
SP GR SPEC: 1.01 — SIGNIFICANT CHANGE UP (ref 1.01–1.02)
SP GR SPEC: 1.02 — SIGNIFICANT CHANGE UP (ref 1.01–1.02)
SPECIMEN SOURCE: SIGNIFICANT CHANGE UP
UROBILINOGEN FLD QL: 8 MG/DL
UROBILINOGEN FLD QL: NEGATIVE MG/DL — SIGNIFICANT CHANGE UP
WBC # BLD: 1.69 K/UL — LOW (ref 3.8–10.5)
WBC # BLD: 2 K/UL — LOW (ref 3.8–10.5)
WBC # BLD: 2.18 K/UL — LOW (ref 3.8–10.5)
WBC # BLD: 2.26 K/UL — LOW (ref 3.8–10.5)
WBC # BLD: 2.41 K/UL — LOW (ref 3.8–10.5)
WBC # BLD: 2.82 K/UL — LOW (ref 3.8–10.5)
WBC # BLD: 2.86 K/UL — LOW (ref 3.8–10.5)
WBC # BLD: 3.06 K/UL — LOW (ref 3.8–10.5)
WBC # BLD: 3.11 K/UL — LOW (ref 3.8–10.5)
WBC # FLD AUTO: 1.69 K/UL — LOW (ref 3.8–10.5)
WBC # FLD AUTO: 2 K/UL — LOW (ref 3.8–10.5)
WBC # FLD AUTO: 2.18 K/UL — LOW (ref 3.8–10.5)
WBC # FLD AUTO: 2.26 K/UL — LOW (ref 3.8–10.5)
WBC # FLD AUTO: 2.41 K/UL — LOW (ref 3.8–10.5)
WBC # FLD AUTO: 2.82 K/UL — LOW (ref 3.8–10.5)
WBC # FLD AUTO: 2.86 K/UL — LOW (ref 3.8–10.5)
WBC # FLD AUTO: 3.06 K/UL — LOW (ref 3.8–10.5)
WBC # FLD AUTO: 3.11 K/UL — LOW (ref 3.8–10.5)
WBC UR QL: NEGATIVE /HPF — SIGNIFICANT CHANGE UP (ref 0–5)
WBC UR QL: NEGATIVE /HPF — SIGNIFICANT CHANGE UP (ref 0–5)
WBC UR QL: SIGNIFICANT CHANGE UP /HPF (ref 0–5)
WBC UR QL: SIGNIFICANT CHANGE UP /HPF (ref 0–5)

## 2022-01-01 PROCEDURE — 82435 ASSAY OF BLOOD CHLORIDE: CPT

## 2022-01-01 PROCEDURE — 36430 TRANSFUSION BLD/BLD COMPNT: CPT

## 2022-01-01 PROCEDURE — G0378: CPT

## 2022-01-01 PROCEDURE — 82803 BLOOD GASES ANY COMBINATION: CPT

## 2022-01-01 PROCEDURE — 71045 X-RAY EXAM CHEST 1 VIEW: CPT | Mod: 26

## 2022-01-01 PROCEDURE — 99232 SBSQ HOSP IP/OBS MODERATE 35: CPT

## 2022-01-01 PROCEDURE — 99222 1ST HOSP IP/OBS MODERATE 55: CPT

## 2022-01-01 PROCEDURE — 84100 ASSAY OF PHOSPHORUS: CPT

## 2022-01-01 PROCEDURE — 99220: CPT

## 2022-01-01 PROCEDURE — 99233 SBSQ HOSP IP/OBS HIGH 50: CPT

## 2022-01-01 PROCEDURE — 81001 URINALYSIS AUTO W/SCOPE: CPT

## 2022-01-01 PROCEDURE — 99283 EMERGENCY DEPT VISIT LOW MDM: CPT

## 2022-01-01 PROCEDURE — 74018 RADEX ABDOMEN 1 VIEW: CPT

## 2022-01-01 PROCEDURE — 83735 ASSAY OF MAGNESIUM: CPT

## 2022-01-01 PROCEDURE — 82330 ASSAY OF CALCIUM: CPT

## 2022-01-01 PROCEDURE — 85014 HEMATOCRIT: CPT

## 2022-01-01 PROCEDURE — 87040 BLOOD CULTURE FOR BACTERIA: CPT

## 2022-01-01 PROCEDURE — 96366 THER/PROPH/DIAG IV INF ADDON: CPT

## 2022-01-01 PROCEDURE — 99285 EMERGENCY DEPT VISIT HI MDM: CPT

## 2022-01-01 PROCEDURE — 36415 COLL VENOUS BLD VENIPUNCTURE: CPT

## 2022-01-01 PROCEDURE — U0003: CPT

## 2022-01-01 PROCEDURE — 72190 X-RAY EXAM OF PELVIS: CPT

## 2022-01-01 PROCEDURE — 83605 ASSAY OF LACTIC ACID: CPT

## 2022-01-01 PROCEDURE — U0005: CPT

## 2022-01-01 PROCEDURE — 84295 ASSAY OF SERUM SODIUM: CPT

## 2022-01-01 PROCEDURE — 80053 COMPREHEN METABOLIC PANEL: CPT

## 2022-01-01 PROCEDURE — 85730 THROMBOPLASTIN TIME PARTIAL: CPT

## 2022-01-01 PROCEDURE — 80048 BASIC METABOLIC PNL TOTAL CA: CPT

## 2022-01-01 PROCEDURE — 0225U NFCT DS DNA&RNA 21 SARSCOV2: CPT

## 2022-01-01 PROCEDURE — 93010 ELECTROCARDIOGRAM REPORT: CPT

## 2022-01-01 PROCEDURE — 87086 URINE CULTURE/COLONY COUNT: CPT

## 2022-01-01 PROCEDURE — 99239 HOSP IP/OBS DSCHRG MGMT >30: CPT

## 2022-01-01 PROCEDURE — 99497 ADVNCD CARE PLAN 30 MIN: CPT | Mod: 25

## 2022-01-01 PROCEDURE — 96375 TX/PRO/DX INJ NEW DRUG ADDON: CPT

## 2022-01-01 PROCEDURE — 96365 THER/PROPH/DIAG IV INF INIT: CPT

## 2022-01-01 PROCEDURE — 93005 ELECTROCARDIOGRAM TRACING: CPT

## 2022-01-01 PROCEDURE — 94640 AIRWAY INHALATION TREATMENT: CPT

## 2022-01-01 PROCEDURE — 86985 SPLIT BLOOD OR PRODUCTS: CPT

## 2022-01-01 PROCEDURE — 99214 OFFICE O/P EST MOD 30 MIN: CPT

## 2022-01-01 PROCEDURE — 71045 X-RAY EXAM CHEST 1 VIEW: CPT

## 2022-01-01 PROCEDURE — 86900 BLOOD TYPING SEROLOGIC ABO: CPT

## 2022-01-01 PROCEDURE — P9016: CPT

## 2022-01-01 PROCEDURE — 99217: CPT

## 2022-01-01 PROCEDURE — 85018 HEMOGLOBIN: CPT

## 2022-01-01 PROCEDURE — 86901 BLOOD TYPING SEROLOGIC RH(D): CPT

## 2022-01-01 PROCEDURE — 82947 ASSAY GLUCOSE BLOOD QUANT: CPT

## 2022-01-01 PROCEDURE — 85027 COMPLETE CBC AUTOMATED: CPT

## 2022-01-01 PROCEDURE — 74018 RADEX ABDOMEN 1 VIEW: CPT | Mod: 26

## 2022-01-01 PROCEDURE — 12345: CPT | Mod: NC

## 2022-01-01 PROCEDURE — 85025 COMPLETE CBC W/AUTO DIFF WBC: CPT

## 2022-01-01 PROCEDURE — 82274 ASSAY TEST FOR BLOOD FECAL: CPT

## 2022-01-01 PROCEDURE — 87637 SARSCOV2&INF A&B&RSV AMP PRB: CPT

## 2022-01-01 PROCEDURE — 86850 RBC ANTIBODY SCREEN: CPT

## 2022-01-01 PROCEDURE — 96376 TX/PRO/DX INJ SAME DRUG ADON: CPT

## 2022-01-01 PROCEDURE — 99221 1ST HOSP IP/OBS SF/LOW 40: CPT

## 2022-01-01 PROCEDURE — 86923 COMPATIBILITY TEST ELECTRIC: CPT

## 2022-01-01 PROCEDURE — 84132 ASSAY OF SERUM POTASSIUM: CPT

## 2022-01-01 PROCEDURE — P9011: CPT

## 2022-01-01 PROCEDURE — 99284 EMERGENCY DEPT VISIT MOD MDM: CPT

## 2022-01-01 PROCEDURE — 85610 PROTHROMBIN TIME: CPT

## 2022-01-01 PROCEDURE — 72190 X-RAY EXAM OF PELVIS: CPT | Mod: 26

## 2022-01-01 RX ORDER — SENNA PLUS 8.6 MG/1
2 TABLET ORAL AT BEDTIME
Refills: 0 | Status: DISCONTINUED | OUTPATIENT
Start: 2022-01-01 | End: 2022-01-01

## 2022-01-01 RX ORDER — OXYCODONE AND ACETAMINOPHEN 5; 325 MG/1; MG/1
1 TABLET ORAL
Qty: 12 | Refills: 0
Start: 2022-01-01

## 2022-01-01 RX ORDER — DOCUSATE SODIUM 100 MG
2 CAPSULE ORAL
Qty: 6 | Refills: 0
Start: 2022-01-01 | End: 2022-01-01

## 2022-01-01 RX ORDER — LANOLIN ALCOHOL/MO/W.PET/CERES
3 CREAM (GRAM) TOPICAL AT BEDTIME
Refills: 0 | Status: DISCONTINUED | OUTPATIENT
Start: 2022-01-01 | End: 2022-01-01

## 2022-01-01 RX ORDER — LISINOPRIL 2.5 MG/1
10 TABLET ORAL DAILY
Refills: 0 | Status: DISCONTINUED | OUTPATIENT
Start: 2022-01-01 | End: 2022-01-01

## 2022-01-01 RX ORDER — MAGNESIUM HYDROXIDE 400 MG/1
30 TABLET, CHEWABLE ORAL DAILY
Refills: 0 | Status: DISCONTINUED | OUTPATIENT
Start: 2022-01-01 | End: 2022-01-01

## 2022-01-01 RX ORDER — POTASSIUM CHLORIDE 20 MEQ
40 PACKET (EA) ORAL ONCE
Refills: 0 | Status: COMPLETED | OUTPATIENT
Start: 2022-01-01 | End: 2022-01-01

## 2022-01-01 RX ORDER — POTASSIUM CHLORIDE 20 MEQ
10 PACKET (EA) ORAL
Refills: 0 | Status: COMPLETED | OUTPATIENT
Start: 2022-01-01 | End: 2022-01-01

## 2022-01-01 RX ORDER — ACETAMINOPHEN 500 MG
650 TABLET ORAL EVERY 6 HOURS
Refills: 0 | Status: DISCONTINUED | OUTPATIENT
Start: 2022-01-01 | End: 2022-01-01

## 2022-01-01 RX ORDER — DRONABINOL 2.5 MG
1 CAPSULE ORAL
Qty: 0 | Refills: 0 | DISCHARGE
Start: 2022-01-01

## 2022-01-01 RX ORDER — ONDANSETRON 8 MG/1
4 TABLET, FILM COATED ORAL EVERY 8 HOURS
Refills: 0 | Status: DISCONTINUED | OUTPATIENT
Start: 2022-01-01 | End: 2022-01-01

## 2022-01-01 RX ORDER — SENNA PLUS 8.6 MG/1
2 TABLET ORAL
Qty: 0 | Refills: 0 | DISCHARGE
Start: 2022-01-01

## 2022-01-01 RX ORDER — MAGNESIUM SULFATE 500 MG/ML
2 VIAL (ML) INJECTION ONCE
Refills: 0 | Status: COMPLETED | OUTPATIENT
Start: 2022-01-01 | End: 2022-01-01

## 2022-01-01 RX ORDER — HYDROMORPHONE HYDROCHLORIDE 2 MG/ML
0.5 INJECTION INTRAMUSCULAR; INTRAVENOUS; SUBCUTANEOUS ONCE
Refills: 0 | Status: DISCONTINUED | OUTPATIENT
Start: 2022-01-01 | End: 2022-01-01

## 2022-01-01 RX ORDER — KETOROLAC TROMETHAMINE 30 MG/ML
15 SYRINGE (ML) INJECTION ONCE
Refills: 0 | Status: DISCONTINUED | OUTPATIENT
Start: 2022-01-01 | End: 2022-01-01

## 2022-01-01 RX ORDER — ALBUTEROL 90 UG/1
2.5 AEROSOL, METERED ORAL ONCE
Refills: 0 | Status: COMPLETED | OUTPATIENT
Start: 2022-01-01 | End: 2022-01-01

## 2022-01-01 RX ORDER — LACTULOSE 10 G/15ML
10 SOLUTION ORAL
Refills: 0 | Status: DISCONTINUED | OUTPATIENT
Start: 2022-01-01 | End: 2022-01-01

## 2022-01-01 RX ORDER — POTASSIUM CHLORIDE 20 MEQ
20 PACKET (EA) ORAL
Refills: 0 | Status: COMPLETED | OUTPATIENT
Start: 2022-01-01 | End: 2022-01-01

## 2022-01-01 RX ORDER — HYDROMORPHONE HYDROCHLORIDE 2 MG/ML
1 INJECTION INTRAMUSCULAR; INTRAVENOUS; SUBCUTANEOUS
Qty: 12 | Refills: 0
Start: 2022-01-01 | End: 2022-01-01

## 2022-01-01 RX ORDER — POLYETHYLENE GLYCOL 3350 17 G/17G
17 POWDER, FOR SOLUTION ORAL EVERY 12 HOURS
Refills: 0 | Status: DISCONTINUED | OUTPATIENT
Start: 2022-01-01 | End: 2022-01-01

## 2022-01-01 RX ORDER — MAGNESIUM OXIDE 400 MG ORAL TABLET 241.3 MG
400 TABLET ORAL
Refills: 0 | Status: DISCONTINUED | OUTPATIENT
Start: 2022-01-01 | End: 2022-01-01

## 2022-01-01 RX ORDER — POLYETHYLENE GLYCOL 3350 17 G/17G
17 POWDER, FOR SOLUTION ORAL
Qty: 0 | Refills: 0 | DISCHARGE
Start: 2022-01-01

## 2022-01-01 RX ORDER — FENTANYL CITRATE 50 UG/ML
50 INJECTION INTRAVENOUS ONCE
Refills: 0 | Status: DISCONTINUED | OUTPATIENT
Start: 2022-01-01 | End: 2022-01-01

## 2022-01-01 RX ORDER — POTASSIUM CHLORIDE 20 MEQ
20 PACKET (EA) ORAL ONCE
Refills: 0 | Status: COMPLETED | OUTPATIENT
Start: 2022-01-01 | End: 2022-01-01

## 2022-01-01 RX ORDER — MINERAL OIL
133 OIL (ML) MISCELLANEOUS ONCE
Refills: 0 | Status: COMPLETED | OUTPATIENT
Start: 2022-01-01 | End: 2022-01-01

## 2022-01-01 RX ORDER — HYDROMORPHONE HYDROCHLORIDE 2 MG/ML
2 INJECTION INTRAMUSCULAR; INTRAVENOUS; SUBCUTANEOUS ONCE
Refills: 0 | Status: DISCONTINUED | OUTPATIENT
Start: 2022-01-01 | End: 2022-01-01

## 2022-01-01 RX ORDER — OXYCODONE HYDROCHLORIDE 5 MG/1
1 TABLET ORAL
Qty: 0 | Refills: 0 | DISCHARGE

## 2022-01-01 RX ORDER — FINASTERIDE 5 MG/1
1 TABLET, FILM COATED ORAL
Qty: 0 | Refills: 0 | DISCHARGE

## 2022-01-01 RX ORDER — ONDANSETRON 8 MG/1
4 TABLET, FILM COATED ORAL ONCE
Refills: 0 | Status: COMPLETED | OUTPATIENT
Start: 2022-01-01 | End: 2022-01-01

## 2022-01-01 RX ORDER — POTASSIUM CHLORIDE 20 MEQ
40 PACKET (EA) ORAL ONCE
Refills: 0 | Status: DISCONTINUED | OUTPATIENT
Start: 2022-01-01 | End: 2022-01-01

## 2022-01-01 RX ORDER — AMLODIPINE BESYLATE 2.5 MG/1
10 TABLET ORAL DAILY
Refills: 0 | Status: DISCONTINUED | OUTPATIENT
Start: 2022-01-01 | End: 2022-01-01

## 2022-01-01 RX ORDER — OXYCODONE HYDROCHLORIDE 5 MG/1
5 TABLET ORAL ONCE
Refills: 0 | Status: DISCONTINUED | OUTPATIENT
Start: 2022-01-01 | End: 2022-01-01

## 2022-01-01 RX ORDER — DENOSUMAB 60 MG/ML
0 INJECTION SUBCUTANEOUS
Qty: 0 | Refills: 0 | DISCHARGE

## 2022-01-01 RX ORDER — IPRATROPIUM/ALBUTEROL SULFATE 18-103MCG
3 AEROSOL WITH ADAPTER (GRAM) INHALATION ONCE
Refills: 0 | Status: COMPLETED | OUTPATIENT
Start: 2022-01-01 | End: 2022-01-01

## 2022-01-01 RX ORDER — POTASSIUM CHLORIDE 20 MEQ
40 PACKET (EA) ORAL
Refills: 0 | Status: COMPLETED | OUTPATIENT
Start: 2022-01-01 | End: 2022-01-01

## 2022-01-01 RX ORDER — DRONABINOL 2.5 MG
2.5 CAPSULE ORAL DAILY
Refills: 0 | Status: DISCONTINUED | OUTPATIENT
Start: 2022-01-01 | End: 2022-01-01

## 2022-01-01 RX ORDER — MAGNESIUM HYDROXIDE 400 MG/1
30 TABLET, CHEWABLE ORAL ONCE
Refills: 0 | Status: COMPLETED | OUTPATIENT
Start: 2022-01-01 | End: 2022-01-01

## 2022-01-01 RX ADMIN — POLYETHYLENE GLYCOL 3350 17 GRAM(S): 17 POWDER, FOR SOLUTION ORAL at 17:38

## 2022-01-01 RX ADMIN — LISINOPRIL 10 MILLIGRAM(S): 2.5 TABLET ORAL at 05:47

## 2022-01-01 RX ADMIN — Medication 0.1 MILLIGRAM(S): at 05:22

## 2022-01-01 RX ADMIN — Medication 0.1 MILLIGRAM(S): at 17:11

## 2022-01-01 RX ADMIN — Medication 2.5 MILLIGRAM(S): at 11:50

## 2022-01-01 RX ADMIN — Medication 650 MILLIGRAM(S): at 17:07

## 2022-01-01 RX ADMIN — ALBUTEROL 2.5 MILLIGRAM(S): 90 AEROSOL, METERED ORAL at 04:04

## 2022-01-01 RX ADMIN — MAGNESIUM OXIDE 400 MG ORAL TABLET 400 MILLIGRAM(S): 241.3 TABLET ORAL at 08:43

## 2022-01-01 RX ADMIN — Medication 2.5 MILLIGRAM(S): at 11:09

## 2022-01-01 RX ADMIN — MAGNESIUM OXIDE 400 MG ORAL TABLET 400 MILLIGRAM(S): 241.3 TABLET ORAL at 17:57

## 2022-01-01 RX ADMIN — HYDROMORPHONE HYDROCHLORIDE 0.5 MILLIGRAM(S): 2 INJECTION INTRAMUSCULAR; INTRAVENOUS; SUBCUTANEOUS at 05:39

## 2022-01-01 RX ADMIN — SENNA PLUS 2 TABLET(S): 8.6 TABLET ORAL at 21:09

## 2022-01-01 RX ADMIN — Medication 25 GRAM(S): at 13:03

## 2022-01-01 RX ADMIN — MAGNESIUM OXIDE 400 MG ORAL TABLET 400 MILLIGRAM(S): 241.3 TABLET ORAL at 09:20

## 2022-01-01 RX ADMIN — Medication 133 MILLILITER(S): at 12:19

## 2022-01-01 RX ADMIN — MAGNESIUM OXIDE 400 MG ORAL TABLET 400 MILLIGRAM(S): 241.3 TABLET ORAL at 08:50

## 2022-01-01 RX ADMIN — Medication 30 MILLILITER(S): at 08:33

## 2022-01-01 RX ADMIN — AMLODIPINE BESYLATE 10 MILLIGRAM(S): 2.5 TABLET ORAL at 06:48

## 2022-01-01 RX ADMIN — HYDROMORPHONE HYDROCHLORIDE 0.5 MILLIGRAM(S): 2 INJECTION INTRAMUSCULAR; INTRAVENOUS; SUBCUTANEOUS at 16:38

## 2022-01-01 RX ADMIN — Medication 0.1 MILLIGRAM(S): at 10:07

## 2022-01-01 RX ADMIN — MAGNESIUM OXIDE 400 MG ORAL TABLET 400 MILLIGRAM(S): 241.3 TABLET ORAL at 17:07

## 2022-01-01 RX ADMIN — MAGNESIUM OXIDE 400 MG ORAL TABLET 400 MILLIGRAM(S): 241.3 TABLET ORAL at 17:15

## 2022-01-01 RX ADMIN — Medication 25 GRAM(S): at 08:43

## 2022-01-01 RX ADMIN — Medication 0.1 MILLIGRAM(S): at 05:40

## 2022-01-01 RX ADMIN — HYDROMORPHONE HYDROCHLORIDE 0.5 MILLIGRAM(S): 2 INJECTION INTRAMUSCULAR; INTRAVENOUS; SUBCUTANEOUS at 14:59

## 2022-01-01 RX ADMIN — Medication 2 GRAM(S): at 16:38

## 2022-01-01 RX ADMIN — Medication 100 MILLIEQUIVALENT(S): at 13:29

## 2022-01-01 RX ADMIN — Medication 100 MILLIEQUIVALENT(S): at 04:29

## 2022-01-01 RX ADMIN — POLYETHYLENE GLYCOL 3350 17 GRAM(S): 17 POWDER, FOR SOLUTION ORAL at 17:09

## 2022-01-01 RX ADMIN — Medication 650 MILLIGRAM(S): at 11:50

## 2022-01-01 RX ADMIN — Medication 40 MILLIEQUIVALENT(S): at 03:52

## 2022-01-01 RX ADMIN — Medication 20 MILLIEQUIVALENT(S): at 19:29

## 2022-01-01 RX ADMIN — POLYETHYLENE GLYCOL 3350 17 GRAM(S): 17 POWDER, FOR SOLUTION ORAL at 05:40

## 2022-01-01 RX ADMIN — Medication 0.1 MILLIGRAM(S): at 17:12

## 2022-01-01 RX ADMIN — MAGNESIUM OXIDE 400 MG ORAL TABLET 400 MILLIGRAM(S): 241.3 TABLET ORAL at 11:32

## 2022-01-01 RX ADMIN — ONDANSETRON 4 MILLIGRAM(S): 8 TABLET, FILM COATED ORAL at 11:49

## 2022-01-01 RX ADMIN — MAGNESIUM OXIDE 400 MG ORAL TABLET 400 MILLIGRAM(S): 241.3 TABLET ORAL at 13:21

## 2022-01-01 RX ADMIN — Medication 40 MILLIEQUIVALENT(S): at 08:43

## 2022-01-01 RX ADMIN — Medication 0.1 MILLIGRAM(S): at 05:41

## 2022-01-01 RX ADMIN — AMLODIPINE BESYLATE 10 MILLIGRAM(S): 2.5 TABLET ORAL at 05:39

## 2022-01-01 RX ADMIN — FENTANYL CITRATE 50 MICROGRAM(S): 50 INJECTION INTRAVENOUS at 13:05

## 2022-01-01 RX ADMIN — Medication 3 MILLIGRAM(S): at 20:51

## 2022-01-01 RX ADMIN — MAGNESIUM OXIDE 400 MG ORAL TABLET 400 MILLIGRAM(S): 241.3 TABLET ORAL at 08:15

## 2022-01-01 RX ADMIN — MAGNESIUM HYDROXIDE 30 MILLILITER(S): 400 TABLET, CHEWABLE ORAL at 22:18

## 2022-01-01 RX ADMIN — LISINOPRIL 10 MILLIGRAM(S): 2.5 TABLET ORAL at 05:56

## 2022-01-01 RX ADMIN — Medication 0.1 MILLIGRAM(S): at 17:38

## 2022-01-01 RX ADMIN — Medication 40 MILLIEQUIVALENT(S): at 13:08

## 2022-01-01 RX ADMIN — Medication 0.1 MILLIGRAM(S): at 05:09

## 2022-01-01 RX ADMIN — Medication 100 MILLIEQUIVALENT(S): at 06:40

## 2022-01-01 RX ADMIN — SENNA PLUS 2 TABLET(S): 8.6 TABLET ORAL at 21:35

## 2022-01-01 RX ADMIN — Medication 0.1 MILLIGRAM(S): at 18:19

## 2022-01-01 RX ADMIN — Medication 0.1 MILLIGRAM(S): at 17:34

## 2022-01-01 RX ADMIN — Medication 650 MILLIGRAM(S): at 22:47

## 2022-01-01 RX ADMIN — Medication 15 MILLIGRAM(S): at 14:57

## 2022-01-01 RX ADMIN — Medication 0.1 MILLIGRAM(S): at 18:35

## 2022-01-01 RX ADMIN — AMLODIPINE BESYLATE 10 MILLIGRAM(S): 2.5 TABLET ORAL at 09:45

## 2022-01-01 RX ADMIN — Medication 40 MILLIEQUIVALENT(S): at 13:21

## 2022-01-01 RX ADMIN — MAGNESIUM OXIDE 400 MG ORAL TABLET 400 MILLIGRAM(S): 241.3 TABLET ORAL at 11:50

## 2022-01-01 RX ADMIN — AMLODIPINE BESYLATE 10 MILLIGRAM(S): 2.5 TABLET ORAL at 05:38

## 2022-01-01 RX ADMIN — Medication 0.1 MILLIGRAM(S): at 05:35

## 2022-01-01 RX ADMIN — Medication 40 MILLIEQUIVALENT(S): at 11:24

## 2022-01-01 RX ADMIN — MAGNESIUM OXIDE 400 MG ORAL TABLET 400 MILLIGRAM(S): 241.3 TABLET ORAL at 17:34

## 2022-01-01 RX ADMIN — Medication 25 GRAM(S): at 09:45

## 2022-01-01 RX ADMIN — Medication 650 MILLIGRAM(S): at 14:40

## 2022-01-01 RX ADMIN — MAGNESIUM OXIDE 400 MG ORAL TABLET 400 MILLIGRAM(S): 241.3 TABLET ORAL at 17:11

## 2022-01-01 RX ADMIN — AMLODIPINE BESYLATE 10 MILLIGRAM(S): 2.5 TABLET ORAL at 05:09

## 2022-01-01 RX ADMIN — MAGNESIUM OXIDE 400 MG ORAL TABLET 400 MILLIGRAM(S): 241.3 TABLET ORAL at 09:01

## 2022-01-01 RX ADMIN — AMLODIPINE BESYLATE 10 MILLIGRAM(S): 2.5 TABLET ORAL at 05:35

## 2022-01-01 RX ADMIN — Medication 25 GRAM(S): at 16:28

## 2022-01-01 RX ADMIN — FENTANYL CITRATE 50 MICROGRAM(S): 50 INJECTION INTRAVENOUS at 16:38

## 2022-01-01 RX ADMIN — Medication 100 MILLIEQUIVALENT(S): at 16:50

## 2022-01-01 RX ADMIN — LISINOPRIL 10 MILLIGRAM(S): 2.5 TABLET ORAL at 05:39

## 2022-01-01 RX ADMIN — Medication 0.1 MILLIGRAM(S): at 17:16

## 2022-01-01 RX ADMIN — LISINOPRIL 10 MILLIGRAM(S): 2.5 TABLET ORAL at 18:23

## 2022-01-01 RX ADMIN — MAGNESIUM OXIDE 400 MG ORAL TABLET 400 MILLIGRAM(S): 241.3 TABLET ORAL at 08:41

## 2022-01-01 RX ADMIN — LISINOPRIL 10 MILLIGRAM(S): 2.5 TABLET ORAL at 05:22

## 2022-01-01 RX ADMIN — AMLODIPINE BESYLATE 10 MILLIGRAM(S): 2.5 TABLET ORAL at 05:57

## 2022-01-01 RX ADMIN — MAGNESIUM OXIDE 400 MG ORAL TABLET 400 MILLIGRAM(S): 241.3 TABLET ORAL at 09:18

## 2022-01-01 RX ADMIN — HYDROMORPHONE HYDROCHLORIDE 2 MILLIGRAM(S): 2 INJECTION INTRAMUSCULAR; INTRAVENOUS; SUBCUTANEOUS at 13:04

## 2022-01-01 RX ADMIN — AMLODIPINE BESYLATE 10 MILLIGRAM(S): 2.5 TABLET ORAL at 05:40

## 2022-01-01 RX ADMIN — Medication 25 GRAM(S): at 03:52

## 2022-01-01 RX ADMIN — Medication 650 MILLIGRAM(S): at 17:37

## 2022-01-01 RX ADMIN — Medication 100 MILLIEQUIVALENT(S): at 14:25

## 2022-01-01 RX ADMIN — Medication 2.5 MILLIGRAM(S): at 11:25

## 2022-01-01 RX ADMIN — MAGNESIUM OXIDE 400 MG ORAL TABLET 400 MILLIGRAM(S): 241.3 TABLET ORAL at 11:13

## 2022-01-01 RX ADMIN — Medication 100 MILLIEQUIVALENT(S): at 14:51

## 2022-01-01 RX ADMIN — HYDROMORPHONE HYDROCHLORIDE 0.5 MILLIGRAM(S): 2 INJECTION INTRAMUSCULAR; INTRAVENOUS; SUBCUTANEOUS at 04:12

## 2022-01-01 RX ADMIN — Medication 20 MILLIEQUIVALENT(S): at 21:07

## 2022-01-01 RX ADMIN — MAGNESIUM HYDROXIDE 30 MILLILITER(S): 400 TABLET, CHEWABLE ORAL at 11:50

## 2022-01-01 RX ADMIN — MAGNESIUM HYDROXIDE 30 MILLILITER(S): 400 TABLET, CHEWABLE ORAL at 21:36

## 2022-01-01 RX ADMIN — Medication 0.1 MILLIGRAM(S): at 05:47

## 2022-01-01 RX ADMIN — MAGNESIUM OXIDE 400 MG ORAL TABLET 400 MILLIGRAM(S): 241.3 TABLET ORAL at 17:12

## 2022-01-01 RX ADMIN — MAGNESIUM OXIDE 400 MG ORAL TABLET 400 MILLIGRAM(S): 241.3 TABLET ORAL at 11:10

## 2022-01-01 RX ADMIN — Medication 20 MILLIEQUIVALENT(S): at 11:50

## 2022-01-01 RX ADMIN — Medication 0.1 MILLIGRAM(S): at 18:23

## 2022-01-01 RX ADMIN — Medication 0.1 MILLIGRAM(S): at 05:38

## 2022-01-01 RX ADMIN — Medication 650 MILLIGRAM(S): at 15:40

## 2022-01-01 RX ADMIN — Medication 0.1 MILLIGRAM(S): at 17:07

## 2022-01-01 RX ADMIN — Medication 0.1 MILLIGRAM(S): at 06:48

## 2022-01-01 RX ADMIN — Medication 100 MILLIEQUIVALENT(S): at 10:33

## 2022-01-01 RX ADMIN — Medication 0.1 MILLIGRAM(S): at 21:39

## 2022-01-01 RX ADMIN — Medication 100 MILLIEQUIVALENT(S): at 05:39

## 2022-01-01 RX ADMIN — Medication 650 MILLIGRAM(S): at 12:40

## 2022-01-01 RX ADMIN — LISINOPRIL 10 MILLIGRAM(S): 2.5 TABLET ORAL at 06:48

## 2022-01-01 RX ADMIN — MAGNESIUM OXIDE 400 MG ORAL TABLET 400 MILLIGRAM(S): 241.3 TABLET ORAL at 11:04

## 2022-01-01 RX ADMIN — HYDROMORPHONE HYDROCHLORIDE 0.5 MILLIGRAM(S): 2 INJECTION INTRAMUSCULAR; INTRAVENOUS; SUBCUTANEOUS at 04:42

## 2022-01-01 RX ADMIN — LISINOPRIL 10 MILLIGRAM(S): 2.5 TABLET ORAL at 05:40

## 2022-01-01 RX ADMIN — Medication 20 MILLIEQUIVALENT(S): at 17:15

## 2022-01-01 RX ADMIN — Medication 2.5 MILLIGRAM(S): at 11:28

## 2022-01-01 RX ADMIN — MAGNESIUM HYDROXIDE 30 MILLILITER(S): 400 TABLET, CHEWABLE ORAL at 11:28

## 2022-01-01 RX ADMIN — AMLODIPINE BESYLATE 10 MILLIGRAM(S): 2.5 TABLET ORAL at 05:46

## 2022-01-01 RX ADMIN — LISINOPRIL 10 MILLIGRAM(S): 2.5 TABLET ORAL at 05:35

## 2022-01-01 RX ADMIN — POLYETHYLENE GLYCOL 3350 17 GRAM(S): 17 POWDER, FOR SOLUTION ORAL at 05:45

## 2022-01-01 RX ADMIN — ONDANSETRON 4 MILLIGRAM(S): 8 TABLET, FILM COATED ORAL at 08:34

## 2022-01-01 RX ADMIN — Medication 0.1 MILLIGRAM(S): at 09:45

## 2022-01-01 RX ADMIN — Medication 2.5 MILLIGRAM(S): at 11:12

## 2022-01-01 RX ADMIN — Medication 25 GRAM(S): at 15:46

## 2022-01-01 RX ADMIN — OXYCODONE HYDROCHLORIDE 5 MILLIGRAM(S): 5 TABLET ORAL at 11:01

## 2022-01-01 RX ADMIN — Medication 40 MILLIEQUIVALENT(S): at 14:25

## 2022-01-01 RX ADMIN — Medication 100 MILLIEQUIVALENT(S): at 11:42

## 2022-01-01 RX ADMIN — Medication 0.1 MILLIGRAM(S): at 17:57

## 2022-01-01 RX ADMIN — Medication 15 MILLIGRAM(S): at 16:38

## 2022-01-01 RX ADMIN — AMLODIPINE BESYLATE 10 MILLIGRAM(S): 2.5 TABLET ORAL at 05:22

## 2022-01-01 RX ADMIN — Medication 650 MILLIGRAM(S): at 22:17

## 2022-01-01 RX ADMIN — MAGNESIUM OXIDE 400 MG ORAL TABLET 400 MILLIGRAM(S): 241.3 TABLET ORAL at 17:38

## 2022-03-30 NOTE — REVIEW OF SYSTEMS
[Patient Intake Form Reviewed] : Patient intake form was reviewed [Fatigue] : fatigue [Recent Change In Weight] : ~T recent weight change [Shortness Of Breath] : shortness of breath [Cough] : cough [SOB on Exertion] : shortness of breath during exertion [Constipation] : constipation [Nocturia] : nocturia [Urinary Frequency] : urinary frequency [Joint Pain] : joint pain [Muscle Pain] : muscle pain [Muscle Weakness] : muscle weakness [Disturbance Of Gait] : gait disturbance [Negative] : Allergic/Immunologic [FreeTextEntry8] : hourly [FreeTextEntry9] : lower back stiffness [Constipation: Grade 1 - Occasional or intermittent symptoms; occasional use of stool softeners, laxatives, dietary modification, or enema] : Constipation: Grade 1 - Occasional or intermittent symptoms; occasional use of stool softeners, laxatives, dietary modification, or enema 14-Jan-2022 [Diarrhea: Grade 0] : Diarrhea: Grade 0 [Fatigue: Grade 1 - Fatigue relieved by rest] : Fatigue: Grade 1 - Fatigue relieved by rest

## 2022-09-23 NOTE — ED ADULT NURSE NOTE - OBJECTIVE STATEMENT
Patient presents to ER C/O severe headache, denies N/V, no visual disturbances, patient reports HX of cancer of the lungs with mets to brain, schedule for radiation today, resp even/unlabored.

## 2022-09-23 NOTE — ED PROVIDER NOTE - PATIENT PORTAL LINK FT
You can access the FollowMyHealth Patient Portal offered by [facility] by registering at the following website: [email or web address]. By joining [facility]’s FollowMyHealth portal, you will also be able to view your health information using other applications (apps) compatible with our system.

## 2022-09-23 NOTE — ED PROVIDER NOTE - OBJECTIVE STATEMENT
68 y/o M with PMH HTN, metastatic prostate Ca with mets to the cervical spine and base of the skull presents with severe right posterior pain at the site of a known met for the past 24-48 hours that started gradually and is increasing, no relief at home. He has never had similar symptoms before. Denies headache per se, but notes bone pain in the parietal scalp - he has an appointment for his first XRT at 3 pm today and came to the ED for pain medication prior to going there. He does not want work up, just pain medication - states that he already has been thoroughly worked up and just needs relief of pain. Denies allergies to medications. Denies difficulty with speech, swallowing, vision changes, numbness or focal weakness.

## 2022-09-23 NOTE — ED ADULT TRIAGE NOTE - ESI TRIAGE ACUITY LEVEL, MLM
SE ORIENTA PTE Y FAMILIAR SOBRE TX MEDICO EL CUAL REFIERE ENTENDER.PTE
CONECTADA A MONITOR CARDIACO Y OXIMETRIA,PTE CANALIZADA EN BRAZO RT CON # 22
POR AMBULANCIA.SE CANALIZA BAJO MEDIDAS ASPETICAS # 18 PARA ESTUDIO CON
CONTRASTE EL CUAL SE NOTIFICA.SE EXTRAEN MUESTRAS BAJO MEDIDAS ASEPTICAS.SE
ADMINISTRAN MEDICAMENTOS HARVEY ORDEN DE DR KAMILLE OLIVO EVALUA PTE AL
MOMENTO.SE LE INSERTA ANDRADE BAJO MEDIDAS ASEPTICAS SIN EGRESO AL MOMENTO,SE LE
REALIZA CAMBIO DE PANAL EL CUAL PRESENTABA ORINA.PTE HIPOTENSA 60/40.SE
NOTIFICA A DR KAMILLE OLIVO ORDENA FLUID CHALLENGE 250ML STAT.SE REPITE EKG Y SE
MUESTRA.SE CECILIA PTE BAJO OBSERVACION POR CAMBIOS.
3
Pulses equal bilaterally, no edema present.

## 2022-09-23 NOTE — ED PROVIDER NOTE - CLINICAL SUMMARY MEDICAL DECISION MAKING FREE TEXT BOX
68 y/o M with PMH metastatic prostate Ca presents for head pain where he has a known metastatic lesion - due for XRT treatment at 3 pm today, just asking for pain medication. Neuro-intact, gradual onset. Will give a dose of dilaudid here, send a short course to [facility] so patient can make his appointment.

## 2022-09-23 NOTE — ED PROVIDER NOTE - PROGRESS NOTE DETAILS
Cesar: [facility]UN[facility]  Reference #: [id number]: No medications prescribed.

## 2022-09-23 NOTE — ED PROVIDER NOTE - PHYSICAL EXAMINATION
Const: Awake, alert and oriented. In no acute distress. Well appearing.  HEENT: NC/AT. Moist mucous membranes.  Eyes: No scleral icterus. EOMI.  Neck:. Soft and supple. Full ROM without pain.  Cardiac: Regular rate and regular rhythm.   Resp: Speaking in full sentences. No evidence of respiratory distress  Abd: Soft, non-tender, non-distended. Normal bowel sounds in all 4 quadrants. No guarding or rebound.  Back: Spine midline and non-tender. No CVAT.  Skin: No rashes, abrasions or lacerations.  Neuro: Awake, alert & oriented x 3. CN II-XII symmetrically intact. Moves all extremities symmetrically.

## 2022-09-23 NOTE — ED ADULT TRIAGE NOTE - CHIEF COMPLAINT QUOTE
" I have been having headaches getting worse the last 24  hours" pt has cancer to prostate now with mets to spinal.

## 2022-11-28 NOTE — ED PROVIDER NOTE - NS ED ROS FT
General: Denies fever, chills  HEENT: Denies sore throat  Neck: Denies neck pain  Resp: Denies coughing, SOB  Cardiovascular: Denies CP, palpitations, LE edema  GI: Nausea, no vomiting, abdominal pain, diarrhea, constipation, blood in stool  : Dysuria, hematuria. Denies frequency, incontinence  MSK: Generalized bony pain  Neuro: Denies HA, dizziness, numbness, weakness  Skin: Denies rashes.

## 2022-11-28 NOTE — ED PROVIDER NOTE - PHYSICAL EXAMINATION
General: Awake, alert, lying in bed in NAD. Tired appearing  HEENT: Normocephalic, atraumatic. No scleral icterus or conjunctival injection. EOMI. Moist mucous membranes. Oropharynx clear.   Neck:. Soft and supple. Full ROM without pain. No midline tenderness. No lymphadenopathy.  Cardiac: RRR, +S1/S2. No murmurs, rubs, gallops. Peripheral pulses 2+ and symmetric. No LE edema.  Resp: Lungs CTAB. Speaking in full sentences. No wheezes, rales or rhonchi.  Abd: Soft, non-tender, non-distended. No guarding, rebound, or rigidity. No scars, masses, or lesions.  Back: Spine midline and non-tender. No CVA tenderness.    Skin: No rashes, abrasions, or lacerations.  Neuro: AO x 4. Moves all extremities symmetrically. Motor strength and sensation grossly intact.  Psych: Appropriate mood and affect

## 2022-11-28 NOTE — ED PROVIDER NOTE - PROGRESS NOTE DETAILS
Pt updated regarding his negative UA results. Instructed pt to follow up with his oncologist as his complaints are likely due to his underlying cancer -Reuben Ingram MD PGY-1

## 2022-11-28 NOTE — ED ADULT NURSE NOTE - OBJECTIVE STATEMENT
Pt with history of prostate CA states he recently stopped treatment because " My body is failing me" Pt reports hematuria, reports difficulty ambulating due to weakness. aao x4

## 2022-11-28 NOTE — ED PROVIDER NOTE - NSFOLLOWUPINSTRUCTIONS_ED_ALL_ED_FT
You were seen in the emergency room for 1 week of worsening blood in your urine.  Your urine was tested for UTI, and it did not show any evidence of infection at this time.  Your other symptoms are likely due to your history of known prostate cancer.    Return to the emergency room for any new or worsening symptoms, burning with urination, fever, chills, of difficulty breathing.

## 2022-11-28 NOTE — ED PROVIDER NOTE - CLINICAL SUMMARY MEDICAL DECISION MAKING FREE TEXT BOX
67M with metastatic prostate CA on radiation tx on Lynparza presents with 1 week worsening hematuria with dysuria. No fever. Pt also with several other chronic complaints related to his cancer treatment. VS WNL. Will order UA/UCx, sx control, likely dc home 67M with metastatic prostate CA on radiation tx on Lynparza presents with 1 week worsening hematuria with dysuria. No fever. Likely 2/2 known prostate CA. Pt also with several other chronic complaints related to his cancer treatment. VS WNL. Will order UA/UCx, sx control, likely dc home

## 2022-11-28 NOTE — ED ADULT NURSE NOTE - SUICIDE SCREENING DEPRESSION
Aury Cervantes was seen and treated in our emergency department on 2/22/2021.  She may return to work on 02/23/2021.       If you have any questions or concerns, please don't hesitate to call.      Isidoro Smith MD Negative

## 2022-11-28 NOTE — ED ADULT TRIAGE NOTE - CHIEF COMPLAINT QUOTE
biba home c/o worsening hematuria x 1 week; hx prostate CA on "radiostatic treatment" (last tx 1 week ago). denies anticoag use/urinary symptoms/dizizness . + weakness + malaise/fatigue

## 2022-11-28 NOTE — ED PROVIDER NOTE - PATIENT PORTAL LINK FT
You can access the FollowMyHealth Patient Portal offered by [facility] by registering at the following website: [email or web address]. By joining [employer]’s FollowMyHealth portal, you will also be able to view your health information using other applications (apps) compatible with our system.

## 2022-11-28 NOTE — ED PROVIDER NOTE - OBJECTIVE STATEMENT
67M hx metastatic prostate cancer receiving radiation treatment on Lynparza presents with 1 week of worsening hematuria. Pt also reports chronic generalized weakness, bony pain at the site of his metastatic lesions, nausea (takes Zofran). Recently he also began having dysuria. Otherwise no CP, SOB, abdominal pain., fevers.

## 2022-12-13 PROBLEM — C61 PROSTATE CANCER: Status: ACTIVE | Noted: 2019-12-27

## 2022-12-13 NOTE — HISTORY OF PRESENT ILLNESS
[de-identified] : This 67-year-old male was initially seen in November 2019 presenting with dyspnea and severe weight loss.  He had moderate to large bilateral pneumothoraces and soft tissue infiltration of the chest wall resulting in destruction of underlying ribs bilaterally.  Thoracic spine metastasis was present.  CT-guided chest tube placement treated his pleural effusion.  He was found to have bulky retroperitoneal and lower pelvic adenopathy and a severely enlarged prostate with a PSA of greater than 5000.  Biopsy confirmed adenocarcinoma of the prostate and in November 2019 he began Casodex and subsequently was started on Lupron.  Taxotere 75 mg/m² was initiated in November 2019.\par He eventually completed 6 cycles of Taxotere and was continued on Lupron.  He did well, and was last seen by us in October 2020 before he moved to [place].\par He has moved back to [place] in September 2022 and is doing poorly from a clinical standpoint.  He is currently receiving stereotactic radiosurgery from Dr. Adriano Santizo in [facility] who is treating the neck and prostate with 4 sessions each week.\par We are attempting to obtain records from his oncologist in [place].  He has been receiving Lynparza 300 mg every 12 hours under the care of Dr. Jose Armando Espinoza.  Lupron was continued with last therapy roughly last September.\par He has developed shallow decubitus ulcer and spends most of his time in a recliner.  Today in our office he is in a wheelchair, experiencing significant weakness and weight loss.

## 2022-12-13 NOTE — ASSESSMENT
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 Adriano Santizo in [facility].\par We will obtain records from [place], and have sent off a complete metabolic panel and PSA today.\par Social service consultation for home nursing to assist with decubitus care and dietitian consult have both been obtained.

## 2022-12-13 NOTE — PHYSICAL EXAM
[Cachectic] : cachectic [Normal] : normoactive bowel sounds, soft and nontender, no hepatosplenomegaly or masses appreciated [de-identified] : Cachectic, chronically ill.  In wheelchair today in the office [de-identified] : Generalized weakness

## 2022-12-13 NOTE — REVIEW OF SYSTEMS
[Fatigue] : fatigue [SOB on Exertion] : shortness of breath during exertion [Joint Pain] : joint pain [Negative] : Heme/Lymph

## 2022-12-16 NOTE — ED PROVIDER NOTE - OBJECTIVE STATEMENT
67-year-old male currently undergoing chemotherapy for prostate ca with lurpon presenting to the ER for evaluation and management of abnormal blood work drawn yesterday.  Patient states he believes his potassium is off as per the call from Dr. Recinos'.  Patient currently has complaints of joint pain which she states has been intermittent and some nausea.  He denies any additional symptoms at this time. 67-year-old male currently undergoing chemotherapy for prostate ca with lurpon presenting to the ER for evaluation and management of abnormal blood work drawn yesterday.  Patient states he believes his potassium is off as per the call from Dr. Recinos.  Patient currently has complaints of joint pain which she states has been intermittent and some nausea.  He denies any additional symptoms at this time.

## 2022-12-16 NOTE — ED PROVIDER NOTE - NEURO NEGATIVE STATEMENT, MLM
no loss of consciousness, no gait abnormality, no headache, no sensory deficits, and no weakness. no loss of consciousness, no gait abnormality, no headache, no sensory deficits, and no weakness. except for right hand for several months

## 2022-12-16 NOTE — ED ADULT TRIAGE NOTE - CHIEF COMPLAINT QUOTE
BIBA from home foe abnormal low hemoglobin and potassium - pt unsure of the numbers. C/O generalized weakness and being tired associated with SOB . Denies rectal bleed or blood thinners

## 2022-12-16 NOTE — ED CDU PROVIDER INITIAL DAY NOTE - OBJECTIVE STATEMENT
67-year-old male currently undergoing chemotherapy for prostate ca with lurpon presenting to the ER for evaluation and management of abnormal blood work drawn yesterday.  Patient states he believes his potassium is off as per the call from Dr. Recinos'.  Patient currently has complaints of joint pain which she states has been intermittent and some nausea.  He denies any additional symptoms at this time.

## 2022-12-16 NOTE — ED PROVIDER NOTE - PROGRESS NOTE DETAILS
Star PETERSON reviewed indicating recent labs with moderate anemia and acute hypokalemia. Patient re-assessed and states he has mild improvement of pain. Patient endorses now that he is only on lynpraza and radiotherapy for his prostate CA. He is no longer on lupron. He states his stool has been a light brown in color. He also endorses that he used to have right hip pain but now has the left hip pain for several weeks.  Will place in obs for blood transfusion and electrolyte correction.

## 2022-12-16 NOTE — ED CDU PROVIDER INITIAL DAY NOTE - PROGRESS NOTE DETAILS
After infusion patient stating he is too weak to go home and would like to remain overnight. Upon further discussion patient's states  he has been steadily getting weaker over the past week, He states this is normal with the chemotherapy but seems more pronounced now. After infusion patient stating he is too weak to go home and would like to remain overnight. Upon further discussion patient's states  he has been steadily getting weaker over the past week, He states this is normal with the chemotherapy but seems more pronounced now. Will continue with another unit of PRBC infusion and repeat labs.

## 2022-12-16 NOTE — ED PROVIDER NOTE - NSFOLLOWUPINSTRUCTIONS_ED_ALL_ED_FT
1) Follow up with your oncologist in 1-3 days  2) Return to the ER for worsening or concerning symptoms      Hypokalemia    WHAT YOU NEED TO KNOW:    Hypokalemia is a low level of potassium in your blood. Potassium helps control how your muscles, heart, and digestive system work. Hypokalemia occurs when your body loses too much potassium or does not absorb enough from food.     DISCHARGE INSTRUCTIONS:    Return to the emergency department if:   •You cannot move your arm or leg.      •You have a fast or irregular heartbeat.      •You are too tired or weak to stand up.      Contact your healthcare provider if:   •You are vomiting, or you have diarrhea.      •You have numbness or tingling in your arms or legs.      •Your symptoms do not go away or they get worse.      •You have questions or concerns about your condition or care.      Medicines:   •Potassium will be given to bring your potassium levels back to normal.      •Take your medicine as directed. Contact your healthcare provider if you think your medicine is not helping or if you have side effects. Tell your provider if you are allergic to any medicine. Keep a list of the medicines, vitamins, and herbs you take. Include the amounts, and when and why you take them. Bring the list or the pill bottles to follow-up visits. Carry your medicine list with you in case of an emergency.      Eat foods that are high in potassium: Foods that are high in potassium include bananas, oranges, tomatoes, potatoes, and avocado. Jo beans, turkey, salmon, lean beef, yogurt, and milk are also high in potassium. Ask your healthcare provider or dietitian for more information about foods that are high in potassium.     Follow up with your healthcare provider as directed: Write down your questions so you remember to ask them during your visits.       Aplastic Anemia       Aplastic anemia occurs when soft tissue inside of bones (bone marrow) stops making enough blood cells. Blood cells perform vital functions.    There are three types of blood cells:  •Red blood cells. These cells carry oxygen to the tissues of the body.      •White blood cells. These cells fight infection.      •Platelets. These cells help your blood to clot when you have an injury.      Aplastic anemia is a rare and serious condition that may develop slowly or rapidly. Even after treatment, it is necessary to be monitored for problems that can come back (recur).      What are the causes?    This condition may be caused by anything that injures bone marrow, such as:  •Radiation and chemotherapy treatment for cancer. These treatments are used to kill cancer cells, but they also damage healthy cells.      •Exposure to poisonous (toxic) chemicals that are used in some pesticides and insecticides.      •Certain medicines, such as medicines used to treat rheumatoid arthritis.      •Conditions in which the body's disease-fighting system (immune system) attacks the body's own cells (autoimmune disorders).      •Viral infections, including hepatitis.      •Pregnancy. This is a rare cause of aplastic anemia, and it may be related to an autoimmune problem that affects bone marrow.      Sometimes, the cause is not known.      What are the signs or symptoms?    Symptoms of this condition include:  •Fatigue, light-headedness, or fainting.      •Shortness of breath and rapid heart rate, especially with physical activity.      •Pale skin, nails, and lips.      •Frequent infections.    •Bruising and bleeding easily. This may include:  •Nosebleeds, bleeding gums, and sore mouth.      •Prolonged bleeding from cuts.      •Severe bleeding during menstrual periods, for women.        •Fever.        How is this diagnosed?    This condition is diagnosed based on your symptoms, medical history, and blood tests. You may also have a test where cells are removed from the inside of a bone (bone marrow biopsy) for testing. You may have more tests to find the underlying cause of your aplastic anemia.      How is this treated?    Treatment for aplastic anemia depends on the severity of the condition. For mild cases, observation is needed. In severe cases, or if complications develop, hospitalization may be necessary. Severe aplastic anemia is life-threatening. Treatment may include:  •Receiving donated blood through an IV (blood transfusion).    •Medicines:  •To reduce the activity of (suppress) the immune system, if you have an autoimmune disorder.      •To stimulate bone marrow to make more blood cells.        •Antibiotic medicines to prevent infection.      •Receiving healthy bone marrow from a donor (bone marrow transplant), if your condition is severe. After the transplant, you will need to take medicines to help prevent your body from rejecting the new marrow. If the procedure is successful, the transplanted marrow will begin to produce new blood cells. Your health care provider will determine whether you are a candidate for bone marrow transplant.      •Hormonal therapy to reduce menstruation-related bleeding.    •A specialized diet to reduce the risk of infections. If told by your health care provider, you may need to:  •Avoid dairy products.      •Avoid raw meat.      •Wash fresh fruit and vegetables well and peel them before eating.          Follow these instructions at home:     Medicines     •Take over-the-counter and prescription medicines only as told by your health care provider.      •If you were prescribed an antibiotic medicine, take it as told by your health care provider. Do not stop taking the antibiotic even if you start to feel better.      Activity     •Avoid excessive exercise. Long-term anemia can put stress on the heart. Ask your health care provider what types of exercise are best for you.      •When platelets are at low levels, avoid all activities that put you at risk for injury. This is important because your risk of bleeding is greater. Ask your health care provider what activities are safe for you when your platelets are low.      •Avoid intense physical activities that could cause bleeding.      General instructions     •Get plenty of sleep.      •Eat a healthy, well-balanced diet. Follow instructions from your health care provider about eating or drinking restrictions.    •Protect yourself from infections. This includes:  •Washing your hands often with soap and water for at least 20 seconds. If soap and water are not available, use hand .      •Avoiding crowds.      •Avoiding contact with sick people.        •Keep all follow-up visits as told by your health care provider. This is important.        How is this prevented?    •Avoid exposure to toxic chemicals.      •Take steps to protect yourself from infections.        Contact a health care provider if you:    •Develop mouth sores.      •Have a fever or other symptoms that last for more than 2–3 days.      •Develop flu-like symptoms.      •Feel you are bruising easily.      •Develop signs of an infection.      •Have blood in your urine or your stool (feces).      •Have bleeding from your gums or nose.      •Develop infections more often than usual.        Get help right away if you:    •Have a fever and your symptoms suddenly get worse.      •Have prolonged bleeding from cuts.      •Have shortness of breath that gets worse.      •Have chest pain or a rapid heart rate when you do physical activity.      •Have increasing fatigue and tiredness.      •Become light-headed or you faint.      •Develop pale skin and lips.      •Cough up blood.      These symptoms may represent a serious problem that is an emergency. Do not wait to see if the symptoms will go away. Get medical help right away. Call your local emergency services (911 in the [place] Do not drive yourself to the hospital.       Summary    •Aplastic anemia occurs when soft tissue inside the bones (bone marrow) stops making enough blood cells.      •Treatment for aplastic anemia depends on the severity of the condition, and it may involve medicines or transfusions. In severe cases, or if complications develop, hospitalization may be necessary.      •Take steps to protect yourself from infections.      This information is not intended to replace advice given to you by your health care provider. Make sure you discuss any questions you have with your health care provider.

## 2022-12-16 NOTE — ED ADULT NURSE REASSESSMENT NOTE - NS ED NURSE REASSESS COMMENT FT1
assumed care for pt at 1930, charting as noted. pt resting in stretcher, A&Ox4, blood transfusion running. respirations even and unlabored. pt shows no s/s of acute distress at this time. safety precautions maintained.

## 2022-12-16 NOTE — ED CDU PROVIDER INITIAL DAY NOTE - CLINICAL SUMMARY MEDICAL DECISION MAKING FREE TEXT BOX
67-year-old male history of prostate cancer currently undergoing them lynpraza and radiotherapy for treatment.  Patient presents today with hypokalemia and anemia likely secondary to nausea nad vomiting the two days and his ongoing chemotherapy.  Patient with generally normal exam except for hip pain.  Will provide 1 unit of blood transfusion and electrolyte replacement.  Patient will then follow-up with his primary care physician within the next couple of days for reassessment. 67-year-old male history of metastatic prostate cancer currently undergoing treatment with lynpraza and radiotherapy.  Patient presents today with hypokalemia and anemia likely secondary to nausea and vomiting the two days and his ongoing chemotherapy.  Patient with generally normal exam except for hip pain which patient states has been recurrent for the past several weeks. Will provide 1 unit of blood transfusion and electrolyte replacement.  Patient will then follow-up with his primary care physician within the next couple of days for reassessment.

## 2022-12-16 NOTE — ED CDU PROVIDER INITIAL DAY NOTE - NEURO NEGATIVE STATEMENT, MLM
no loss of consciousness, no gait abnormality, no headache, no sensory deficits, and no weakness. no loss of consciousness, no gait abnormality, no headache, no sensory deficits, and no weakness. except for right hand

## 2022-12-17 NOTE — ED CDU PROVIDER SUBSEQUENT DAY NOTE - CLINICAL SUMMARY MEDICAL DECISION MAKING FREE TEXT BOX
68 yo male with pmhx history of prostate CA, HTN currently undergoing tx presents today with hypokalemia and anemia. Pt received electrolyte replacement and 2UpRBC. Notified pt still hypokalemic to 2.7. Pt admitted to medicine for further evaluation and workup.

## 2022-12-17 NOTE — H&P ADULT - NSHPSOCIALHISTORY_GEN_ALL_CORE
No cigarette, alcohol or illicit drug use No cigarette, alcohol or illicit drug use. Lives with family

## 2022-12-17 NOTE — H&P ADULT - ASSESSMENT
Electrolytes abnormalities   Admit to telemetry   K: 2.7; M.4, Ca: 10.7      Anemia     Prostate ca     HTN  Lisinopril 10mg   Clonidine 0.1mg bid     Supportive   DVT prophylaxis: CSD given low Hb, change to chemical as needed   Diet: DASH     Plan of care discussed with patient  68 y/o male with PMH of HTN, prostate ca was sent to the ED for abnormal blood work. Patient noted to have hypokalemia and hypomagnesemia in the ED placed in observation, electrolytes supplemented. Electrolytes not improved after treatment, medicine called for admission. Also found to have low Hb 6.7 s/p 2 unit of PRBC.     Electrolytes abnormalities   Admit to telemetry   K: 2.7; M.4  Ca: 10.7 (was 11.4 on initial blood), improving without IVF. Will hold off on IVF given elevated BP   Encourage PO intake   Monitor electrolytes, supplement to keep K >4 and Mg 2     Anemia   Hb: 6.7  Likely due to underlying ca/hematuria   2 units of PRBC reported to be given in the ED   Will check post transfusion CBC, transfuse to keep hb>7    Prostate ca  On Olaparib 150mg bid (non-formulary, patient to bring home medication)  Pain control   Percocet PRN     HTN  Lisinopril 10mg   Clonidine 0.1mg bid   Amlodipine 10mg     Supportive   DVT prophylaxis: CSD given low Hb, change to chemical as needed   Diet: DASH     Code: Full (see GOC note)     Plan of care discussed with patient and sister (Gail @[phone]) via phone as instructed by patient.

## 2022-12-17 NOTE — ED ADULT NURSE REASSESSMENT NOTE - NS ED NURSE REASSESS COMMENT FT1
pt received in observation area, AOX3 with 2nd unit of PRBC infusing thru patent IV. pt aware of plain of care, voids in urinal without assistance. even and unlabored resps present. pt received in observation area, AOX3 with 2nd unit of PRBC infusing thru patent IV. pt aware of plain of care, voids in urinal without assistance. even and unlabored resps present. offers no complaints of pain at this time. safety maintained.

## 2022-12-17 NOTE — ED CLERICAL - NS ED CLERK UNITS
TELE TELE DAA 12/19 ANY DAA 12/19 ANY ANY DAA 12/19 or 12/20 ANY DAA 12/20 5219-02 ANY DAA/5TWR ANY DAA/5TWR ANY/5TWR

## 2022-12-17 NOTE — H&P ADULT - HISTORY OF PRESENT ILLNESS
66 y/o male with PMH of prostate ca currently undergoing chemotherapy was sent to the ED for abnormal blood work. Patient noted to have hypokalemia and hypomagnesemia in the ED placed in observation, electrolytes supplemented. Electrolytes not improved after treatment, medicine called for admission. Also found to have low Hb 6.7 s/p 2 unit of PRBC.      68 y/o male with PMH of HTN, prostate ca was sent to the ED for abnormal blood work. Patient noted to have hypokalemia and hypomagnesemia in the ED placed in observation, electrolytes supplemented. Electrolytes not improved after treatment, medicine called for admission. Also found to have low Hb 6.7 s/p 2 unit of PRBC. Patient reported weakness for < 1 week which he attributed to decrease PO intake; noted he had radiosurgery done 2 weeks ago and has not been able to eat well since because food has no taste. He reported nausea, vomiting, hematuria, but no fever, chills, abdominal pain, diarrhea, chest pain, shortness of breath.

## 2022-12-17 NOTE — ED CDU PROVIDER SUBSEQUENT DAY NOTE - NS ED ROS FT
Gen: denies fever, chills  Skin: denies rashes  HEENT: denies visual changes, ear pain, nasal congestion, throat pain  Respiratory: denies THOMPSON, SOB  Cardiovascular: denies chest pain, palpitations  GI: denies abdominal pain, n/v/d  : denies dysuria, frequency  MSK: denies joint swelling/pain, back pain, neck pain  Neuro: +generalized weakness. denies headache, dizziness, LOC, numbness

## 2022-12-17 NOTE — GOALS OF CARE CONVERSATION - ADVANCED CARE PLANNING - CONVERSATION DETAILS
Advance care directive discussed with patient, he named his sister Gail as the health care proxy. Patient said he has discussed with family in the past, not sure if form completed, but will like to talk to his sister again to go over their decisions. In the mean time, he wants to be full code. I spoke to Gail over the phone, she agreed with keeping him full code for now, she will look for paper work.     Goal of care was done face to face and via phone

## 2022-12-17 NOTE — ED ADULT NURSE REASSESSMENT NOTE - NS ED NURSE REASSESS COMMENT FT1
care assumed from MILENA Benjamin. patient AAO x 4. all questions answered with verbal understanding. on obs pending PT consult. 2nd unit PRBC finished. tele monitoring in use. per monitor tech, intermittent episodes of non sustaining trigeminy. YONATAN Glover aware. asymptomatic. bed in lowest position, call bell within reach. frequent rounds initiated for comfort and safety.

## 2022-12-17 NOTE — H&P ADULT - NSHPPHYSICALEXAM_GEN_ALL_CORE
Vital Signs Last 24 Hrs  T(C): 36.7 (17 Dec 2022 04:03), Max: 36.7 (16 Dec 2022 11:56)  T(F): 98 (17 Dec 2022 04:03), Max: 98.1 (16 Dec 2022 15:41)  HR: 90 (17 Dec 2022 04:03) (78 - 100)  BP: 159/82 (17 Dec 2022 04:03) (137/79 - 179/82)  BP(mean): --  RR: 18 (17 Dec 2022 04:03) (18 - 20)  SpO2: 96% (17 Dec 2022 04:03) (95% - 98%)    Parameters below as of 17 Dec 2022 04:03  Patient On (Oxygen Delivery Method): room air

## 2022-12-17 NOTE — ED CDU PROVIDER DISPOSITION NOTE - NS ED ATTENDING STATEMENT MOD
This was a shared visit with the JEFERSON. I reviewed and verified the documentation and independently performed the documented:

## 2022-12-17 NOTE — ED CDU PROVIDER SUBSEQUENT DAY NOTE - PHYSICAL EXAMINATION
Gen: No acute distress, non toxic  HEENT: Mucous membranes moist, pink conjunctivae, EOMI. PERRL. Airway patent.   CV: RRR, nl s1/s2.  Resp: CTAB, normal rate and effort. no wheezes, rhonchi, or crackles.   GI: Abdomen soft, NT, ND. No rebound, no guarding  Neuro: A&O x4, MAEx4. 5/5 str ext x 4. Sensation intact, symmetric throughout. No FND's  MSK: FROM UE and LE b/l   Skin: No rashes. intact and perfused. cap refill <2sec  Vascular: Radial and dorsalis pedal pulses 2+ b/l. no LE edema

## 2022-12-17 NOTE — ED CDU PROVIDER DISPOSITION NOTE - CLINICAL COURSE
67-year-old male currently undergoing chemotherapy for prostate ca with lurpon presenting to the ER for evaluation and management of abnormal blood work drawn yesterday. Pt found to be hypokalemic and anemic. Received 3 rounds of K riders and PO K, still hypokalemic. Pt s/p 2UpRBC. Admitted to medicine for electrolyte abnl.

## 2022-12-17 NOTE — ED CDU PROVIDER SUBSEQUENT DAY NOTE - HISTORY
Pt resting comfortably at time of re-assessment. No events overnight. Pt reports he still "feels weak all over." Neurovascularly intact. Pending PT consult. Will continue to monitor.

## 2022-12-17 NOTE — ED CDU PROVIDER SUBSEQUENT DAY NOTE - PROGRESS NOTE DETAILS
Was notified by lab pt hypokalemic to 2.7. Pt admitted to medicine for further evaluation and management for electrolyte abnl.

## 2022-12-17 NOTE — PATIENT PROFILE ADULT - FALL HARM RISK - HARM RISK INTERVENTIONS

## 2022-12-17 NOTE — ED CDU PROVIDER SUBSEQUENT DAY NOTE - ATTENDING CONTRIBUTION TO CARE
I agree with the PA's note and was available for any issues/concerns. I was directly involved in patient care. My brief overall assessment is as follows:    no acute events overnight; awaiting PT and placement

## 2022-12-18 NOTE — DISCHARGE NOTE PROVIDER - NSDCMRMEDTOKEN_GEN_ALL_CORE_FT
amLODIPine 10 mg oral tablet: 1 tab(s) orally once a day  cloNIDine 0.1 mg oral tablet: 1 tab(s) orally 2 times a day  Colace 100 mg oral capsule: 2 cap(s) orally once a day (at bedtime)   olaparib 150 mg oral tablet: 1 tab(s) orally 2 times a day  oxyCODONE-acetaminophen 5 mg-325 mg oral tablet: 1 tab(s) orally every 6 hours, As Needed MDD:4    amLODIPine 10 mg oral tablet: 1 tab(s) orally once a day  cloNIDine 0.1 mg oral tablet: 1 tab(s) orally 2 times a day  Colace 100 mg oral capsule: 2 cap(s) orally once a day (at bedtime)   dronabinol 2.5 mg oral capsule: 1 cap(s) orally once a day  olaparib 150 mg oral tablet: 1 tab(s) orally 2 times a day  oxyCODONE-acetaminophen 5 mg-325 mg oral tablet: 1 tab(s) orally every 6 hours, As Needed MDD:4   polyethylene glycol 3350 oral powder for reconstitution: 17 gram(s) orally every 12 hours, hold for diarrhea  senna leaf extract oral tablet: 2 tab(s) orally once a day (at bedtime), hold for diarrhea

## 2022-12-18 NOTE — DISCHARGE NOTE PROVIDER - NSDCFUSCHEDAPPT_GEN_ALL_CORE_FT
[facility]  Evelin DELAROSA [facility]  Scheduled Appointment: 12/19/2022    [facility]  Evelin DELAROSA [facility]  Scheduled Appointment: 12/20/2022    [facility]  Evelin DELAROSA [facility]  Scheduled Appointment: 01/18/2023     [facility] Physician Counts include 234 beds at the [facility]  Evelin DELAROSA [facility]  Scheduled Appointment: 01/18/2023    [facility]  Evelin DELAROSA Inf[facility]io  Scheduled Appointment: 03/20/2023

## 2022-12-18 NOTE — DISCHARGE NOTE PROVIDER - NS AS DC PROVIDER CONTACT Y/N MULTI
Pt here for nausea, vomiting, and diarrhea with right sided abd pain since last week. Dizziness started today while in PCP office.
Yes

## 2022-12-18 NOTE — DISCHARGE NOTE PROVIDER - ATTENDING DISCHARGE PHYSICAL EXAMINATION:
Vital Signs Last 24 Hrs  T(C): 36.9 (18 Dec 2022 12:12), Max: 36.9 (18 Dec 2022 10:03)  T(F): 98.4 (18 Dec 2022 12:12), Max: 98.4 (18 Dec 2022 10:03)  HR: 91 (18 Dec 2022 12:12) (66 - 91)  BP: 121/73 (18 Dec 2022 12:12) (100/62 - 136/80)  BP(mean): --  RR: 18 (18 Dec 2022 12:12) (18 - 18)  SpO2: 95% (18 Dec 2022 12:12) (92% - 96%)    Parameters below as of 18 Dec 2022 12:12  Patient On (Oxygen Delivery Method): room air    CONSTITUTIONAL: NAD, well-developed, well-groomed  ENMT: Moist oral mucosa, no pharyngeal injection or exudates; normal dentition; No JVD  RESPIRATORY: Normal respiratory effort; lungs are clear to auscultation bilaterally  CARDIOVASCULAR: Regular rate and rhythm, normal S1 and S2, no murmur/rub/gallop; No lower extremity edema; Peripheral pulses are 2+ bilaterally  ABDOMEN: Nontender to palpation, normoactive bowel sounds, no rebound/guarding; No hepatosplenomegaly  MUSCLOSKELETAL:  no clubbing or cyanosis of digits; no joint swelling or tenderness to palpation  PSYCH: A+O to person, place, and time; affect appropriate  NEUROLOGY: CN 2-12 are intact and symmetric; no gross sensory deficits; was observed moving all 4 ext against gravity cooperating with exam.   SKIN: No rashes; no palpable lesions Vital Signs Last 24 Hrs  T(C): 36.5 (26 Dec 2022 10:33), Max: 37.2 (26 Dec 2022 05:19)  T(F): 97.7 (26 Dec 2022 10:33), Max: 98.9 (26 Dec 2022 05:19)  HR: 77 (26 Dec 2022 10:33) (75 - 89)  BP: 114/70 (26 Dec 2022 10:33) (105/70 - 129/75)  BP(mean): --  RR: 18 (26 Dec 2022 10:33) (18 - 19)  SpO2: 92% (26 Dec 2022 10:33) (92% - 98%)    Parameters below as of 26 Dec 2022 06:31  Patient On (Oxygen Delivery Method): room air    CONSTITUTIONAL: NAD, well-groomed, on RA  ENMT: Moist oral mucosa, no pharyngeal injection or exudates; normal dentition  RESPIRATORY: Normal respiratory effort; lungs are clear to auscultation bilaterally  CARDIOVASCULAR: Regular rate and rhythm; No lower extremity edema  ABDOMEN: Nontender to palpation, normoactive bowel sounds  PSYCH: A+O x3; affect appropriate  NEUROLOGY: CN 2-12 are intact and symmetric; no gross sensory deficits;   SKIN: No rashes; no palpable lesions Vital Signs Last 24 Hrs  T(C): 37.6 (27 Dec 2022 11:48), Max: 37.6 (27 Dec 2022 11:48)  T(F): 99.7 (27 Dec 2022 11:48), Max: 99.7 (27 Dec 2022 11:48)  HR: 104 (27 Dec 2022 11:48) (74 - 104)  BP: 112/77 (27 Dec 2022 11:48) (106/66 - 129/74)  BP(mean): --  RR: 18 (27 Dec 2022 11:48) (18 - 18)  SpO2: 96% (27 Dec 2022 11:48) (95% - 98%)    Parameters below as of 27 Dec 2022 05:15  Patient On (Oxygen Delivery Method): room air    Vital Signs Last 24 Hrs  T(C): 36.5 (26 Dec 2022 10:33), Max: 37.2 (26 Dec 2022 05:19)  T(F): 97.7 (26 Dec 2022 10:33), Max: 98.9 (26 Dec 2022 05:19)  HR: 77 (26 Dec 2022 10:33) (75 - 89)  BP: 114/70 (26 Dec 2022 10:33) (105/70 - 129/75)  BP(mean): --  RR: 18 (26 Dec 2022 10:33) (18 - 19)  SpO2: 92% (26 Dec 2022 10:33) (92% - 98%)    Parameters below as of 26 Dec 2022 06:31  Patient On (Oxygen Delivery Method): room air    CONSTITUTIONAL: NAD, well-groomed, on RA  ENMT: Moist oral mucosa, no pharyngeal injection or exudates; normal dentition  RESPIRATORY: Normal respiratory effort; lungs are clear to auscultation bilaterally  CARDIOVASCULAR: Regular rate and rhythm; No lower extremity edema  ABDOMEN: Nontender to palpation, normoactive bowel sounds  PSYCH: A+O x3; affect appropriate  NEUROLOGY: CN 2-12 are intact and symmetric; no gross sensory deficits;   SKIN: No rashes; no palpable lesions

## 2022-12-18 NOTE — DISCHARGE NOTE PROVIDER - NSDCCPCAREPLAN_GEN_ALL_CORE_FT
PRINCIPAL DISCHARGE DIAGNOSIS  Diagnosis: Anemia in neoplastic disease  Assessment and Plan of Treatment: - due to effect of chemotherapy  - please follow up with Dr. Oshea      SECONDARY DISCHARGE DIAGNOSES  Diagnosis: Acute hypokalemia  Assessment and Plan of Treatment: - please try to eat a little bit more  - keep your self hydrated    Diagnosis: HTN (hypertension)  Assessment and Plan of Treatment: - continue to take your medication    Diagnosis: Prostate cancer  Assessment and Plan of Treatment: - follow up with your oncologist w/in next 3-7 days     PRINCIPAL DISCHARGE DIAGNOSIS  Diagnosis: Anemia in neoplastic disease  Assessment and Plan of Treatment: - due to effect of chemotherapy  - please follow up with Dr. Oshea      SECONDARY DISCHARGE DIAGNOSES  Diagnosis: Viral upper respiratory infection  Assessment and Plan of Treatment:     Diagnosis: Acute hypokalemia  Assessment and Plan of Treatment: - please try to eat a little bit more  - keep your self hydrated    Diagnosis: HTN (hypertension)  Assessment and Plan of Treatment: - continue to take your medication    Diagnosis: Prostate cancer  Assessment and Plan of Treatment: - follow up with your oncologist w/in next 3-7 days     PRINCIPAL DISCHARGE DIAGNOSIS  Diagnosis: Anemia in neoplastic disease  Assessment and Plan of Treatment: -You were admitted for further evalution and management of your anemia, which was likely due to a side effect of chemotherapy. Please folo-wup with your outpatient oncologist Dr. Oshea as directed. Please continue to take all of your medications as directed, please follow all dietary recommendations, please maintain adequate hydration and nutrition, and please follow up with all of your healthcare providers as directed.        SECONDARY DISCHARGE DIAGNOSES  Diagnosis: Acute hypokalemia  Assessment and Plan of Treatment: Please continue to take all of your medications as directed, please follow all dietary recommendations, please maintain adequate hydration and nutrition, and please follow up with all of your healthcare providers as directed.      Diagnosis: HTN (hypertension)  Assessment and Plan of Treatment: Please continue to take all of your medications as directed, please follow all dietary recommendations, please maintain adequate hydration and nutrition, and please follow up with all of your healthcare providers as directed.      Diagnosis: Prostate cancer  Assessment and Plan of Treatment: Please follow up with your oncologist w/in next 3-7 days. Please continue to take all of your medications as directed, please follow all dietary recommendations, please maintain adequate hydration and nutrition, and please follow up with all of your healthcare providers as directed.

## 2022-12-18 NOTE — DISCHARGE NOTE PROVIDER - PROVIDER TOKENS
FREE:[LAST:[Dr. Oshea],PHONE:[(   )    -],FAX:[(   )    -],FOLLOWUP:[1-3 days],ESTABLISHEDPATIENT:[T]] FREE:[LAST:[Dr. Oshea],PHONE:[(   )    -],FAX:[(   )    -],FOLLOWUP:[1-3 days],ESTABLISHEDPATIENT:[T]],FREE:[LAST:[Primary Care Provider],PHONE:[(   )    -],FAX:[(   )    -],FOLLOWUP:[1-3 days],ESTABLISHEDPATIENT:[T]],FREE:[LAST:[[facility]],FIRST:[Saqib],PHONE:[(   )    -],FAX:[(   )    -],FOLLOWUP:[1-3 days]]

## 2022-12-18 NOTE — DISCHARGE NOTE PROVIDER - DETAILS OF MALNUTRITION DIAGNOSIS/DIAGNOSES
This patient has been assessed with a concern for Malnutrition and was treated during this hospitalization for the following Nutrition diagnosis/diagnoses:     -  12/24/2022: Severe protein-calorie malnutrition

## 2022-12-18 NOTE — DISCHARGE NOTE PROVIDER - CARE PROVIDER_API CALL
Dr. Oshea,   Phone: (   )    -  Fax: (   )    -  Established Patient  Follow Up Time: 1-3 days   Dr. Oshea,   Phone: (   )    -  Fax: (   )    -  Established Patient  Follow Up Time: 1-3 days    Primary Care Provider,   Phone: (   )    -  Fax: (   )    -  Established Patient  Follow Up Time: 1-3 days    Saqib Espinal  Phone: (   )    -  Fax: (   )    -  Follow Up Time: 1-3 days

## 2022-12-18 NOTE — DISCHARGE NOTE PROVIDER - HOSPITAL COURSE
66 y/o male with PMH of HTN, prostate ca was sent to the ED for abnormal blood work. Patient noted to have hypokalemia and hypomagnesemia in the ED placed in observation, electrolytes supplemented. Electrolytes not improved after treatment, medicine called for admission. Also found to have low Hb 6.7 s/p 2 unit of PRBC, anemia was atributed to myelospression from chemotherapy in setting of mild hematuria. Pt h/h 8.1/23.4 on discharge. He is stable to be discharge with close follow up with Dr. Oshea, case was discussed with Dr. Smith. 68 y/o male with PMH of HTN, prostate ca was sent to the ED for abnormal blood work. Patient noted to have hypokalemia and hypomagnesemia in the ED placed in observation, electrolytes supplemented. Electrolytes not improved after treatment, medicine called for admission. Also found to have low Hb 6.7 s/p 2 unit of PRBC, anemia was atributed to myelospression from chemotherapy in setting of mild hematuria. Hg remains stable ~ 8.5 after transfusion, remains stable at discharge. Course c/b recurrent fever, patient was noted to be entero/rhino virus positive. He is stable to be discharge with close follow up with Dr. Oshea, case was discussed with Dr. Smith. 66 y/o male with PMH of HTN, prostate ca was sent to the ED for abnormal blood work. Patient noted to have hypokalemia and hypomagnesemia in the ED placed in observation, electrolytes supplemented. Electrolytes not improved after treatment, medicine called for admission. Also found to have low Hb 6.7 s/p 2 unit of PRBC, anemia was attributed to myelosupression from chemotherapy in setting of mild hematuria. Hg remains stable ~ 8.5 after transfusion, remains stable at discharge. Course c/b recurrent fever, patient was noted to be entero/rhino virus positive. He is stable to be discharge with close follow up with Dr. Oshea, case was discussed with Dr. Smith. Physical therapy consulted and recommends discharge to [facility], our case management and social work teams are currently working to obtain authorization.

## 2022-12-19 NOTE — PHYSICAL THERAPY INITIAL EVALUATION ADULT - ACTIVE RANGE OF MOTION EXAMINATION, REHAB EVAL
right hand with  strength issues, very weak, states recent onset/bilateral upper extremity Active ROM was WFL (within functional limits)/bilateral  lower extremity Active ROM was WFL (within functional limits)/deficits as listed below

## 2022-12-19 NOTE — PHYSICAL THERAPY INITIAL EVALUATION ADULT - ADDITIONAL COMMENTS
Pt lives in a house with  his sister 4 steps to enter with  rails and 0 stairs inside.  Pt owns medical equipment: RW  Pt lives with: Sister   Someone is usually home to assist if needed

## 2022-12-19 NOTE — CHART NOTE - NSCHARTNOTEFT_GEN_A_CORE
pt requesting breathing treatment  Denies sob or difficulty breathing, or CP  Pt states he feels like he needs one  VSS PO 98% R 18  NAD Pt appears comfortable  Breathing easy and unlabored  Lungs clear  Albuterol neb ordered for pts comfort

## 2022-12-22 NOTE — CHART NOTE - NSCHARTNOTEFT_GEN_A_CORE
68 y/o male with PMH of HTN, prostate ca admitted with electrolytes abnormalities due to poor po intake in setting of ongoing tx for prostate Cr and Anemia s/p 2U   Patient with H&H 6.7/20     Vital Signs  T(F): 98.6  HR: 92   BP: 118/66  RR: 18  SpO2: 91%   Parameters below as of 22 Dec 2022 04:37  Patient On (Oxygen Delivery Method): room air    Type and screen ordered STAT   1U PRBCs once T&S completed 66 y/o male with PMH of HTN, prostate ca admitted with electrolytes abnormalities due to poor po intake in setting of ongoing tx for prostate Cr and Anemia s/p 2U   Patient with H&H 6.7/20     Vital Signs  T(F): 98.6  HR: 92   BP: 118/66  RR: 18  SpO2: 91%   Parameters below as of 22 Dec 2022 04:37  Patient On (Oxygen Delivery Method): room air    Asxs, no active bleeding   Type and screen ordered STAT   1U PRBCs once T&S completed

## 2022-12-23 NOTE — CONSULT NOTE ADULT - ASSESSMENT
The patient is a 67 year old man with metastatic prostate cancer  currently on treatment with olaparib and leuprolide, who presents to the ED with pancytopenia. The patient was previously treated with leuprolide and taxotere in 2020.  The patient noted to have a significantly increasing PSA suggestive of progressive disease. The patient's pancytopenia maybe attributable to olaparib vs. myelophthisic  process from tumor infiltration.  Would manage supportively at this time and hold olaparib.      # prostate cancer  - Please transfuse if hgb> 8, platelets <20, or 50 with bleeding.   - hold olaparib for now; patient likely progressing.   - Patient will likely need change of treatment as outpatient. WIll schedule patient for follow up upon discharge.

## 2022-12-23 NOTE — CONSULT NOTE ADULT - SUBJECTIVE AND OBJECTIVE BOX
REASON FOR CONSULTATION    HPI:  68 y/o male with PMH of HTN, prostate ca currently on treatment with olaparib was sent to the ED for abnormal blood work. Patient noted to have hypokalemia and hypomagnesemia in the ED placed in observation, electrolytes supplemented. Electrolytes not improved after treatment, medicine called for admission. Also found to have low Hb 6.7 s/p 2 unit of PRBC. Patient reported weakness for < 1 week which he attributed to decrease PO intake; noted he had radiosurgery done 2 weeks ago and has not been able to eat well since because food has no taste. He reported nausea, vomiting, hematuria, but no fever, chills, abdominal pain, diarrhea, chest pain, shortness of breath.           REVIEW OF SYSTEMS:    REVIEW OF SYSTEMS:   [X All ROS addressed below are non-contributory, except:  Neuro: [ ] Headache [  ]Back pain [ ] Numbness [ ] Weakness [ ] Ataxia [ ] Dizziness [ ] Aphasia [ ] Dysarthria [ ] Visual disturbance  Resp: [ ] Shortness of breath/dyspnea, [ ] Orthopnea [ ] Cough  CV: [ ] Chest pain [ ] Palpitation [ ] Lightheadedness [ ] Syncope  Renal: [ ] Thirst [ ] Edema [ ] hematuria   GI: [ ] Nausea [ ] Emesis [ ] Abdominal pain [ ] Constipation [ ] Diarrhea  Hem: [ ] Hematemesis [ ] bright red blood per rectum  ID: [ ] Fever [ ] Chills [ ] Dysuria  ENT: [ ] Rhinorrhea  Neuro [ ] Numbness [ ] tingling   Psych: [ ] confusion     PAST MEDICAL & SURGICAL HISTORY:  Prostate CA  Adenocarcinoma with mets to Lung, Bone, and Lymph      No significant past surgical history          SOCIAL HISTORY:    FAMILY HISTORY:  FH: multiple myeloma  Mother        SOCIAL HISTORY:    Allergies    No Known Allergies    Intolerances        MEDICATIONS  (STANDING):  amLODIPine   Tablet 10 milliGRAM(s) Oral daily  cloNIDine 0.1 milliGRAM(s) Oral two times a day  dronabinol 2.5 milliGRAM(s) Oral daily  lisinopril 10 milliGRAM(s) Oral daily  magnesium hydroxide Suspension 30 milliLiter(s) Oral once  magnesium oxide 400 milliGRAM(s) Oral three times a day with meals  polyethylene glycol 3350 17 Gram(s) Oral every 12 hours  senna 2 Tablet(s) Oral at bedtime    MEDICATIONS  (PRN):  acetaminophen     Tablet .. 650 milliGRAM(s) Oral every 6 hours PRN Temp greater or equal to 38C (100.4F), Mild Pain (1 - 3)  aluminum hydroxide/magnesium hydroxide/simethicone Suspension 30 milliLiter(s) Oral every 4 hours PRN Dyspepsia  melatonin 3 milliGRAM(s) Oral at bedtime PRN Insomnia  ondansetron Injectable 4 milliGRAM(s) IV Push every 8 hours PRN Nausea and/or Vomiting  oxycodone    5 mG/acetaminophen 325 mG 2 Tablet(s) Oral every 4 hours PRN Severe Pain (7 - 10)      Vital Signs Last 24 Hrs  T(C): 37.4 (23 Dec 2022 10:13), Max: 38.2 (22 Dec 2022 22:15)  T(F): 99.3 (23 Dec 2022 10:13), Max: 100.7 (22 Dec 2022 22:15)  HR: 90 (23 Dec 2022 10:13) (88 - 94)  BP: 123/71 (23 Dec 2022 10:13) (123/71 - 143/82)  BP(mean): --  RR: 16 (23 Dec 2022 10:13) (16 - 18)  SpO2: 94% (23 Dec 2022 10:13) (92% - 94%)    Parameters below as of 23 Dec 2022 10:13  Patient On (Oxygen Delivery Method): room air        PHYSICAL EXAM:    GENERAL: NAD, well-groomed, well-developed  HEAD:  Atraumatic, Normocephalic  EYES: EOMI, PERRLA, conjunctiva and sclera clear  ENMT: No tonsillar erythema, exudates, or enlargement; Moist mucous membranes, Good dentition, No lesions  NECK: Supple, No JVD, Normal thyroid  NERVOUS SYSTEM:  Alert & Oriented X3, Good concentration; Motor Strength 5/5 B/L upper and lower extremities; DTRs 2+ intact and symmetric  CHEST/LUNG: Clear to percussion bilaterally; No rales, rhonchi, wheezing, or rubs  HEART: Regular rate and rhythm; No murmurs, rubs, or gallops  ABDOMEN: Soft, Nontender, Nondistended; Bowel sounds present  EXTREMITIES:  2+ Peripheral Pulses, No clubbing, cyanosis, or edema  LYMPH: No lymphadenopathy noted  SKIN: No rashes or lesions      LABS:                        7.8    1.69  )-----------( 46       ( 23 Dec 2022 05:35 )             23.1     12-    136  |  100  |  15.3  ----------------------------<  111<H>  3.7   |  25.0  |  0.52    Ca    10.0      23 Dec 2022 05:35  Phos  3.1     12-  Mg     1.6             Urinalysis Basic - ( 22 Dec 2022 13:30 )    Color: Anita / Appearance: very cloudy / S.015 / pH: x  Gluc: x / Ketone: Negative  / Bili: Negative / Urobili: 8 mg/dL   Blood: x / Protein: Negative / Nitrite: Negative   Leuk Esterase: Negative / RBC: 3-5 /HPF / WBC Negative /HPF   Sq Epi: x / Non Sq Epi: Few / Bacteria: Few          RADIOLOGY & ADDITIONAL STUDIES:    PATHOLOGY:

## 2022-12-24 NOTE — DIETITIAN NUTRITION RISK NOTIFICATION - TREATMENT: THE FOLLOWING DIET HAS BEEN RECOMMENDED
Diet, Regular:   Supplement Feeding Modality:  Oral  Ensure Clear Cans or Servings Per Day:  1       Frequency:  Three Times a day (12-17-22 @ 10:54) [Active]

## 2022-12-24 NOTE — DIETITIAN INITIAL EVALUATION ADULT - PERTINENT MEDS FT
MEDICATIONS  (STANDING):  amLODIPine   Tablet 10 milliGRAM(s) Oral daily  cloNIDine 0.1 milliGRAM(s) Oral two times a day  dronabinol 2.5 milliGRAM(s) Oral daily  lisinopril 10 milliGRAM(s) Oral daily  magnesium oxide 400 milliGRAM(s) Oral three times a day with meals  polyethylene glycol 3350 17 Gram(s) Oral every 12 hours  senna 2 Tablet(s) Oral at bedtime    MEDICATIONS  (PRN):  acetaminophen     Tablet .. 650 milliGRAM(s) Oral every 6 hours PRN Temp greater or equal to 38C (100.4F), Mild Pain (1 - 3)  aluminum hydroxide/magnesium hydroxide/simethicone Suspension 30 milliLiter(s) Oral every 4 hours PRN Dyspepsia  melatonin 3 milliGRAM(s) Oral at bedtime PRN Insomnia  ondansetron Injectable 4 milliGRAM(s) IV Push every 8 hours PRN Nausea and/or Vomiting  oxycodone    5 mG/acetaminophen 325 mG 2 Tablet(s) Oral every 4 hours PRN Severe Pain (7 - 10)

## 2022-12-24 NOTE — DIETITIAN INITIAL EVALUATION ADULT - PERTINENT LABORATORY DATA
12-24    136  |  99  |  16.1  ----------------------------<  102<H>  3.6   |  26.0  |  0.46<L>    Ca    10.5      24 Dec 2022 05:40  Phos  3.1     12-23  Mg     1.7     12-24 12-24 Na136 mmol/L Glu 102 mg/dL<H> K+ 3.6 mmol/L Cr  0.46 mg/dL<L> BUN 16.1 mg/dL Phos n/a   Alb n/a   PAB n/a

## 2022-12-24 NOTE — DIETITIAN INITIAL EVALUATION ADULT - ADD RECOMMEND
RECOMMENDATIONS:  1) Ensure Enlive TID (1050kcal,60gpro)  2) RX: MVI, folate daily  3) Monitor nutrition related labs and hydration status; iron stores  4) Monitor PO intake and wts; encourage PO intake

## 2022-12-24 NOTE — DIETITIAN INITIAL EVALUATION ADULT - REASON FOR ADMISSION
EXAM DATE/TIME:  05/12/2018 16:57 

 

[facility] COMPARISON:     

CHEST SINGLE AP, December 22, 2017, 20:06.

 

                     

INDICATIONS :     

Chest pain and shortness of breath.

                     

 

MEDICAL HISTORY :     

Hypercholesterolemia.       Hypertension. Encephalitis. Diverticulitis. GERD. Kidney stones. Arthriti
s. Cerbral palsy.    

 

SURGICAL HISTORY :        

Lithotripsy. Tonsillectomy. Left ankle surgery. Bilateral knee replacements.

 

ENCOUNTER:     

Initial                                        

 

ACUITY:     

1 week      

 

PAIN SCORE:     

10/10

 

LOCATION:     

Bilateral chest 

 

FINDINGS:     

Minimal basilar parenchymal changes left base..  Right lung is clear. The cardiomediastinal contours 
are unremarkable.  Osseous structures are intact.

 

CONCLUSION:     

Minimal parenchymal changes left base new from comparison study

 

 

 

 Gamaliel Schwartz MD FACR on May 12, 2018 at 17:28           

Board Certified Radiologist.

 This report was verified electronically. Anemia in neoplastic disease

## 2022-12-24 NOTE — DIETITIAN INITIAL EVALUATION ADULT - OTHER INFO
No
68 y/o male with PMH of HTN, prostate ca was sent to the ED for abnormal blood work. Patient noted to have hypokalemia and hypomagnesemia in the ED placed in observation, electrolytes supplemented. Electrolytes not improved after treatment, medicine called for admission. Also found to have low Hb 6.7 s/p 2 unit of PRBC, anemia was atributed to myelosuppression from chemotherapy in setting of mild hematuria. Pt h/h 8.1/23.4 on discharge. He is stable to be discharge with close follow up with Dr. Oshea, case was discussed with Dr. Smith. Patient later was assessed by PT team and recommended for TED. admission complicated with worsening pancytopenia , post tx fever episode.

## 2022-12-24 NOTE — DIETITIAN INITIAL EVALUATION ADULT - ETIOLOGY
related to inability to meet increased nutrition requirements in setting of dx anemia in neoplastic disease, prostate cancer with active chemo, poor appetite

## 2022-12-24 NOTE — DIETITIAN NUTRITION RISK NOTIFICATION - ADDITIONAL COMMENTS/DIETITIAN RECOMMENDATIONS
1) Ensure Enlive TID (1050kcal,60gpro)  2) RX: MVI, folate daily  3) Monitor nutrition related labs and hydration status; iron stores  4) Monitor PO intake and wts; encourage PO intake

## 2022-12-24 NOTE — DIETITIAN INITIAL EVALUATION ADULT - ORAL INTAKE PTA/DIET HISTORY
Nutrition assessment completed at bedside. Patient reports decreased PO intake and appetite. Reports weight changes from UBW ~265-270lbs, now ~248lbs. Likely 2/2 clinical course and decreased PO intake. Reports some constipation. Reports consuming ~100% ensure supplementation but would prefer enlive not clear. Encouraged high calorie high protein nutrition therapy with HBV proteins and nutrition supplementation as needed. Encouraged fruit, fluid and yogurt intake with meals to promote motility. Patient in good udnerstanding. RD to remain available.

## 2022-12-24 NOTE — DIETITIAN INITIAL EVALUATION ADULT - NS FNS DIET ORDER
Diet, Regular:   Supplement Feeding Modality:  Oral  Ensure Clear Cans or Servings Per Day:  1       Frequency:  Three Times a day (12-17-22 @ 10:54)

## 2022-12-27 NOTE — PROGRESS NOTE ADULT - SUBJECTIVE AND OBJECTIVE BOX
Bee Fernández M.D.    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 No concerns, wants to get out.     Patient denies chest pain, SOB, abd pain, N/V, fever, chills, dysuria or any other complaints. All remainder ROS negative.     MEDICATIONS  (STANDING):  amLODIPine   Tablet 10 milliGRAM(s) Oral daily  cloNIDine 0.1 milliGRAM(s) Oral two times a day  dronabinol 2.5 milliGRAM(s) Oral daily  lactulose Syrup 10 Gram(s) Oral two times a day  lisinopril 10 milliGRAM(s) Oral daily  magnesium hydroxide Suspension 30 milliLiter(s) Oral daily  magnesium oxide 400 milliGRAM(s) Oral three times a day with meals  polyethylene glycol 3350 17 Gram(s) Oral every 12 hours  potassium chloride   Powder 40 milliEquivalent(s) Oral every 2 hours  senna 2 Tablet(s) Oral at bedtime    MEDICATIONS  (PRN):  acetaminophen     Tablet .. 650 milliGRAM(s) Oral every 6 hours PRN Temp greater or equal to 38C (100.4F), Mild Pain (1 - 3)  aluminum hydroxide/magnesium hydroxide/simethicone Suspension 30 milliLiter(s) Oral every 4 hours PRN Dyspepsia  melatonin 3 milliGRAM(s) Oral at bedtime PRN Insomnia  ondansetron Injectable 4 milliGRAM(s) IV Push every 8 hours PRN Nausea and/or Vomiting      I&O's Summary    25 Dec 2022 07:01  -  26 Dec 2022 07:00  --------------------------------------------------------  IN: 0 mL / OUT: 400 mL / NET: -400 mL        PHYSICAL EXAM:  Vital Signs Last 24 Hrs  T(C): 36.5 (26 Dec 2022 10:33), Max: 37.2 (26 Dec 2022 05:19)  T(F): 97.7 (26 Dec 2022 10:33), Max: 98.9 (26 Dec 2022 05:19)  HR: 77 (26 Dec 2022 10:33) (75 - 89)  BP: 114/70 (26 Dec 2022 10:33) (105/70 - 129/75)  BP(mean): --  RR: 18 (26 Dec 2022 10:33) (18 - 19)  SpO2: 92% (26 Dec 2022 10:33) (92% - 98%)    Parameters below as of 26 Dec 2022 06:31  Patient On (Oxygen Delivery Method): room air      CONSTITUTIONAL: NAD, well-groomed  ENMT: Moist oral mucosa, no pharyngeal injection or exudates; normal dentition  RESPIRATORY: Normal respiratory effort; lungs are clear to auscultation bilaterally  CARDIOVASCULAR: Regular rate and rhythm; No lower extremity edema  ABDOMEN: Nontender to palpation, normoactive bowel sounds  PSYCH: A+O x3; affect appropriate  NEUROLOGY: CN 2-12 are intact and symmetric; no gross sensory deficits;   SKIN: No rashes; no palpable lesions      LABS:                        8.5    2.00  )-----------( 58       ( 26 Dec 2022 06:37 )             24.1     12-26    138  |  99  |  16.8  ----------------------------<  110<H>  3.2<L>   |  25.0  |  0.45<L>    Ca    10.5      26 Dec 2022 06:37  Phos  3.3     12-26  Mg     1.9     12-26                Culture - Blood (collected 23 Dec 2022 15:38)  Source: .Blood Blood-Peripheral  Preliminary Report (24 Dec 2022 22:01):    No growth to date.    Culture - Blood (collected 23 Dec 2022 15:36)  Source: .Blood Blood-Peripheral  Preliminary Report (24 Dec 2022 22:01):    No growth to date.      CAPILLARY BLOOD GLUCOSE          RADIOLOGY & ADDITIONAL TESTS:  Results Reviewed:   Imaging Personally Reviewed:  Electrocardiogram Personally Reviewed:
Bee Fernández M.D.    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     Patient denies chest pain, SOB, N/V, fever, chills, dysuria or any other complaints. All remainder ROS negative.     MEDICATIONS  (STANDING):  amLODIPine   Tablet 10 milliGRAM(s) Oral daily  cloNIDine 0.1 milliGRAM(s) Oral two times a day  dronabinol 2.5 milliGRAM(s) Oral daily  lactulose Syrup 10 Gram(s) Oral two times a day  lisinopril 10 milliGRAM(s) Oral daily  magnesium hydroxide Suspension 30 milliLiter(s) Oral daily  magnesium oxide 400 milliGRAM(s) Oral three times a day with meals  polyethylene glycol 3350 17 Gram(s) Oral every 12 hours  senna 2 Tablet(s) Oral at bedtime    MEDICATIONS  (PRN):  acetaminophen     Tablet .. 650 milliGRAM(s) Oral every 6 hours PRN Temp greater or equal to 38C (100.4F), Mild Pain (1 - 3)  aluminum hydroxide/magnesium hydroxide/simethicone Suspension 30 milliLiter(s) Oral every 4 hours PRN Dyspepsia  melatonin 3 milliGRAM(s) Oral at bedtime PRN Insomnia  ondansetron Injectable 4 milliGRAM(s) IV Push every 8 hours PRN Nausea and/or Vomiting      I&O's Summary    24 Dec 2022 07:  -  25 Dec 2022 07:00  --------------------------------------------------------  IN: 250 mL / OUT: 650 mL / NET: -400 mL    25 Dec 2022 07:01  -  25 Dec 2022 11:31  --------------------------------------------------------  IN: 0 mL / OUT: 400 mL / NET: -400 mL        PHYSICAL EXAM:  Vital Signs Last 24 Hrs  T(C): 36.6 (25 Dec 2022 10:26), Max: 37 (24 Dec 2022 12:25)  T(F): 97.8 (25 Dec 2022 10:26), Max: 98.6 (24 Dec 2022 12:25)  HR: 92 (25 Dec 2022 10:26) (79 - 92)  BP: 106/67 (25 Dec 2022 10:26) (102/66 - 136/74)  BP(mean): --  RR: 18 (25 Dec 2022 10:26) (18 - 19)  SpO2: 94% (25 Dec 2022 10:) (94% - 97%)    Parameters below as of 25 Dec 2022 10:26  Patient On (Oxygen Delivery Method): nasal cannula      CONSTITUTIONAL: NAD, well-groomed  ENMT: Moist oral mucosa, no pharyngeal injection or exudates; normal dentition  RESPIRATORY: Normal respiratory effort; lungs are clear to auscultation bilaterally  CARDIOVASCULAR: Regular rate and rhythm; No lower extremity edema  ABDOMEN: Nontender to palpation, normoactive bowel sounds  PSYCH: A+O x3; affect appropriate  NEUROLOGY: CN 2-12 are intact and symmetric; no gross sensory deficits;   SKIN: No rashes; no palpable lesions    LABS:                        8.1    2.18  )-----------( 57       ( 25 Dec 2022 08:01 )             23.9     12-    136  |  99  |  16.1  ----------------------------<  102<H>  3.6   |  26.0  |  0.46<L>    Ca    10.5      24 Dec 2022 05:40  Mg     1.7     12-            Urinalysis Basic - ( 24 Dec 2022 03:50 )    Color: Yellow / Appearance: Clear / S.015 / pH: x  Gluc: x / Ketone: Negative  / Bili: Negative / Urobili: Negative mg/dL   Blood: x / Protein: Negative / Nitrite: Negative   Leuk Esterase: Negative / RBC: 0-2 /HPF / WBC Negative /HPF   Sq Epi: x / Non Sq Epi: Occasional / Bacteria: Occasional        Culture - Blood (collected 23 Dec 2022 15:38)  Source: .Blood Blood-Peripheral  Preliminary Report (24 Dec 2022 22:01):    No growth to date.    Culture - Blood (collected 23 Dec 2022 15:36)  Source: .Blood Blood-Peripheral  Preliminary Report (24 Dec 2022 22:01):    No growth to date.      CAPILLARY BLOOD GLUCOSE          RADIOLOGY & ADDITIONAL TESTS:  Results Reviewed:   Imaging Personally Reviewed:  Electrocardiogram Personally Reviewed:
Bee Fernández M.D.    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     Patient denies chest pain, SOB, abd pain, N/V, fever, chills, dysuria or any other complaints. All remainder ROS negative.     MEDICATIONS  (STANDING):  amLODIPine   Tablet 10 milliGRAM(s) Oral daily  cloNIDine 0.1 milliGRAM(s) Oral two times a day  dronabinol 2.5 milliGRAM(s) Oral daily  magnesium hydroxide Suspension 30 milliLiter(s) Oral daily  magnesium oxide 400 milliGRAM(s) Oral three times a day with meals  polyethylene glycol 3350 17 Gram(s) Oral every 12 hours  senna 2 Tablet(s) Oral at bedtime    MEDICATIONS  (PRN):  acetaminophen     Tablet .. 650 milliGRAM(s) Oral every 6 hours PRN Temp greater or equal to 38C (100.4F), Mild Pain (1 - 3)  aluminum hydroxide/magnesium hydroxide/simethicone Suspension 30 milliLiter(s) Oral every 4 hours PRN Dyspepsia  melatonin 3 milliGRAM(s) Oral at bedtime PRN Insomnia  ondansetron Injectable 4 milliGRAM(s) IV Push every 8 hours PRN Nausea and/or Vomiting      I&O's Summary    26 Dec 2022 07:01  -  27 Dec 2022 07:00  --------------------------------------------------------  IN: 250 mL / OUT: 850 mL / NET: -600 mL        PHYSICAL EXAM:  Vital Signs Last 24 Hrs  T(C): 36.4 (27 Dec 2022 05:15), Max: 36.5 (26 Dec 2022 10:33)  T(F): 97.5 (27 Dec 2022 05:15), Max: 97.7 (26 Dec 2022 10:33)  HR: 74 (27 Dec 2022 05:15) (74 - 81)  BP: 129/74 (27 Dec 2022 05:15) (106/66 - 129/74)  BP(mean): --  RR: 18 (27 Dec 2022 05:15) (18 - 18)  SpO2: 96% (27 Dec 2022 05:15) (92% - 96%)    Parameters below as of 27 Dec 2022 05:15  Patient On (Oxygen Delivery Method): room air    CONSTITUTIONAL: NAD, well-groomed  ENMT: Moist oral mucosa, no pharyngeal injection or exudates; normal dentition  RESPIRATORY: Normal respiratory effort; lungs are clear to auscultation bilaterally  CARDIOVASCULAR: Regular rate and rhythm; No lower extremity edema  ABDOMEN: Nontender to palpation, normoactive bowel sounds  PSYCH: A+O x3; affect appropriate  NEUROLOGY: CN 2-12 are intact and symmetric; no gross sensory deficits;   SKIN: No rashes; no palpable lesions    LABS:                        8.5    2.00  )-----------( 58       ( 26 Dec 2022 06:37 )             24.1     12-26    138  |  99  |  16.8  ----------------------------<  110<H>  3.2<L>   |  25.0  |  0.45<L>    Ca    10.5      26 Dec 2022 06:37  Phos  3.3     12-26  Mg     1.9     12-26        CAPILLARY BLOOD GLUCOSE      RADIOLOGY & ADDITIONAL TESTS:  Results Reviewed:   Imaging Personally Reviewed:  Electrocardiogram Personally Reviewed:
[facility] Division of Hospital Medicine    Chief Complaint:  abnormal labs    SUBJECTIVE: endorses chronic L hip pain, aggravated by current hospital bed    OVERNIGHT EVENTS: none reported     Patient denies chest pain, SOB, abd pain, N/V, fever, chills, dysuria or any other complaints. All remainder ROS negative.     MEDICATIONS  (STANDING):  amLODIPine   Tablet 10 milliGRAM(s) Oral daily  cloNIDine 0.1 milliGRAM(s) Oral two times a day  lisinopril 10 milliGRAM(s) Oral daily    MEDICATIONS  (PRN):  acetaminophen     Tablet .. 650 milliGRAM(s) Oral every 6 hours PRN Temp greater or equal to 38C (100.4F), Mild Pain (1 - 3)  aluminum hydroxide/magnesium hydroxide/simethicone Suspension 30 milliLiter(s) Oral every 4 hours PRN Dyspepsia  melatonin 3 milliGRAM(s) Oral at bedtime PRN Insomnia  ondansetron Injectable 4 milliGRAM(s) IV Push every 8 hours PRN Nausea and/or Vomiting  oxycodone    5 mG/acetaminophen 325 mG 2 Tablet(s) Oral every 4 hours PRN Severe Pain (7 - 10)        I&O's Summary    16 Dec 2022 07:01  -  17 Dec 2022 07:00  --------------------------------------------------------  IN: 0 mL / OUT: 450 mL / NET: -450 mL        PHYSICAL EXAM:  Vital Signs Last 24 Hrs  T(C): 36.3 (17 Dec 2022 15:47), Max: 36.8 (17 Dec 2022 07:45)  T(F): 97.3 (17 Dec 2022 15:47), Max: 98.3 (17 Dec 2022 07:45)  HR: 83 (17 Dec 2022 15:47) (83 - 96)  BP: 117/65 (17 Dec 2022 15:47) (117/65 - 175/79)  BP(mean): --  RR: 18 (17 Dec 2022 15:47) (18 - 18)  SpO2: 94% (17 Dec 2022 15:47) (93% - 98%)    Parameters below as of 17 Dec 2022 15:47  Patient On (Oxygen Delivery Method): room air            CONSTITUTIONAL: NAD, well-developed, well-groomed  ENMT: Moist oral mucosa, no pharyngeal injection or exudates; normal dentition; No JVD  RESPIRATORY: Normal respiratory effort; lungs are clear to auscultation bilaterally  CARDIOVASCULAR: Regular rate and rhythm, normal S1 and S2, no murmur/rub/gallop; No lower extremity edema; Peripheral pulses are 2+ bilaterally  ABDOMEN: Nontender to palpation, normoactive bowel sounds, no rebound/guarding; No hepatosplenomegaly  MUSCLOSKELETAL:  no clubbing or cyanosis of digits; no joint swelling or tenderness to palpation  PSYCH: A+O to person, place, and time; affect appropriate  NEUROLOGY: CN 2-12 are intact and symmetric; no gross sensory deficits; was observed moving all 4 ext against gravity cooperating with exam.   SKIN: No rashes; no palpable lesions    LABS:                        8.0    3.11  )-----------( 30       ( 17 Dec 2022 06:23 )             23.4     12-    138  |  99  |  5.4<L>  ----------------------------<  100<H>  3.3<L>   |  25.0  |  0.38<L>    Ca    10.6<H>      17 Dec 2022 11:15  Phos  2.8     12-16  Mg     1.4         TPro  6.1<L>  /  Alb  3.9  /  TBili  0.7  /  DBili  x   /  AST  27  /  ALT  8   /  AlkPhos  85  12-16    PT/INR - ( 16 Dec 2022 12:20 )   PT: 23.9 sec;   INR: 2.05 ratio         PTT - ( 16 Dec 2022 12:20 )  PTT:30.8 sec      Urinalysis Basic - ( 17 Dec 2022 02:19 )    Color: Yellow / Appearance: Clear / S.010 / pH: x  Gluc: x / Ketone: Negative  / Bili: Negative / Urobili: Negative mg/dL   Blood: x / Protein: Negative / Nitrite: Negative   Leuk Esterase: Negative / RBC: 6-10 /HPF / WBC 0-2 /HPF   Sq Epi: x / Non Sq Epi: Occasional / Bacteria: Few        CAPILLARY BLOOD GLUCOSE            RADIOLOGY & ADDITIONAL TESTS:  Results Reviewed:   Imaging Personally Reviewed:  Electrocardiogram Personally Reviewed:
cc: deconditioning , electrolyte abnls , pancytopenia , fever     interval hx: patient seen and eval. fever overnigth after blood tx , tmax this am 99.3. deneis any cough / sob . c/o consitpation . c/o low po intake       Vital Signs Last 24 Hrs  T(C): 37.4 (23 Dec 2022 10:13), Max: 38.2 (22 Dec 2022 22:15)  T(F): 99.3 (23 Dec 2022 10:13), Max: 100.7 (22 Dec 2022 22:15)  HR: 90 (23 Dec 2022 10:13) (88 - 94)  BP: 123/71 (23 Dec 2022 10:13) (123/71 - 143/82)  BP(mean): --  RR: 16 (23 Dec 2022 10:13) (16 - 18)  SpO2: 94% (23 Dec 2022 10:13) (92% - 94%)    Parameters below as of 23 Dec 2022 10:13  Patient On (Oxygen Delivery Method): room air      CONSTITUTIONAL: NAD, well-developed,   ENMT: Moist oral mucosa,   RESPIRATORY: Normal respiratory effort; lungs are clear to auscultation bilaterally  CARDIOVASCULAR: Regular rate and rhythm, normal S1 and S2, no murmur/rub/gallop;   ext: no edema ,  Peripheral pulses are 2+ bilaterally  ABDOMEN: Nontender to palpation, normoactive bowel sounds, no rebound/guarding; No hepatosplenomegaly  MUSCLOSKELETAL:  no clubbing or cyanosis of digits; no joint swelling or tenderness to palpation  PSYCH: A+O to person, place, and time; affect appropriate  NEUROLOGY:awake, alert x 3 , deconditioned but non focal   SKIN: No rashes; no palpable lesions                          7.8    1.69  )-----------( 46       ( 23 Dec 2022 05:35 )             23.1   12-23    136  |  100  |  15.3  ----------------------------<  111<H>  3.7   |  25.0  |  0.52    Ca    10.0      23 Dec 2022 05:35  Phos  3.1     12-23  Mg     1.6     12-23    MEDICATIONS  (STANDING):  amLODIPine   Tablet 10 milliGRAM(s) Oral daily  cloNIDine 0.1 milliGRAM(s) Oral two times a day  dronabinol 2.5 milliGRAM(s) Oral daily  lisinopril 10 milliGRAM(s) Oral daily  magnesium hydroxide Suspension 30 milliLiter(s) Oral once  magnesium oxide 400 milliGRAM(s) Oral three times a day with meals  polyethylene glycol 3350 17 Gram(s) Oral every 12 hours  senna 2 Tablet(s) Oral at bedtime    MEDICATIONS  (PRN):  acetaminophen     Tablet .. 650 milliGRAM(s) Oral every 6 hours PRN Temp greater or equal to 38C (100.4F), Mild Pain (1 - 3)  aluminum hydroxide/magnesium hydroxide/simethicone Suspension 30 milliLiter(s) Oral every 4 hours PRN Dyspepsia  melatonin 3 milliGRAM(s) Oral at bedtime PRN Insomnia  ondansetron Injectable 4 milliGRAM(s) IV Push every 8 hours PRN Nausea and/or Vomiting  oxycodone    5 mG/acetaminophen 325 mG 2 Tablet(s) Oral every 4 hours PRN Severe Pain (7 - 10)  
[facility] Division of Hospital Medicine    Chief Complaint:  anemia, electrolytes changes    SUBJECTIVE: no new complains    OVERNIGHT EVENTS: none reported    Patient denies chest pain, SOB, abd pain, N/V, fever, chills, dysuria or any other complaints. All remainder ROS negative.     MEDICATIONS  (STANDING):  amLODIPine   Tablet 10 milliGRAM(s) Oral daily  cloNIDine 0.1 milliGRAM(s) Oral two times a day  lisinopril 10 milliGRAM(s) Oral daily    MEDICATIONS  (PRN):  acetaminophen     Tablet .. 650 milliGRAM(s) Oral every 6 hours PRN Temp greater or equal to 38C (100.4F), Mild Pain (1 - 3)  aluminum hydroxide/magnesium hydroxide/simethicone Suspension 30 milliLiter(s) Oral every 4 hours PRN Dyspepsia  melatonin 3 milliGRAM(s) Oral at bedtime PRN Insomnia  ondansetron Injectable 4 milliGRAM(s) IV Push every 8 hours PRN Nausea and/or Vomiting  oxycodone    5 mG/acetaminophen 325 mG 2 Tablet(s) Oral every 4 hours PRN Severe Pain (7 - 10)        I&O's Summary    18 Dec 2022 07:01  -  19 Dec 2022 07:00  --------------------------------------------------------  IN: 0 mL / OUT: 200 mL / NET: -200 mL        PHYSICAL EXAM:  Vital Signs Last 24 Hrs  T(C): 36.7 (19 Dec 2022 18:26), Max: 36.9 (18 Dec 2022 23:45)  T(F): 98.1 (19 Dec 2022 18:26), Max: 98.4 (18 Dec 2022 23:45)  HR: 90 (19 Dec 2022 18:26) (84 - 93)  BP: 121/76 (19 Dec 2022 18:26) (101/64 - 135/84)  BP(mean): 76 (19 Dec 2022 15:53) (76 - 76)  RR: 18 (19 Dec 2022 18:26) (18 - 19)  SpO2: 93% (19 Dec 2022 18:26) (93% - 95%)    Parameters below as of 19 Dec 2022 18:26  Patient On (Oxygen Delivery Method): room air    CONSTITUTIONAL: NAD, well-developed, well-groomed  ENMT: Moist oral mucosa, no pharyngeal injection or exudates; normal dentition; No JVD  RESPIRATORY: Normal respiratory effort; lungs are clear to auscultation bilaterally  CARDIOVASCULAR: Regular rate and rhythm, normal S1 and S2, no murmur/rub/gallop; No lower extremity edema; Peripheral pulses are 2+ bilaterally  ABDOMEN: Nontender to palpation, normoactive bowel sounds, no rebound/guarding; No hepatosplenomegaly  MUSCLOSKELETAL:  no clubbing or cyanosis of digits; no joint swelling or tenderness to palpation  PSYCH: A+O to person, place, and time; affect appropriate  NEUROLOGY: CN 2-12 are intact and symmetric; no gross sensory deficits; was observed moving all 4 ext against gravity cooperating with exam.   SKIN: No rashes; no palpable lesions      LABS:                        8.1    2.41  )-----------( 32       ( 18 Dec 2022 06:09 )             23.8     12-18    139  |  99  |  9.7  ----------------------------<  101<H>  3.1<L>   |  29.0  |  0.56    Ca    10.8<H>      18 Dec 2022 06:09  Phos  4.0     12-18  Mg     1.7     12-18                Culture - Urine (collected 17 Dec 2022 02:19)  Source: Clean Catch Clean Catch (Midstream)  Final Report (19 Dec 2022 14:49):    10,000 - 49,000 CFU/mL Coag Negative Staphylococcus "Susceptibilities not    performed"      CAPILLARY BLOOD GLUCOSE            RADIOLOGY & ADDITIONAL TESTS:  Results Reviewed:   Imaging Personally Reviewed:  Electrocardiogram Personally Reviewed:
[facility] Division of Hospital Medicine    Chief Complaint:  anemia, electrolytes changes    SUBJECTIVE: no new complains    OVERNIGHT EVENTS: none reported    Patient denies chest pain, SOB, abd pain, N/V, fever, chills, dysuria or any other complaints. All remainder ROS negative.     MEDICATIONS  (STANDING):  amLODIPine   Tablet 10 milliGRAM(s) Oral daily  cloNIDine 0.1 milliGRAM(s) Oral two times a day  lisinopril 10 milliGRAM(s) Oral daily  magnesium oxide 400 milliGRAM(s) Oral three times a day with meals    MEDICATIONS  (PRN):  acetaminophen     Tablet .. 650 milliGRAM(s) Oral every 6 hours PRN Temp greater or equal to 38C (100.4F), Mild Pain (1 - 3)  aluminum hydroxide/magnesium hydroxide/simethicone Suspension 30 milliLiter(s) Oral every 4 hours PRN Dyspepsia  melatonin 3 milliGRAM(s) Oral at bedtime PRN Insomnia  ondansetron Injectable 4 milliGRAM(s) IV Push every 8 hours PRN Nausea and/or Vomiting  oxycodone    5 mG/acetaminophen 325 mG 2 Tablet(s) Oral every 4 hours PRN Severe Pain (7 - 10)      I&O's Summary    18 Dec 2022 07:01  -  19 Dec 2022 07:00  --------------------------------------------------------  IN: 0 mL / OUT: 200 mL / NET: -200 mL        PHYSICAL EXAM:  Vital Signs Last 24 Hrs  T(C): 36.6 (20 Dec 2022 15:16), Max: 37.2 (20 Dec 2022 00:17)  T(F): 97.8 (20 Dec 2022 15:16), Max: 98.9 (20 Dec 2022 00:17)  HR: 89 (20 Dec 2022 15:16) (77 - 90)  BP: 111/66 (20 Dec 2022 15:16) (104/65 - 133/72)  BP(mean): --  RR: 18 (20 Dec 2022 15:16) (18 - 18)  SpO2: 93% (20 Dec 2022 15:16) (93% - 97%)    Parameters below as of 20 Dec 2022 15:16  Patient On (Oxygen Delivery Method): room air        CONSTITUTIONAL: NAD, well-developed, well-groomed  ENMT: Moist oral mucosa, no pharyngeal injection or exudates; normal dentition; No JVD  RESPIRATORY: Normal respiratory effort; lungs are clear to auscultation bilaterally  CARDIOVASCULAR: Regular rate and rhythm, normal S1 and S2, no murmur/rub/gallop; No lower extremity edema; Peripheral pulses are 2+ bilaterally  ABDOMEN: Nontender to palpation, normoactive bowel sounds, no rebound/guarding; No hepatosplenomegaly  MUSCLOSKELETAL:  no clubbing or cyanosis of digits; no joint swelling or tenderness to palpation  PSYCH: A+O to person, place, and time; affect appropriate  NEUROLOGY: CN 2-12 are intact and symmetric; no gross sensory deficits; was observed moving all 4 ext against gravity cooperating with exam.   SKIN: No rashes; no palpable lesions      LABS:    12-20    137  |  98  |  11.5  ----------------------------<  104<H>  3.2<L>   |  28.0  |  0.53    Ca    10.6<H>      20 Dec 2022 03:16  Phos  3.8     12-20  Mg     1.4     12-20                CAPILLARY BLOOD GLUCOSE            RADIOLOGY & ADDITIONAL TESTS:  Results Reviewed:   Imaging Personally Reviewed:  Electrocardiogram Personally Reviewed:
Bee Fernández M.D.    Patient is a 67y old  Male who presents with a chief complaint of anemia, hypokalemia (18 Dec 2022 12:10)      SUBJECTIVE / OVERNIGHT EVENTS: Tmax 100.9 overnight.     Patient denies chest pain, SOB, abd pain, N/V, fever, chills, dysuria or any other complaints. All remainder ROS negative.     MEDICATIONS  (STANDING):  amLODIPine   Tablet 10 milliGRAM(s) Oral daily  cloNIDine 0.1 milliGRAM(s) Oral two times a day  dronabinol 2.5 milliGRAM(s) Oral daily  lisinopril 10 milliGRAM(s) Oral daily  magnesium oxide 400 milliGRAM(s) Oral three times a day with meals  polyethylene glycol 3350 17 Gram(s) Oral every 12 hours  senna 2 Tablet(s) Oral at bedtime    MEDICATIONS  (PRN):  acetaminophen     Tablet .. 650 milliGRAM(s) Oral every 6 hours PRN Temp greater or equal to 38C (100.4F), Mild Pain (1 - 3)  aluminum hydroxide/magnesium hydroxide/simethicone Suspension 30 milliLiter(s) Oral every 4 hours PRN Dyspepsia  melatonin 3 milliGRAM(s) Oral at bedtime PRN Insomnia  ondansetron Injectable 4 milliGRAM(s) IV Push every 8 hours PRN Nausea and/or Vomiting  oxycodone    5 mG/acetaminophen 325 mG 2 Tablet(s) Oral every 4 hours PRN Severe Pain (7 - 10)      I&O's Summary      PHYSICAL EXAM:  Vital Signs Last 24 Hrs  T(C): 36.8 (24 Dec 2022 11:23), Max: 38.3 (23 Dec 2022 16:16)  T(F): 98.3 (24 Dec 2022 11:23), Max: 100.9 (23 Dec 2022 16:16)  HR: 80 (24 Dec 2022 11:23) (79 - 84)  BP: 109/68 (24 Dec 2022 11:23) (109/68 - 127/76)  BP(mean): --  RR: 18 (24 Dec 2022 11:23) (16 - 20)  SpO2: 96% (24 Dec 2022 11:23) (90% - 99%)    Parameters below as of 24 Dec 2022 11:23  Patient On (Oxygen Delivery Method): nasal cannula  O2 Flow (L/min): 2    CONSTITUTIONAL: NAD, well-groomed  ENMT: Moist oral mucosa, no pharyngeal injection or exudates; normal dentition  RESPIRATORY: Normal respiratory effort; lungs are clear to auscultation bilaterally  CARDIOVASCULAR: Regular rate and rhythm; No lower extremity edema  ABDOMEN: Nontender to palpation, normoactive bowel sounds  PSYCH: A+O x3; affect appropriate  NEUROLOGY: CN 2-12 are intact and symmetric; no gross sensory deficits;   SKIN: No rashes; no palpable lesions    LABS:                        7.8    2.26  )-----------( 52       ( 24 Dec 2022 05:40 )             23.4     12-24    136  |  99  |  16.1  ----------------------------<  102<H>  3.6   |  26.0  |  0.46<L>    Ca    10.5      24 Dec 2022 05:40  Phos  3.1     12-23  Mg     1.7     12-24        Urinalysis Basic - ( 22 Dec 2022 13:30 )    Color: Anita / Appearance: very cloudy / S.015 / pH: x  Gluc: x / Ketone: Negative  / Bili: Negative / Urobili: 8 mg/dL   Blood: x / Protein: Negative / Nitrite: Negative   Leuk Esterase: Negative / RBC: 3-5 /HPF / WBC Negative /HPF   Sq Epi: x / Non Sq Epi: Few / Bacteria: Few      CAPILLARY BLOOD GLUCOSE    RADIOLOGY & ADDITIONAL TESTS:  Results Reviewed:   Imaging Personally Reviewed:  Electrocardiogram Personally Reviewed:
cc: deconditioning , electrolyte abnls     interval hx: patient seen and eval. awake, comfortable.  feels weak.     Vital Signs Last 24 Hrs  T(C): 36.9 (21 Dec 2022 11:33), Max: 37 (20 Dec 2022 19:59)  T(F): 98.4 (21 Dec 2022 11:33), Max: 98.6 (20 Dec 2022 19:59)  HR: 90 (21 Dec 2022 11:33) (82 - 93)  BP: 111/65 (21 Dec 2022 11:33) (110/64 - 131/76)  BP(mean): --  RR: 18 (21 Dec 2022 11:33) (18 - 18)  SpO2: 94% (21 Dec 2022 11:33) (92% - 94%)    Parameters below as of 21 Dec 2022 11:33  Patient On (Oxygen Delivery Method): room air      CONSTITUTIONAL: NAD, well-developed,   ENMT: Moist oral mucosa,   RESPIRATORY: Normal respiratory effort; lungs are clear to auscultation bilaterally  CARDIOVASCULAR: Regular rate and rhythm, normal S1 and S2, no murmur/rub/gallop;   ext: no edema ,  Peripheral pulses are 2+ bilaterally  ABDOMEN: Nontender to palpation, normoactive bowel sounds, no rebound/guarding; No hepatosplenomegaly  MUSCLOSKELETAL:  no clubbing or cyanosis of digits; no joint swelling or tenderness to palpation  PSYCH: A+O to person, place, and time; affect appropriate  NEUROLOGY:awake, alert x 3 , deconditioned but non focal   SKIN: No rashes; no palpable lesions    MEDICATIONS  (STANDING):  amLODIPine   Tablet 10 milliGRAM(s) Oral daily  cloNIDine 0.1 milliGRAM(s) Oral two times a day  lisinopril 10 milliGRAM(s) Oral daily  magnesium oxide 400 milliGRAM(s) Oral three times a day with meals  magnesium sulfate  IVPB 2 Gram(s) IV Intermittent once  potassium chloride    Tablet ER 20 milliEquivalent(s) Oral every 2 hours    MEDICATIONS  (PRN):  acetaminophen     Tablet .. 650 milliGRAM(s) Oral every 6 hours PRN Temp greater or equal to 38C (100.4F), Mild Pain (1 - 3)  aluminum hydroxide/magnesium hydroxide/simethicone Suspension 30 milliLiter(s) Oral every 4 hours PRN Dyspepsia  melatonin 3 milliGRAM(s) Oral at bedtime PRN Insomnia  ondansetron Injectable 4 milliGRAM(s) IV Push every 8 hours PRN Nausea and/or Vomiting  oxycodone    5 mG/acetaminophen 325 mG 2 Tablet(s) Oral every 4 hours PRN Severe Pain (7 - 10)      12-20    137  |  98  |  11.5  ----------------------------<  104<H>  3.2<L>   |  28.0  |  0.53    Ca    10.6<H>      20 Dec 2022 03:16  Phos  3.8     12-20  Mg     1.4     12-20    
cc: deconditioning , electrolyte abnls     interval hx: patient seen and eval. awake, weak and tired. c/o mild hematuria. deneis any teresa , denies any abd pain.  sister Gail at bedside     Vital Signs Last 24 Hrs  T(C): 36.8 (22 Dec 2022 10:15), Max: 37.4 (22 Dec 2022 09:41)  T(F): 98.3 (22 Dec 2022 10:15), Max: 99.3 (22 Dec 2022 09:41)  HR: 88 (22 Dec 2022 10:15) (73 - 92)  BP: 132/70 (22 Dec 2022 10:15) (95/59 - 132/70)  BP(mean): --  RR: 18 (22 Dec 2022 10:15) (18 - 18)  SpO2: 91% (22 Dec 2022 10:15) (91% - 95%)    Parameters below as of 22 Dec 2022 10:15  Patient On (Oxygen Delivery Method): room air      CONSTITUTIONAL: NAD, well-developed,   ENMT: Moist oral mucosa,   RESPIRATORY: Normal respiratory effort; lungs are clear to auscultation bilaterally  CARDIOVASCULAR: Regular rate and rhythm, normal S1 and S2, no murmur/rub/gallop;   ext: no edema ,  Peripheral pulses are 2+ bilaterally  ABDOMEN: Nontender to palpation, normoactive bowel sounds, no rebound/guarding; No hepatosplenomegaly  MUSCLOSKELETAL:  no clubbing or cyanosis of digits; no joint swelling or tenderness to palpation  PSYCH: A+O to person, place, and time; affect appropriate  NEUROLOGY:awake, alert x 3 , deconditioned but non focal   SKIN: No rashes; no palpable lesions    MEDICATIONS  (STANDING):  amLODIPine   Tablet 10 milliGRAM(s) Oral daily  cloNIDine 0.1 milliGRAM(s) Oral two times a day  lisinopril 10 milliGRAM(s) Oral daily  magnesium oxide 400 milliGRAM(s) Oral three times a day with meals    MEDICATIONS  (PRN):  acetaminophen     Tablet .. 650 milliGRAM(s) Oral every 6 hours PRN Temp greater or equal to 38C (100.4F), Mild Pain (1 - 3)  aluminum hydroxide/magnesium hydroxide/simethicone Suspension 30 milliLiter(s) Oral every 4 hours PRN Dyspepsia  melatonin 3 milliGRAM(s) Oral at bedtime PRN Insomnia  ondansetron Injectable 4 milliGRAM(s) IV Push every 8 hours PRN Nausea and/or Vomiting  oxycodone    5 mG/acetaminophen 325 mG 2 Tablet(s) Oral every 4 hours PRN Severe Pain (7 - 10)                            6.7    2.82  )-----------( 46       ( 22 Dec 2022 05:13 )             20.0   12-22    139  |  100  |  15.5  ----------------------------<  107<H>  3.7   |  27.0  |  0.59    Ca    10.6<H>      22 Dec 2022 05:13  Mg     1.6     12-22

## 2022-12-27 NOTE — PROGRESS NOTE ADULT - ASSESSMENT
66 y/o male with PMH of HTN, prostate ca was sent to the ED for abnormal blood work. Patient noted to have hypokalemia and hypomagnesemia in the ED placed in observation, electrolytes supplemented. Electrolytes not improved after treatment, medicine called for admission. Also found to have low Hb 6.7 s/p 2 unit of PRBC, anemia was atributed to myelospression from chemotherapy in setting of mild hematuria. Pt h/h 8.1/23.4 on discharge. He is stable to be discharge with close follow up with Dr. Oshea, case was discussed with Dr. Smith. Patient later was assessed by PT team and recommended for TED.     >Electrolytes abnormalities, due to poor po intake in setting of ongoing tx for prostate Cr  c/w telemetry   Encourage PO intake, added insure  Monitor electrolytes, supplement to keep K >4 and Mg 2   monitor     >Anemia   Likely due to underlying ca/hematuria   2 units of PRBC> h/h noted, will trend for now daily       >Prostate ca  On Olaparib 150mg bid (non-formulary, patient to bring home medication)  c/w Percocet 2 pills  PRN for sever pain  f/u as op with heme/ onc     >HTN  Lisinopril 10mg   Clonidine 0.1mg bid   Amlodipine 10mg     DVT prophylaxis: CSD given low Hb, change to chemical as needed   Code/social -  Full code  Dispo pending TED placement 
66 y/o male with PMH of HTN, prostate ca was sent to the ED for abnormal blood work. Patient noted to have hypokalemia and hypomagnesemia in the ED placed in observation, electrolytes supplemented. Electrolytes not improved after treatment, medicine called for admission. Also found to have low Hb 6.7 s/p 2 unit of PRBC, anemia was atributed to myelospression from chemotherapy in setting of mild hematuria. Pt h/h 8.1/23.4 on discharge. He is stable to be discharge with close follow up with Dr. Oshea, case was discussed with Dr. Smith. Patient later was assessed by PT team and recommended for TED.     Electrolytes abnormalities, due to poor po intake in setting of ongoing tx for prostate Cr  c/w telemetry   bmp in am   Encourage PO intake, added insure  Monitor electrolytes, supplement to keep K >4 and Mg 2     Anemia   Likely due to underlying ca/hematuria   2 units of PRBC> h/h noted, will trend for now daily       Prostate ca  On Olaparib 150mg bid (non-formulary, patient to bring home medication)  c/w Percocet 2 pills  PRN for sever pain    HTN  Lisinopril 10mg   Clonidine 0.1mg bid   Amlodipine 10mg     DVT prophylaxis: CSD given low Hb, change to chemical as needed   Code/social -  Full code  Dispo -stable for TED,  sister (Gail @[phone]) over phone updated on 12/20, pending TED placement where pt can continue stereotactic radiotherapy in [facility]. 
66 y/o male with PMH of HTN, prostate ca was sent to the ED for abnormal blood work. Patient noted to have hypokalemia and hypomagnesemia in the ED placed in observation, electrolytes supplemented. Electrolytes not improved after treatment, medicine called for admission. Also found to have low Hb 6.7 s/p 2 unit of PRBC, anemia was atributed to myelosuppression from chemotherapy in setting of mild hematuria. Pt h/h 8.1/23.4 on discharge. He is stable to be discharge with close follow up with Dr. Oshea, case was discussed with Dr. Smith. Patient later was assessed by PT team and recommended for TED.     >Anemia / acute on chronic   -Likely due to underlying ca/hematuria   -s/p 2 units of PRB  - will give another unit of blood   - monitor , tx for hb <7   - check ua , fobt   - if grossly positive urine and continues to drop h/h then will need urology consult       > pancytopenia   - likey due to myelosupression   - monitor   - f/u with heme/ onc as op     >Electrolytes abnormalities, due to poor po intake in setting of ongoing tx for prostate Cr  -c/w telemetry   -Encourage PO intake, added insure  -Monitor electrolytes, supplement to keep K >4 and Mg 2   -monitor     >Prostate ca  -On Olaparib 150mg bid (non-formulary, patient to bring home medication)  -c/w Percocet 2 pills  PRN for sever pain  -f/u as op with heme/ onc     >HTN  -Lisinopril 10mg   -Clonidine 0.1mg bid   -Amlodipine 10mg     -DVT prophylaxis: CSD given low Hb, change to chemical as needed   -Code/social -  Full code    >dispo pending TED placement once medically stable.  plan of care discussed in detail with patient and sister juan at beside in detail 
68 y/o male with PMH of HTN, prostate ca was sent to the ED for abnormal blood work. Patient noted to have hypokalemia and hypomagnesemia in the ED placed in observation, electrolytes supplemented. Electrolytes not improved after treatment, medicine called for admission. Also found to have low Hb 6.7 s/p 2 unit of PRBC, anemia was atributed to myelosuppression from chemotherapy in setting of mild hematuria. Pt h/h 8.1/23.4 on discharge. He is stable to be discharge with close follow up with Dr. Oshea, case was discussed with Dr. Smith. Patient later was assessed by PT team and recommended for TED. admission complicated with worsening pancytopenia , post tx fever episode.     #Anemia / acute on chronic   - ua doesnot show significant hematuria   - s/p 3 units of PRB  - monitor , tx for hb <8   - to give 1/2u PRBC 12/24  - fever 12/22 and 12/24  - heme/onc following, felicia recs    #Fever  - f/u RVP, blood culture, UA and Chest Xray  - non-toxic, monitor off abx    #pancytopenia   - likey due to myelosupression   - monitor   - follows with dr. oshea , [facility] consulted     #Electrolytes abnormalities, due to poor po intake in setting of ongoing tx for prostate Cr  - c/w telemetry   - Encourage PO intake, added insure  - Monitor electrolytes, supplement to keep K >4 and Mg 2   - monitor     #Prostate ca  - On Olaparib 150mg bid (non-formulary, patient to bring home medication)  - c/w Percocet 2 pills  PRN for sever pain  - heme onc consulted   - started on marinol low dose for poor appetite and dec po intake     #constipation   - bowel protocol     #HTN  - Lisinopril 10mg   - Clonidine 0.1mg bid   - Amlodipine 10mg     DVT prophylaxis: CSD given low Hb, change to chemical as needed   Code/social -  Full code, ultimately to TED    Dispo: pending TED once medically stable, need auth
68 y/o male with PMH of HTN, prostate ca was sent to the ED for abnormal blood work. Patient noted to have hypokalemia and hypomagnesemia in the ED placed in observation, electrolytes supplemented. Electrolytes not improved after treatment, medicine called for admission. Also found to have low Hb 6.7 s/p 2 unit of PRBC.     Electrolytes abnormalities, due to poor po intake in setting of ongoing tx for prostate Cr  c/w telemetry   bmp in am   Encourage PO intake, added insure  Monitor electrolytes, supplement to keep K >4 and Mg 2     Anemia   Likely due to underlying ca/hematuria   2 units of PRBC> h/h noted, will trend for now daily       Prostate ca  On Olaparib 150mg bid (non-formulary, patient to bring home medication)  c/w Percocet 2 pills  PRN for sever pain    HTN  Lisinopril 10mg   Clonidine 0.1mg bid   Amlodipine 10mg     DVT prophylaxis: CSD given low Hb, change to chemical as needed   Code/social -  Full code  Dispo -stable for TED,  sister (Gail @[phone]) at bedside. 
66 y/o male with PMH of HTN, prostate ca was sent to the ED for abnormal blood work. Patient noted to have hypokalemia and hypomagnesemia in the ED placed in observation, electrolytes supplemented. Electrolytes not improved after treatment, medicine called for admission. Also found to have low Hb 6.7 s/p 2 unit of PRBC, anemia was atributed to myelosuppression from chemotherapy in setting of mild hematuria. Pt h/h 8.1/23.4 on discharge. He is stable to be discharge with close follow up with Dr. Oshea, case was discussed with Dr. Smith. Patient later was assessed by PT team and recommended for TED. admission complicated with worsening pancytopenia , post tx fever episode.     #Anemia / acute on chronic   - ua does not show significant hematuria   - s/p 3.5 units of PRB  - monitor, tx for hb <8   - fever 12/22 and 12/24  - heme/onc following, felicia recs    #Entero/rhino virus  - probable explanation for fever  - blood culture NGTD  - non-toxic, monitor off abx    #pancytopenia   - likey due to myelosupression   - monitor   - follows with dr. oshea , [facility] consulted     #Electrolytes abnormalities, due to poor po intake in setting of ongoing tx for prostate Cr  - c/w telemetry   - Encourage PO intake, added insure  - Monitor electrolytes, supplement to keep K >4 and Mg 2   - monitor     #Constipation  - c/w BM regimen  - f/u abd KRISTINAB read    #Prostate ca  - On Olaparib 150mg bid (non-formulary, patient to bring home medication)  - c/w Percocet 2 pills  PRN for sever pain  - heme onc consulted   - started on marinol low dose for poor appetite and dec po intake     #HTN  - Lisinopril 10mg   - Clonidine 0.1mg bid   - Amlodipine 10mg     DVT prophylaxis: CSD given low Hb, change to chemical as needed   Code/social -  Full code, ultimately to TED    Dispo: TED pending auth
66 y/o male with PMH of HTN, prostate ca was sent to the ED for abnormal blood work. Patient noted to have hypokalemia and hypomagnesemia in the ED placed in observation, electrolytes supplemented. Electrolytes not improved after treatment, medicine called for admission. Also found to have low Hb 6.7 s/p 2 unit of PRBC, anemia was atributed to myelosuppression from chemotherapy in setting of mild hematuria. Pt h/h 8.1/23.4 on discharge. He is stable to be discharge with close follow up with Dr. Oshea, case was discussed with Dr. Smith. Patient later was assessed by PT team and recommended for TED. admission complicated with worsening pancytopenia , post tx fever episode.     #Anemia / acute on chronic   - ua does not show significant hematuria   - s/p 3.5 units of PRB  - monitor, tx for hb <8   - fever 12/22 and 12/24  - heme/onc following, felicia recs    #Entero/rhino virus  - probable explanation for fever  - blood culture NGTD  - non-toxic, monitor off abx    #pancytopenia   - likey due to myelosupression   - monitor   - follows with dr. oshea , [facility] consulted     #Electrolytes abnormalities, due to poor po intake in setting of ongoing tx for prostate Cr  - c/w telemetry   - Encourage PO intake, added insure  - Monitor electrolytes, supplement to keep K >4 and Mg 2   - monitor     #Constipation  - c/w BM regimen  - f/u abd KUB read    #Prostate ca  - On Olaparib 150mg bid (non-formulary, patient to bring home medication)  - c/w Percocet 2 pills  PRN for sever pain  - heme onc consulted   - started on marinol low dose for poor appetite and dec po intake     #HTN  - Lisinopril 10mg   - Clonidine 0.1mg bid   - Amlodipine 10mg     DVT prophylaxis: CSD given low Hb, change to chemical as needed   Code/social -  Full code, ultimately to TED    Dispo: TED pending 
68 y/o male with PMH of HTN, prostate ca was sent to the ED for abnormal blood work. Patient noted to have hypokalemia and hypomagnesemia in the ED placed in observation, electrolytes supplemented. Electrolytes not improved after treatment, medicine called for admission. Also found to have low Hb 6.7 s/p 2 unit of PRBC.     Electrolytes abnormalities, due to poor po intake in setting of ongoing tx for prostate Cr  c/w telemetry   bmp later today   Encourage PO intake, added insure  Monitor electrolytes, supplement to keep K >4 and Mg 2     Anemia   Likely due to underlying ca/hematuria   2 units of PRBC> h/h noted, will trend for now daily       Prostate ca  On Olaparib 150mg bid (non-formulary, patient to bring home medication)  increased Percocet 2 pills  PRN for sever pain    HTN  Lisinopril 10mg   Clonidine 0.1mg bid   Amlodipine 10mg     DVT prophylaxis: CSD given low Hb, change to chemical as needed   Code/social -  Full code  Dispo - if h/h and electrolytes stable may be d/c home w/in next 24-48 hr,will reach ou sister (Gail @[phone]) via phone. 
68 y/o male with PMH of HTN, prostate ca was sent to the ED for abnormal blood work. Patient noted to have hypokalemia and hypomagnesemia in the ED placed in observation, electrolytes supplemented. Electrolytes not improved after treatment, medicine called for admission. Also found to have low Hb 6.7 s/p 2 unit of PRBC, anemia was atributed to myelosuppression from chemotherapy in setting of mild hematuria. Pt h/h 8.1/23.4 on discharge. He is stable to be discharge with close follow up with Dr. Oshea, case was discussed with Dr. Smith. Patient later was assessed by PT team and recommended for TED. admission complicated with worsening pancytopenia , post tx fever episode.     #Anemia / acute on chronic   - ua does not show significant hematuria   - s/p 3.5 units of PRB  - monitor , tx for hb <8   - fever 12/22 and 12/24  - heme/onc following, felicia recs    #Entero/rhino virus  - probable explanation for fever  - blood culture NGTD  - non-toxic, monitor off abx    #pancytopenia   - likey due to myelosupression   - monitor   - follows with dr. oshea , [facility] consulted     #Electrolytes abnormalities, due to poor po intake in setting of ongoing tx for prostate Cr  - c/w telemetry   - Encourage PO intake, added insure  - Monitor electrolytes, supplement to keep K >4 and Mg 2   - monitor     #Constipation  - c/w BM regimen  - f/u abd KUB read    #Prostate ca  - On Olaparib 150mg bid (non-formulary, patient to bring home medication)  - c/w Percocet 2 pills  PRN for sever pain  - heme onc consulted   - started on marinol low dose for poor appetite and dec po intake     #HTN  - Lisinopril 10mg   - Clonidine 0.1mg bid   - Amlodipine 10mg     DVT prophylaxis: CSD given low Hb, change to chemical as needed   Code/social -  Full code, ultimately to TED    Dispo: likely in 1-2 days, plan for TED
66 y/o male with PMH of HTN, prostate ca was sent to the ED for abnormal blood work. Patient noted to have hypokalemia and hypomagnesemia in the ED placed in observation, electrolytes supplemented. Electrolytes not improved after treatment, medicine called for admission. Also found to have low Hb 6.7 s/p 2 unit of PRBC, anemia was atributed to myelosuppression from chemotherapy in setting of mild hematuria. Pt h/h 8.1/23.4 on discharge. He is stable to be discharge with close follow up with Dr. Oshea, case was discussed with Dr. Smith. Patient later was assessed by PT team and recommended for TED. admission complicated with worsening pancytopenia , post tx fever episode.     >Anemia / acute on chronic   -ua doesnot show significant hematuria   -s/p 3 units of PRB  - monitor , tx for hb <7   - awaiting fobt   - 12/22 fever episode after prbcs , denies any n/v/ha/ sob. monitor off abxs , check bcxs , rvp, cxr   - 12/23 [facility] consult called ,follows with dr. oshea as op       > pancytopenia   - likey due to myelosupression   - monitor   - follows with dr. oshea , [facility] consulted     >Electrolytes abnormalities, due to poor po intake in setting of ongoing tx for prostate Cr  -c/w telemetry   -Encourage PO intake, added insure  -Monitor electrolytes, supplement to keep K >4 and Mg 2   -monitor     >Prostate ca  -On Olaparib 150mg bid (non-formulary, patient to bring home medication)  -c/w Percocet 2 pills  PRN for sever pain  -heme onc consulted   - started on marinol low dose for lwo appetite and dec po intake     > cosnitpation   - bowel protocol     >HTN  -Lisinopril 10mg   -Clonidine 0.1mg bid   -Amlodipine 10mg     -DVT prophylaxis: CSD given low Hb, change to chemical as needed   -Code/social -  Full code    >dispo pending TED placement once medically stable.  plan of care discussed in detail with patient

## 2022-12-27 NOTE — DISCHARGE NOTE NURSING/CASE MANAGEMENT/SOCIAL WORK - NSDCPEFALRISK_GEN_ALL_CORE
For information on Fall & Injury Prevention, visit: [email or web address] OR  [email or web address] OR  https://www.cdc.gov/steadi/patient.html

## 2022-12-27 NOTE — PROGRESS NOTE ADULT - NUTRITIONAL ASSESSMENT
This patient has been assessed with a concern for Malnutrition and has been determined to have a diagnosis/diagnoses of Severe protein-calorie malnutrition.    This patient is being managed with:   Diet Regular-  Supplement Feeding Modality:  Oral  Ensure Clear Cans or Servings Per Day:  1       Frequency:  Three Times a day  Entered: Dec 17 2022 10:55AM    

## 2022-12-27 NOTE — DISCHARGE NOTE NURSING/CASE MANAGEMENT/SOCIAL WORK - PATIENT PORTAL LINK FT
You can access the FollowMyHealth Patient Portal offered by [facility] by registering at the following website: [email or web address]. By joining [facility]’s FollowMyHealth portal, you will also be able to view your health information using other applications (apps) compatible with our system.

## 2023-01-01 ENCOUNTER — APPOINTMENT (OUTPATIENT)
Dept: HEMATOLOGY ONCOLOGY | Facility: CLINIC | Age: 68
End: 2023-01-01

## 2023-01-01 ENCOUNTER — OUTPATIENT (OUTPATIENT)
Dept: OUTPATIENT SERVICES | Facility: HOSPITAL | Age: 68
LOS: 1 days | Discharge: HOME | End: 2023-01-01

## 2023-01-01 ENCOUNTER — INPATIENT (INPATIENT)
Facility: HOSPITAL | Age: 68
LOS: 4 days | Discharge: HOSPICE | DRG: 722 | End: 2023-01-30
Attending: INTERNAL MEDICINE | Admitting: HOSPITALIST
Payer: MEDICARE

## 2023-01-01 ENCOUNTER — TRANSCRIPTION ENCOUNTER (OUTPATIENT)
Age: 68
End: 2023-01-01

## 2023-01-01 VITALS
TEMPERATURE: 98 F | RESPIRATION RATE: 18 BRPM | HEART RATE: 104 BPM | SYSTOLIC BLOOD PRESSURE: 123 MMHG | DIASTOLIC BLOOD PRESSURE: 76 MMHG | OXYGEN SATURATION: 98 %

## 2023-01-01 VITALS
RESPIRATION RATE: 20 BRPM | HEIGHT: 78 IN | HEART RATE: 100 BPM | DIASTOLIC BLOOD PRESSURE: 60 MMHG | TEMPERATURE: 98 F | OXYGEN SATURATION: 100 % | SYSTOLIC BLOOD PRESSURE: 105 MMHG | WEIGHT: 199.96 LBS

## 2023-01-01 DIAGNOSIS — D63.0 ANEMIA IN NEOPLASTIC DISEASE: ICD-10-CM

## 2023-01-01 DIAGNOSIS — C61 MALIGNANT NEOPLASM OF PROSTATE: ICD-10-CM

## 2023-01-01 LAB
ACANTHOCYTES BLD QL SMEAR: SLIGHT — SIGNIFICANT CHANGE UP
ALBUMIN SERPL ELPH-MCNC: 3.5 G/DL — SIGNIFICANT CHANGE UP (ref 3.3–5.2)
ALBUMIN SERPL ELPH-MCNC: 3.8 G/DL — SIGNIFICANT CHANGE UP (ref 3.3–5.2)
ALP SERPL-CCNC: 87 U/L — SIGNIFICANT CHANGE UP (ref 40–120)
ALP SERPL-CCNC: 98 U/L — SIGNIFICANT CHANGE UP (ref 40–120)
ALT FLD-CCNC: 8 U/L — SIGNIFICANT CHANGE UP
ALT FLD-CCNC: 9 U/L — SIGNIFICANT CHANGE UP
ANION GAP SERPL CALC-SCNC: 10 MMOL/L — SIGNIFICANT CHANGE UP (ref 5–17)
ANION GAP SERPL CALC-SCNC: 11 MMOL/L — SIGNIFICANT CHANGE UP (ref 5–17)
ANION GAP SERPL CALC-SCNC: 13 MMOL/L — SIGNIFICANT CHANGE UP (ref 5–17)
ANION GAP SERPL CALC-SCNC: 13 MMOL/L — SIGNIFICANT CHANGE UP (ref 5–17)
ANISOCYTOSIS BLD QL: SLIGHT — SIGNIFICANT CHANGE UP
APTT BLD: 23.6 SEC — LOW (ref 27.5–35.5)
AST SERPL-CCNC: 30 U/L — SIGNIFICANT CHANGE UP
AST SERPL-CCNC: 34 U/L — SIGNIFICANT CHANGE UP
BASOPHILS # BLD AUTO: 0 K/UL — SIGNIFICANT CHANGE UP (ref 0–0.2)
BASOPHILS NFR BLD AUTO: 0 % — SIGNIFICANT CHANGE UP (ref 0–2)
BILIRUB DIRECT SERPL-MCNC: 0.4 MG/DL — HIGH (ref 0–0.3)
BILIRUB INDIRECT FLD-MCNC: 0.7 MG/DL — SIGNIFICANT CHANGE UP (ref 0.2–1)
BILIRUB SERPL-MCNC: 0.9 MG/DL — SIGNIFICANT CHANGE UP (ref 0.4–2)
BILIRUB SERPL-MCNC: 1.1 MG/DL — SIGNIFICANT CHANGE UP (ref 0.4–2)
BLD GP AB SCN SERPL QL: SIGNIFICANT CHANGE UP
BUN SERPL-MCNC: 20.9 MG/DL — HIGH (ref 8–20)
BUN SERPL-MCNC: 22.5 MG/DL — HIGH (ref 8–20)
BUN SERPL-MCNC: 25.8 MG/DL — HIGH (ref 8–20)
BUN SERPL-MCNC: 27.6 MG/DL — HIGH (ref 8–20)
CALCIUM SERPL-MCNC: 14.4 MG/DL — CRITICAL HIGH (ref 8.4–10.5)
CALCIUM SERPL-MCNC: 14.6 MG/DL — CRITICAL HIGH (ref 8.4–10.5)
CALCIUM SERPL-MCNC: 16 MG/DL — CRITICAL HIGH (ref 8.4–10.5)
CALCIUM SERPL-MCNC: 17 MG/DL — CRITICAL HIGH (ref 8.4–10.5)
CHLORIDE SERPL-SCNC: 100 MMOL/L — SIGNIFICANT CHANGE UP (ref 96–108)
CHLORIDE SERPL-SCNC: 102 MMOL/L — SIGNIFICANT CHANGE UP (ref 96–108)
CHLORIDE SERPL-SCNC: 102 MMOL/L — SIGNIFICANT CHANGE UP (ref 96–108)
CHLORIDE SERPL-SCNC: 99 MMOL/L — SIGNIFICANT CHANGE UP (ref 96–108)
CO2 SERPL-SCNC: 28 MMOL/L — SIGNIFICANT CHANGE UP (ref 22–29)
CO2 SERPL-SCNC: 29 MMOL/L — SIGNIFICANT CHANGE UP (ref 22–29)
CO2 SERPL-SCNC: 32 MMOL/L — HIGH (ref 22–29)
CO2 SERPL-SCNC: 33 MMOL/L — HIGH (ref 22–29)
CREAT SERPL-MCNC: 0.53 MG/DL — SIGNIFICANT CHANGE UP (ref 0.5–1.3)
CREAT SERPL-MCNC: 0.63 MG/DL — SIGNIFICANT CHANGE UP (ref 0.5–1.3)
CREAT SERPL-MCNC: 0.68 MG/DL — SIGNIFICANT CHANGE UP (ref 0.5–1.3)
CREAT SERPL-MCNC: 0.75 MG/DL — SIGNIFICANT CHANGE UP (ref 0.5–1.3)
EGFR: 100 ML/MIN/1.73M2 — SIGNIFICANT CHANGE UP
EGFR: 103 ML/MIN/1.73M2 — SIGNIFICANT CHANGE UP
EGFR: 105 ML/MIN/1.73M2 — SIGNIFICANT CHANGE UP
EGFR: 111 ML/MIN/1.73M2 — SIGNIFICANT CHANGE UP
EOSINOPHIL # BLD AUTO: 0 K/UL — SIGNIFICANT CHANGE UP (ref 0–0.5)
EOSINOPHIL NFR BLD AUTO: 0 % — SIGNIFICANT CHANGE UP (ref 0–6)
GIANT PLATELETS BLD QL SMEAR: PRESENT — SIGNIFICANT CHANGE UP
GLUCOSE SERPL-MCNC: 102 MG/DL — HIGH (ref 70–99)
GLUCOSE SERPL-MCNC: 103 MG/DL — HIGH (ref 70–99)
GLUCOSE SERPL-MCNC: 119 MG/DL — HIGH (ref 70–99)
GLUCOSE SERPL-MCNC: 95 MG/DL — SIGNIFICANT CHANGE UP (ref 70–99)
HAV IGM SER-ACNC: SIGNIFICANT CHANGE UP
HBV CORE IGM SER-ACNC: SIGNIFICANT CHANGE UP
HBV SURFACE AG SER-ACNC: SIGNIFICANT CHANGE UP
HCT VFR BLD CALC: 19.8 % — CRITICAL LOW (ref 39–50)
HCT VFR BLD CALC: 24.5 % — LOW (ref 39–50)
HCT VFR BLD CALC: 27 % — LOW (ref 39–50)
HCV AB S/CO SERPL IA: 0.06 S/CO — SIGNIFICANT CHANGE UP (ref 0–0.99)
HCV AB SERPL-IMP: SIGNIFICANT CHANGE UP
HGB BLD-MCNC: 6.5 G/DL — CRITICAL LOW (ref 13–17)
HGB BLD-MCNC: 8.1 G/DL — LOW (ref 13–17)
HGB BLD-MCNC: 8.5 G/DL — LOW (ref 13–17)
HIV 1 & 2 AB SERPL IA.RAPID: SIGNIFICANT CHANGE UP
INR BLD: 1.87 RATIO — HIGH (ref 0.88–1.16)
LYMPHOCYTES # BLD AUTO: 0.35 K/UL — LOW (ref 1–3.3)
LYMPHOCYTES # BLD AUTO: 8.9 % — LOW (ref 13–44)
MACROCYTES BLD QL: SLIGHT — SIGNIFICANT CHANGE UP
MAGNESIUM SERPL-MCNC: 1.3 MG/DL — LOW (ref 1.6–2.6)
MAGNESIUM SERPL-MCNC: 1.3 MG/DL — LOW (ref 1.6–2.6)
MANUAL SMEAR VERIFICATION: SIGNIFICANT CHANGE UP
MCHC RBC-ENTMCNC: 29 PG — SIGNIFICANT CHANGE UP (ref 27–34)
MCHC RBC-ENTMCNC: 30 PG — SIGNIFICANT CHANGE UP (ref 27–34)
MCHC RBC-ENTMCNC: 31.1 PG — SIGNIFICANT CHANGE UP (ref 27–34)
MCHC RBC-ENTMCNC: 31.5 GM/DL — LOW (ref 32–36)
MCHC RBC-ENTMCNC: 32.8 GM/DL — SIGNIFICANT CHANGE UP (ref 32–36)
MCHC RBC-ENTMCNC: 33.1 GM/DL — SIGNIFICANT CHANGE UP (ref 32–36)
MCV RBC AUTO: 90.7 FL — SIGNIFICANT CHANGE UP (ref 80–100)
MCV RBC AUTO: 92.2 FL — SIGNIFICANT CHANGE UP (ref 80–100)
MCV RBC AUTO: 94.7 FL — SIGNIFICANT CHANGE UP (ref 80–100)
MONOCYTES # BLD AUTO: 0.04 K/UL — SIGNIFICANT CHANGE UP (ref 0–0.9)
MONOCYTES NFR BLD AUTO: 0.9 % — LOW (ref 2–14)
MYELOCYTES NFR BLD: 4.5 % — HIGH (ref 0–0)
NEUTROPHILS # BLD AUTO: 3.34 K/UL — SIGNIFICANT CHANGE UP (ref 1.8–7.4)
NEUTROPHILS NFR BLD AUTO: 85.7 % — HIGH (ref 43–77)
NRBC # BLD: 4 /100 WBCS — HIGH (ref 0–0)
NRBC # BLD: 5 /100 WBCS — HIGH (ref 0–0)
NRBC # BLD: 6 /100 — HIGH (ref 0–0)
PHOSPHATE SERPL-MCNC: 1.8 MG/DL — LOW (ref 2.4–4.7)
PHOSPHATE SERPL-MCNC: 2.9 MG/DL — SIGNIFICANT CHANGE UP (ref 2.4–4.7)
PLAT MORPH BLD: NORMAL — SIGNIFICANT CHANGE UP
PLATELET # BLD AUTO: 30 K/UL — LOW (ref 150–400)
PLATELET # BLD AUTO: 33 K/UL — LOW (ref 150–400)
PLATELET # BLD AUTO: 38 K/UL — LOW (ref 150–400)
POIKILOCYTOSIS BLD QL AUTO: SLIGHT — SIGNIFICANT CHANGE UP
POLYCHROMASIA BLD QL SMEAR: SLIGHT — SIGNIFICANT CHANGE UP
POTASSIUM SERPL-MCNC: 3.2 MMOL/L — LOW (ref 3.5–5.3)
POTASSIUM SERPL-MCNC: 3.2 MMOL/L — LOW (ref 3.5–5.3)
POTASSIUM SERPL-MCNC: 3.3 MMOL/L — LOW (ref 3.5–5.3)
POTASSIUM SERPL-MCNC: 3.3 MMOL/L — LOW (ref 3.5–5.3)
POTASSIUM SERPL-SCNC: 3.2 MMOL/L — LOW (ref 3.5–5.3)
POTASSIUM SERPL-SCNC: 3.2 MMOL/L — LOW (ref 3.5–5.3)
POTASSIUM SERPL-SCNC: 3.3 MMOL/L — LOW (ref 3.5–5.3)
POTASSIUM SERPL-SCNC: 3.3 MMOL/L — LOW (ref 3.5–5.3)
PROT SERPL-MCNC: 5.6 G/DL — LOW (ref 6.6–8.7)
PROT SERPL-MCNC: 6.1 G/DL — LOW (ref 6.6–8.7)
PROTHROM AB SERPL-ACNC: 21.8 SEC — HIGH (ref 10.5–13.4)
RBC # BLD: 2.09 M/UL — LOW (ref 4.2–5.8)
RBC # BLD: 2.7 M/UL — LOW (ref 4.2–5.8)
RBC # BLD: 2.93 M/UL — LOW (ref 4.2–5.8)
RBC # FLD: 19.2 % — HIGH (ref 10.3–14.5)
RBC # FLD: 19.5 % — HIGH (ref 10.3–14.5)
RBC # FLD: 20 % — HIGH (ref 10.3–14.5)
RBC BLD AUTO: ABNORMAL
SARS-COV-2 RNA SPEC QL NAA+PROBE: SIGNIFICANT CHANGE UP
SODIUM SERPL-SCNC: 141 MMOL/L — SIGNIFICANT CHANGE UP (ref 135–145)
SODIUM SERPL-SCNC: 142 MMOL/L — SIGNIFICANT CHANGE UP (ref 135–145)
SODIUM SERPL-SCNC: 144 MMOL/L — SIGNIFICANT CHANGE UP (ref 135–145)
SODIUM SERPL-SCNC: 145 MMOL/L — SIGNIFICANT CHANGE UP (ref 135–145)
TARGETS BLD QL SMEAR: SLIGHT — SIGNIFICANT CHANGE UP
WBC # BLD: 3.9 K/UL — SIGNIFICANT CHANGE UP (ref 3.8–10.5)
WBC # BLD: 3.93 K/UL — SIGNIFICANT CHANGE UP (ref 3.8–10.5)
WBC # BLD: 4.7 K/UL — SIGNIFICANT CHANGE UP (ref 3.8–10.5)
WBC # FLD AUTO: 3.9 K/UL — SIGNIFICANT CHANGE UP (ref 3.8–10.5)
WBC # FLD AUTO: 3.93 K/UL — SIGNIFICANT CHANGE UP (ref 3.8–10.5)
WBC # FLD AUTO: 4.7 K/UL — SIGNIFICANT CHANGE UP (ref 3.8–10.5)

## 2023-01-01 PROCEDURE — 99223 1ST HOSP IP/OBS HIGH 75: CPT | Mod: FS

## 2023-01-01 PROCEDURE — 96375 TX/PRO/DX INJ NEW DRUG ADDON: CPT

## 2023-01-01 PROCEDURE — 85027 COMPLETE CBC AUTOMATED: CPT

## 2023-01-01 PROCEDURE — 96376 TX/PRO/DX INJ SAME DRUG ADON: CPT

## 2023-01-01 PROCEDURE — 85730 THROMBOPLASTIN TIME PARTIAL: CPT

## 2023-01-01 PROCEDURE — 83735 ASSAY OF MAGNESIUM: CPT

## 2023-01-01 PROCEDURE — 99497 ADVNCD CARE PLAN 30 MIN: CPT | Mod: 25

## 2023-01-01 PROCEDURE — 86901 BLOOD TYPING SEROLOGIC RH(D): CPT

## 2023-01-01 PROCEDURE — U0003: CPT

## 2023-01-01 PROCEDURE — 96374 THER/PROPH/DIAG INJ IV PUSH: CPT

## 2023-01-01 PROCEDURE — 99232 SBSQ HOSP IP/OBS MODERATE 35: CPT

## 2023-01-01 PROCEDURE — 84181 WESTERN BLOT TEST: CPT

## 2023-01-01 PROCEDURE — 86900 BLOOD TYPING SEROLOGIC ABO: CPT

## 2023-01-01 PROCEDURE — 80053 COMPREHEN METABOLIC PANEL: CPT

## 2023-01-01 PROCEDURE — 36430 TRANSFUSION BLD/BLD COMPNT: CPT

## 2023-01-01 PROCEDURE — G0378: CPT

## 2023-01-01 PROCEDURE — 71045 X-RAY EXAM CHEST 1 VIEW: CPT | Mod: 26

## 2023-01-01 PROCEDURE — 36415 COLL VENOUS BLD VENIPUNCTURE: CPT

## 2023-01-01 PROCEDURE — 99238 HOSP IP/OBS DSCHRG MGMT 30/<: CPT

## 2023-01-01 PROCEDURE — 99233 SBSQ HOSP IP/OBS HIGH 50: CPT

## 2023-01-01 PROCEDURE — 99285 EMERGENCY DEPT VISIT HI MDM: CPT | Mod: 25

## 2023-01-01 PROCEDURE — 70450 CT HEAD/BRAIN W/O DYE: CPT | Mod: 26,MA

## 2023-01-01 PROCEDURE — 83520 IMMUNOASSAY QUANT NOS NONAB: CPT

## 2023-01-01 PROCEDURE — 99239 HOSP IP/OBS DSCHRG MGMT >30: CPT

## 2023-01-01 PROCEDURE — 99223 1ST HOSP IP/OBS HIGH 75: CPT

## 2023-01-01 PROCEDURE — 70450 CT HEAD/BRAIN W/O DYE: CPT | Mod: MA

## 2023-01-01 PROCEDURE — 80076 HEPATIC FUNCTION PANEL: CPT

## 2023-01-01 PROCEDURE — P9016: CPT

## 2023-01-01 PROCEDURE — 80074 ACUTE HEPATITIS PANEL: CPT

## 2023-01-01 PROCEDURE — 71045 X-RAY EXAM CHEST 1 VIEW: CPT

## 2023-01-01 PROCEDURE — 85025 COMPLETE CBC W/AUTO DIFF WBC: CPT

## 2023-01-01 PROCEDURE — 80048 BASIC METABOLIC PNL TOTAL CA: CPT

## 2023-01-01 PROCEDURE — 86703 HIV-1/HIV-2 1 RESULT ANTBDY: CPT

## 2023-01-01 PROCEDURE — 85610 PROTHROMBIN TIME: CPT

## 2023-01-01 PROCEDURE — 99497 ADVNCD CARE PLAN 30 MIN: CPT

## 2023-01-01 PROCEDURE — 84100 ASSAY OF PHOSPHORUS: CPT

## 2023-01-01 PROCEDURE — 86923 COMPATIBILITY TEST ELECTRIC: CPT

## 2023-01-01 PROCEDURE — U0005: CPT

## 2023-01-01 PROCEDURE — 86850 RBC ANTIBODY SCREEN: CPT

## 2023-01-01 RX ORDER — ATROPINE SULFATE 1 %
1 DROPS OPHTHALMIC (EYE) EVERY 12 HOURS
Refills: 0 | Status: DISCONTINUED | OUTPATIENT
Start: 2023-01-01 | End: 2023-01-01

## 2023-01-01 RX ORDER — POLYETHYLENE GLYCOL 3350 17 G/17G
17 POWDER, FOR SOLUTION ORAL DAILY
Refills: 0 | Status: DISCONTINUED | OUTPATIENT
Start: 2023-01-01 | End: 2023-01-01

## 2023-01-01 RX ORDER — MAGNESIUM SULFATE 500 MG/ML
2 VIAL (ML) INJECTION ONCE
Refills: 0 | Status: COMPLETED | OUTPATIENT
Start: 2023-01-01 | End: 2023-01-01

## 2023-01-01 RX ORDER — ATROPINE SULFATE 1 %
1 DROPS OPHTHALMIC (EYE)
Qty: 0 | Refills: 0 | DISCHARGE
Start: 2023-01-01

## 2023-01-01 RX ORDER — POTASSIUM CHLORIDE 20 MEQ
10 PACKET (EA) ORAL
Refills: 0 | Status: COMPLETED | OUTPATIENT
Start: 2023-01-01 | End: 2023-01-01

## 2023-01-01 RX ORDER — SODIUM CHLORIDE 9 MG/ML
1000 INJECTION INTRAMUSCULAR; INTRAVENOUS; SUBCUTANEOUS ONCE
Refills: 0 | Status: COMPLETED | OUTPATIENT
Start: 2023-01-01 | End: 2023-01-01

## 2023-01-01 RX ORDER — POTASSIUM CHLORIDE 20 MEQ
40 PACKET (EA) ORAL EVERY 6 HOURS
Refills: 0 | Status: DISCONTINUED | OUTPATIENT
Start: 2023-01-01 | End: 2023-01-01

## 2023-01-01 RX ORDER — ROBINUL 0.2 MG/ML
0.2 INJECTION INTRAMUSCULAR; INTRAVENOUS
Qty: 0 | Refills: 0 | DISCHARGE
Start: 2023-01-01

## 2023-01-01 RX ORDER — MORPHINE SULFATE 50 MG/1
2 CAPSULE, EXTENDED RELEASE ORAL
Qty: 0 | Refills: 0 | DISCHARGE
Start: 2023-01-01

## 2023-01-01 RX ORDER — ONDANSETRON 8 MG/1
4 TABLET, FILM COATED ORAL EVERY 8 HOURS
Refills: 0 | Status: DISCONTINUED | OUTPATIENT
Start: 2023-01-01 | End: 2023-01-01

## 2023-01-01 RX ORDER — SENNA PLUS 8.6 MG/1
2 TABLET ORAL AT BEDTIME
Refills: 0 | Status: DISCONTINUED | OUTPATIENT
Start: 2023-01-01 | End: 2023-01-01

## 2023-01-01 RX ORDER — AMLODIPINE BESYLATE 2.5 MG/1
1 TABLET ORAL
Qty: 0 | Refills: 0 | DISCHARGE

## 2023-01-01 RX ORDER — FUROSEMIDE 40 MG
0 TABLET ORAL
Qty: 0 | Refills: 0 | DISCHARGE

## 2023-01-01 RX ORDER — DEXTROSE MONOHYDRATE, SODIUM CHLORIDE, AND POTASSIUM CHLORIDE 50; .745; 4.5 G/1000ML; G/1000ML; G/1000ML
1000 INJECTION, SOLUTION INTRAVENOUS
Refills: 0 | Status: DISCONTINUED | OUTPATIENT
Start: 2023-01-01 | End: 2023-01-01

## 2023-01-01 RX ORDER — OXYCODONE HYDROCHLORIDE 5 MG/1
5 TABLET ORAL EVERY 6 HOURS
Refills: 0 | Status: DISCONTINUED | OUTPATIENT
Start: 2023-01-01 | End: 2023-01-01

## 2023-01-01 RX ORDER — ACETAMINOPHEN 500 MG
650 TABLET ORAL EVERY 6 HOURS
Refills: 0 | Status: DISCONTINUED | OUTPATIENT
Start: 2023-01-01 | End: 2023-01-01

## 2023-01-01 RX ORDER — OLAPARIB 150 MG/1
1 TABLET, FILM COATED ORAL
Qty: 0 | Refills: 0 | DISCHARGE

## 2023-01-01 RX ORDER — MORPHINE SULFATE 50 MG/1
2 CAPSULE, EXTENDED RELEASE ORAL EVERY 4 HOURS
Refills: 0 | Status: DISCONTINUED | OUTPATIENT
Start: 2023-01-01 | End: 2023-01-01

## 2023-01-01 RX ORDER — CALCITONIN SALMON 200 [IU]/ML
360 INJECTION, SOLUTION INTRAMUSCULAR EVERY 12 HOURS
Refills: 0 | Status: COMPLETED | OUTPATIENT
Start: 2023-01-01 | End: 2023-01-01

## 2023-01-01 RX ORDER — ROBINUL 0.2 MG/ML
0.2 INJECTION INTRAMUSCULAR; INTRAVENOUS EVERY 4 HOURS
Refills: 0 | Status: DISCONTINUED | OUTPATIENT
Start: 2023-01-01 | End: 2023-01-01

## 2023-01-01 RX ORDER — PAMIDRONATE DISODIUM 9 MG/ML
90 INJECTION, SOLUTION INTRAVENOUS ONCE
Refills: 0 | Status: COMPLETED | OUTPATIENT
Start: 2023-01-01 | End: 2023-01-01

## 2023-01-01 RX ORDER — ACETAMINOPHEN 500 MG
975 TABLET ORAL EVERY 8 HOURS
Refills: 0 | Status: DISCONTINUED | OUTPATIENT
Start: 2023-01-01 | End: 2023-01-01

## 2023-01-01 RX ORDER — LANOLIN ALCOHOL/MO/W.PET/CERES
3 CREAM (GRAM) TOPICAL AT BEDTIME
Refills: 0 | Status: DISCONTINUED | OUTPATIENT
Start: 2023-01-01 | End: 2023-01-01

## 2023-01-01 RX ORDER — SODIUM CHLORIDE 9 MG/ML
1000 INJECTION INTRAMUSCULAR; INTRAVENOUS; SUBCUTANEOUS
Refills: 0 | Status: DISCONTINUED | OUTPATIENT
Start: 2023-01-01 | End: 2023-01-01

## 2023-01-01 RX ORDER — OXYCODONE AND ACETAMINOPHEN 5; 325 MG/1; MG/1
1 TABLET ORAL EVERY 6 HOURS
Refills: 0 | Status: DISCONTINUED | OUTPATIENT
Start: 2023-01-01 | End: 2023-01-01

## 2023-01-01 RX ORDER — AMLODIPINE BESYLATE 2.5 MG/1
10 TABLET ORAL DAILY
Refills: 0 | Status: DISCONTINUED | OUTPATIENT
Start: 2023-01-01 | End: 2023-01-01

## 2023-01-01 RX ORDER — FUROSEMIDE 40 MG
40 TABLET ORAL EVERY 12 HOURS
Refills: 0 | Status: DISCONTINUED | OUTPATIENT
Start: 2023-01-01 | End: 2023-01-01

## 2023-01-01 RX ORDER — MORPHINE SULFATE 50 MG/1
2 CAPSULE, EXTENDED RELEASE ORAL
Refills: 0 | Status: DISCONTINUED | OUTPATIENT
Start: 2023-01-01 | End: 2023-01-01

## 2023-01-01 RX ORDER — MORPHINE SULFATE 50 MG/1
0 CAPSULE, EXTENDED RELEASE ORAL
Qty: 0 | Refills: 0 | DISCHARGE
Start: 2023-01-01

## 2023-01-01 RX ORDER — MAGNESIUM SULFATE 500 MG/ML
2 VIAL (ML) INJECTION ONCE
Refills: 0 | Status: DISCONTINUED | OUTPATIENT
Start: 2023-01-01 | End: 2023-01-01

## 2023-01-01 RX ORDER — FUROSEMIDE 40 MG
20 TABLET ORAL ONCE
Refills: 0 | Status: COMPLETED | OUTPATIENT
Start: 2023-01-01 | End: 2023-01-01

## 2023-01-01 RX ORDER — DRONABINOL 2.5 MG
2.5 CAPSULE ORAL DAILY
Refills: 0 | Status: DISCONTINUED | OUTPATIENT
Start: 2023-01-01 | End: 2023-01-01

## 2023-01-01 RX ORDER — ROBINUL 0.2 MG/ML
0 INJECTION INTRAMUSCULAR; INTRAVENOUS
Qty: 0 | Refills: 0 | DISCHARGE
Start: 2023-01-01

## 2023-01-01 RX ADMIN — MORPHINE SULFATE 2 MILLIGRAM(S): 50 CAPSULE, EXTENDED RELEASE ORAL at 22:55

## 2023-01-01 RX ADMIN — MORPHINE SULFATE 2 MILLIGRAM(S): 50 CAPSULE, EXTENDED RELEASE ORAL at 14:37

## 2023-01-01 RX ADMIN — ROBINUL 0.2 MILLIGRAM(S): 0.2 INJECTION INTRAMUSCULAR; INTRAVENOUS at 06:55

## 2023-01-01 RX ADMIN — SODIUM CHLORIDE 125 MILLILITER(S): 9 INJECTION INTRAMUSCULAR; INTRAVENOUS; SUBCUTANEOUS at 04:08

## 2023-01-01 RX ADMIN — MORPHINE SULFATE 2 MILLIGRAM(S): 50 CAPSULE, EXTENDED RELEASE ORAL at 11:13

## 2023-01-01 RX ADMIN — MORPHINE SULFATE 2 MILLIGRAM(S): 50 CAPSULE, EXTENDED RELEASE ORAL at 12:10

## 2023-01-01 RX ADMIN — MORPHINE SULFATE 2 MILLIGRAM(S): 50 CAPSULE, EXTENDED RELEASE ORAL at 22:25

## 2023-01-01 RX ADMIN — Medication 100 MILLIEQUIVALENT(S): at 02:41

## 2023-01-01 RX ADMIN — Medication 100 MILLIEQUIVALENT(S): at 06:33

## 2023-01-01 RX ADMIN — SODIUM CHLORIDE 60 MILLILITER(S): 9 INJECTION INTRAMUSCULAR; INTRAVENOUS; SUBCUTANEOUS at 02:32

## 2023-01-01 RX ADMIN — MORPHINE SULFATE 2 MILLIGRAM(S): 50 CAPSULE, EXTENDED RELEASE ORAL at 06:32

## 2023-01-01 RX ADMIN — SODIUM CHLORIDE 125 MILLILITER(S): 9 INJECTION INTRAMUSCULAR; INTRAVENOUS; SUBCUTANEOUS at 04:29

## 2023-01-01 RX ADMIN — Medication 20 MILLIGRAM(S): at 22:43

## 2023-01-01 RX ADMIN — AMLODIPINE BESYLATE 10 MILLIGRAM(S): 2.5 TABLET ORAL at 11:53

## 2023-01-01 RX ADMIN — Medication 40 MILLIEQUIVALENT(S): at 00:38

## 2023-01-01 RX ADMIN — SODIUM CHLORIDE 1000 MILLILITER(S): 9 INJECTION INTRAMUSCULAR; INTRAVENOUS; SUBCUTANEOUS at 22:53

## 2023-01-01 RX ADMIN — SODIUM CHLORIDE 1000 MILLILITER(S): 9 INJECTION INTRAMUSCULAR; INTRAVENOUS; SUBCUTANEOUS at 18:46

## 2023-01-01 RX ADMIN — PAMIDRONATE DISODIUM 65 MILLIGRAM(S): 9 INJECTION, SOLUTION INTRAVENOUS at 06:07

## 2023-01-01 RX ADMIN — MORPHINE SULFATE 2 MILLIGRAM(S): 50 CAPSULE, EXTENDED RELEASE ORAL at 12:43

## 2023-01-01 RX ADMIN — Medication 0.1 MILLIGRAM(S): at 11:53

## 2023-01-01 RX ADMIN — Medication 100 MILLIEQUIVALENT(S): at 00:25

## 2023-01-01 RX ADMIN — Medication 100 MILLIEQUIVALENT(S): at 08:58

## 2023-01-01 RX ADMIN — Medication 100 MILLIEQUIVALENT(S): at 10:14

## 2023-01-01 RX ADMIN — Medication 1 DROP(S): at 18:34

## 2023-01-01 RX ADMIN — Medication 0.5 MILLIGRAM(S): at 06:06

## 2023-01-01 RX ADMIN — Medication 25 GRAM(S): at 00:57

## 2023-01-01 RX ADMIN — Medication 1 DROP(S): at 05:41

## 2023-01-01 RX ADMIN — Medication 25 GRAM(S): at 09:03

## 2023-01-01 RX ADMIN — Medication 2.5 MILLIGRAM(S): at 11:52

## 2023-01-01 RX ADMIN — POLYETHYLENE GLYCOL 3350 17 GRAM(S): 17 POWDER, FOR SOLUTION ORAL at 11:54

## 2023-01-01 RX ADMIN — CALCITONIN SALMON 360 INTERNATIONAL UNIT(S): 200 INJECTION, SOLUTION INTRAMUSCULAR at 06:06

## 2023-01-01 RX ADMIN — Medication 100 MILLIEQUIVALENT(S): at 22:31

## 2023-01-19 NOTE — DIETITIAN INITIAL EVALUATION ADULT. - NUTRITION DIAGNOSIS
yes... Cyclophosphamide Counseling:  I discussed with the patient the risks of cyclophosphamide including but not limited to hair loss, hormonal abnormalities, decreased fertility, abdominal pain, diarrhea, nausea and vomiting, bone marrow suppression and infection. The patient understands that monitoring is required while taking this medication.

## 2023-01-25 NOTE — ED CDU PROVIDER INITIAL DAY NOTE - OBJECTIVE STATEMENT
67 male with hx of prostate cancer with metastasis to bone and lung, HTN, presents to the ED for low hemoglobin. Pt was in [facility] and they did outpatient bloodwork which showed he had hemoglobin of 6.5 and elevated calcium level with low potassium. He was sent to the ED for transfusion. Pt was started on furosemide and fluids for hypercalcemia, ordered for potassium orally for hypokalemia. As of note he endorsed feeling tired and weak. Pt currently on 2L NC for metastatic lung cancer.

## 2023-01-25 NOTE — ED CDU PROVIDER INITIAL DAY NOTE - CLINICAL SUMMARY MEDICAL DECISION MAKING FREE TEXT BOX
67 male with hx of prostate cancer with metastasis to bone and lung, HTN, presents to the ED for low hemoglobin.     Metastatic prostate cancer  - pt currently being followed by Dr. 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

## 2023-01-25 NOTE — ED PROVIDER NOTE - NSICDXPASTMEDICALHX_GEN_ALL_CORE_FT
PAST MEDICAL HISTORY:  Prostate CA Adenocarcinoma with mets to Lung, Bone, and Lymph     PAST MEDICAL HISTORY:  HTN (hypertension)     Prostate CA Adenocarcinoma with mets to Lung, Bone, and Lymph

## 2023-01-25 NOTE — ED PROVIDER NOTE - CLINICAL SUMMARY MEDICAL DECISION MAKING FREE TEXT BOX
67-year-old male with a history of metastatic prostate CA chronic hypercalcemia , chronic hypokalemia was referred to [facility] for blood transfusion.  Patient is weak is a alert and answers questions .  conjunctiva is pale cardiac exam is within normal limits discussed plan for dispo after transfusion With his sister. .agrees the patient can be transferred back to [facility] for continued management.

## 2023-01-25 NOTE — ED ADULT NURSE NOTE - CHIEF COMPLAINT QUOTE
pt A&OX2- oriented to name and place (unknown what pt baseline is), pt from [facility] and sent for as per EMS "being anemic", pt c/o generalized body pain, resp even and unlabored no distress noted

## 2023-01-25 NOTE — ED ADULT TRIAGE NOTE - CHIEF COMPLAINT QUOTE
pt A&OX2- oriented to name and place (unknown what pt baseline is), pt from [facility] and sent for as per EMS "being anemic", pt c/o generalized body pain, resp even and unlabored no distress noted

## 2023-01-25 NOTE — ED CDU PROVIDER INITIAL DAY NOTE - CPE EDP ENMT NORM
normal... mechanical trip and fall over commode with resulting R hip pain with findings concerning for periprosthetic fx. will evaluate for traumatic injury though hip is major complaint. reassessment of neurovascular examination. no signs of compartment syndrome of thigh. mechanical trip and fall over commode with resulting R hip pain with findings concerning for periprosthetic fx. will evaluate for other traumatic injury though hip is major complaint. frequent reassessment of neurovascular examination. no signs of compartment syndrome of thigh.

## 2023-01-25 NOTE — ED PROVIDER NOTE - OBJECTIVE STATEMENT
68 y/o male referred for blood transfusions . pt with end stage metastatic prostate CA was d/c 1 month ago from  with no follow up. pt and family aware of poor prognosis   pt with high CA , TED with treatment plan for hypercalcemia   referred him for transfusion

## 2023-01-25 NOTE — ED CDU PROVIDER INITIAL DAY NOTE - NSICDXPASTMEDICALHX_GEN_ALL_CORE_FT
PAST MEDICAL HISTORY:  HTN (hypertension)     Prostate CA Adenocarcinoma with mets to Lung, Bone, and Lymph

## 2023-01-25 NOTE — ED ADULT NURSE NOTE - OBJECTIVE STATEMENT
pt received to ED from NH, as per triage note pt sent for abnormal labs.  pt awake, responsive to verbal but unable to provide history.  respirations even and unlabored, on 5L NC at this time.  skin warm and dry.

## 2023-01-25 NOTE — ED CDU PROVIDER INITIAL DAY NOTE - ATTENDING APP SHARED VISIT CONTRIBUTION OF CARE
Pt accepted into observation before this attending able to review chart. Patient sent from NH for transfusion  due to low H/H. Pt known to have hx hypercalcemia and was receiving some treatment at NH for same. In ED Calcium 17, which is severely high and requires treatment above the level of observation. Hospitalist called and case discussed. Pt's( HCP) sister called and wants hypercalcemia treated. Pt's hematologist is Dr. GIANLUCA Oshea. He needs to be consulted for management.  Pt med hx metastatic cancer, prostate to lung and bone, anemia and htn. As per sister pt was talking and carrying on conversation up to yesterday. Currently he is obtunded. Stat CT head ordered. Hospitalist to be notified of results by YONATAN HUBER. Pt requires hospital admission for IV fluids, Calcitonin and biphosphonates. Pt needs cardiac monitoring. Pt needs serial chemistries including magnesium and phosphorus to trend calcium and treat appropriately. Will sign out to Dr Narvaez.

## 2023-01-25 NOTE — ED PROVIDER NOTE - INPATIENT RESIDENT/ACP NOTIFIED DATE
Infant in open crib on RA with no apnea or bradycardia. Maintaining temperature in open crib with stable vital signs. Infant bottle feed EBM 24Kcal Q3hr; toelrating well and nippling all 4 feeds this shift. Occasionally sleepy with feedings but easily aroused. No labs obtained. Infant in NAD,will continue to monitor. Infant voiding and stooling. Mom at bedside and infant rooming in for bonding. Mom attempting to participates in some cares when able. All questions and concerns addressed. Mom given the infant baby care guide booklet and booklet discussed and reviewed with mom.    25-Jan-2023

## 2023-01-26 NOTE — PROGRESS NOTE ADULT - SUBJECTIVE AND OBJECTIVE BOX
SENTHIL SAVAGE  67y  Male      Patient is a 67y old  Male who presents with a chief complaint of Severe hypercalcemia  Anemia  Stage 4 prostate cancer (26 Jan 2023 04:45)      INTERVAL HPI/OVERNIGHT EVENTS:  Patient seen and examined. Appears comfortable. AAOx0  Pending Onc eval as per family request. Family is deciding on aggressive care vs comfort      REVIEW OF SYSTEMS:  Unable to 2/2 pt mental status    T(C): 36.4 (01-26-23 @ 07:43), Max: 36.9 (01-25-23 @ 20:05)  HR: 100 (01-26-23 @ 07:43) (78 - 100)  BP: 143/73 (01-26-23 @ 07:43) (105/60 - 143/73)  RR: 20 (01-26-23 @ 07:43) (18 - 20)  SpO2: 100% (01-26-23 @ 07:43) (100% - 100%)  Wt(kg): --Vital Signs Last 24 Hrs  T(C): 36.4 (26 Jan 2023 07:43), Max: 36.9 (25 Jan 2023 20:05)  T(F): 97.5 (26 Jan 2023 07:43), Max: 98.4 (25 Jan 2023 20:05)  HR: 100 (26 Jan 2023 07:43) (78 - 100)  BP: 143/73 (26 Jan 2023 07:43) (105/60 - 143/73)  BP(mean): 99 (26 Jan 2023 04:45) (99 - 99)  RR: 20 (26 Jan 2023 07:43) (18 - 20)  SpO2: 100% (26 Jan 2023 07:43) (100% - 100%)    Parameters below as of 26 Jan 2023 07:43  Patient On (Oxygen Delivery Method): nasal cannula  O2 Flow (L/min): 4      PHYSICAL EXAM:  GENERAL: Elderly ill appearing male, nad verbal but aaox0  HEAD:  Atraumatic, Normocephalic  EYES: EOMI, PERRLA, conjunctiva and sclera clear  ENMT: No tonsillar erythema, exudates, or enlargement; Moist mucous membranes, Good dentition, No lesions  NECK: Supple, No JVD, Normal thyroid  CHEST/LUNG: Diminished air movement  HEART: Regular rate and rhythm; No murmurs, rubs, or gallops  ABDOMEN: Soft, Nontender, Nondistended; Bowel sounds present  EXTREMITIES:  2+ Peripheral Pulses, No clubbing, cyanosis, or edema  LYMPH: No lymphadenopathy noted  SKIN: No rashes or lesions    Consultant(s) Notes Reviewed:  [x ] YES  [ ] NO  Care Discussed with Consultants/Other Providers [ x] YES  [ ] NO    LABS:                        6.5    3.90  )-----------( 38       ( 25 Jan 2023 17:16 )             19.8     01-26    144  |  102  |  25.8<H>  ----------------------------<  102<H>  3.2<L>   |  29.0  |  0.68    Ca    16.0<HH>      26 Jan 2023 03:06  Phos  2.9     01-25  Mg     1.3     01-25    TPro  6.1<L>  /  Alb  3.8  /  TBili  0.9  /  DBili  x   /  AST  34  /  ALT  8   /  AlkPhos  98  01-25    PT/INR - ( 25 Jan 2023 17:16 )   PT: 21.8 sec;   INR: 1.87 ratio         PTT - ( 25 Jan 2023 17:16 )  PTT:23.6 sec    CAPILLARY BLOOD GLUCOSE                RADIOLOGY & ADDITIONAL TESTS:    Imaging Personally Reviewed:  [ ] YES  [ ] NO    HEALTH ISSUES - PROBLEM Dx:

## 2023-01-26 NOTE — ED CDU PROVIDER DISPOSITION NOTE - CLINICAL COURSE
68 y/o male with PMH of HTN, prostate ca was sent to the ED for abnormal blood work. Patient noted to have hypokalemia and hypomagnesemia in the ED placed in observation, electrolytes supplemented. Electrolytes not improved after treatment, medicine called for admission. Also found to have low Hb 6.7 s/p 2 unit of PRBC. Patient reported weakness for < 1 week which he attributed to decrease PO intake; noted he had radiosurgery done 2 weeks ago and has not been able to eat well since because food has no taste. He reported nausea, vomiting, hematuria, but no fever, chills, abdominal pain, diarrhea, chest pain, shortness of breath. pt with elevated calcium, low potassium, ct head negative for acute process, Discussed need to admit with patient & discussed risk and benefits.  Patient agreed to admission.  Discussed case w/ admitting medicine doctor - agreed to admit to their service.

## 2023-01-26 NOTE — CONSULT NOTE ADULT - ASSESSMENT
66 y/o male with hx of advanced prostate cancer with mets to lung/bone/lymph [place] Where he was for 2  and was placed on lynparza The patient was previously treated with leuprolide and taxotere in 2020.    patient has also seen a Holistic oncologist in [facility] ( Dr Wilder) and has been having 'radiosurgery' by Dr valdez   Recent PSA in Dec ~ 14K   Patient has been off Lynparza since last admission~ 1 month ago.     Discussed with patient sister Gail [phone] at length    inprogress 68 y/o male with hx of advanced prostate cancer with mets to lung/bone/lymph [place] Where [place] was for 2  and was placed on lynparza The patient was previously treated with leuprolide and taxotere in 2020.    patient has also [place] Holistic [place] [facility] [place] Wilder[place] and has been having 'radiosurgery' by Dr valdez   Recent PSA in Dec ~ 14K   Patient has been off Lynparza since last admission~ 1 month ago.     Discussed with patient sister Gail [phone] at length  Patient with poor performance status, POD, poor prognosis.   Hospice is appropriate. Sister is in agreement. WIll discuss with patients primary oncologist, Dr Oshea

## 2023-01-26 NOTE — PATIENT PROFILE ADULT - FALL HARM RISK - HARM RISK INTERVENTIONS

## 2023-01-26 NOTE — H&P ADULT - ASSESSMENT
68 y/o male with refractory anemia, Severe hypercalcemia, thrombocytopenia, Metastatic prostate cancer to bone/lung/lymph, hypomagnesemia, Hypokalemia, HTN    Refractory anemia:  -essentially has pancytopenia likely from BM infiltration   -Transfuse 2 units PRBCs  -Check post transfusion CBC w/ goal >7  -Transfuse platelets it develops bleeding  -FA, MVI    Severe hypercalcemia:  -From mets to bone and likely humeral hypercalcemia of malignancy  -Phos normal at  2.9  -s/p IVF/Lasix with repeat Ca of 16  -Will give 1 dose of Aredia and start on Calcitonin   -DC lasix with poor po intake and dehydration   -Cont. IVF  -Xgeva?   -Likely will not be able to bring down to normal without being able to effectively treat underlying prostate cancer  -Cont. tele with multiple electrolyte abnormalities    Hypokalemia/hypomagnesemia:  -Due to Lasix, poor po intake  -Being replaced IV, repeat labs for 10am to trend    Prostate cancer with diffuse mets:  -Poor performance status, unclear what treatments patient is a candidate for  -Poor prognosis and appropriate for hospice  -Sister now agreeable to DNR/DNI  -May transition to comfort care, but wants to discuss with Oncology first  -Pain control, fall risk    HTN:  -Resume o/p regimen as tolerated    Discussed with ED staff, Patient Sister/HCP Gail on phone.

## 2023-01-26 NOTE — CONSULT NOTE ADULT - SUBJECTIVE AND OBJECTIVE BOX
REASON FOR CONSULTATION:     HPI:  66 y/o male with hx of advanced prostate cancer with mets to lung/bone/lymph with last treatment being Lynparza which was on hold from last admission 1 month ago for anemia related to cancer/bone marrow infiltration. Patient has been residing in [facility] since [place] admission and most recently was started on [place]F and [place] [facility] [place] [facility] for hypercalcemia related to progression of prostate cancer. Patient also noted to again be anemia with no reported bleeding. He was sent to ED for PRBC transfusion, however while in ED noted to have Calcium of 17 with normal albumin and more lethargic than baseline. He was given IVF, lasix, and also had magnesium/potassium replaced. CTH with no acute changes. Patient unable to provide any meaningful history, history obtained from his Sister Gail over the phone, [facility] records reviewed.  (26 Jan 2023 04:45)      REVIEW OF SYSTEMS:  Constitutional, Eyes, ENT, Cardiovascular, Respiratory, Gastrointestinal, Genitourinary, Musculoskeletal, Integumentary, Neurological, Psychiatric, Endocrine, Heme/Lymph, and Allergic/Immunologic review of systems are otherwise negative except as noted in the HPI.    PAST MEDICAL & SURGICAL HISTORY:  Prostate CA  Adenocarcinoma with mets to Lung, Bone, and Lymph      HTN (hypertension)      No significant past surgical history          FAMILY HISTORY:  FH: multiple myeloma  Mother        SOCIAL HISTORY:    Allergies    No Known Allergies    Intolerances        MEDICATIONS  (STANDING):  amLODIPine   Tablet 10 milliGRAM(s) Oral daily  calcitonin Injectable 360 International Unit(s) IntraMuscular every 12 hours  cloNIDine 0.1 milliGRAM(s) Oral two times a day  dronabinol 2.5 milliGRAM(s) Oral daily  magnesium sulfate  IVPB 2 Gram(s) IV Intermittent once  polyethylene glycol 3350 17 Gram(s) Oral daily  senna 2 Tablet(s) Oral at bedtime  sodium chloride 0.9%. 1000 milliLiter(s) (125 mL/Hr) IV Continuous <Continuous>    MEDICATIONS  (PRN):  acetaminophen     Tablet .. 650 milliGRAM(s) Oral every 6 hours PRN Temp greater or equal to 38C (100.4F), Mild Pain (1 - 3), Moderate Pain (4 - 6)  melatonin 3 milliGRAM(s) Oral at bedtime PRN Insomnia  ondansetron Injectable 4 milliGRAM(s) IV Push every 8 hours PRN Nausea and/or Vomiting  oxycodone    5 mG/acetaminophen 325 mG 1 Tablet(s) Oral every 6 hours PRN Severe Pain (7 - 10)      Vital Signs Last 24 Hrs  T(C): 36.4 (26 Jan 2023 12:18), Max: 36.9 (25 Jan 2023 20:05)  T(F): 97.6 (26 Jan 2023 12:18), Max: 98.4 (25 Jan 2023 20:05)  HR: 97 (26 Jan 2023 12:18) (78 - 100)  BP: 126/74 (26 Jan 2023 12:18) (105/60 - 143/73)  BP(mean): 99 (26 Jan 2023 04:45) (99 - 99)  RR: 20 (26 Jan 2023 12:18) (18 - 20)  SpO2: 97% (26 Jan 2023 12:18) (97% - 100%)    Parameters below as of 26 Jan 2023 12:18  Patient On (Oxygen Delivery Method): nasal cannula  O2 Flow (L/min): 4      PHYSICAL EXAM:    GENERAL: NAD, well-groomed, well-developed  HEAD:  Atraumatic, Normocephalic  EYES: EOMI, PERRLA, conjunctiva and sclera clear  ENMT: No tonsillar erythema, exudates, or enlargement; Moist mucous membranes, Good dentition, No lesions  NECK: Supple, No JVD, Normal thyroid  NERVOUS SYSTEM:  Alert & Oriented X3, Good concentration; Motor Strength 5/5 B/L upper and lower extremities; DTRs 2+ intact and symmetric  CHEST/LUNG: Clear to auscultation bilaterally; No rales, rhonchi, wheezing, or rubs  HEART: Regular rate and rhythm; No murmurs, rubs, or gallops  ABDOMEN: Soft, Nontender, Nondistended; Bowel sounds present  EXTREMITIES:  2+ Peripheral Pulses, No clubbing, cyanosis, or edema  LYMPH: No lymphadenopathy noted  SKIN: No rashes or lesions      LABS:                        6.5    3.90  )-----------( 38       ( 25 Jan 2023 17:16 )             19.8     01-26    144  |  102  |  25.8<H>  ----------------------------<  102<H>  3.2<L>   |  29.0  |  0.68    Ca    16.0<HH>      26 Jan 2023 03:06  Phos  2.9     01-25  Mg     1.3     01-25    TPro  6.1<L>  /  Alb  3.8  /  TBili  0.9  /  DBili  x   /  AST  34  /  ALT  8   /  AlkPhos  98  01-25    PT/INR - ( 25 Jan 2023 17:16 )   PT: 21.8 sec;   INR: 1.87 ratio         PTT - ( 25 Jan 2023 17:16 )  PTT:23.6 sec        RADIOLOGY & ADDITIONAL STUDIES:    PATHOLOGY:     REASON FOR CONSULTATION:     HPI:  66 y/o male with hx of advanced prostate cancer with mets to lung/bone/lymph with last treatment being Lynparza which was on hold from last admission 1 month ago for anemia related to cancer/bone marrow infiltration. Patient has been residing in [facility] since last admission and most recently was started on IVF and Lasix in [facility] for hypercalcemia related to progression of prostate cancer. Patient also noted to again be anemia with no reported bleeding. He was sent to ED for PRBC transfusion, however while in ED noted to have Calcium of 17 with normal albumin and more lethargic than baseline. He was given IVF, lasix, and also had magnesium/potassium replaced. CTH with no acute changes. Patient unable to provide any meaningful history, history obtained from his Sister Gail over the phone, [facility] records reviewed.  (26 Jan 2023 04:45)      REVIEW OF SYSTEMS:  Constitutional, Eyes, ENT, Cardiovascular, Respiratory, Gastrointestinal, Genitourinary, Musculoskeletal, Integumentary, Neurological, Psychiatric, Endocrine, Heme/Lymph, and Allergic/Immunologic review of systems are otherwise negative except as noted in the HPI.    PAST MEDICAL & SURGICAL HISTORY:  Prostate CA  Adenocarcinoma with mets to Lung, Bone, and Lymph      HTN (hypertension)      No significant past surgical history          FAMILY HISTORY:  FH: multiple myeloma  Mother        SOCIAL HISTORY:    Allergies    No Known Allergies    Intolerances        MEDICATIONS  (STANDING):  amLODIPine   Tablet 10 milliGRAM(s) Oral daily  calcitonin Injectable 360 International Unit(s) IntraMuscular every 12 hours  cloNIDine 0.1 milliGRAM(s) Oral two times a day  dronabinol 2.5 milliGRAM(s) Oral daily  magnesium sulfate  IVPB 2 Gram(s) IV Intermittent once  polyethylene glycol 3350 17 Gram(s) Oral daily  senna 2 Tablet(s) Oral at bedtime  sodium chloride 0.9%. 1000 milliLiter(s) (125 mL/Hr) IV Continuous <Continuous>    MEDICATIONS  (PRN):  acetaminophen     Tablet .. 650 milliGRAM(s) Oral every 6 hours PRN Temp greater or equal to 38C (100.4F), Mild Pain (1 - 3), Moderate Pain (4 - 6)  melatonin 3 milliGRAM(s) Oral at bedtime PRN Insomnia  ondansetron Injectable 4 milliGRAM(s) IV Push every 8 hours PRN Nausea and/or Vomiting  oxycodone    5 mG/acetaminophen 325 mG 1 Tablet(s) Oral every 6 hours PRN Severe Pain (7 - 10)      Vital Signs Last 24 Hrs  T(C): 36.4 (26 Jan 2023 12:18), Max: 36.9 (25 Jan 2023 20:05)  T(F): 97.6 (26 Jan 2023 12:18), Max: 98.4 (25 Jan 2023 20:05)  HR: 97 (26 Jan 2023 12:18) (78 - 100)  BP: 126/74 (26 Jan 2023 12:18) (105/60 - 143/73)  BP(mean): 99 (26 Jan 2023 04:45) (99 - 99)  RR: 20 (26 Jan 2023 12:18) (18 - 20)  SpO2: 97% (26 Jan 2023 12:18) (97% - 100%)    Parameters below as of 26 Jan 2023 12:18  Patient On (Oxygen Delivery Method): nasal cannula  O2 Flow (L/min): 4      PHYSICAL EXAM:    GENERAL:NAD NOt oriented  HEAD:  Atraumatic, Normocephalic  EYES: EOMI, PERRLA, conjunctiva and sclera clear  NECK: Supple, No JVD, Normal thyroid  CHEST/LUNG: Diminished air movement  HEART: Regular rate and rhythm; No murmurs, rubs, or gallops  ABDOMEN: Soft, Nontender, Nondistended; Bowel sounds present  EXTREMITIES:  2+ Peripheral Pulses, No clubbing, cyanosis, or edema  LYMPH: No lymphadenopathy noted  SKIN: No rashes or lesions    SKIN: No rashes or lesions      LABS:                        6.5    3.90  )-----------( 38       ( 25 Jan 2023 17:16 )             19.8     01-26    144  |  102  |  25.8<H>  ----------------------------<  102<H>  3.2<L>   |  29.0  |  0.68    Ca    16.0<HH>      26 Jan 2023 03:06  Phos  2.9     01-25  Mg     1.3     01-25    TPro  6.1<L>  /  Alb  3.8  /  TBili  0.9  /  DBili  x   /  AST  34  /  ALT  8   /  AlkPhos  98  01-25    PT/INR - ( 25 Jan 2023 17:16 )   PT: 21.8 sec;   INR: 1.87 ratio         PTT - ( 25 Jan 2023 17:16 )  PTT:23.6 sec        RADIOLOGY & ADDITIONAL STUDIES:    PATHOLOGY:

## 2023-01-26 NOTE — CONSULT NOTE ADULT - SUBJECTIVE AND OBJECTIVE BOX
HPI:  68 y/o male with hx of advanced prostate cancer with mets to lung/bone/lymph with last treatment being Lynparza which was on hold from last admission 1 month ago for anemia related to cancer/bone marrow infiltration. Patient has been residing in [facility] since last admission and most recently was started on IVF and Lasix in [facility] for hypercalcemia related to progression of prostate cancer. Patient also noted to again be anemia with no reported bleeding. He was sent to ED for PRBC transfusion, however while in ED noted to have Calcium of 17 with normal albumin and more lethargic than baseline. He was given IVF, lasix, and also had magnesium/potassium replaced. CTH with no acute changes. Patient unable to provide any meaningful history, history obtained from his Sister Gail over the phone, [facility] records reviewed.  (26 Jan 2023 04:45)      HPI:  67 year old male with a history of adenocarcinoma of the prostate, with mets to the lung, bone, and lymph nodes, last treated with Lynparza, on hold from an admission 1 month ago for anemia related to cancer/bone marrow infiltration, presents from [facility] for a blood transfusion.  Per notes, outpatient blood work revealed a hemoglobin of 6.5, hypercalcemia (noted as chronic) and hypokalemia (noted as chronic). Patient had been at [facility] since his prior admission, and was started on IVF and Lasix at [facility] for hypercalcemia, related to progression of prostate cancer. Anemia noted on labs, however, no reported bleeding, which prompted presentation to [facility].  Per notes, patient reported fatigue, weakness, generalized pain here on his arrival.  Findings on ED labs notable for a calcium of 17 (with normal albumin).  Notes reports patient obtunded; CTH with no acute changes.  Given lasix, electrolytes also repleted.  Admitted for further management of anemia and hypercalcemia.     An initial goals of care conversation was held in the ED, and patient's sister authorized orders to avoid CPR and intubation: DNR/I.  She would like to speak to oncology to better understand options for ongoing management.  Palliative care consulted for ongoing goals of care discussion.      PERTINENT PMH REVIEWED: Yes     PAST MEDICAL & SURGICAL HISTORY:  Prostate CA  Adenocarcinoma with mets to Lung, Bone, and Lymph      HTN (hypertension)      No significant past surgical history          SOCIAL HISTORY:  [facility]                                  Surrogate/HCP/Guardian:Gail Cruz Phone#: [phone]    FAMILY HISTORY:  FH: multiple myeloma  Mother      Baseline ADLs (prior to admission): assistance in TED      Allergies    No Known Allergies    Intolerances        Present Symptoms:     Dyspnea: 0 1 2 3   Nausea/Vomiting: Yes No  Anxiety:  Yes No  Depression: Yes No  Fatigue: Yes No  Loss of appetite: Yes No    Pain:             Character-            Duration-            Effect-            Factors-            Frequency-            Location-            Severity-    Review of Systems: Reviewed                     Negative:                     Positive:  Unable to obtain due to poor mentation   All others negative    MEDICATIONS  (STANDING):  amLODIPine   Tablet 10 milliGRAM(s) Oral daily  calcitonin Injectable 360 International Unit(s) IntraMuscular every 12 hours  cloNIDine 0.1 milliGRAM(s) Oral two times a day  dronabinol 2.5 milliGRAM(s) Oral daily  magnesium sulfate  IVPB 2 Gram(s) IV Intermittent once  polyethylene glycol 3350 17 Gram(s) Oral daily  senna 2 Tablet(s) Oral at bedtime  sodium chloride 0.9%. 1000 milliLiter(s) (125 mL/Hr) IV Continuous <Continuous>    MEDICATIONS  (PRN):  acetaminophen     Tablet .. 650 milliGRAM(s) Oral every 6 hours PRN Temp greater or equal to 38C (100.4F), Mild Pain (1 - 3), Moderate Pain (4 - 6)  melatonin 3 milliGRAM(s) Oral at bedtime PRN Insomnia  ondansetron Injectable 4 milliGRAM(s) IV Push every 8 hours PRN Nausea and/or Vomiting  oxycodone    5 mG/acetaminophen 325 mG 1 Tablet(s) Oral every 6 hours PRN Severe Pain (7 - 10)      PHYSICAL EXAM:    Vital Signs Last 24 Hrs  T(C): 36.4 (26 Jan 2023 07:43), Max: 36.9 (25 Jan 2023 20:05)  T(F): 97.5 (26 Jan 2023 07:43), Max: 98.4 (25 Jan 2023 20:05)  HR: 100 (26 Jan 2023 07:43) (78 - 100)  BP: 143/73 (26 Jan 2023 07:43) (105/60 - 143/73)  BP(mean): 99 (26 Jan 2023 04:45) (99 - 99)  RR: 20 (26 Jan 2023 07:43) (18 - 20)  SpO2: 100% (26 Jan 2023 07:43) (100% - 100%)    Parameters below as of 26 Jan 2023 07:43  Patient On (Oxygen Delivery Method): nasal cannula  O2 Flow (L/min): 4      General: alert  oriented x ____ lethargic agitated                  cachexia  nonverbal  coma    Karnofsky:  %    HEENT: normal  dry mouth  ET tube/trach    Lungs: comfortable tachypnea/labored breathing  excessive secretions    CV: normal  tachycardia    GI: normal  distended  tender  no BS               PEG/NG/OG tube  constipation  last BM:     : normal  incontinent  oliguria/anuria  zavala    MSK: normal  weakness  edema             ambulatory  bedbound/wheelchair bound    Skin: normal  pressure ulcers- Stage_____  no rash    LABS:                        6.5    3.90  )-----------( 38       ( 25 Jan 2023 17:16 )             19.8     01-26    144  |  102  |  25.8<H>  ----------------------------<  102<H>  3.2<L>   |  29.0  |  0.68    Ca    16.0<HH>      26 Jan 2023 03:06  Phos  2.9     01-25  Mg     1.3     01-25    TPro  6.1<L>  /  Alb  3.8  /  TBili  0.9  /  DBili  x   /  AST  34  /  ALT  8   /  AlkPhos  98  01-25    PT/INR - ( 25 Jan 2023 17:16 )   PT: 21.8 sec;   INR: 1.87 ratio         PTT - ( 25 Jan 2023 17:16 )  PTT:23.6 sec    I&O's Summary      RADIOLOGY & ADDITIONAL STUDIES:    ADVANCE DIRECTIVES:   DNR YES NO  Completed on:                     MOLST  YES NO   Completed on:  Living Will  YES NO   Completed on:     HPI:  66 y/o male with hx of advanced prostate cancer with mets to lung/bone/lymph with last treatment being Lynparza which was on hold from last admission 1 month ago for anemia related to cancer/bone marrow infiltration. Patient has been residing in [facility] since last admission and most recently was started on IVF and Lasix in [facility] for hypercalcemia related to progression of prostate cancer. Patient also noted to again be anemia with no reported bleeding. He was sent to ED for PRBC transfusion, however while in ED noted to have Calcium of 17 with normal albumin and more lethargic than baseline. He was given IVF, lasix, and also had magnesium/potassium replaced. CTH with no acute changes. Patient unable to provide any meaningful history, history obtained from his Sister Gail over the phone, [facility] records reviewed.  (26 Jan 2023 04:45)      HPI:  67 year old male with a history of adenocarcinoma of the prostate, with mets to the lung, bone, and lymph nodes, last treated with Lynparza, on hold from an admission 1 month ago for anemia related to cancer/bone marrow infiltration, presents from [facility] for a blood transfusion.  Per notes, outpatient blood work revealed a hemoglobin of 6.5, hypercalcemia (noted as chronic) and hypokalemia (noted as chronic). Patient had been at [facility] since his prior admission, and was started on IVF and Lasix at [facility] for hypercalcemia, related to progression of prostate cancer. Anemia noted on labs, however, no reported bleeding, which prompted presentation to [facility].  Per notes, patient reported fatigue, weakness, generalized pain here on his arrival.  Findings on ED labs notable for a calcium of 17 (with normal albumin).  Notes reports patient obtunded; CTH with no acute changes.  Given lasix, electrolytes also repleted.  Admitted for further management of anemia and hypercalcemia.     An initial goals of care conversation was held in the ED, and patient's sister authorized orders to avoid CPR and intubation: DNR/I.  She would like to speak to oncology to better understand options for ongoing management.  Palliative care consulted for ongoing goals of care discussion.      PERTINENT PMH REVIEWED: Yes     PAST MEDICAL & SURGICAL HISTORY:  Prostate CA  Adenocarcinoma with mets to Lung, Bone, and Lymph      HTN (hypertension)      No significant past surgical history          SOCIAL HISTORY:  [facility]                                  Surrogate/HCP/Guardian:Gail Cruz Phone#: [phone]    FAMILY HISTORY:  FH: multiple myeloma  Mother      Baseline ADLs (prior to admission): assistance in TED      Allergies    No Known Allergies    Intolerances        Present Symptoms:     Dyspnea: 0 1 2 3   Nausea/Vomiting: Yes No  Anxiety:  Yes No  Depression: Yes No  Fatigue: Yes No  Loss of appetite: Yes No    Pain:             Character-            Duration-            Effect-            Factors-            Frequency-            Location-            Severity-    Review of Systems: Reviewed                     Negative:                     Positive:  Unable to obtain due to poor mentation   All others negative    MEDICATIONS  (STANDING):  amLODIPine   Tablet 10 milliGRAM(s) Oral daily  calcitonin Injectable 360 International Unit(s) IntraMuscular every 12 hours  cloNIDine 0.1 milliGRAM(s) Oral two times a day  dronabinol 2.5 milliGRAM(s) Oral daily  magnesium sulfate  IVPB 2 Gram(s) IV Intermittent once  polyethylene glycol 3350 17 Gram(s) Oral daily  senna 2 Tablet(s) Oral at bedtime  sodium chloride 0.9%. 1000 milliLiter(s) (125 mL/Hr) IV Continuous <Continuous>    MEDICATIONS  (PRN):  acetaminophen     Tablet .. 650 milliGRAM(s) Oral every 6 hours PRN Temp greater or equal to 38C (100.4F), Mild Pain (1 - 3), Moderate Pain (4 - 6)  melatonin 3 milliGRAM(s) Oral at bedtime PRN Insomnia  ondansetron Injectable 4 milliGRAM(s) IV Push every 8 hours PRN Nausea and/or Vomiting  oxycodone    5 mG/acetaminophen 325 mG 1 Tablet(s) Oral every 6 hours PRN Severe Pain (7 - 10)      PHYSICAL EXAM:    Vital Signs Last 24 Hrs  T(C): 36.4 (26 Jan 2023 07:43), Max: 36.9 (25 Jan 2023 20:05)  T(F): 97.5 (26 Jan 2023 07:43), Max: 98.4 (25 Jan 2023 20:05)  HR: 100 (26 Jan 2023 07:43) (78 - 100)  BP: 143/73 (26 Jan 2023 07:43) (105/60 - 143/73)  BP(mean): 99 (26 Jan 2023 04:45) (99 - 99)  RR: 20 (26 Jan 2023 07:43) (18 - 20)  SpO2: 100% (26 Jan 2023 07:43) (100% - 100%)    Parameters below as of 26 Jan 2023 07:43  Patient On (Oxygen Delivery Method): nasal cannula  O2 Flow (L/min): 4      General: alert  oriented x ____ lethargic agitated                  cachexia  nonverbal  coma    Karnofsky:  %    HEENT: normal  dry mouth  ET tube/trach    Lungs: comfortable tachypnea/labored breathing  excessive secretions    CV: normal  tachycardia    GI: normal  distended  tender  no BS               PEG/NG/OG tube  constipation  last BM:     : normal  incontinent  oliguria/anuria  zavala    MSK: normal  weakness  edema             ambulatory  bedbound/wheelchair bound    Skin: normal  pressure ulcers- Stage_____  no rash    LABS:                        6.5    3.90  )-----------( 38       ( 25 Jan 2023 17:16 )             19.8     01-26    144  |  102  |  25.8<H>  ----------------------------<  102<H>  3.2<L>   |  29.0  |  0.68    Ca    16.0<HH>      26 Jan 2023 03:06  Phos  2.9     01-25  Mg     1.3     01-25      ISTOP:  atient Name: Peterson PotterBirth Date: 1955  Address: [address] [place] [address] [address] [place], [place] [address]Qph: Male  Rx Written	Rx Dispensed	Drug	Quantity	Days Supply	Prescriber Name	Prescriber Radha #	Payment Method	Dispenser  09/23/2022	09/24/2022	oxycodone hcl (ir) 5 mg tablet	60	15	William Santizo	[id number]	Medicare	[id number]    Patient Name: Peterson PotterBirth Date: 1955  Address: [address] [place], [place] [address]Vqg: Male  Rx Written	Rx Dispensed	Drug	Quantity	Days Supply	Prescriber Name	Prescriber Radha #	Payment Method	Dispenser  01/23/2023	01/23/2023	dronabinol 2.5 mg capsule	10	10	Johnny Mccrary)	[id number]	[facility]  01/17/2023	01/17/2023	dronabinol 2.5 mg capsule	7	7	Johnny Mccrary)	[id number]	[facility]  01/03/2023	01/05/2023	dronabinol 2.5 mg capsule	7	7	Ali, Johnny SMITH)	[id number]	[facility]  12/27/2022	01/03/2023	dronabinol 2.5 mg capsule	7	7	Ali, Johnny SMITH)	[id number]	[facility]  12/27/2022	12/28/2022	dronabinol 2.5 mg capsule	7	7	Ali, Johnny SMITH)	[id number]	[facility]  12/27/2022	12/28/2022	oxycodone-acetaminophen 5-325 mg tab	28	7	Ali, Johnny SMITH)	[id number]	[facility]  * - Drugs marked with an asterisk are compound drugs. If the compound drug is made up of more than one controlled substance, then each controlled substance will be a separate row in the table.      TPro  6.1<L>  /  Alb  3.8  /  TBili  0.9  /  DBili  x   /  AST  34  /  ALT  8   /  AlkPhos  98  01-25    PT/INR - ( 25 Jan 2023 17:16 )   PT: 21.8 sec;   INR: 1.87 ratio         PTT - ( 25 Jan 2023 17:16 )  PTT:23.6 sec    I&O's Summary      RADIOLOGY & ADDITIONAL STUDIES:    ADVANCE DIRECTIVES:   DNR YES NO  Completed on:                     MOLST  YES NO   Completed on:  Living Will  YES NO   Completed on:     HPI:  68 y/o male with hx of advanced prostate cancer with mets to lung/bone/lymph with last treatment being Lynparza which was on hold from last admission 1 month ago for anemia related to cancer/bone marrow infiltration. Patient has been residing in [facility] since last admission and most recently was started on IVF and Lasix in [facility] for hypercalcemia related to progression of prostate cancer. Patient also noted to again be anemia with no reported bleeding. He was sent to ED for PRBC transfusion, however while in ED noted to have Calcium of 17 with normal albumin and more lethargic than baseline. He was given IVF, lasix, and also had magnesium/potassium replaced. CTH with no acute changes. Patient unable to provide any meaningful history, history obtained from his Sister Gail over the phone, [facility] records reviewed.  (26 Jan 2023 04:45)      HPI:  67 year old male with a history of adenocarcinoma of the prostate, with mets to the lung, bone, and lymph nodes, last treated with Lynparza, on hold from an admission 1 month ago for anemia related to cancer/bone marrow infiltration, presents from [facility] for a blood transfusion.  Per notes, outpatient blood work revealed a hemoglobin of 6.5, hypercalcemia (noted as chronic) and hypokalemia (noted as chronic). Patient had been at [facility] since his prior admission, and was started on IVF and Lasix at [facility] for hypercalcemia, related to progression of prostate cancer. Anemia noted on labs, however, no reported bleeding, which prompted presentation to [facility].  Per notes, patient reported fatigue, weakness, generalized pain here on his arrival.  Findings on ED labs notable for a calcium of 17 (with normal albumin).  Notes report patient obtunded; CTH with no acute changes.  Given lasix, electrolytes also repleted.  Admitted for further management of anemia and hypercalcemia.     An initial goals of care conversation was held in the ED, and patient's sister authorized orders to avoid CPR and intubation: DNR/I.  She would like to speak to oncology to better understand options for ongoing management.  Palliative care consulted for ongoing goals of care discussion.    Patient seen and examined at the bedside at 12:45 pm.  Limited information from the patient, has difficulty at times with word-finding.    - Patient knows that he is in the hospital, unable to explain why he is here.  When asked if he has any illnesses, affirms that he has cancer, and says that he thinks it is in other places.  - Reports that he has rib pain, non-radiating, present most of the time.  Describes pain as mild.   - States that he lives in [place], and has lived in a lot of places, including [place].    PERTINENT PMH REVIEWED: Yes     PAST MEDICAL & SURGICAL HISTORY:  Prostate CA  Adenocarcinoma with mets to Lung, Bone, and Lymph      HTN (hypertension)      No significant past surgical history          SOCIAL HISTORY:   from [facility].  Reports that he is , no children, has brothers and a sister, Gail.                                  Surrogate/HCP/Guardian: Gail Cruz Phone#: [phone]    FAMILY HISTORY:  FH: multiple myeloma  Mother      Baseline ADLs (prior to admission):  in [facility] prior to admission.  Per patient, he does not walk      Allergies    No Known Allergies    Intolerances        Present Symptoms:     Dyspnea: denies  Nausea/Vomiting: occasional, none at present  Anxiety:  denies  Depression: reports mild depression, wants to go home  Fatigue: Yes  Loss of appetite: unable to elicit    Pain: as above      Review of Systems: Reviewed, limited by response                     Negative: dysphagia                     Positive:  mild constipation    MEDICATIONS  (STANDING):  amLODIPine   Tablet 10 milliGRAM(s) Oral daily  calcitonin Injectable 360 International Unit(s) IntraMuscular every 12 hours  cloNIDine 0.1 milliGRAM(s) Oral two times a day  dronabinol 2.5 milliGRAM(s) Oral daily  magnesium sulfate  IVPB 2 Gram(s) IV Intermittent once  polyethylene glycol 3350 17 Gram(s) Oral daily  senna 2 Tablet(s) Oral at bedtime  sodium chloride 0.9%. 1000 milliLiter(s) (125 mL/Hr) IV Continuous <Continuous>    MEDICATIONS  (PRN):  acetaminophen     Tablet .. 650 milliGRAM(s) Oral every 6 hours PRN Temp greater or equal to 38C (100.4F), Mild Pain (1 - 3), Moderate Pain (4 - 6)  melatonin 3 milliGRAM(s) Oral at bedtime PRN Insomnia  ondansetron Injectable 4 milliGRAM(s) IV Push every 8 hours PRN Nausea and/or Vomiting  oxycodone    5 mG/acetaminophen 325 mG 1 Tablet(s) Oral every 6 hours PRN Severe Pain (7 - 10)      PHYSICAL EXAM:    Vital Signs Last 24 Hrs  T(C): 36.4 (26 Jan 2023 07:43), Max: 36.9 (25 Jan 2023 20:05)  T(F): 97.5 (26 Jan 2023 07:43), Max: 98.4 (25 Jan 2023 20:05)  HR: 100 (26 Jan 2023 07:43) (78 - 100)  BP: 143/73 (26 Jan 2023 07:43) (105/60 - 143/73)  BP(mean): 99 (26 Jan 2023 04:45) (99 - 99)  RR: 20 (26 Jan 2023 07:43) (18 - 20)  SpO2: 100% (26 Jan 2023 07:43) (100% - 100%)    Parameters below as of 26 Jan 2023 07:43  Patient On (Oxygen Delivery Method): nasal cannula  O2 Flow (L/min): 4    Karnofsky: 30 %  Gen: In NAD.  No pain behaviors or work of breathing noted  Neuro: alert and oriented x 3, intermittent difficulty with word finding.  Answers few, simple questions  Head: NC/AT  Eyes: sclerae non-icteric  ENT: moist oral mucosa  Resp: unlabored, on oxygen via NC  CV: S1, S2  Abd: soft, non-tender, + bowels sounds  : no zavala  MSK: no TTP of ribs  Peripheral Vasc: warm  Int: warm and dry  Psych: no psychomotor agitation, no SI/HI        LABS:                        6.5    3.90  )-----------( 38       ( 25 Jan 2023 17:16 )             19.8     01-26    144  |  102  |  25.8<H>  ----------------------------<  102<H>  3.2<L>   |  29.0  |  0.68    Ca    16.0<HH>      26 Jan 2023 03:06  Phos  2.9     01-25  Mg     1.3     01-25      ISTOP:  atient Name: Peterson Marshall Date: 1955  Address: [facility] [address] [place], [place] [address]Zdm: Male  Rx Written	Rx Dispensed	Drug	Quantity	Days Supply	Prescriber Name	Prescriber Radha #	Payment Method	Dispenser  09/23/2022	09/24/2022	oxycodone hcl (ir) 5 mg tablet	60	15	William Santizo	[id number]	Medicare	[id number]    Patient Name: Peterson Marshall Date: 1955  Address: [address] [place], [place] [address]Hlh: Male  Rx Written	Rx Dispensed	Drug	Quantity	Days Supply	Prescriber Name	Prescriber Radha #	Payment Method	Dispenser  01/23/2023	01/23/2023	dronabinol 2.5 mg capsule	10	10	Ali, Johnny SMITH)	[id number]	[facility]  01/17/2023	01/17/2023	dronabinol 2.5 mg capsule	7	7	Ali, Johnny SMITH)	[id number]	[facility]  01/03/2023	01/05/2023	dronabinol 2.5 mg capsule	7	7	Ali, Johnny SMITH)	[id number]	[facility]  12/27/2022	01/03/2023	dronabinol 2.5 mg capsule	7	7	Ali, Johnny SMITH)	[id number]	[facility]  12/27/2022	12/28/2022	dronabinol 2.5 mg capsule	7	7	Ali, Johnny SMITH)	[id number]	[facility]  12/27/2022	12/28/2022	oxycodone-acetaminophen 5-325 mg tab	28	7	Ali, Johnny SMITH)	[id number]	[facility]  * - Drugs marked with an asterisk are compound drugs. If the compound drug is made up of more than one controlled substance, then each controlled substance will be a separate row in the table.      TPro  6.1<L>  /  Alb  3.8  /  TBili  0.9  /  DBili  x   /  AST  34  /  ALT  8   /  AlkPhos  98  01-25    PT/INR - ( 25 Jan 2023 17:16 )   PT: 21.8 sec;   INR: 1.87 ratio         PTT - ( 25 Jan 2023 17:16 )  PTT:23.6 sec    I&O's Summary      RADIOLOGY & ADDITIONAL STUDIES: reviewed    ADVANCE DIRECTIVES:   DNR/I

## 2023-01-26 NOTE — H&P ADULT - TREATMENT GUIDELINE COMMENT
Sister requesting DNR/DNI, but would still want him treated if there are any available options for his prostate cancer

## 2023-01-26 NOTE — CHART NOTE - NSCHARTNOTEFT_GEN_A_CORE
Pt's emergency contact - pt's sister was called to obtain consent for Hepatits/LFT profile, and HIV/CD4 count. Pt's sister gives permission to testing on already drawn blood.  Sister noted that pt. was made hospice today, with no more blood draws. Hospitalist made aware. Lab has been called and notified to add the tests to already drawn blood. Nursing staff and nurse manager notified.

## 2023-01-26 NOTE — CONSULT NOTE ADULT - REASON FOR ADMISSION
Severe hypercalcemia  Anemia  Stage 4 prostate cancer
Severe hypercalcemia  Anemia  Stage 4 prostate cancer

## 2023-01-26 NOTE — ED CDU PROVIDER SUBSEQUENT DAY NOTE - HISTORY
pt with elevated calcium, low potassium, ct head negative for acute process, Discussed need to admit with patient & discussed risk and benefits.  Patient agreed to admission.  Discussed case w/ admitting medicine doctor - agreed to admit to their service.

## 2023-01-26 NOTE — H&P ADULT - NSICDXPASTMEDICALHX_GEN_ALL_CORE_FT
Patient woke up at 2 am with left ear pain. Up a lot of the night in pain. Fever starting Thursday 102- slight cough.    PAST MEDICAL HISTORY:  HTN (hypertension)     Prostate CA Adenocarcinoma with mets to Lung, Bone, and Lymph

## 2023-01-26 NOTE — H&P ADULT - CONVERSATION DETAILS
Discussed plan for admission, treatment of anemia, hypercalcemia, Oncology recommendations. Sister is aware his cancer is progressing and he has not had any f/u since discharge here a month ago. His performance status is poor and is more appropriate for hospice care. She will make a decision about this after discussing his case with Oncology during this admission. For now she is agreeable to limited interventions ie IVF, treatment for hypercalcemia, transfusions. She is requesting DNR/DNI at this time.

## 2023-01-26 NOTE — PROGRESS NOTE ADULT - CONVERSATION DETAILS
- Introduced role of palliative care in symptom management, care planning, support, and transitions in care to those with serious illness.  Emotional support offered.  - Gail offers that patient had been symptomatic - Introduced role of palliative care in symptom management, care planning, support, and transitions in care to those with serious illness.  Emotional support offered.  - Gail reports that she is the patient's health care proxy.  Advised that documentation should be brought in.  Of note, patient offers that Gail is the person he trusts to help with his medical care/care decision making, and gave me permission to reach out to her.  - Gail offers that patient had sx consistent with prostate cancer in 2018, however, was only diagnosed in 2019.  He had metastatic disease at the time of diagnosis and little was being offered. Hospice was encouraged.  - Gail offers that if she did not intervene, "he would have been dead."  Ultimately patient received treatment, and at one point was on a trial. He came to [place] and received treatment here.  - She shared that subsequent to last discharge and prior to this admission, she encouraged patient to go back to his oncologist, but he felt like he didn't have the strength.  She shares that in talking to the oncologist here at [facility], she understands that there are no viable options to manage his cancer that he could tolerate in his current state.  - She indicates that DNR/I orders were placed, consistent with the patient's known wishes.  - She shares that she would like to pursue hospice care on the patient's behalf, and focus on his comfort.  She is familiar with it, as she cared for her mother at home on hospice.  - Hospice, philosophy, and locations of care discussed.  She offers that she doesn't feel well-position to provide hospice care for the patient in her home.  She affirms that the patient has Medicaid.  - Reviewed that while patient awaits for evaluation by hospice, patient's care could be focused on the management of his symptoms, and that we could avoid interventions that could cause further discomfort, including blood draws and imaging.  She is in agreement with such a plan.  - Hospice consult placed. Hospice contacted.  - Emotional support provided.  - Aforementioned reviewed with hospitalist.    Total time spent in advance care plannin min.

## 2023-01-26 NOTE — CONSULT NOTE ADULT - ASSESSMENT
67 year old male with a history of adenocarcinoma of the prostate, with mets to the lung, bone, and lymph nodes, presents from [facility] to [facility] with abnormal labs, found hypercalcemic: 17 (normal albumin) and with a hemoglobin of 6.5.  Admitted for management of hypercalcemia, anemia.  Palliative care consulted for goals of care.      # hypercalcemia  # anemia  # metastatic prostate cancer  # debility  # palliative care encounter 67 year old male with a history of adenocarcinoma of the prostate, with mets to the lung, bone, and lymph nodes, presents from [facility] to [facility] with abnormal labs, found hypercalcemic: 17 (normal albumin) and with a hemoglobin of 6.5.  Admitted for management of hypercalcemia, anemia.  Palliative care consulted for goals of care.    # cancer-associated pain:  - CT chest 1/2020 revealed: bony destructive changes are present in the left second and sixth ribs. There are scattered small vague lucencies within the spine with mixed lytic and sclerotic changes in T6 vertebral body.  - recommend d/c current orders for acetaminophen and start acetaminophen 975 mg PO q 8 hr (pt may refuse)  - would d/c current oxycodone/apap orders and start oxycodone 5 mg PO q 6 hr prn (moderate-severe pain), with low threshold to increase frequency of oxycodone to q 4 hr prn    # constipation:  - on polyethylene glycol 17 grams PO daily  - would start senna 2 tabs PO HS (may hold for loose stools)    # hypercalcemia  - sequelae of malignancy  - medical management per primary team.    - s/p IVF/Lasix with repeat Ca of 16, s/p pamidronate  - on IVF, calcitonin.    # anemia  - sequelae of malignancy  - transfused 2 units of PRBC  - continue to trend h/h, monitor for bleeding    # metastatic prostate cancer  - seen by heme-onc.  Per onc, patient recently returned from [place] to [place], and was placed on lynparza and had been previously treated with leuprolide and taxotere in 2020.  Per notes, patient has also seen a Holistic oncologist in [facility] ( Dr Wilder) and has been having 'radiosurgery' by Dr. Santizo.  Recent PSA in Dec ~ 14K     # debility  - supportive care. Assist as needed.  - PT eval (pt reports that he is not able to walk)    # palliative care encounter  - Introduced role of palliative care in symptom management, care planning, support, and transitions in care to those with serious illness.  Emotional support offered.  - Per notes, HCP is his sister, Gail.  Patient offers that he trusts his sister, Gail, to make medical decisions on his behalf  - DNR/I orders in place.  - Will outreach to patient's sister.  Call with heme-onc noted.   67 year old male with a history of adenocarcinoma of the prostate, with mets to the lung, bone, and lymph nodes, presents from [facility] to [facility] with abnormal labs, found hypercalcemic: 17 (normal albumin) and with a hemoglobin of 6.5.  Admitted for management of hypercalcemia, anemia.  Palliative care consulted for goals of care.    # cancer-associated pain:  - CT chest 1/2020 revealed: bony destructive changes are present in the left second and sixth ribs. There are scattered small vague lucencies within the spine with mixed lytic and sclerotic changes in T6 vertebral body.  - recommend d/c current orders for acetaminophen and start acetaminophen 975 mg PO q 8 hr (pt may refuse)  - would d/c current oxycodone/apap orders and start oxycodone 5 mg PO q 6 hr prn (moderate-severe pain), with low threshold to increase frequency of oxycodone to q 4 hr prn    # constipation:  - on polyethylene glycol 17 grams PO daily  - would start senna 2 tabs PO HS (may hold for loose stools)    # hypercalcemia  - sequelae of malignancy  - medical management per primary team.    - s/p IVF/Lasix with repeat Ca of 16, s/p pamidronate  - on IVF, calcitonin.    # anemia  - sequelae of malignancy  - transfused 2 units of PRBC  - continue to trend h/h, monitor for bleeding    # metastatic prostate cancer  - seen by heme-onc.  Per onc, patient recently returned from [place] to [place], and was placed on lynparza and had been previously treated with leuprolide and taxotere in 2020.  Per notes, patient has also seen a Holistic oncologist in [facility] ( Dr Wilder) and has been having 'radiosurgery' by Dr. Santizo.  Recent PSA in Dec ~ 14K   - Pt reports he is bedbound: ECOG 4: functional status likely precludes tumor directed treatment at this time.    # debility  - supportive care. Assist as needed.  - PT eval     # palliative care encounter  - Introduced role of palliative care in symptom management, care planning, support, and transitions in care to those with serious illness.  Emotional support offered.  - Per notes, HCP is his sister, Gail.  Patient offers that he trusts his sister, Gail, to make medical decisions on his behalf  - DNR/I orders in place.  - Will outreach to patient's sister.  Call with heme-onc noted.    Patient reviewed with hospitalist.

## 2023-01-26 NOTE — H&P ADULT - HISTORY OF PRESENT ILLNESS
68 y/o male with hx of recurrent anemia related to  68 y/o male with hx of advanced prostate cancer with mets to lung/bone/lymph with last treatment being Lynparza which was on hold from last admission 1 month ago for anemia related to cancer/bone marrow infiltration. Patient has been residing in [facility] since last admission and most recently was started on IVF and Lasix in [facility] for hypercalcemia related to progression of prostate cancer. Patient also noted to again be anemia with no reported bleeding. He was sent to ED for PRBC transfusion, however while in ED noted to have Calcium of 17 with normal albumin and more lethargic than baseline. He was given IVF, lasix, and also had magnesium/potassium replaced. CTH with no acute changes. Patient unable to provide any meaningful history, history obtained from his Sister Gail over the phone, [facility] records reviewed.

## 2023-01-27 NOTE — DISCHARGE NOTE PROVIDER - ATTENDING DISCHARGE PHYSICAL EXAMINATION:
GENERAL: Elderly ill appearing male, nad verbal but aaox0  HEAD:  Atraumatic, Normocephalic  EYES: EOMI, PERRLA, conjunctiva and sclera clear  ENMT: No tonsillar erythema, exudates, or enlargement; Moist mucous membranes, Good dentition, No lesions  NECK: Supple, No JVD, Normal thyroid  CHEST/LUNG: Diminished air movement  HEART: Regular rate and rhythm; No murmurs, rubs, or gallops  ABDOMEN: Soft, Nontender, Nondistended; Bowel sounds present  EXTREMITIES:  2+ Peripheral Pulses, No clubbing, cyanosis, or edema  LYMPH: No lymphadenopathy noted  SKIN: No rashes or lesions

## 2023-01-27 NOTE — PROGRESS NOTE ADULT - CONVERSATION DETAILS
- Spoke to patient's sister, Gail, at bedside.  - Shared with her that patient has not taken oral medications, and was less responsive this am.  She shared that the patient has not been speaking to her or her son (pt's nephew) this am, despite efforts to engage him.  She is working on planning his .  - Writer shared concern that his limited responsiveness and the fact that he is no longer taking oral medications, taken with his lab abnormalities and advanced cancer that is not being treated, suggests that he may be moving closer to the end of his life.  - Advised her that medications were being shifted from an oral to a non-oral route of administration in order to assure that his symptoms would be managed.  Given his condition, discussed that he appears more appropriate for an inpatient level of hospice care in order to be reassured that his symptoms can be managed.  She is in agreement for evaluation for an inpatient level of hospice care.  - Given the aforementioned, discussed that his life expectancy could be short: on the order of days, if he is not able to take anything regularly by mouth.  - Reviewed that he was at risk of aspiration, but that at life's end, patients and families may opt to have patients take small tastes of food or drink by mouth if interested.  She is in agreement for PO intake with known aspiration risk.  - Emotional support provided.    Total time spent in goals of care discussion: 17 min.

## 2023-01-27 NOTE — DISCHARGE NOTE PROVIDER - PROVIDER TOKENS
FREE:[LAST:[PCP],PHONE:[(   )    -],FAX:[(   )    -]] FREE:[LAST:[PCP],PHONE:[(   )    -],FAX:[(   )    -]],FREE:[LAST:[hospice care],PHONE:[(   )    -],FAX:[(   )    -],FOLLOWUP:[1-3 days]]

## 2023-01-27 NOTE — DISCHARGE NOTE PROVIDER - CARE PROVIDER_API CALL
PCP,   Phone: (   )    -  Fax: (   )    -  Follow Up Time:    PCP,   Phone: (   )    -  Fax: (   )    -  Follow Up Time:     hospice care,   Phone: (   )    -  Fax: (   )    -  Follow Up Time: 1-3 days

## 2023-01-27 NOTE — DISCHARGE NOTE PROVIDER - HOSPITAL COURSE
66 y/o male with hx of advanced prostate cancer with mets to lung/bone/lymph with last treatment being Lynparza which was on hold from last admission 1 month ago for anemia related to cancer/bone marrow infiltration. Patient has been residing in [facility] since last admission and most recently was started on IVF and Lasix in [facility] for hypercalcemia related to progression of prostate cancer. Patient also noted to again be anemia with no reported bleeding. He was sent to ED for PRBC transfusion, however while in ED noted to have Calcium of 17 with normal albumin and more lethargic than baseline. He was given IVF, lasix, and also had magnesium/potassium replaced. CTH with no acute changes. Patient unable to provide any meaningful history, history obtained from his Sister Gail over the phone, [facility] records reviewed.       66 y/o male with refractory anemia, Severe hypercalcemia, thrombocytopenia, Metastatic prostate cancer to bone/lung/lymph, hypomagnesemia, Hypokalemia, HTN    Refractory anemia:  -essentially has pancytopenia likely from BM infiltration   -Transfuse 2 units PRBCs  -Check post transfusion CBC w/ goal >7  -Transfuse platelets it develops bleeding  -FA, MVI  -Oncology evaluation appreciated. Poor prognosis. Palliative evaluation appreciated. Patient's sister would like comfort measures only. No aggressive medical management. Suitable for hospice     Severe hypercalcemia:  -From mets to bone and likely humeral hypercalcemia of malignancy  -Phos normal at  2.9  -s/p IVF/Lasix with repeat Ca of 16  -Will give 1 dose of Aredia and start on Calcitonin   -DC lasix with poor po intake and dehydration   -Cont. IVF  -Xgeva?   -Likely will not be able to bring down to normal without being able to effectively treat underlying prostate cancer    Hypokalemia/hypomagnesemia:  -Due to Lasix, poor po intake  -Being replaced IV, repeat labs for 10am to trend    Prostate cancer with diffuse mets:  -Poor performance status, unclear what treatments patient is a candidate for  -Poor prognosis and appropriate for hospice    HTN:  -Resume o/p regimen as tolerated 68 y/o male with hx of advanced prostate cancer with mets to lung/bone/lymph with last treatment being Lynparza which was on hold from last admission 1 month ago for anemia related to cancer/bone marrow infiltration. Patient has been residing in [facility] since last admission and most recently was started on IVF and Lasix in [facility] for hypercalcemia related to progression of prostate cancer. Patient also noted to again be anemia with no reported bleeding. He was sent to ED for PRBC transfusion, however while in ED noted to have Calcium of 17 with normal albumin and more lethargic than baseline. He was given IVF, lasix, and also had magnesium/potassium replaced. CTH with no acute changes. Patient was seen in consultation by Oncology and Palliative care, Poor performance status, no other treatments offered. Poor prognosis. Patient's sister would like comfort measures only. No aggressive medical management. Stable for dc to inpatient hospice.

## 2023-01-27 NOTE — DISCHARGE NOTE PROVIDER - NSDCCPCAREPLAN_GEN_ALL_CORE_FT
PRINCIPAL DISCHARGE DIAGNOSIS  Diagnosis: Anemia in neoplastic disease  Assessment and Plan of Treatment:       SECONDARY DISCHARGE DIAGNOSES  Diagnosis: Hypercalcemia of malignancy  Assessment and Plan of Treatment:     Diagnosis: Hypokalemia  Assessment and Plan of Treatment:      PRINCIPAL DISCHARGE DIAGNOSIS  Diagnosis: Anemia in neoplastic disease  Assessment and Plan of Treatment: Metastatic prostate cancer  No further treatments offered  Discharge to Hospice facility  Symptom management      SECONDARY DISCHARGE DIAGNOSES  Diagnosis: Hypercalcemia of malignancy  Assessment and Plan of Treatment:     Diagnosis: Hypokalemia  Assessment and Plan of Treatment:

## 2023-01-27 NOTE — PROGRESS NOTE ADULT - SUBJECTIVE AND OBJECTIVE BOX
Palliative care follow up:    OVERNIGHT EVENTS:     No acute events overnight.  Medications declined by patient.    CC:     Present Symptoms:   Dyspnea: 0 1 2 3   Nausea/Vomiting: Yes No  Anxiety:  Yes No  Depression: Yes No  Fatigue: Yes No  Loss of appetite: Yes No  Constipation: Yes No    Pain:             Character-            Duration-            Effect-            Factors-            Frequency-            Location-            Severity-    Review of Systems: Reviewed  Per HPI all other ROS negative  Unable to obtain due to poor mentation     MEDICATIONS  (STANDING):  acetaminophen     Tablet .. 975 milliGRAM(s) Oral every 8 hours  amLODIPine   Tablet 10 milliGRAM(s) Oral daily  cloNIDine 0.1 milliGRAM(s) Oral two times a day  dextrose 5% + sodium chloride 0.45% with potassium chloride 20 mEq/L 1000 milliLiter(s) (100 mL/Hr) IV Continuous <Continuous>  dronabinol 2.5 milliGRAM(s) Oral daily  polyethylene glycol 3350 17 Gram(s) Oral daily  senna 2 Tablet(s) Oral at bedtime    MEDICATIONS  (PRN):  melatonin 3 milliGRAM(s) Oral at bedtime PRN Insomnia  morphine  - Injectable 2 milliGRAM(s) IV Push every 4 hours PRN SOB/increase work of breathing/Pain  ondansetron Injectable 4 milliGRAM(s) IV Push every 8 hours PRN Nausea and/or Vomiting  oxyCODONE    IR 5 milliGRAM(s) Oral every 6 hours PRN Moderate Pain (4 - 6)      PHYSICAL EXAM:      Vital Signs Last 24 Hrs  T(C): 37.7 (27 Jan 2023 08:38), Max: 37.7 (27 Jan 2023 08:38)  T(F): 99.9 (27 Jan 2023 08:38), Max: 99.9 (27 Jan 2023 08:38)  HR: 111 (27 Jan 2023 08:38) (92 - 115)  BP: 115/69 (27 Jan 2023 08:38) (106/65 - 127/71)  BP(mean): --  RR: 18 (27 Jan 2023 05:17) (18 - 20)  SpO2: 100% (27 Jan 2023 05:17) (97% - 100%)    Parameters below as of 27 Jan 2023 08:38  Patient On (Oxygen Delivery Method): nasal cannula  O2 Flow (L/min): 4      General: alert  oriented x ____ lethargic agitated                  cachexia  nonverbal  coma    Karnofsky:  %    HEENT: normal  dry mouth  ET tube/trach    Lungs: comfortable tachypnea/labored breathing  excessive secretions    CV: normal  tachycardia    GI: normal  distended  tender  no BS               PEG/NG/OG tube  constipation  last BM:     : normal  incontinent  oliguria/anuria  zavala    MSK: normal  weakness  edema             ambulatory  bedbound/wheelchair bound    Skin: normal  pressure ulcers- Stage_____  no rash    LABS:                          8.5    4.70  )-----------( 33       ( 27 Jan 2023 04:44 )             27.0     01-27    145  |  102  |  22.5<H>  ----------------------------<  95  3.3<L>   |  32.0<H>  |  0.63    Ca    14.6<HH>      27 Jan 2023 04:44  Phos  1.8     01-27  Mg     1.3     01-27    TPro  5.6<L>  /  Alb  3.5  /  TBili  1.1  /  DBili  0.4<H>  /  AST  30  /  ALT  9   /  AlkPhos  87  01-26    PT/INR - ( 25 Jan 2023 17:16 )   PT: 21.8 sec;   INR: 1.87 ratio         PTT - ( 25 Jan 2023 17:16 )  PTT:23.6 sec    I&O's Summary      RADIOLOGY & ADDITIONAL STUDIES:    ADVANCE DIRECTIVES:   DNR YES NO  Completed on:                     MOLST  YES NO   Completed on:  Living Will  YES NO   Completed on:     Palliative care follow up:    OVERNIGHT EVENTS:     PO meds were not taken overnight. Per NP, patient has been minimally responsive, prompting her to start prn IV morphine, as patient has not been taking oral medications.    Patient seen and examined at 10:00 am. Patient's sister, Gail, nephew, at bedside.  They offer that the patient has not been verbal with them.    Denies pain, difficulty breathing.  States he is ok.  Does not respond to other ROS questions.    MEDICATIONS  (STANDING):  acetaminophen     Tablet .. 975 milliGRAM(s) Oral every 8 hours  amLODIPine   Tablet 10 milliGRAM(s) Oral daily  cloNIDine 0.1 milliGRAM(s) Oral two times a day  dextrose 5% + sodium chloride 0.45% with potassium chloride 20 mEq/L 1000 milliLiter(s) (100 mL/Hr) IV Continuous <Continuous>  dronabinol 2.5 milliGRAM(s) Oral daily  polyethylene glycol 3350 17 Gram(s) Oral daily  senna 2 Tablet(s) Oral at bedtime    MEDICATIONS  (PRN):  melatonin 3 milliGRAM(s) Oral at bedtime PRN Insomnia  morphine  - Injectable 2 milliGRAM(s) IV Push every 4 hours PRN SOB/increase work of breathing/Pain  ondansetron Injectable 4 milliGRAM(s) IV Push every 8 hours PRN Nausea and/or Vomiting  oxyCODONE    IR 5 milliGRAM(s) Oral every 6 hours PRN Moderate Pain (4 - 6)      PHYSICAL EXAM:      Vital Signs Last 24 Hrs  T(C): 37.7 (27 Jan 2023 08:38), Max: 37.7 (27 Jan 2023 08:38)  T(F): 99.9 (27 Jan 2023 08:38), Max: 99.9 (27 Jan 2023 08:38)  HR: 111 (27 Jan 2023 08:38) (92 - 115)  BP: 115/69 (27 Jan 2023 08:38) (106/65 - 127/71)  BP(mean): --  RR: 18 (27 Jan 2023 05:17) (18 - 20)  SpO2: 100% (27 Jan 2023 05:17) (97% - 100%)    Parameters below as of 27 Jan 2023 08:38  Patient On (Oxygen Delivery Method): nasal cannula  O2 Flow (L/min): 4    Karnofsky: 20 %  Gen: Ill appearing.  No pain behaviors or work of breathing noted  Neuro: lethargic, engages briefly in conversation-short (1-3 word responses)  Head: NC/AT  Eyes: sclerae non-icteric  Resp: unlabored, on oxygen via NC  CV: S1, S2, tachycardic  Abd: soft  : no zavala  Peripheral Vasc: warm  Int: warm and dry  Psych: no psychomotor agitation    LABS:                          8.5    4.70  )-----------( 33       ( 27 Jan 2023 04:44 )             27.0     01-27    145  |  102  |  22.5<H>  ----------------------------<  95  3.3<L>   |  32.0<H>  |  0.63    Ca    14.6<HH>      27 Jan 2023 04:44  Phos  1.8     01-27  Mg     1.3     01-27    TPro  5.6<L>  /  Alb  3.5  /  TBili  1.1  /  DBili  0.4<H>  /  AST  30  /  ALT  9   /  AlkPhos  87  01-26    PT/INR - ( 25 Jan 2023 17:16 )   PT: 21.8 sec;   INR: 1.87 ratio         PTT - ( 25 Jan 2023 17:16 )  PTT:23.6 sec    I&O's Summary      RADIOLOGY & ADDITIONAL STUDIES:    ADVANCE DIRECTIVES:   - DNR/I   - hospice consult in place

## 2023-01-27 NOTE — DISCHARGE NOTE PROVIDER - DETAILS OF MALNUTRITION DIAGNOSIS/DIAGNOSES
This patient has been assessed with a concern for Malnutrition and was treated during this hospitalization for the following Nutrition diagnosis/diagnoses:     -  01/29/2023: Severe protein-calorie malnutrition

## 2023-01-27 NOTE — DISCHARGE NOTE PROVIDER - NSDCMRMEDTOKEN_GEN_ALL_CORE_FT
amLODIPine 10 mg oral tablet: 1 tab(s) orally once a day  cloNIDine 0.1 mg oral tablet: 1 tab(s) orally 2 times a day  Colace 100 mg oral capsule: 2 cap(s) orally once a day (at bedtime)   dronabinol 2.5 mg oral capsule: 1 cap(s) orally once a day  Lasix 10 mg/mL injectable solution:   oxyCODONE-acetaminophen 5 mg-325 mg oral tablet: 1 tab(s) orally every 6 hours, As Needed MDD:4   polyethylene glycol 3350 oral powder for reconstitution: 17 gram(s) orally every 12 hours, hold for diarrhea  senna leaf extract oral tablet: 2 tab(s) orally once a day (at bedtime), hold for diarrhea   atropine 1% ophthalmic solution: 1 drop(s) sublingual every 12 hours  bisacodyl 10 mg rectal suppository: 1 suppository(ies) rectal once a day, As needed, Constipation  dronabinol 2.5 mg oral capsule: 1 cap(s) orally once a day  glycopyrrolate 0.2 mg/mL injectable solution: 0.2 milligram(s) injectable every 4 hours, As Needed  LORazepam: 0.5 milligram(s) intravenous every 4 hours, As Needed  morphine: 2 milligram(s) intravenous every 2 hours, As Needed

## 2023-01-27 NOTE — CHART NOTE - NSCHARTNOTEFT_GEN_A_CORE
Palliative care social work note.    SW met with family at bedside to provide support in coping with patients prognosis and projected  loss. SW addressed questions regarding burial assistance with Medicaid and next steps. Palliative care following and planning for inpatient Hospice transfer.

## 2023-01-27 NOTE — DISCHARGE NOTE PROVIDER - NSDCFUSCHEDAPPT_GEN_ALL_CORE_FT
[facility]  Evelin DELAROSA [facility]  Scheduled Appointment: 02/01/2023    [facility]  Evelin DELAROSA [facility]  Scheduled Appointment: 03/20/2023

## 2023-01-28 NOTE — PROGRESS NOTE ADULT - SUBJECTIVE AND OBJECTIVE BOX
cc: Patient is a 67y old  Male who presents with a chief complaint of Severe hypercalcemia      INTERVAL HPI/OVERNIGHT EVENTS:  Patient seen and examined. Appears comfortable. AAOx0  Pending Onc eval as per family request. Family is deciding on aggressive care vs comfort      REVIEW OF SYSTEMS:  Unable to 2/2 pt mental status    T(C): 36.4 (01-26-23 @ 07:43), Max: 36.9 (01-25-23 @ 20:05)  HR: 100 (01-26-23 @ 07:43) (78 - 100)  BP: 143/73 (01-26-23 @ 07:43) (105/60 - 143/73)  RR: 20 (01-26-23 @ 07:43) (18 - 20)  SpO2: 100% (01-26-23 @ 07:43) (100% - 100%)  Wt(kg): --Vital Signs Last 24 Hrs  T(C): 36.4 (26 Jan 2023 07:43), Max: 36.9 (25 Jan 2023 20:05)  T(F): 97.5 (26 Jan 2023 07:43), Max: 98.4 (25 Jan 2023 20:05)  HR: 100 (26 Jan 2023 07:43) (78 - 100)  BP: 143/73 (26 Jan 2023 07:43) (105/60 - 143/73)  BP(mean): 99 (26 Jan 2023 04:45) (99 - 99)  RR: 20 (26 Jan 2023 07:43) (18 - 20)  SpO2: 100% (26 Jan 2023 07:43) (100% - 100%)    Parameters below as of 26 Jan 2023 07:43  Patient On (Oxygen Delivery Method): nasal cannula  O2 Flow (L/min): 4      PHYSICAL EXAM:  ENMT: No tonsillar erythema, exudates, or enlargement; Moist mucous membranes, Good dentition, No lesions  NECK: Supple, No JVD, Normal thyroid  CHEST/LUNG: Diminished air movement  HEART: Regular rate and rhythm; No murmurs, rubs, or gallops  ABDOMEN: Soft, Nontender, Nondistended; Bowel sounds present  EXTREMITIES:  2+ Peripheral Pulses, No clubbing, cyanosis, or edema  LYMPH: No lymphadenopathy noted  SKIN: No rashes or lesions                          8.5    4.70  )-----------( 33       ( 27 Jan 2023 04:44 )             27.0   01-27    145  |  102  |  22.5<H>  ----------------------------<  95  3.3<L>   |  32.0<H>  |  0.63    Ca    14.6<HH>      27 Jan 2023 04:44  Phos  1.8     01-27  Mg     1.3     01-27                MEDICATIONS  (STANDING):  dronabinol 2.5 milliGRAM(s) Oral daily    MEDICATIONS  (PRN):  bisacodyl Suppository 10 milliGRAM(s) Rectal daily PRN Constipation  glycopyrrolate Injectable 0.2 milliGRAM(s) IV Push every 4 hours PRN increased oropharyngeal secretions  LORazepam   Injectable 0.5 milliGRAM(s) IV Push every 4 hours PRN Anxiety  morphine  - Injectable 2 milliGRAM(s) IV Push every 2 hours PRN pain, work of breathing, RR > 26

## 2023-01-29 NOTE — DIETITIAN INITIAL EVALUATION ADULT - ETIOLOGY
related to inability to meet sufficient protein-energy needs in setting of advanced metastatic prostate CA, AMS

## 2023-01-29 NOTE — PROGRESS NOTE ADULT - NUTRITIONAL ASSESSMENT
This patient has been assessed with a concern for Malnutrition and has been determined to have a diagnosis/diagnoses of Severe protein-calorie malnutrition.    This patient is being managed with:   Diet Regular-  Entered: Jan 27 2023 11:40AM

## 2023-01-29 NOTE — DIETITIAN NUTRITION RISK NOTIFICATION - ADDITIONAL COMMENTS/DIETITIAN RECOMMENDATIONS
1) Add Ensure TID  2) Rx MVI daily  3) Consider appetite stimulant   4) Monitor weights daily for trend/accuracy   5) RD to remain available  6) Provide encouragement/assistance as needed during mealtimes to inc PO

## 2023-01-29 NOTE — PROGRESS NOTE ADULT - SUBJECTIVE AND OBJECTIVE BOX
cc: Patient is a 67y old  Male who presents with a chief complaint of Severe hypercalcemia      INTERVAL HPI/OVERNIGHT EVENTS: awake, comfortable, now comfort care       Vital Signs Last 24 Hrs  T(C): 36.6 (29 Jan 2023 11:13), Max: 37.1 (29 Jan 2023 07:53)  T(F): 97.8 (29 Jan 2023 11:13), Max: 98.8 (29 Jan 2023 07:53)  HR: 98 (29 Jan 2023 11:13) (98 - 112)  BP: 112/63 (29 Jan 2023 11:13) (112/63 - 121/73)  BP(mean): --  RR: 19 (29 Jan 2023 11:13) (18 - 19)  SpO2: 100% (29 Jan 2023 11:13) (100% - 100%)    Parameters below as of 29 Jan 2023 11:13  Patient On (Oxygen Delivery Method): nasal cannula  O2 Flow (L/min): 4      PHYSICAL EXAM:  ENMT: No tonsillar erythema, exudates, or enlargement; Moist mucous membranes, Good dentition, No lesions  NECK: Supple, No JVD, Normal thyroid  CHEST/LUNG: Diminished air movement  HEART: Regular rate and rhythm; No murmurs, rubs, or gallops  ABDOMEN: Soft, Nontender, Nondistended; Bowel sounds present  EXTREMITIES:  2+ Peripheral Pulses, No clubbing, cyanosis, or edema  LYMPH: No lymphadenopathy noted  SKIN: No rashes or lesions

## 2023-01-29 NOTE — DIETITIAN INITIAL EVALUATION ADULT - OTHER INFO
66 y/o male with hx of advanced prostate cancer with mets to lung/bone/lymph with last treatment being Lynparza which was on hold from last admission 1 month ago for anemia related to cancer/bone marrow infiltration. Patient has been residing in [facility] since last admission and most recently was started on IVF and Lasix in [facility] for hypercalcemia related to progression of prostate cancer. Patient also noted to again be anemia with no reported bleeding. He was sent to ED for PRBC transfusion, however while in ED noted to have Calcium of 17 with normal albumin and more lethargic than baseline. He was given IVF, lasix, and also had magnesium/potassium replaced. CTH with no acute changes.

## 2023-01-29 NOTE — DIETITIAN INITIAL EVALUATION ADULT - PERTINENT MEDS FT
MEDICATIONS  (STANDING):  dronabinol 2.5 milliGRAM(s) Oral daily    MEDICATIONS  (PRN):  bisacodyl Suppository 10 milliGRAM(s) Rectal daily PRN Constipation  glycopyrrolate Injectable 0.2 milliGRAM(s) IV Push every 4 hours PRN increased oropharyngeal secretions  LORazepam   Injectable 0.5 milliGRAM(s) IV Push every 4 hours PRN Anxiety  morphine  - Injectable 2 milliGRAM(s) IV Push every 2 hours PRN pain, work of breathing, RR > 26

## 2023-01-29 NOTE — DIETITIAN INITIAL EVALUATION ADULT - ORAL INTAKE PTA/DIET HISTORY
Pt A&O x0 noted. Per previous nutrition assessment Dec 2022 Pt had reported UBW of 265-270lbs with weight loss at that admission. Current admission weight 208lbs consistent with RD bedscale weight, visually appears accurate however question accuracy of weights. Aware poor prognosis and possible hospice candidate, family is deciding aggressive treatement vs comfort care pending discussion with oncology.

## 2023-01-30 NOTE — PROGRESS NOTE ADULT - PROVIDER SPECIALTY LIST ADULT
Hospitalist
Palliative Care
Family Medicine
Hospitalist
Palliative Care

## 2023-01-30 NOTE — DISCHARGE NOTE NURSING/CASE MANAGEMENT/SOCIAL WORK - PATIENT PORTAL LINK FT
You can access the FollowMyHealth Patient Portal offered by [facility] by registering at the following website: [email or web address]. By joining [employer]’s FollowMyHealth portal, you will also be able to view your health information using other applications (apps) compatible with our system.

## 2023-01-30 NOTE — PROGRESS NOTE ADULT - NSPROGADDITIONALINFOA_GEN_ALL_CORE
Review of chart documents, labs, imaging. Direct patient assessment,  formulation of care plan. Discussion with  Interdisciplinary  team  Hospice RN, SW

## 2023-01-30 NOTE — DISCHARGE NOTE NURSING/CASE MANAGEMENT/SOCIAL WORK - NSDCPEFALRISK_GEN_ALL_CORE
For information on Fall & Injury Prevention, visit: [email or web address] OR  [email or web address] OR  https://www.cdc.gov/steadi/patient.html

## 2023-01-30 NOTE — PROGRESS NOTE ADULT - ASSESSMENT
66 y/o male with refractory anemia, Severe hypercalcemia, thrombocytopenia, Metastatic prostate cancer to bone/lung/lymph, hypomagnesemia, Hypokalemia, HTN    Refractory anemia:  -essentially has pancytopenia likely from BM infiltration   -Transfuse 2 units PRBCs  -Check post transfusion CBC w/ goal >7  -Transfuse platelets it develops bleeding  -FA, MVI  -Pending AM labs, Onc eval and subsequent goals of care conversation with family to determine further course    Severe hypercalcemia:  -From mets to bone and likely humeral hypercalcemia of malignancy  -Phos normal at  2.9  -s/p IVF/Lasix with repeat Ca of 16  -Will give 1 dose of Aredia and start on Calcitonin   -DC lasix with poor po intake and dehydration   -Cont. IVF  -Xgeva?   -Likely will not be able to bring down to normal without being able to effectively treat underlying prostate cancer  -Cont. tele with multiple electrolyte abnormalities    Hypokalemia/hypomagnesemia:  -Due to Lasix, poor po intake  -Being replaced IV, repeat labs for 10am to trend    Prostate cancer with diffuse mets:  -Poor performance status, unclear what treatments patient is a candidate for  -Poor prognosis and appropriate for hospice  -Sister now agreeable to DNR/DNI  -May transition to comfort care, but wants to discuss with Oncology first  -Pain control, fall risk    HTN:  -Resume o/p regimen as tolerated  
66 y/o male with refractory anemia, Severe hypercalcemia, thrombocytopenia, Metastatic prostate cancer to bone/lung/lymph, hypomagnesemia, Hypokalemia, HTN    Refractory anemia:  -essentially has pancytopenia likely from BM infiltration   --FA, MVI  -now comfort care     Severe hypercalcemia:  -From mets to bone and likely humeral hypercalcemia of malignancy  -Phos normal at  2.9  -s/p IVF/Lasix with repeat Ca of 16  -Will give 1 dose of Aredia and start on Calcitonin   -DC lasix with poor po intake and dehydration   -Cont. IVF  -Xgeva?   -Likely will not be able to bring down to normal without being able to effectively treat underlying prostate cancer  -Cont. tele with multiple electrolyte abnormalities    Hypokalemia/hypomagnesemia:  -Due to Lasix, poor po intake  -Being replaced IV, repeat labs for 10am to trend    Prostate cancer with diffuse mets:  -Poor performance status, unclear what treatments patient is a candidate for  -Poor prognosis and appropriate for hospice  -Sister now agreeable to DNR/DNI  -May transition to comfort care, but wants to discuss with Oncology first  -Pain control, fall risk    HTN:  -Resume o/p regimen as tolerated    > dispo: dnr/ dni. now comfort care . plan for hospice inn. 
67 year old male with a history of adenocarcinoma of the prostate, with mets to the lung, bone, and lymph nodes, presents from [facility] to [facility] with abnormal labs, found hypercalcemic: 17 (normal albumin) and with a hemoglobin of 6.5.  Admitted for management of hypercalcemia, anemia.  Focus of care now oriented to comfort. Palliative care consulted for goals of care.    # cancer-associated pain:  - not reliably taking PO.  - d/c scheduled APAP   - Cont  morphine 2 mg IV q 2 hr prn (pain, dyspnea, RR > 26)  Place Morphine  ATC if using multiple PRN doses    # Anxiety  Reviewed prn dosing  Cont Ativan 0.5mg IVP q4 PRN    #Secretions  On Atropine s/l q12hr and Robinul 0.2 mg IVP PRN  Consider Scopolamine patch if secretions persists      # anemia  - sequelae of malignancy  - as focus of care is now on comfort, dyspnea can be anticipated.  May use morphine prn as above for symptomatic management of dyspnea    # metastatic prostate cancer  -- Focus of care now comfort oriented.    # AMS  - likely multifactorial in the setting of metabolic derangements, care in a different facility.  Would take steps to manage/ minimize risk of reversible cause of delirium: exercise steps to manage/prevent delirium.  Provide re-assurance, re-orientation as needed.  Provide day/night distinction, compartmentalize care.  Assure regular bowel/bladder habits.   - may use lorazepam 0.5 mg IV q 6 hrs prn (anxiety/restlessness)    # debility  - supportive care. Assist as needed.    # palliative care encounter  Continue monitoring for symptoms - medicate as ordered  Plan for hospice inpatient transfer  
66 y/o male with refractory anemia, Severe hypercalcemia, thrombocytopenia, Metastatic prostate cancer to bone/lung/lymph, hypomagnesemia, Hypokalemia, HTN    Refractory anemia:  -essentially has pancytopenia likely from BM infiltration   --FA, MVI  -now comfort care     Severe hypercalcemia:  -From mets to bone and likely humeral hypercalcemia of malignancy  -Phos normal at  2.9  -s/p IVF/Lasix with repeat Ca of 16  -Will give 1 dose of Aredia and start on Calcitonin   -DC lasix with poor po intake and dehydration   -Cont. IVF  -Xgeva?   -Likely will not be able to bring down to normal without being able to effectively treat underlying prostate cancer  -Cont. tele with multiple electrolyte abnormalities    Hypokalemia/hypomagnesemia:  -Due to Lasix, poor po intake  -Being replaced IV, repeat labs for 10am to trend    Prostate cancer with diffuse mets:  -Poor performance status, unclear what treatments patient is a candidate for  -Poor prognosis and appropriate for hospice  -Sister now agreeable to DNR/DNI  -May transition to comfort care, but wants to discuss with Oncology first  -Pain control, fall risk    HTN:  -Resume o/p regimen as tolerated    > dispo: dnr/ dni. now comfort care . plan for hospice inn. 
67 year old male with a history of adenocarcinoma of the prostate, with mets to the lung, bone, and lymph nodes, presents from [facility] to [facility] with abnormal labs, found hypercalcemic: 17 (normal albumin) and with a hemoglobin of 6.5.  Admitted for management of hypercalcemia, anemia.  Focus of care now oriented to comfort. Palliative care consulted for goals of care.    # cancer-associated pain:  - not reliably taking PO.  - d/c scheduled APAP   - d/c current orders for morphine prn; would start morphine 2 mg IV q 2 hr prn (pain, dyspnea, RR > 26)    # hypercalcemia  - sequelae of malignancy  - medical management per primary team.    - s/p IVF/Lasix with repeat Ca of 16, s/p pamidronate  - as focus of care is now on comfort, AMS can be anticipated    # anemia  - sequelae of malignancy  - transfused 2 units of PRBC  - as focus of care is now on comfort, dyspnea can be anticipated.  May use morphine prn as above for symptomatic management of dyspnea    # metastatic prostate cancer  - seen by heme-onc.  Per onc, patient recently returned from [place] to [place], and was placed on lynparza and had been previously treated with leuprolide and taxotere in 2020.  Per notes, patient has also seen [place] [facility] [place] [facility] [place] and has been having 'radiosurgery' by Dr. Santizo.  Recent PSA in Dec ~ 14K   - Focus of care now comfort oriented.    # AMS  - likely multifactorial in the setting of metabolic derangements, care in a different facility.  Would take steps to manage/ minimize risk of reversible cause of delirium: exercise steps to manage/prevent delirium.  Provide re-assurance, re-orientation as needed.  Provide day/night distinction, compartmentalize care.  Assure regular bowel/bladder habits.   - may use lorazepam 0.5 mg IV q 6 hrs prn (anxiety/restlessness)    # debility  - supportive care. Assist as needed.    # palliative care encounter  - DNR/I  - care oriented towards comfort: hospice consulted  - see separate GOC note.

## 2023-01-30 NOTE — PROGRESS NOTE ADULT - SUBJECTIVE AND OBJECTIVE BOX
CC:  Follow up GOC , Symptoms    OVERNIGHT EVENTS:  on comfort care  reviewed PRN dosing    Present Symptoms:   Dyspnea:  yes   Nausea/Vomiting:  No    Anxiety:  No     Depression:  Unable  Fatigue:  Yes   Loss of appetite:  Yes  Constipation: Not Reported       Pain: + grimace            Character-            Duration-            Location-            Severity-    Pain AD Score:  [email or web address] (press ctrl + left click to view)    Review of Systems: Reviewed as above  Further ROS unable to  obtain due to poor mentation       MEDICATIONS  (STANDING):  atropine 1% Ophthalmic Solution for SubLingual Use 1 Drop(s) SubLingual every 12 hours    MEDICATIONS  (PRN):  bisacodyl Suppository 10 milliGRAM(s) Rectal daily PRN Constipation  glycopyrrolate Injectable 0.2 milliGRAM(s) IV Push every 4 hours PRN increased oropharyngeal secretions  LORazepam   Injectable 0.5 milliGRAM(s) IV Push every 4 hours PRN Anxiety  morphine  - Injectable 2 milliGRAM(s) IV Push every 2 hours PRN pain, work of breathing, RR > 26      PHYSICAL EXAM:    Vital Signs Last 24 Hrs  T(C): 36.6 (30 Jan 2023 07:59), Max: 36.6 (30 Jan 2023 07:59)  T(F): 97.8 (30 Jan 2023 07:59), Max: 97.8 (30 Jan 2023 07:59)  HR: 104 (30 Jan 2023 07:59) (104 - 104)  BP: 123/76 (30 Jan 2023 07:59) (123/76 - 123/76)  BP(mean): --  RR: 18 (30 Jan 2023 07:59) (18 - 18)  SpO2: 98% (30 Jan 2023 07:59) (98% - 98%)    Parameters below as of 30 Jan 2023 07:59  Patient On (Oxygen Delivery Method): nasal cannula    Karnofsky: 20 %  General:    M awake NAD     HEENT:  NCAT   + grimace  Lungs:  slightly  labored  CV:  RR  GI:  soft NTND  :  zavala             MSK: weak  Skin:  warm/dry  Neuro  awake, confused    LABS:      I&O's Summary    29 Jan 2023 07:01  -  30 Jan 2023 07:00  --------------------------------------------------------  IN: 0 mL / OUT: 600 mL / NET: -600 mL        RADIOLOGY & ADDITIONAL STUDIES:      ADVANCE DIRECTIVES/TREATMENT PREFERENCES:

## 2023-01-30 NOTE — PROGRESS NOTE ADULT - REASON FOR ADMISSION
Severe hypercalcemia  Anemia  Stage 4 prostate cancer

## 2023-02-01 ENCOUNTER — APPOINTMENT (OUTPATIENT)
Dept: HEMATOLOGY ONCOLOGY | Facility: CLINIC | Age: 68
End: 2023-02-01

## 2023-02-02 LAB — MAG AB SER-ACNC: NEGATIVE — SIGNIFICANT CHANGE UP

## 2023-02-13 NOTE — CDI QUERY NOTE - NSCDIOTHERTXTBX_GEN_ALL_CORE_HH
Progress Note Adult-Palliative Care Nurse Practitioner [Charted Location: [facility] [id number]] [Authored: 27-Jan-2023 09:22]  # AMS  - likely multifactorial in the setting of metabolic derangements, care in a different facility.  Would take steps to manage/ minimize risk of reversible cause of delirium: exercise steps to manage/prevent delirium.  Provide re-assurance, re-orientation as needed.  Provide day/night distinction, compartmentalize care.  Assure regular bowel/bladder habits.   - may use lorazepam 0.5 mg IV q 6 hrs prn (anxiety/restlessness)        Please clarify if AMS (altered mental status) from metabolic derangements supports a clinically significant diagnosis.  A.	Metabolic encephalopathy  B.	Other, please specify  C.	Not clinically significant

## 2023-02-15 ENCOUNTER — APPOINTMENT (OUTPATIENT)
Dept: HEMATOLOGY ONCOLOGY | Facility: CLINIC | Age: 68
End: 2023-02-15

## 2023-03-20 ENCOUNTER — APPOINTMENT (OUTPATIENT)
Age: 68
End: 2023-03-20

## 2023-07-29 NOTE — PHYSICAL THERAPY INITIAL EVALUATION ADULT - WEIGHT-BEARING RESTRICTIONS: STAND/SIT, REHAB EVAL
3.5 hour dialysis complete.  Blood returned.  Needles pulled from JAIRON graft.  Pressure held x 10 minutes.  Hemostasis achieved.  +thrill +bruit    Net UF 2L.  Tolerated well.    Transported from [facility] to room 1153 via bed by 2 transporters.   full weight-bearing

## 2023-08-09 NOTE — ED PROVIDER NOTE - PRO INTERPRETER NEED 2
English What Type Of Note Output Would You Prefer (Optional)?: Bullet Format How Severe Is Your Acne?: severe Is This A New Presentation, Or A Follow-Up?: Acne

## 2024-11-06 NOTE — ED PROVIDER NOTE - PHYSICAL EXAMINATION
INR today is 3.8 Per Dr. Dorman patient is to hold for today then start taking 6 mg on Mondays and 3 mg on the other days. Recheck in 2 weeks.    pale skin moist
